# Patient Record
Sex: FEMALE | Race: WHITE | NOT HISPANIC OR LATINO | Employment: FULL TIME | URBAN - NONMETROPOLITAN AREA
[De-identification: names, ages, dates, MRNs, and addresses within clinical notes are randomized per-mention and may not be internally consistent; named-entity substitution may affect disease eponyms.]

---

## 2017-01-01 ENCOUNTER — HOSPITAL ENCOUNTER (OUTPATIENT)
Dept: PHYSICAL THERAPY | Facility: HOSPITAL | Age: 52
Setting detail: THERAPIES SERIES
Discharge: HOME | End: 2017-01-20
Attending: ORTHOPAEDIC SURGERY | Admitting: ORTHOPAEDIC SURGERY

## 2017-01-19 ENCOUNTER — TRANSCRIBE ORDERS (OUTPATIENT)
Dept: PHYSICAL THERAPY | Facility: HOSPITAL | Age: 52
End: 2017-01-19

## 2017-01-19 DIAGNOSIS — Z98.890 STATUS POST ARTHROSCOPY OF SHOULDER: Primary | ICD-10-CM

## 2017-01-23 ENCOUNTER — HOSPITAL ENCOUNTER (OUTPATIENT)
Dept: PHYSICAL THERAPY | Facility: HOSPITAL | Age: 52
Discharge: HOME | End: 2017-01-23
Admitting: PHYSICAL THERAPIST

## 2017-01-23 DIAGNOSIS — M25.512 LEFT SHOULDER PAIN, UNSPECIFIED CHRONICITY: Primary | ICD-10-CM

## 2017-01-23 PROCEDURE — 97110 THERAPEUTIC EXERCISES: CPT | Performed by: PHYSICAL THERAPIST

## 2017-01-23 PROCEDURE — 97164 PT RE-EVAL EST PLAN CARE: CPT | Performed by: PHYSICAL THERAPIST

## 2017-01-23 NOTE — PROGRESS NOTES
Outpatient Physical Therapy Ortho Re-Evaluation  [facility]     Patient Name: Priti Orr  : 1965  MRN: [id number]  Today's Date: 2017      Visit Date: 2017  Attendance: 15  Subjective Improvement: 80-85%  Patient Active Problem List   Diagnosis   • Osteoporosis   • Chronic left shoulder pain   • Presence of left artificial shoulder joint        Past Medical History   Diagnosis Date   • Allergic rhinitis    • Asthma    • Benign neoplasm of meninges      Post op       • Cobalamin deficiency    • Corneal abrasion    • Encounter for gynecological examination (general) (routine) without abnormal findings    • Essential hypertension    • Fatigue    • Female stress incontinence    • History of delayed wound healing      Delayed healing of surgical wound      • Hyperlipidemia    • Low back pain    • Nosebleed, symptom      Nosebleed/epistaxis symptom      • Osteoporosis         Past Surgical History   Procedure Laterality Date   • Hemorrhoidectomy     • Hysterectomy     • Breast lumpectomy     • Injection of medication  2013     Celestone (betamethasone) (1)      •  section  1986      Section (1)     • Endoscopy  2008     Colon endoscopy 02975 (1)     • Injection of medication  2013     Depo Medrol (Methylprednisone) (1)      • Injection of medication  2013     Dexamethasone (2)      • Excision breast lesion w/ preop needle loc  1981     Excision, breast lesion (1)     • Hemorrhoidectomy  2008     Hemorrhoidectomy (2)      • Injection of medication  2014     Kenalog (6)   • Knee arthroscopy  2007     Knee arthroscopy, surgery (1)     • Pap smear  2007     PAP SMEAR (1)      • Arm skin lesion biopsy / excision     • Arm lesion/cyst excision  2014     Remove arm bone lesion (1)   • Arm wound repair / closure  2014     Repair Superficial Wound TR-EXT 2.5 < CM 13976 (1)      • Total abdominal  hysterectomy with salpingo oophorectomy  1993     JOSE/BSO (1)      • Laparoscopic tubal ligation  04/16/1991     Tubal ligation (1)          Visit Dx:     ICD-10-CM ICD-9-CM   1. Left shoulder pain, unspecified chronicity M25.512 719.41             Patient History       01/23/17 1700          History    Chief Complaint Pain;Difficulty with daily activities  -      Type of Pain Shoulder pain  -      Date Current Problem(s) Began 08/23/16  -      Brief Description of Current Complaint Please see Initial Evaluation  -        User Key  (r) = Recorded By, (t) = Taken By, (c) = Cosigned By    Initials Name Provider Type     Nathaniel Sharp, PT Physical Therapist                PT Ortho       01/23/17 1700    Subjective Comments    Subjective Comments Quite a bit of soreness after last therapy session. Isometric abduction was somewhat painful. Trouble with activities that require elevation of the left shoulder. Able to reach L side of head if laterally flexes cervical spine. Unable to reach R side of head. Able to carry briefcase and groceries. No strength in L UE.  -    Precautions and Contraindications    Precautions/Limitations no known precautions/limitations  -    Subjective Pain    Able to rate subjective pain? yes  -    Pre-Treatment Pain Level 3  -SS    Post-Treatment Pain Level 4  -    Pain Assessment    Pain Assessment 0-10  -SS    Pain Score 3  -SS    Post Pain Score 4  -    Pain Location Shoulder  -SS    Pain Orientation Left  -SS    Posture/Observations    Alignment Options Scapular elevation;Rounded shoulders  -SS    ROM (Range of Motion)    General ROM upper extremity range of motion deficits identified  -    Left Shoulder    Flexion AROM other (see comments)   80 seated; 90 standing  -SS    External Rotation AROM other (see comments)   50 in supine 90/90  -SS    MMT (Manual Muscle Testing)    General MMT Assessment upper extremity strength deficits identified  -SS    Left  Shoulder    Flexion Gross Movement (3/5) fair  -SS    Extension Gross Movement (3+/5) fair plus  -SS    Int Rotation Gross Movement (3/5) fair  -SS    Ext Rotation Gross Movement (3/5) fair  -SS      User Key  (r) = Recorded By, (t) = Taken By, (c) = Cosigned By    Initials Name Provider Type    RUBÉN Sharp, PT Physical Therapist                            Therapy Education       01/23/17 1700    Therapy Education    Given HEP  -SS    Program Reinforced  -SS    How Provided Verbal;Demonstration  -SS    Provided to Patient  -SS    Level of Understanding Demonstrated  -SS      User Key  (r) = Recorded By, (t) = Taken By, (c) = Cosigned By    Initials Name Provider Type     Nathaniel Sharp, PT Physical Therapist                PT OP Goals       01/23/17 1700          PT Short Term Goals    STG Date to Achieve 02/13/17  -SS      STG 1 Active elevation to be >= 110 degrees  -SS      STG 1 Progress Not Met  -SS      STG 2 Active ER in supine 90/90 position to be >= 65 degrees  -SS      STG 2 Progress Not Met  -SS      Long Term Goals    LTG Date to Achieve 03/06/17  -SS      LTG 1 Independent w/ HEP  -SS      LTG 1 Progress Not Met  -SS      LTG 2 Active flexion to be >= 120 degrees  -SS      LTG 2 Progress Not Met  -SS      LTG 3 Independent use of L UE for dression  -SS      LTG 3 Progress Not Met  -SS      LTG 4 Fix hair w/o assistance  -SS      LTG 4 Progress Not Met  -SS      Time Calculation    PT Goal Re-Cert Due Date 02/13/17  -        User Key  (r) = Recorded By, (t) = Taken By, (c) = Cosigned By    Initials Name Provider Type     Nathaniel Sharp, PT Physical Therapist                PT Assessment/Plan       01/23/17 1700          PT Assessment    Functional Limitations Limitations in community activities;Performance in leisure activities;Performance in self-care ADL  -SS      Impairments Range of motion;Muscle strength  -      Assessment Comments Continuing ROM and strength  deficits s/p total shoulder arthroplasty  -SS      Please refer to paper survey for additional self-reported information Yes   QuickDASH  -SS      Rehab Potential Fair   Prior enchondroma and proximal humerus replacement   -SS      Patient/caregiver participated in establishment of treatment plan and goals Yes  -SS      Patient would benefit from skilled therapy intervention Yes  -SS      PT Plan    PT Frequency 1x/week  -SS      Predicted Duration of Therapy Intervention (days/wks) 6-8 weeks  -SS      Planned CPT's? PT EVAL: 09177;PT RE-EVAL: 27862;PT THER PROC EA 15 MIN: 76158;PT HOT OR COLD PACK TREAT MCARE  -SS      PT Plan Comments Focus on strength  -SS        User Key  (r) = Recorded By, (t) = Taken By, (c) = Cosigned By    Initials Name Provider Type     Nathaniel Sharp, PT Physical Therapist                  Exercises       01/23/17 1700          Subjective Comments    Subjective Comments Quite a bit of soreness after last therapy session. Isometric abduction was somewhat painful. Trouble with activities that require elevation of the left shoulder. Able to reach L side of head if laterally flexes cervical spine. Unable to reach R side of head. Able to carry briefcase and groceries. No strength in L UE.  -SS      Subjective Pain    Able to rate subjective pain? yes  -SS      Pre-Treatment Pain Level 3  -SS      Post-Treatment Pain Level 4  -SS      Aquatics    Aquatics performed? No  -SS      Exercise 1    Exercise Name 1 Isometric shoulder abduction ball squeeze  -SS      Cueing 1 Demo;Verbal  -SS      Equipment 1 Other   ball  -SS      Sets 1 1  -SS      Reps 1 10  -SS      Time (Seconds) 1 5  -SS      Exercise 2    Exercise Name 2 T-band Row  -SS      Cueing 2 Verbal  -SS      Equipment 2 Theraband  -SS      Resistance 2 Green  -SS      Sets 2 2  -SS      Reps 2 10  -SS      Exercise 3    Exercise Name 3 T-band Shoulder Extension  -SS      Cueing 3 Verbal  -SS      Equipment 3 Theraband  -SS       Resistance 3 Red  -SS      Sets 3 2  -SS      Reps 3 10  -SS      Exercise 4    Exercise Name 4 T-band Shoulder Adduction  -SS      Cueing 4 Verbal  -SS      Equipment 4 Theraband  -SS      Resistance 4 Red  -SS      Sets 4 2  -SS      Reps 4 10  -SS      Exercise 5    Exercise Name 5 T-band Shoulder IR  -SS      Cueing 5 Verbal  -SS      Equipment 5 Theraband  -SS      Resistance 5 Red  -SS      Sets 5 2  -SS      Reps 5 10  -SS      Exercise 6    Exercise Name 6 T-band Single Arm Row -- Left  -SS      Cueing 6 Verbal  -SS      Equipment 6 Theraband  -SS      Resistance 6 Red  -SS      Sets 6 2  -SS      Reps 6 10  -SS      Exercise 7    Exercise Name 7 Shoulder Flexion w/ Isometric Adduction Ball Squeeze  -SS      Cueing 7 Verbal  -SS      Resistance 7 --   isometric  -SS      Sets 7 1  -SS      Reps 7 5  -SS      Exercise 8    Exercise Name 8 Countertop Pushups  -SS      Cueing 8 Demo  -SS      Sets 8 2  -SS      Reps 8 5  -SS      Exercise 9    Exercise Name 9 Shoulder Depression Into Ball  -SS      Cueing 9 Verbal  -SS      Equipment 9 --   therapy ball  -SS      Sets 9 1  -SS      Reps 9 10  -SS      Time (Seconds) 9 5  -SS      Exercise 10    Exercise Name 10 Shoulder Abduction w/ Elbows Flexed  -SS      Cueing 10 Demo  -SS      Sets 10 2  -SS      Reps 10 10  -SS      Exercise 11    Exercise Name 11 Sternal Lift  -SS      Cueing 11 Tactile  -SS      Sets 11 2  -SS      Reps 11 15  -SS        User Key  (r) = Recorded By, (t) = Taken By, (c) = Cosigned By    Initials Name Provider Type     Nathaniel Sharp PT Physical Therapist                              Outcome Measures       01/23/17 1700          Quick DASH    Open a tight or new jar. 3  -SS      Do heavy household chores (e.g., wash walls, wash floors) 1  -SS      Carry a shopping bag or briefcase 1  -SS      Wash your back 4  -SS      Use a knife to cut food 2  -SS      Recreational activities in which you take some force or impact through your  arm, should or hand (e.g. golf, hammering, tennis, etc.) 4  -SS      During the past week, to what extent has your arm, shoulder, or hand problem interfered with your normal social activites with family, friends, neighbors or groups? 1  -SS      During the past week, were you limited in your work or other regular daily activities as a result of your arm, shoulder or hand problem? 1  -SS      Arm, Shoulder, or hand pain 3  -SS      Tingling (pins and needles) in your arm, shoulder, or hand 1  -SS      During the past week, how much difficulty have you had sleeping because of the pain in your arm, shoulder or hand? 2  -SS      Number of Questions Answered 11  -SS      Quick DASH Score 27.27  -SS      Functional Assessment    Outcome Measure Options Quick DASH  -SS        User Key  (r) = Recorded By, (t) = Taken By, (c) = Cosigned By    Initials Name Provider Type    SS Nathaniel Sharp, PT Physical Therapist            Time Calculation:   Start Time: 1615  Stop Time: 1657  Time Calculation (min): 42 min     Therapy Charges for Today     Code Description Service Date Service Provider Modifiers Qty    13804712201 HC PT RE-EVAL ESTABLISHED PLAN 1 1/23/2017 Nathaniel Sharp, PT GP 1    18335209558 HC PT THER PROC EA 15 MIN 1/23/2017 Nathaneil Sharp, PT GP 2          PT G-Codes  Outcome Measure Options: Quick DASH         Nathaniel Sharp, PT  1/23/2017

## 2017-01-30 ENCOUNTER — HOSPITAL ENCOUNTER (OUTPATIENT)
Dept: PHYSICAL THERAPY | Facility: HOSPITAL | Age: 52
Setting detail: THERAPIES SERIES
Discharge: HOME | End: 2017-01-30

## 2017-01-30 DIAGNOSIS — Z96.612 PRESENCE OF LEFT ARTIFICIAL SHOULDER JOINT: Primary | ICD-10-CM

## 2017-01-30 PROCEDURE — 97110 THERAPEUTIC EXERCISES: CPT | Performed by: PHYSICAL THERAPIST

## 2017-01-30 NOTE — MR AVS SNAPSHOT
Priti Andrews Kirby   1/30/2017  4:15 PM   Therapy Treatment    Dept Phone:  [phone]   Encounter #:  [id number]    Provider:  Nathaniel Sharp PT   Department:  [facility]                 Your Full Care Plan              Your Updated Medication List      ASK your doctor about these medications     ALBUTEROL IN        MG capsule       lisinopril-hydrochlorothiazide 20-12.5 MG per tablet   Commonly known as:  PRINZIDE,ZESTORETIC       PROLIA 60 MG/ML solution syringe   Generic drug:  denosumab       Vitamin D 1000 UNITS tablet               You Were Diagnosed With        Codes Comments    Presence of left artificial shoulder joint    -  Primary ICD-10-CM: Z96.612  ICD-9-CM: V43.61       Instructions     None    Patient Instructions History      Upcoming Appointments     Visit Type Date Time Department    TREATMENT 1/30/2017  4:15 PM [facility]     FOLLOW UP 2/2/2017  8:00 AM [facility]    TREATMENT 2/6/2017  4:15 PM [facility]       MyChart Signup     Our records indicate that you have declined [facility] sign[facility]. If you would like to sign up for [employer], please email [email or web address] or call [phone] to obtain an activation code.             Other Info from Your Visit           Your Appointments     Feb 02, 2017  8:00 AM CST   Follow Up with Brooks Junior MD   [facility] (--)    [address]  [facility] [address] [phone]           Arrive 15 minutes prior to appointment.            Feb 06, 2017  4:15 PM CST   Therapy Treatment with Nathaniel Sharp, PT   [facility]  (--)    [address] [address] [phone]              Allergies     Pseudoephedrine  Shortness Of Breath, Swelling    LIPS SWELL AND TONGUE BECOMES THICK    Morphine  Other (See Comments)    States felt like itching from  within, hallucinations, agitation    Allegra [Fexofenadine]  Swelling    Zyrtec [Cetirizine]  Swelling      Reason for Visit     Left Arm - Pain L reverse total shoulder 8/23/16      Vital Signs     Smoking Status                   Never Smoker           Problems and Diagnoses Noted     Presence of left artificial shoulder joint

## 2017-01-30 NOTE — PROGRESS NOTES
Outpatient Physical Therapy Ortho Treatment Note  [facility]     Patient Name: Priti Orr  : 1965  MRN: [id number]  Today's Date: 2017      Visit Date: 2017  Attendance:   Subjective Improvement: 85%  Next MD Appt: 17  Recert Date: 17      Visit Dx:    ICD-10-CM ICD-9-CM   1. Presence of left artificial shoulder joint Z96.612 V43.61   Left reverse total shoulder arthroplasty 16    Patient Active Problem List   Diagnosis   • Osteoporosis   • Chronic left shoulder pain   • Presence of left artificial shoulder joint        Past Medical History   Diagnosis Date   • Allergic rhinitis    • Asthma    • Benign neoplasm of meninges      Post op       • Cobalamin deficiency    • Corneal abrasion    • Encounter for gynecological examination (general) (routine) without abnormal findings    • Essential hypertension    • Fatigue    • Female stress incontinence    • History of delayed wound healing      Delayed healing of surgical wound      • Hyperlipidemia    • Low back pain    • Nosebleed, symptom      Nosebleed/epistaxis symptom      • Osteoporosis         Past Surgical History   Procedure Laterality Date   • Hemorrhoidectomy     • Hysterectomy     • Breast lumpectomy     • Injection of medication  2013     Celestone (betamethasone) (1)      •  section  1986      Section (1)     • Endoscopy  2008     Colon endoscopy 66536 (1)     • Injection of medication  2013     Depo Medrol (Methylprednisone) (1)      • Injection of medication  2013     Dexamethasone (2)      • Excision breast lesion w/ preop needle loc  1981     Excision, breast lesion (1)     • Hemorrhoidectomy  2008     Hemorrhoidectomy (2)      • Injection of medication  2014     Kenalog (6)   • Knee arthroscopy  2007     Knee arthroscopy, surgery (1)     • Pap smear  2007     PAP SMEAR (1)      • Arm skin lesion biopsy / excision      • Arm lesion/cyst excision  12/11/2014     Remove arm bone lesion (1)   • Arm wound repair / closure  07/02/2014     Repair Superficial Wound TR-EXT 2.5 < CM 21776 (1)      • Total abdominal hysterectomy with salpingo oophorectomy  1993     JOSE/BSO (1)      • Laparoscopic tubal ligation  04/16/1991     Tubal ligation (1)                PT Ortho       01/30/17 1600    Left Shoulder    Flexion AROM other (see comments)   90 Standing  -SS    Extension AROM other (see comments)   67  -SS    ABduction AROM other (see comments)   85  -SS    External Rotation AROM other (see comments)   15 (neutral)  -      User Key  (r) = Recorded By, (t) = Taken By, (c) = Cosigned By    Initials Name Provider Type    SS Nathaniel Sharp, PT Physical Therapist                            PT Assessment/Plan       01/30/17 1600          PT Assessment    Functional Limitations Limitations in community activities;Performance in leisure activities;Performance in self-care ADL  -      Impairments Range of motion;Muscle strength  -      Assessment Comments Muscle fatigue post-treatment with diffuse shoulder pain.  -      Rehab Potential Fair   prior enchondroma and proximal humerus replacement  -      Patient/caregiver participated in establishment of treatment plan and goals Yes  -SS      Patient would benefit from skilled therapy intervention Yes  -SS      PT Plan    PT Frequency 1x/week  -      Predicted Duration of Therapy Intervention (days/wks) 6-8 weeks  -      PT Plan Comments Continue to work on shoulder and scapular strengthening. Advance [place] exercises as zena.  -        User Key  (r) = Recorded By, (t) = Taken By, (c) = Cosigned By    Initials Name Provider Type    SS Nathaniel Sharp, PT Physical Therapist                    Exercises       01/30/17 1600          Subjective Comments    Subjective Comments Very sore yesterday. Worked on HEP with wall walks, can lifts, theraband exercises, and supine  elevation over the weekend. Has made her sore. 85% improved.  -SS      Subjective Pain    Able to rate subjective pain? yes  -SS      Pre-Treatment Pain Level 0  -SS      Post-Treatment Pain Level 4  -SS      Subjective Pain Comment fatigued with therex  -SS      Aquatics    Aquatics performed? No  -SS      Exercise 1    Exercise Name 1 AA shouler ER w/ arm neutral  -SS      Cueing 1 Tactile  -SS      Sets 1 3  -SS      Reps 1 20  -SS      Exercise 2    Exercise Name 2 Manually resisted scap retraction  -SS      Sets 2 3  -SS      Reps 2 10  -SS      Time (Seconds) 2 5  -SS      Exercise 3    Exercise Name 3 Shoulder abduction with elbows flexed  -SS      Sets 3 3  -SS      Reps 3 10  -SS      Exercise 4    Exercise Name 4 AA Shoulder flexion with elbow flexed   -SS      Cueing 4 Verbal;Demo  -SS      Sets 4 2  -SS      Reps 4 20  -SS      Exercise 5    Exercise Name 5 Isometric Shoulder Depression  -SS      Cueing 5 Tactile  -SS      Sets 5 3  -SS      Reps 5 10  -SS      Time (Seconds) 5 5  -SS      Exercise 6    Exercise Name 6 [place] Isometric Low Row  -SS      Cueing 6 Demo  -SS      Sets 6 1  -SS      Reps 6 15  -SS      Exercise 7    Exercise Name 7 [place] T-band Low Row  -SS      Cueing 7 Demo  -SS      Equipment 7 Theraband  -SS      Resistance 7 Red  -SS      Sets 7 3  -SS      Reps 7 10  -SS      Exercise 8    Exercise Name 8 Katie T-band Long Pull  -SS      Cueing 8 Demo  -SS      Equipment 8 Theraband  -SS      Resistance 8 Red  -SS      Sets 8 3  -SS      Reps 8 10  -SS        User Key  (r) = Recorded By, (t) = Taken By, (c) = Cosigned By    Initials Name Provider Type    SS Nathaniel Sharp, PT Physical Therapist                               PT OP Goals       01/30/17 1712 01/30/17 1600 01/23/17 1700    PT Short Term Goals    STG Date to Achieve  02/13/17  -SS 02/13/17  -SS    STG 1  Active elevation to be >= 110 degrees  -SS Active elevation to be >= 110 degrees  -SS    STG 1 Progress  Not Met   -SS Not Met  -SS    STG 2  Active ER in supine 90/90 position to be >= 65 degrees  -SS Active ER in supine 90/90 position to be >= 65 degrees  -SS    STG 2 Progress  Not Met  -SS Not Met  -SS    Long Term Goals    LTG Date to Achieve  03/06/17  -SS 03/06/17  -SS    LTG 1  Independent w/ HEP  -SS Independent w/ HEP  -SS    LTG 1 Progress  Not Met  -SS Not Met  -SS    LTG 2  Active flexion to be >= 120 degrees  -SS Active flexion to be >= 120 degrees  -SS    LTG 2 Progress  Not Met  -SS Not Met  -SS    LTG 3  Independent use of L UE for dression  -SS Independent use of L UE for dression  -SS    LTG 3 Progress  Not Met  -SS Not Met  -SS    LTG 4  Fix hair w/o assistance  -SS Fix hair w/o assistance  -SS    LTG 4 Progress  Not Met  -SS Not Met  -SS    Time Calculation    PT Goal Re-Cert Due Date 02/13/17  -SS  02/13/17  -SS      User Key  (r) = Recorded By, (t) = Taken By, (c) = Cosigned By    Initials Name Provider Type    SS Nathaniel Sharp, PT Physical Therapist                    Time Calculation:   Start Time: 1614  Stop Time: 1700  Time Calculation (min): 46 min  Total Timed Code Minutes- PT: 46 minute(s)    Therapy Charges for Today     Code Description Service Date Service Provider Modifiers Qty    21454907250 HC PT THER PROC EA 15 MIN 1/30/2017 Nathaniel Sharp, PT GP 3                    Nathaniel Sharp, PT  1/30/2017

## 2017-02-02 ENCOUNTER — OFFICE VISIT (OUTPATIENT)
Dept: ORTHOPEDIC SURGERY | Facility: CLINIC | Age: 52
End: 2017-02-02

## 2017-02-02 VITALS — WEIGHT: 201 LBS | BODY MASS INDEX: 40.52 KG/M2 | HEIGHT: 59 IN

## 2017-02-02 DIAGNOSIS — Z96.612 PRESENCE OF LEFT ARTIFICIAL SHOULDER JOINT: ICD-10-CM

## 2017-02-02 DIAGNOSIS — M25.512 CHRONIC LEFT SHOULDER PAIN: Primary | ICD-10-CM

## 2017-02-02 DIAGNOSIS — G89.29 CHRONIC LEFT SHOULDER PAIN: Primary | ICD-10-CM

## 2017-02-02 PROCEDURE — 99213 OFFICE O/P EST LOW 20 MIN: CPT | Performed by: ORTHOPAEDIC SURGERY

## 2017-02-02 RX ORDER — MELOXICAM 15 MG/1
15 TABLET ORAL DAILY
Refills: 5 | COMMUNITY
Start: 2016-12-01 | End: 2020-05-29 | Stop reason: ALTCHOICE

## 2017-02-02 NOTE — PROGRESS NOTES
Priti Orr is a 51 y.o. female returns for     Chief Complaint   Patient presents with   • Left Shoulder - Follow-up   • Results     repeat xrays done today in office       HISTORY OF PRESENT ILLNESS: Patient follows up for left shoulder. Achy but not having any real pain.  She states she is so much better than prior to last surgery.  She has continued doing PT and HEP but has not noticed much improvement lately.  No fevers or chills.       CONCURRENT MEDICAL HISTORY:    Past Medical History   Diagnosis Date   • Allergic rhinitis    • Asthma    • Benign neoplasm of meninges      Post op 2011      • Cobalamin deficiency    • Corneal abrasion    • Encounter for gynecological examination (general) (routine) without abnormal findings    • Essential hypertension    • Fatigue    • Female stress incontinence    • History of delayed wound healing      Delayed healing of surgical wound      • Hyperlipidemia    • Low back pain    • Nosebleed, symptom      Nosebleed/epistaxis symptom      • Osteoporosis        Allergies   Allergen Reactions   • Pseudoephedrine Shortness Of Breath and Swelling     LIPS SWELL AND TONGUE BECOMES THICK   • Morphine Other (See Comments)     States felt like itching from within, hallucinations, agitation   • Allegra [Fexofenadine] Swelling   • Zyrtec [Cetirizine] Swelling         Current Outpatient Prescriptions:   •  ALBUTEROL IN, Inhale 1 puff., Disp: , Rfl:   •  Cholecalciferol (VITAMIN D) 1000 UNITS tablet, Take 1,000 Units by mouth., Disp: , Rfl:   •  denosumab (PROLIA) 60 MG/ML solution syringe, Inject  under the skin 1 (One) Time., Disp: , Rfl:   •  Docusate Sodium (DSS) 100 MG capsule, Take 100 mg by mouth., Disp: , Rfl:   •  lisinopril-hydrochlorothiazide (PRINZIDE,ZESTORETIC) 20-12.5 MG per tablet, Take 1 tablet by mouth., Disp: , Rfl:   •  meloxicam (MOBIC) 15 MG tablet, , Disp: , Rfl: 5    Past Surgical History   Procedure Laterality Date   • Hemorrhoidectomy     • Hysterectomy   "   • Breast lumpectomy     • Injection of medication  2013     Celestone (betamethasone) (1)      •  section  1986      Section (1)     • Endoscopy  2008     Colon endoscopy 97476 (1)     • Injection of medication  2013     Depo Medrol (Methylprednisone) (1)      • Injection of medication  2013     Dexamethasone (2)      • Excision breast lesion w/ preop needle loc  1981     Excision, breast lesion (1)     • Hemorrhoidectomy  2008     Hemorrhoidectomy (2)      • Injection of medication  2014     Kenalog (6)   • Knee arthroscopy  2007     Knee arthroscopy, surgery (1)     • Pap smear  2007     PAP SMEAR (1)      • Arm skin lesion biopsy / excision     • Arm lesion/cyst excision  2014     Remove arm bone lesion (1)   • Arm wound repair / closure  2014     Repair Superficial Wound TR-EXT 2.5 < CM 37060 (1)      • Total abdominal hysterectomy with salpingo oophorectomy       JOSE/BSO (1)      • Laparoscopic tubal ligation  1991     Tubal ligation (1)          ROS  No fevers or chills.  No chest pain or shortness of air.  No GI or  disturbances.    PHYSICAL EXAMINATION:       Visit Vitals   • Ht 59\" (149.9 cm)   • Wt 201 lb (91.2 kg)   • BMI 40.6 kg/m2       Physical Exam   Constitutional: She is oriented to person, place, and time. She appears well-developed and well-nourished.   Neurological: She is alert and oriented to person, place, and time.   Psychiatric: She has a normal mood and affect. Her behavior is normal. Judgment and thought content normal.         Left Shoulder Exam     Tenderness   The patient is experiencing no tenderness.         Range of Motion   Active Abduction: 90   Forward Flexion: 30     Muscle Strength   Abduction: 3/5   Supraspinatus: 3/5     Other   Sensation: normal  Pulse: present     Comments:  Stable exam              Xr Shoulder 2+ View Left    Result Date: 2017  Narrative: 4 views of " the left shoulder show a proximal humerus replacement device as an extended reverse total shoulder.  No sign of loosening or failure is noted.  No acute bony abnormality is noted. Comparison views 3/22/16 which were prior to her most recent surgery. 02/02/17 at 5:08 PM by Brooks Junior MD             ASSESSMENT:    Diagnoses and all orders for this visit:    Chronic left shoulder pain  -     XR Shoulder 2+ View Left    Presence of left artificial shoulder joint  -     XR Shoulder 2+ View Left    Other orders  -     meloxicam (MOBIC) 15 MG tablet;           PLAN    Activity as tolerated.  Continue strengthening.  Continue stretching and trying to gain rotation.  Recheck in 6 months with repeat xrays or sooner if needed.  Patient is happy.      Brooks Junior MD     Cc:  Dr Glenn Sy

## 2017-02-06 ENCOUNTER — HOSPITAL ENCOUNTER (OUTPATIENT)
Dept: PHYSICAL THERAPY | Facility: HOSPITAL | Age: 52
Setting detail: THERAPIES SERIES
Discharge: HOME | End: 2017-02-06

## 2017-02-06 DIAGNOSIS — M25.512 LEFT SHOULDER PAIN, UNSPECIFIED CHRONICITY: ICD-10-CM

## 2017-02-06 DIAGNOSIS — Z96.612 PRESENCE OF LEFT ARTIFICIAL SHOULDER JOINT: Primary | ICD-10-CM

## 2017-02-06 PROCEDURE — 97110 THERAPEUTIC EXERCISES: CPT | Performed by: PHYSICAL THERAPIST

## 2017-02-06 NOTE — PROGRESS NOTES
Outpatient Physical Therapy Ortho Treatment Note  [facility]     Patient Name: Priti Orr  : 1965  MRN: [id number]  Today's Date: 2017      Visit Date: 2017  Attendance:   (30v/yr approved; 6 used in 2017)  Subjective Improvement: 85%  Next MD Appt: 6 months ([place])  Recert Date: 17    Therapy Diagnosis: L reverse total shoulder 16    Visit Dx:    ICD-10-CM ICD-9-CM   1. Presence of left artificial shoulder joint Z96.612 V43.61   2. Left shoulder pain, unspecified chronicity M25.512 719.41       Patient Active Problem List   Diagnosis   • Osteoporosis   • Chronic left shoulder pain   • Presence of left artificial shoulder joint        Past Medical History   Diagnosis Date   • Allergic rhinitis    • Asthma    • Benign neoplasm of meninges      Post op       • Cobalamin deficiency    • Corneal abrasion    • Encounter for gynecological examination (general) (routine) without abnormal findings    • Essential hypertension    • Fatigue    • Female stress incontinence    • History of delayed wound healing      Delayed healing of surgical wound      • Hyperlipidemia    • Low back pain    • Nosebleed, symptom      Nosebleed/epistaxis symptom      • Osteoporosis         Past Surgical History   Procedure Laterality Date   • Hemorrhoidectomy     • Hysterectomy     • Breast lumpectomy     • Injection of medication  2013     Celestone (betamethasone) (1)      •  section  1986      Section (1)     • Endoscopy  2008     Colon endoscopy 67065 (1)     • Injection of medication  2013     Depo Medrol (Methylprednisone) (1)      • Injection of medication  2013     Dexamethasone (2)      • Excision breast lesion w/ preop needle loc  1981     Excision, breast lesion (1)     • Hemorrhoidectomy  2008     Hemorrhoidectomy (2)      • Injection of medication  2014     Kenalog (6)   • Knee arthroscopy  2007     Knee  "arthroscopy, surgery (1)     • Pap smear  01/18/2007     PAP SMEAR (1)      • Arm skin lesion biopsy / excision     • Arm lesion/cyst excision  12/11/2014     Remove arm bone lesion (1)   • Arm wound repair / closure  07/02/2014     Repair Superficial Wound TR-EXT 2.5 < CM 97904 (1)      • Total abdominal hysterectomy with salpingo oophorectomy  1993     JOSE/BSO (1)      • Laparoscopic tubal ligation  04/16/1991     Tubal ligation (1)                PT Ortho       02/06/17 1600    Left Shoulder    Flexion AROM other (see comments)   90 scaption  -SS    Extension AROM WNL (0-60 degrees)   65  -SS    ABduction AROM other (see comments)   90  -SS    External Rotation AROM other (see comments)   20  -SS      User Key  (r) = Recorded By, (t) = Taken By, (c) = Cosigned By    Initials Name Provider Type     Nathaniel Sharp, PT Physical Therapist                            PT Assessment/Plan       02/06/17 1700          PT Assessment    Functional Limitations Limitations in community activities;Performance in leisure activities  -      Impairments Range of motion;Muscle strength  -      Assessment Comments Muscle fatigue w/ therex. Good effort.   -      Rehab Potential Fair   prior enchondroma with proximal humerus replacement  -      Patient/caregiver participated in establishment of treatment plan and goals Yes  -SS      Patient would benefit from skilled therapy intervention Yes  -SS      PT Plan    PT Frequency 1x/week  -      Predicted Duration of Therapy Intervention (days/wks) 6 weeks  -      PT Plan Comments Re-eval scheduled for 2/14/17. Continue strengthening.  -        User Key  (r) = Recorded By, (t) = Taken By, (c) = Cosigned By    Initials Name Provider Type     Nathaniel Sharp, PT Physical Therapist                    Exercises       02/06/17 1600          Subjective Comments    Subjective Comments \"I do wake up in the night\" with arm twisted and painful. Notices that happens " almost every night. Overall, pt is very pleased.  -SS      Subjective Pain    Able to rate subjective pain? yes  -SS      Pre-Treatment Pain Level 0  -SS      Post-Treatment Pain Level 0  -SS      Subjective Pain Comment sore and muscle fatigue with therex  -SS      Aquatics    Aquatics performed? No  -SS      Exercise 1    Exercise Name 1 AA shoulder ER  -SS      Cueing 1 Tactile  -SS      Sets 1 1  -SS      Reps 1 Other   60  -SS      Exercise 2    Exercise Name 2 Manually resisted isometric shoulder ER  -SS      Cueing 2 Tactile  -SS      Sets 2 2  -SS      Reps 2 10  -SS      Time (Seconds) 2 3  -SS      Exercise 3    Exercise Name 3 Manually resisted scapular retraction  -SS      Cueing 3 Tactile  -SS      Sets 3 3  -SS      Reps 3 10  -SS      Time (Seconds) 3 5  -SS      Exercise 4    Exercise Name 4 Isometric shoulder depression  -SS      Cueing 4 Tactile  -SS      Sets 4 2  -SS      Reps 4 10  -SS      Time (Seconds) 4 5  -SS      Exercise 5    Exercise Name 5 CW/CCW circles with pressdown  -SS      Cueing 5 Tactile  -SS      Equipment 5 Other   physioball  -SS      Sets 5 1  -SS      Reps 5 20  -SS      Exercise 6    Exercise Name 6 [place] T-band Low Row  -SS      Cueing 6 Verbal  -SS      Equipment 6 Theraband  -SS      Resistance 6 Red  -SS      Sets 6 2  -SS      Reps 6 15  -SS      Exercise 7    Exercise Name 7 [place] T-band Long Pull  -SS      Cueing 7 Verbal  -SS      Equipment 7 Theraband  -SS      Resistance 7 Red  -SS      Sets 7 2  -SS      Reps 7 15  -SS      Exercise 8    Exercise Name 8 Tband IR  -SS      Cueing 8 Verbal  -SS      Equipment 8 Theraband  -SS      Resistance 8 Red  -SS      Sets 8 2  -SS      Reps 8 10  -SS      Exercise 9    Exercise Name 9 Place and hold shoulder scaption  -SS      Cueing 9 Tactile  -SS      Sets 9 1  -SS      Reps 9 15  -SS      Time (Seconds) 9 3  -SS        User Key  (r) = Recorded By, (t) = Taken By, (c) = Cosigned By    Initials Name Provider Type    SS  Nathaniel Sharp, PT Physical Therapist                               PT OP Goals       02/06/17 1722 02/06/17 1600 01/30/17 1712    PT Short Term Goals    STG Date to Achieve  02/13/17  -SS     STG 1  Active elevation to be >= 110 degrees  -SS     STG 1 Progress  Not Met  -SS     STG 2  Active ER in supine 90/90 position to be >= 65 degrees  -SS     STG 2 Progress  Not Met  -SS     Long Term Goals    LTG Date to Achieve  03/06/17  -SS     LTG 1  Independent w/ HEP  -SS     LTG 1 Progress  Not Met  -SS     LTG 2  Active flexion to be >= 120 degrees  -SS     LTG 2 Progress  Not Met  -SS     LTG 3  Independent use of L UE for dression  -SS     LTG 3 Progress  Not Met  -SS     LTG 4  Fix hair w/o assistance  -SS     LTG 4 Progress  Not Met  -SS     Time Calculation    PT Goal Re-Cert Due Date 02/13/17  -SS  02/13/17  -SS      01/30/17 1600          PT Short Term Goals    STG Date to Achieve 02/13/17  -SS      STG 1 Active elevation to be >= 110 degrees  -SS      STG 1 Progress Not Met  -SS      STG 2 Active ER in supine 90/90 position to be >= 65 degrees  -SS      STG 2 Progress Not Met  -SS      Long Term Goals    LTG Date to Achieve 03/06/17  -SS      LTG 1 Independent w/ HEP  -SS      LTG 1 Progress Not Met  -SS      LTG 2 Active flexion to be >= 120 degrees  -SS      LTG 2 Progress Not Met  -SS      LTG 3 Independent use of L UE for dression  -SS      LTG 3 Progress Not Met  -SS      LTG 4 Fix hair w/o assistance  -SS      LTG 4 Progress Not Met  -SS        User Key  (r) = Recorded By, (t) = Taken By, (c) = Cosigned By    Initials Name Provider Type    SS Nathaniel Sharp, PT Physical Therapist                    Time Calculation:   Start Time: 1612  Stop Time: 1702  Time Calculation (min): 50 min  Total Timed Code Minutes- PT: 50 minute(s)    Therapy Charges for Today     Code Description Service Date Service Provider Modifiers Qty    27037846085 HC PT THER PROC EA 15 MIN 2/6/2017 Nathaniel Sharp,  PT GP 3                    Nathaniel Sharp, PT  2/6/2017

## 2017-02-14 ENCOUNTER — HOSPITAL ENCOUNTER (OUTPATIENT)
Dept: PHYSICAL THERAPY | Facility: HOSPITAL | Age: 52
Setting detail: THERAPIES SERIES
Discharge: HOME | End: 2017-02-14

## 2017-02-14 DIAGNOSIS — Z96.612 PRESENCE OF LEFT ARTIFICIAL SHOULDER JOINT: Primary | ICD-10-CM

## 2017-02-14 DIAGNOSIS — M25.512 LEFT SHOULDER PAIN, UNSPECIFIED CHRONICITY: ICD-10-CM

## 2017-02-14 PROCEDURE — 97164 PT RE-EVAL EST PLAN CARE: CPT | Performed by: PHYSICAL THERAPIST

## 2017-02-14 PROCEDURE — 97110 THERAPEUTIC EXERCISES: CPT | Performed by: PHYSICAL THERAPIST

## 2017-02-14 NOTE — THERAPY DISCHARGE NOTE
Outpatient Physical Therapy Ortho Re-Evaluation/Discharge  [facility]     Patient Name: Priti Orr  : 1965  MRN: [id number]  Today's Date: 2017      Visit Date: 2017  Attendance:   Subjective Improvement: 85%  Next MD Appt: August (Dr. Junoir)  Recert Date: N/A    Therapy Diagnosis: L reverse total shoulder 16    Patient Active Problem List   Diagnosis   • Osteoporosis   • Chronic left shoulder pain   • Presence of left artificial shoulder joint        Past Medical History   Diagnosis Date   • Allergic rhinitis    • Asthma    • Benign neoplasm of meninges      Post op       • Cobalamin deficiency    • Corneal abrasion    • Encounter for gynecological examination (general) (routine) without abnormal findings    • Essential hypertension    • Fatigue    • Female stress incontinence    • History of delayed wound healing      Delayed healing of surgical wound      • Hyperlipidemia    • Low back pain    • Nosebleed, symptom      Nosebleed/epistaxis symptom      • Osteoporosis         Past Surgical History   Procedure Laterality Date   • Hemorrhoidectomy     • Hysterectomy     • Breast lumpectomy     • Injection of medication  2013     Celestone (betamethasone) (1)      •  section  1986      Section (1)     • Endoscopy  2008     Colon endoscopy 07232 (1)     • Injection of medication  2013     Depo Medrol (Methylprednisone) (1)      • Injection of medication  2013     Dexamethasone (2)      • Excision breast lesion w/ preop needle loc  1981     Excision, breast lesion (1)     • Hemorrhoidectomy  2008     Hemorrhoidectomy (2)      • Injection of medication  2014     Kenalog (6)   • Knee arthroscopy  2007     Knee arthroscopy, surgery (1)     • Pap smear  2007     PAP SMEAR (1)      • Arm skin lesion biopsy / excision     • Arm lesion/cyst excision  2014     Remove arm bone lesion (1)   • Arm  wound repair / closure  07/02/2014     Repair Superficial Wound TR-EXT 2.5 < CM 23960 (1)      • Total abdominal hysterectomy with salpingo oophorectomy  1993     JOSE/BSO (1)      • Laparoscopic tubal ligation  04/16/1991     Tubal ligation (1)        Changes in Medications: none noted  Changes in MD Orders: none noted  Number of Work Days Lost: approximately 2 weeks      Visit Dx:     ICD-10-CM ICD-9-CM   1. Presence of left artificial shoulder joint Z96.612 V43.61   2. Left shoulder pain, unspecified chronicity M25.512 719.41                 PT Ortho       02/14/17 1600    Subjective Comments    Subjective Comments Not feeling too good today. Generalized ache in whole body. Shoulder isn't feeling any worse. Getting better at curling her hair using compensations. Sees Dr. Junior again in August for shoulder. 85% subjective improvement.  -SS    Precautions and Contraindications    Precautions/Limitations no known precautions/limitations  -SS    Subjective Pain    Able to rate subjective pain? yes  -SS    Pre-Treatment Pain Level 0  -SS    Pain Assessment    Pain Location Shoulder  -SS    Pain Orientation Left  -SS    Posture/Observations    Alignment Options Scapular elevation  -SS    Scapular Elevation Moderate  -SS    Sensation    Sensation WNL? WNL  -SS    Left Shoulder    Flexion AROM other (see comments)   90 (scaption) standing; 143 supine flexion  -SS    Extension AROM WNL (0-60 degrees)   62  -SS    ABduction AROM other (see comments)   85  -SS    External Rotation AROM other (see comments)   15 neutral; 55 in supine 90/90  -SS    Left Shoulder    Flexion Gross Movement (3/5) fair   pain  -SS    Extension Gross Movement (3+/5) fair plus;(4-/5) good minus  -SS    ABduction Gross Movement (3/5) fair   pain  -SS    Int Rotation Gross Movement (3/5) fair  -SS    Ext Rotation Gross Movement (3/5) fair   pain  -SS      User Key  (r) = Recorded By, (t) = Taken By, (c) = Cosigned By    Initials Name Provider Type     SS Nathaniel Sharp, PT Physical Therapist                             Therapy Education       02/14/17 1600    Therapy Education    Given HEP  -SS    Program Modified   added supine chest press; discussed HEP and Fitness routine  -SS    How Provided Verbal;Demonstration  -SS    Provided to Patient  -SS    Level of Understanding Demonstrated;Verbalized  -SS      User Key  (r) = Recorded By, (t) = Taken By, (c) = Cosigned By    Initials Name Provider Type    SS Nathaniel Sharp, PT Physical Therapist                PT OP Goals       02/14/17 1600 02/06/17 1722 02/06/17 1600    PT Short Term Goals    STG Date to Achieve 02/13/17  -SS  02/13/17  -SS    STG 1 Active elevation to be >= 110 degrees  -SS  Active elevation to be >= 110 degrees  -SS    STG 1 Progress Not Met  -SS  Not Met  -SS    STG 2 Active ER in supine 90/90 position to be >= 65 degrees  -SS  Active ER in supine 90/90 position to be >= 65 degrees  -SS    STG 2 Progress Not Met  -SS  Not Met  -SS    Long Term Goals    LTG Date to Achieve 03/06/17  -SS  03/06/17  -SS    LTG 1 Independent w/ HEP  -SS  Independent w/ HEP  -SS    LTG 1 Progress Met  -SS  Not Met  -SS    LTG 2 Active flexion to be >= 120 degrees  -SS  Active flexion to be >= 120 degrees  -SS    LTG 2 Progress Not Met  -SS  Not Met  -SS    LTG 3 Independent use of L UE for dression  -SS  Independent use of L UE for dression  -SS    LTG 3 Progress Met  -SS  Not Met  -SS    LTG 4 Fix hair w/o assistance  -SS  Fix hair w/o assistance  -SS    LTG 4 Progress Met  -SS  Not Met  -SS    Time Calculation    PT Goal Re-Cert Due Date  02/13/17  -SS       User Key  (r) = Recorded By, (t) = Taken By, (c) = Cosigned By    Initials Name Provider Type    SS Nathaniel Sharp, PT Physical Therapist                PT Assessment/Plan       02/14/17 1600          PT Assessment    Functional Limitations Limitations in community activities;Performance in leisure activities  -SS      Impairments Range of  motion;Muscle strength  -      Assessment Comments Essentiallly no change in AROM over past 6 weeks. Improving functional use of the L UE but continues to be limited by motion and strength. We discussed HEP and [employer] routine.  -SS      Rehab Potential Fair   prior enchondroma and proximal humerus replacement  -SS      Patient/caregiver participated in establishment of treatment plan and goals Yes  -SS      Patient would benefit from skilled therapy intervention Yes  -SS      PT Plan    PT Frequency Other (comment)   D/C P.T. this date  -SS      PT Plan Comments D/C P.T. this date  -        User Key  (r) = Recorded By, (t) = Taken By, (c) = Cosigned By    Initials Name Provider Type     Nathaniel Sharp, PT Physical Therapist                Exercises       02/14/17 1600          Subjective Comments    Subjective Comments Not feeling too good today. Generalized ache in whole body. Shoulder isn't feeling any worse. Getting better at curling her hair using compensations. Sees Dr. Junior again in August for shoulder. 85% subjective improvement.  -      Subjective Pain    Able to rate subjective pain? yes  -SS      Pre-Treatment Pain Level 0  -SS      Post-Treatment Pain Level 3  -SS      Exercise 1    Exercise Name 1 Cybex Row  -SS      Cueing 1 Verbal;Demo  -SS      Weights/Plates 1 Other Weight   30#  -SS      Sets 1 2  -SS      Reps 1 10  -SS      Exercise 2    Exercise Name 2 Cybex Incline Pull  -SS      Cueing 2 Verbal;Demo  -SS      Weights/Plates 2 10   pounds  -SS      Reps 2 5  -SS      Exercise 3    Exercise Name 3 Cybex Chest Press  -SS      Cueing 3 Verbal;Demo  -SS      Weights/Plates 3 10   pounds  -SS      Reps 3 3  -SS      Exercise 4    Exercise Name 4 Supine Chest Press  -SS      Cueing 4 Verbal;Tactile  -SS      Weights/Plates 4 --   body weight  -SS      Sets 4 1  -SS      Reps 4 10  -SS      Exercise 5    Exercise Name 5 DB row  -SS      Cueing 5 Verbal  -SS      Equipment 5  [place]  -SS      Weights/Plates 5 1   pound  -SS      Reps 5 20  -SS        User Key  (r) = Recorded By, (t) = Taken By, (c) = Cosigned By    Initials Name Provider Type     Nathaniel Sharp PT Physical Therapist                             Outcome Measures       02/14/17 1600          Quick DASH    Open a tight or new jar. 2  -SS      Do heavy household chores (e.g., wash walls, wash floors) 1  -SS      Carry a shopping bag or briefcase 1  -SS      Wash your back 4  -SS      Use a knife to cut food 1  -SS      Recreational activities in which you take some force or impact through your arm, should or hand (e.g. golf, hammering, tennis, etc.) 4  -SS      During the past week, to what extent has your arm, shoulder, or hand problem interfered with your normal social activites with family, friends, neighbors or groups? 1  -SS      During the past week, were you limited in your work or other regular daily activities as a result of your arm, shoulder or hand problem? 1  -SS      Arm, Shoulder, or hand pain 2  -SS      During the past week, how much difficulty have you had sleeping because of the pain in your arm, shoulder or hand? 1  -SS      Number of Questions Answered 10  -SS      Quick DASH Score 20  -SS      Functional Assessment    Outcome Measure Options Quick DASH  -SS        User Key  (r) = Recorded By, (t) = Taken By, (c) = Cosigned By    Initials Name Provider Type     Nathaniel Sharp PT Physical Therapist            Time Calculation:   Start Time: 1604  Stop Time: 1648  Time Calculation (min): 44 min  Total Timed Code Minutes- PT: 14 minute(s)    Therapy Charges for Today     Code Description Service Date Service Provider Modifiers Qty    63346894029 HC PT RE-EVAL ESTABLISHED PLAN 2 2/14/2017 Nathaniel Sharp, PT GP 1    81036645150 HC PT THER SUPP EA 15 MIN 2/14/2017 Nathaniel Sharp, PT GP 1    11819975953 HC PT THER PROC EA 15 MIN 2/14/2017 Nathaniel Sharp, PT GP 1           PT G-Codes  Outcome Measure Options: Quick DASH     OP PT Discharge Summary  Date of Discharge: 02/14/17  Reason for Discharge: Lack of progress (AROM unchanged x 6-8 weeks)  Outcomes Achieved: Patient able to partially acheive established goals  Discharge Destination: Home with home program  Discharge Instructions: Pt to contact the P.T. clinic if questions or problems arise. She was provided with 3-month [employer] membership extension.        Nathaniel Sharp, PT  2/14/2017

## 2017-05-09 DIAGNOSIS — M81.0 OSTEOPOROSIS, UNSPECIFIED: Primary | ICD-10-CM

## 2017-05-10 ENCOUNTER — TELEPHONE (OUTPATIENT)
Dept: ORTHOPEDIC SURGERY | Facility: CLINIC | Age: 52
End: 2017-05-10

## 2017-08-02 DIAGNOSIS — M25.512 CHRONIC LEFT SHOULDER PAIN: Primary | ICD-10-CM

## 2017-08-02 DIAGNOSIS — Z96.612 PRESENCE OF LEFT ARTIFICIAL SHOULDER JOINT: ICD-10-CM

## 2017-08-02 DIAGNOSIS — G89.29 CHRONIC LEFT SHOULDER PAIN: Primary | ICD-10-CM

## 2017-08-03 ENCOUNTER — OFFICE VISIT (OUTPATIENT)
Dept: ORTHOPEDIC SURGERY | Facility: CLINIC | Age: 52
End: 2017-08-03

## 2017-08-03 VITALS — HEIGHT: 59 IN

## 2017-08-03 DIAGNOSIS — M85.80 OSTEOPENIA, SENILE: ICD-10-CM

## 2017-08-03 DIAGNOSIS — M25.512 CHRONIC LEFT SHOULDER PAIN: Primary | ICD-10-CM

## 2017-08-03 DIAGNOSIS — Z96.612 PRESENCE OF LEFT ARTIFICIAL SHOULDER JOINT: ICD-10-CM

## 2017-08-03 DIAGNOSIS — G89.29 CHRONIC LEFT SHOULDER PAIN: Primary | ICD-10-CM

## 2017-08-03 PROCEDURE — 99213 OFFICE O/P EST LOW 20 MIN: CPT | Performed by: ORTHOPAEDIC SURGERY

## 2017-08-03 NOTE — PROGRESS NOTES
Priti Orr is a 51 y.o. female returns for     Chief Complaint   Patient presents with   • Left Shoulder - Follow-up       HISTORY OF PRESENT ILLNESS:   xrays done today, patient d/c from therapy on 2/14/2017.   She has some regular aching.  She reports that she is doing fairly well and is able to do most activities.  She is able to get her hand to the side of her head but has to lean her head over an order to get on top of her head.  No fevers or chills.       CONCURRENT MEDICAL HISTORY:    Past Medical History:   Diagnosis Date   • Allergic rhinitis    • Asthma    • Benign neoplasm of meninges     Post op 2011      • Cobalamin deficiency    • Corneal abrasion    • Encounter for gynecological examination (general) (routine) without abnormal findings    • Essential hypertension    • Fatigue    • Female stress incontinence    • History of delayed wound healing     Delayed healing of surgical wound      • Hyperlipidemia    • Low back pain    • Nosebleed, symptom     Nosebleed/epistaxis symptom      • Osteoporosis        Allergies   Allergen Reactions   • Pseudoephedrine Shortness Of Breath and Swelling     LIPS SWELL AND TONGUE BECOMES THICK   • Morphine Other (See Comments)     States felt like itching from within, hallucinations, agitation   • Allegra [Fexofenadine] Swelling   • Zyrtec [Cetirizine] Swelling         Current Outpatient Prescriptions:   •  ALBUTEROL IN, Inhale 1 puff., Disp: , Rfl:   •  Cholecalciferol (VITAMIN D) 1000 UNITS tablet, Take 1,000 Units by mouth., Disp: , Rfl:   •  denosumab (PROLIA) 60 MG/ML solution syringe, Inject  under the skin 1 (One) Time., Disp: , Rfl:   •  Docusate Sodium (DSS) 100 MG capsule, Take 100 mg by mouth., Disp: , Rfl:   •  lisinopril-hydrochlorothiazide (PRINZIDE,ZESTORETIC) 20-12.5 MG per tablet, Take 1 tablet by mouth., Disp: , Rfl:   •  meloxicam (MOBIC) 15 MG tablet, , Disp: , Rfl: 5    Past Surgical History:   Procedure Laterality Date   • ARM LESION/CYST  "EXCISION  2014    Remove arm bone lesion (1)   • ARM SKIN LESION BIOPSY / EXCISION     • ARM WOUND REPAIR / CLOSURE  2014    Repair Superficial Wound TR-EXT 2.5 < CM 02972 (1)      • BREAST LUMPECTOMY     •  SECTION  1986     Section (1)     • ENDOSCOPY  2008    Colon endoscopy 93743 (1)     • EXCISION BREAST LESION W/ PREOP NEEDLE LOC  1981    Excision, breast lesion (1)     • HEMORRHOIDECTOMY     • HEMORRHOIDECTOMY  2008    Hemorrhoidectomy (2)      • HYSTERECTOMY     • INJECTION OF MEDICATION  2013    Celestone (betamethasone) (1)      • INJECTION OF MEDICATION  2013    Depo Medrol (Methylprednisone) (1)      • INJECTION OF MEDICATION  2013    Dexamethasone (2)      • INJECTION OF MEDICATION  2014    Kenalog (6)   • KNEE ARTHROSCOPY  2007    Knee arthroscopy, surgery (1)     • LAPAROSCOPIC TUBAL LIGATION  1991    Tubal ligation (1)      • PAP SMEAR  2007    PAP SMEAR (1)      • TOTAL ABDOMINAL HYSTERECTOMY WITH SALPINGO OOPHORECTOMY      JOSE/BSO (1)          ROS  No fevers or chills.  No chest pain or shortness of air.  No GI or  disturbances.    PHYSICAL EXAMINATION:       Ht 59\" (149.9 cm)    Physical Exam   Constitutional: She is oriented to person, place, and time. She appears well-developed and well-nourished.   Neurological: She is alert and oriented to person, place, and time.   Psychiatric: She has a normal mood and affect. Her behavior is normal. Judgment and thought content normal.       GAIT:     [x]  Normal  []  Antalgic    Assistive device: [x]  None  []  Walker     []  Crutches  []  Cane     []  Wheelchair  []  Stretcher    Left Shoulder Exam     Tenderness   The patient is experiencing no tenderness.         Range of Motion   Active Abduction: 90   Forward Flexion: 90   Left shoulder external rotation: 45.     Other   Erythema: absent  Scars: present  Sensation: normal  Pulse: present     Comments:  " Generally she has 3+ to 4- strength in the left shoulder.              Xr Shoulder 2+ View Left    Result Date: 8/3/2017  Narrative: 3 views of the left shoulder show acceptable position and alignment of a reverse total shoulder proximal humeral replacement.  No sign of implant loosening or failure is noted.  No other acute bony abnormality is noted either. 08/03/17 at 12:18 PM by Brooks Junior MD             ASSESSMENT:    Diagnoses and all orders for this visit:    Chronic left shoulder pain    Presence of left artificial shoulder joint    Osteopenia, senile          PLAN    Her personality does not allow for her to be significantly limited by this condition.  She is much happier and is in less pain than she was prior to revision surgery.  Her function is also better than it was prior to the revision surgery.  She will continue with range of motion and strengthening as tolerated.  She'll continue with mobility as tolerated.  She does need repeat Prolene and continued use of bone density testing once a year.  Otherwise there are no restrictions and she can do activities as tolerated.    Return for prolia injection.    Brooks Junior MD

## 2017-08-07 ENCOUNTER — TELEPHONE (OUTPATIENT)
Dept: ORTHOPEDIC SURGERY | Facility: CLINIC | Age: 52
End: 2017-08-07

## 2017-08-07 DIAGNOSIS — M81.0 OSTEOPOROSIS, UNSPECIFIED: Primary | ICD-10-CM

## 2017-08-07 NOTE — TELEPHONE ENCOUNTER
----- Message from Brooks Junior MD sent at 8/6/2017  6:54 AM CDT -----  prolia injection please  Also, send a copy of note to Dr Glenn Sy in [place].  Thanks,  CONSTANTINO

## 2017-08-07 NOTE — TELEPHONE ENCOUNTER
rx for prolia we no longer get them from our pharmacy. Patient will need to get the rx from the pharmacy and call back to setup appt.

## 2017-08-29 ENCOUNTER — CLINICAL SUPPORT (OUTPATIENT)
Dept: ORTHOPEDIC SURGERY | Facility: CLINIC | Age: 52
End: 2017-08-29

## 2017-08-29 VITALS — HEIGHT: 59 IN | BODY MASS INDEX: 42.29 KG/M2 | WEIGHT: 209.8 LBS

## 2017-08-29 DIAGNOSIS — M81.0 OSTEOPOROSIS: Primary | ICD-10-CM

## 2017-08-29 PROCEDURE — 96372 THER/PROPH/DIAG INJ SC/IM: CPT | Performed by: ORTHOPAEDIC SURGERY

## 2017-08-29 RX ORDER — MONTELUKAST SODIUM 10 MG/1
10 TABLET ORAL DAILY PRN
COMMUNITY
Start: 2017-08-09 | End: 2020-05-29

## 2017-11-29 ENCOUNTER — OFFICE VISIT (OUTPATIENT)
Dept: PODIATRY | Facility: CLINIC | Age: 52
End: 2017-11-29

## 2017-11-29 VITALS
DIASTOLIC BLOOD PRESSURE: 74 MMHG | HEART RATE: 73 BPM | HEIGHT: 59 IN | OXYGEN SATURATION: 95 % | SYSTOLIC BLOOD PRESSURE: 107 MMHG | BODY MASS INDEX: 41.33 KG/M2 | WEIGHT: 205 LBS

## 2017-11-29 DIAGNOSIS — M79.671 RIGHT FOOT PAIN: Primary | ICD-10-CM

## 2017-11-29 DIAGNOSIS — M72.2 PLANTAR FASCIITIS: ICD-10-CM

## 2017-11-29 PROCEDURE — 99203 OFFICE O/P NEW LOW 30 MIN: CPT | Performed by: PODIATRIST

## 2017-11-29 PROCEDURE — 20550 NJX 1 TENDON SHEATH/LIGAMENT: CPT | Performed by: PODIATRIST

## 2017-11-29 RX ORDER — FLUTICASONE PROPIONATE 50 MCG
1 SPRAY, SUSPENSION (ML) NASAL DAILY
COMMUNITY
Start: 2017-10-17 | End: 2020-12-04

## 2017-11-29 RX ORDER — BUPIVACAINE HYDROCHLORIDE 5 MG/ML
5 INJECTION, SOLUTION EPIDURAL; INTRACAUDAL ONCE
Status: COMPLETED | OUTPATIENT
Start: 2017-11-29 | End: 2017-11-29

## 2017-11-29 RX ORDER — DEXAMETHASONE SODIUM PHOSPHATE 4 MG/ML
2 INJECTION, SOLUTION INTRA-ARTICULAR; INTRALESIONAL; INTRAMUSCULAR; INTRAVENOUS; SOFT TISSUE ONCE
Status: COMPLETED | OUTPATIENT
Start: 2017-11-29 | End: 2017-11-29

## 2017-11-29 RX ORDER — TRIAMCINOLONE ACETONIDE 40 MG/ML
10 INJECTION, SUSPENSION INTRA-ARTICULAR; INTRAMUSCULAR ONCE
Status: COMPLETED | OUTPATIENT
Start: 2017-11-29 | End: 2017-11-29

## 2017-11-29 RX ADMIN — TRIAMCINOLONE ACETONIDE 10 MG: 40 INJECTION, SUSPENSION INTRA-ARTICULAR; INTRAMUSCULAR at 10:09

## 2017-11-29 RX ADMIN — BUPIVACAINE HYDROCHLORIDE 5 MG: 5 INJECTION, SOLUTION EPIDURAL; INTRACAUDAL at 10:09

## 2017-11-29 RX ADMIN — DEXAMETHASONE SODIUM PHOSPHATE 2 MG: 4 INJECTION, SOLUTION INTRA-ARTICULAR; INTRALESIONAL; INTRAMUSCULAR; INTRAVENOUS; SOFT TISSUE at 10:09

## 2017-11-29 NOTE — PROGRESS NOTES
Priti Orr  1965  52 y.o. female   Patient presents today for right foot pain.     2017  Chief Complaint   Patient presents with   • Right Foot - Pain           History of Present Illness    Priti Orr is a 52 y.o.female who presents to clinic today with chief complaint of right foot pain.  Pain is located to the bottom of her heel.  It has been present for 1 month.  She rates as a 5 out of 10.  She describes a sharp and states that it is worse with the first step in the morning.  She has tried nothing to treat it.  She denies any injury or trauma.  She has no other pedal complaints.          Past Medical History:   Diagnosis Date   • Allergic rhinitis    • Asthma    • Benign neoplasm of meninges     Post op       • Cobalamin deficiency    • Corneal abrasion    • Encounter for gynecological examination (general) (routine) without abnormal findings    • Essential hypertension    • Fatigue    • Female stress incontinence    • History of delayed wound healing     Delayed healing of surgical wound      • Hyperlipidemia    • Low back pain    • Nosebleed, symptom     Nosebleed/epistaxis symptom      • Osteoporosis          Past Surgical History:   Procedure Laterality Date   • ARM LESION/CYST EXCISION  2014    Remove arm bone lesion (1)   • ARM SKIN LESION BIOPSY / EXCISION     • ARM WOUND REPAIR / CLOSURE  2014    Repair Superficial Wound TR-EXT 2.5 < CM 95393 (1)      • BREAST LUMPECTOMY     •  SECTION  1986     Section (1)     • ENDOSCOPY  2008    Colon endoscopy 81283 (1)     • EXCISION BREAST LESION W/ PREOP NEEDLE LOC  1981    Excision, breast lesion (1)     • HEMORRHOIDECTOMY     • HEMORRHOIDECTOMY  2008    Hemorrhoidectomy (2)      • HYSTERECTOMY     • INJECTION OF MEDICATION  2013    Celestone (betamethasone) (1)      • INJECTION OF MEDICATION  2013    Depo Medrol (Methylprednisone) (1)      • INJECTION OF MEDICATION   05/21/2013    Dexamethasone (2)      • INJECTION OF MEDICATION  01/29/2014    Kenalog (6)   • KNEE ARTHROSCOPY  03/05/2007    Knee arthroscopy, surgery (1)     • LAPAROSCOPIC TUBAL LIGATION  04/16/1991    Tubal ligation (1)      • PAP SMEAR  01/18/2007    PAP SMEAR (1)      • SHOULDER SURGERY     • TOTAL ABDOMINAL HYSTERECTOMY WITH SALPINGO OOPHORECTOMY  1993    JOSE/BSO (1)            Family History   Problem Relation Age of Onset   • Alzheimer's disease Other    • Breast cancer Other      other   • Cancer Other      other - GYN   • Colon cancer Other      Colorectal Cancer   • Depression Other    • Diabetes Other    • Hyperlipidemia Other    • Hypertension Other    • Stroke Other    • Ovarian cancer Other    • Colon cancer Other    • Hypertension Mother    • Hypertension Father        Allergies   Allergen Reactions   • Pseudoephedrine Shortness Of Breath and Swelling     LIPS SWELL AND TONGUE BECOMES THICK   • Morphine Other (See Comments)     States felt like itching from within, hallucinations, agitation   • Allegra [Fexofenadine] Swelling   • Zyrtec [Cetirizine] Swelling       Social History     Social History   • Marital status:      Spouse name: N/A   • Number of children: N/A   • Years of education: N/A     Occupational History   • Not on file.     Social History Main Topics   • Smoking status: Never Smoker   • Smokeless tobacco: Never Used   • Alcohol use No   • Drug use: No   • Sexual activity: Defer     Other Topics Concern   • Not on file     Social History Narrative         Current Outpatient Prescriptions   Medication Sig Dispense Refill   • ALBUTEROL IN Inhale 1 puff.     • Cholecalciferol (VITAMIN D) 1000 UNITS tablet Take 1,000 Units by mouth.     • denosumab (PROLIA) 60 MG/ML solution syringe Inject  under the skin 1 (One) Time.     • Docusate Sodium (DSS) 100 MG capsule Take 100 mg by mouth.     • fluticasone (FLONASE) 50 MCG/ACT nasal spray 1 spray into each nostril.     •  "lisinopril-hydrochlorothiazide (PRINZIDE,ZESTORETIC) 20-12.5 MG per tablet Take 1 tablet by mouth.     • meloxicam (MOBIC) 15 MG tablet   5   • montelukast (SINGULAIR) 10 MG tablet        No current facility-administered medications for this visit.          OBJECTIVE    /74  Pulse 73  Ht 59\" (149.9 cm)  Wt 205 lb (93 kg)  SpO2 95%  BMI 41.4 kg/m2      Review of Systems   Constitutional: Negative.    HENT: Negative.    Eyes: Negative.    Respiratory: Negative.    Cardiovascular: Negative.    Gastrointestinal: Negative.    Endocrine: Negative.    Genitourinary: Negative.    Musculoskeletal: Positive for joint swelling.        Foot pain         Constitutional: well developed, well nourished    HEENT: Normocephalic and atraumatic, normal hearing    Respiratory: Non labored respirations noted    Cardiovascular:    DP/PT pulses palpable    CFT brisk  to all digitst.   No erythema  noted     Musculoskeletal:  Muscle strength is 5/5 for all muscle groups tested   ROM of the 1st MTP is full without pain or crepitus  ROM of the MTJ is full without pain or crepitus    ROM of the STJ is full without pain or crepitus    ROM of the ankle joint is full without pain or crepitus    Pain on palpation to the medial tubercle of the right calcaneus.  Negative lateral squeeze test.    Dermatological:   Skin is warm, dry and intact    Webspaces 1-4 bilateral are clean, dry and intact.   No subcutaneous nodules or masses noted      Neurological:   Sensation intact to light touch    DTR intact    Psychiatric: A&O x 3 with normal mood and affect. NAD.     Radiographs: 3 views the right foot were obtained today.  No acute osseous abnormalities noted.        Procedures    Plantar Fasciits Injection  Date/Time: 11/29/17  Performed by: YOLANDE SANCHEZ  Authorized by: YOLANDE SANCHEZ   Consent: Verbal consent obtained. Written consent obtained.  Risks and benefits: risks, benefits and alternatives were discussed  Consent given by: " patient  Patient identity confirmed: verbally with patient  Indications: pain relief  Nerve block body site: left heel.  Sedation:  Patient sedated: no    Patient position: sitting  Needle size: 25 G  Local anesthetic: 0.5% Marcaine plain, Kenalog 40 mg/ml , Decadron 4 mg/mL.   Outcome: pain improved  Patient tolerance: Patient tolerated the procedure well with no immediate complications              ASSESSMENT AND PLAN    Priti was seen today for pain.    Diagnoses and all orders for this visit:    Right foot pain  -     XR Foot 3+ View Right    Plantar fasciitis    - Comprehensive foot and ankle exam performed  - X-rays taken and reviewed  - Steroid injection for plantar fasciitis given  - Patient advised to perform stretching exercises, icing and to make appropriate shoe gear changes to include wearing athletic type shoes with supportive insoles.  No bare foot walking.  Patient also given written instructions on how to correctly perform the stretching of the Achilles tendon/calf stretches, and the heel spur/plantar fasciitis regimen.  - Recommended over-the-counter insole such as power steps to properly support the arch in order to alleviate the tension in stress on the plantar fascia associated with normal daily walking. Patient was educated on the break-in period for new orthotics.  - Patient is in agreement with the current treatment plan.  All  questions were answered to their satisfaction.  - RTC in 4 weeks          This document has been electronically signed by Genaro Lowery DPM on November 29, 2017 2:01 PM     11/29/2017  2:01 PM

## 2018-04-06 DIAGNOSIS — M81.0 OSTEOPOROSIS, UNSPECIFIED OSTEOPOROSIS TYPE, UNSPECIFIED PATHOLOGICAL FRACTURE PRESENCE: Primary | ICD-10-CM

## 2018-04-16 ENCOUNTER — TELEPHONE (OUTPATIENT)
Dept: ORTHOPEDIC SURGERY | Facility: CLINIC | Age: 53
End: 2018-04-16

## 2018-04-16 NOTE — TELEPHONE ENCOUNTER
PT CALLED AND WAS UNABLE TO GET BONE DENSITY DUE TO INSURANCE. LAST SCAN WAS 5-24-17. DOES SHE STILL NEED TO KEEP APPOINTMENT FOR PROLIA INJ ON 4-26-18?

## 2018-04-26 ENCOUNTER — OFFICE VISIT (OUTPATIENT)
Dept: ORTHOPEDIC SURGERY | Facility: CLINIC | Age: 53
End: 2018-04-26

## 2018-04-26 VITALS — HEIGHT: 59 IN | BODY MASS INDEX: 41.53 KG/M2 | WEIGHT: 206 LBS

## 2018-04-26 DIAGNOSIS — M81.0 OSTEOPOROSIS, UNSPECIFIED OSTEOPOROSIS TYPE, UNSPECIFIED PATHOLOGICAL FRACTURE PRESENCE: ICD-10-CM

## 2018-04-26 PROCEDURE — 96372 THER/PROPH/DIAG INJ SC/IM: CPT | Performed by: ORTHOPAEDIC SURGERY

## 2018-04-26 NOTE — PROGRESS NOTES
"Chief Complaint   Patient presents with   • Osteoporosis     Prolia injection     Bon Estrada MD    HPI:  Patient returns for a prolia injection.  No new complaints      Current Outpatient Prescriptions:   •  ALBUTEROL IN, Inhale 1 puff., Disp: , Rfl:   •  Cholecalciferol (VITAMIN D) 1000 UNITS tablet, Take 1,000 Units by mouth., Disp: , Rfl:   •  fluticasone (FLONASE) 50 MCG/ACT nasal spray, 1 spray into each nostril., Disp: , Rfl:   •  lisinopril-hydrochlorothiazide (PRINZIDE,ZESTORETIC) 20-12.5 MG per tablet, Take 1 tablet by mouth., Disp: , Rfl:   •  meloxicam (MOBIC) 15 MG tablet, , Disp: , Rfl: 5  •  montelukast (SINGULAIR) 10 MG tablet, , Disp: , Rfl:     Allergies   Allergen Reactions   • Pseudoephedrine Shortness Of Breath and Swelling     LIPS SWELL AND TONGUE BECOMES THICK   • Morphine Other (See Comments)     States felt like itching from within, hallucinations, agitation   • Allegra [Fexofenadine] Swelling   • Zyrtec [Cetirizine] Swelling         Vitals:    04/26/18 0806   Weight: 93.4 kg (206 lb)   Height: 149.9 cm (59\")         ASSESSMENT:    Diagnoses and all orders for this visit:    Osteoporosis, unspecified osteoporosis type, unspecified pathological fracture presence        Plan:    The medicine was supplied by the patient  The patient was injected, under aseptic conditions, into the:    [x]  Right arm    []  Left arm  []  Right thigh    []  Left thigh  []  Right side of abdomen  []  Left side of abdomen    The patient tolerated the procedure well.  Plan Followup in 6 months for next injection.  Bone density test scheduled    NDC #: 37777-264-23      [id number]  "

## 2018-06-25 DIAGNOSIS — M25.562 LEFT KNEE PAIN, UNSPECIFIED CHRONICITY: Primary | ICD-10-CM

## 2018-06-26 ENCOUNTER — OFFICE VISIT (OUTPATIENT)
Dept: ORTHOPEDIC SURGERY | Facility: CLINIC | Age: 53
End: 2018-06-26

## 2018-06-26 VITALS — HEIGHT: 59 IN | BODY MASS INDEX: 42.13 KG/M2 | WEIGHT: 209 LBS

## 2018-06-26 DIAGNOSIS — E66.01 MORBID OBESITY WITH BMI OF 40.0-44.9, ADULT (HCC): ICD-10-CM

## 2018-06-26 DIAGNOSIS — M81.0 OSTEOPOROSIS WITHOUT CURRENT PATHOLOGICAL FRACTURE, UNSPECIFIED OSTEOPOROSIS TYPE: ICD-10-CM

## 2018-06-26 DIAGNOSIS — M25.562 LEFT KNEE PAIN, UNSPECIFIED CHRONICITY: ICD-10-CM

## 2018-06-26 DIAGNOSIS — M17.12 PRIMARY OSTEOARTHRITIS OF LEFT KNEE: Primary | ICD-10-CM

## 2018-06-26 PROCEDURE — 99214 OFFICE O/P EST MOD 30 MIN: CPT | Performed by: NURSE PRACTITIONER

## 2018-06-26 PROCEDURE — 20610 DRAIN/INJ JOINT/BURSA W/O US: CPT | Performed by: NURSE PRACTITIONER

## 2018-06-26 RX ORDER — ERGOCALCIFEROL 1.25 MG/1
50000 CAPSULE ORAL WEEKLY
COMMUNITY
End: 2019-02-21 | Stop reason: SDUPTHER

## 2018-06-26 RX ORDER — TRIAMCINOLONE ACETONIDE 40 MG/ML
80 INJECTION, SUSPENSION INTRA-ARTICULAR; INTRAMUSCULAR
Status: COMPLETED | OUTPATIENT
Start: 2018-06-26 | End: 2018-06-26

## 2018-06-26 RX ORDER — LIDOCAINE HYDROCHLORIDE 10 MG/ML
2 INJECTION, SOLUTION EPIDURAL; INFILTRATION; INTRACAUDAL; PERINEURAL
Status: COMPLETED | OUTPATIENT
Start: 2018-06-26 | End: 2018-06-26

## 2018-06-26 RX ADMIN — TRIAMCINOLONE ACETONIDE 80 MG: 40 INJECTION, SUSPENSION INTRA-ARTICULAR; INTRAMUSCULAR at 09:50

## 2018-06-26 RX ADMIN — LIDOCAINE HYDROCHLORIDE 2 ML: 10 INJECTION, SOLUTION EPIDURAL; INFILTRATION; INTRACAUDAL; PERINEURAL at 09:50

## 2018-06-26 NOTE — PROGRESS NOTES
Priti Orr is a 52 y.o. female   Primary provider:  Bon Estrada MD       Chief Complaint   Patient presents with   • Left Knee - Knee Pain       HISTORY OF PRESENT ILLNESS: left knee pain started about 2 weeks, patient has plantar fasciitis  in the right foot and has been leaning more the left side. xrays done today.     Knee Pain    The incident occurred more than 1 week ago. The incident occurred at home. There was no injury mechanism. The pain is present in the left knee. Quality: grinding.  The pain is severe. The pain has been intermittent since onset. Associated symptoms comments: Swelling, clicking. . She reports no foreign bodies present. The symptoms are aggravated by weight bearing (standing, walking. ). She has tried rest for the symptoms.        CONCURRENT MEDICAL HISTORY:    Past Medical History:   Diagnosis Date   • Allergic rhinitis    • Asthma    • Benign neoplasm of meninges     Post op 2011      • Cobalamin deficiency    • Corneal abrasion    • Encounter for gynecological examination (general) (routine) without abnormal findings    • Essential hypertension    • Fatigue    • Female stress incontinence    • History of delayed wound healing     Delayed healing of surgical wound      • Hyperlipidemia    • Low back pain    • Nosebleed, symptom     Nosebleed/epistaxis symptom      • Osteoporosis        Allergies   Allergen Reactions   • Pseudoephedrine Shortness Of Breath and Swelling     LIPS SWELL AND TONGUE BECOMES THICK   • Morphine Other (See Comments)     States felt like itching from within, hallucinations, agitation   • Allegra [Fexofenadine] Swelling   • Zyrtec [Cetirizine] Swelling         Current Outpatient Prescriptions:   •  ALBUTEROL IN, Inhale 1 puff., Disp: , Rfl:   •  BuPROPion HCl (WELLBUTRIN PO), Take  by mouth., Disp: , Rfl:   •  fluticasone (FLONASE) 50 MCG/ACT nasal spray, 1 spray into each nostril., Disp: , Rfl:   •  lisinopril-hydrochlorothiazide  (PRINZIDE,ZESTORETIC) 20-12.5 MG per tablet, Take 1 tablet by mouth., Disp: , Rfl:   •  meloxicam (MOBIC) 15 MG tablet, , Disp: , Rfl: 5  •  montelukast (SINGULAIR) 10 MG tablet, , Disp: , Rfl:   •  vitamin D (ERGOCALCIFEROL) 45978 units capsule capsule, Take 50,000 Units by mouth 1 (One) Time Per Week., Disp: , Rfl:     Past Surgical History:   Procedure Laterality Date   • ARM LESION/CYST EXCISION  2014    Remove arm bone lesion (1)   • ARM SKIN LESION BIOPSY / EXCISION     • ARM WOUND REPAIR / CLOSURE  2014    Repair Superficial Wound TR-EXT 2.5 < CM 69387 (1)      • BREAST LUMPECTOMY     •  SECTION  1986     Section (1)     • ENDOSCOPY  2008    Colon endoscopy 40387 (1)     • EXCISION BREAST LESION W/ PREOP NEEDLE LOC  1981    Excision, breast lesion (1)     • HEMORRHOIDECTOMY     • HEMORRHOIDECTOMY  2008    Hemorrhoidectomy (2)      • HYSTERECTOMY     • INJECTION OF MEDICATION  2013    Celestone (betamethasone) (1)      • INJECTION OF MEDICATION  2013    Depo Medrol (Methylprednisone) (1)      • INJECTION OF MEDICATION  2013    Dexamethasone (2)      • INJECTION OF MEDICATION  2014    Kenalog (6)   • KNEE ARTHROSCOPY  2007    Knee arthroscopy, surgery (1)     • LAPAROSCOPIC TUBAL LIGATION  1991    Tubal ligation (1)      • PAP SMEAR  2007    PAP SMEAR (1)      • SHOULDER SURGERY     • TOTAL ABDOMINAL HYSTERECTOMY WITH SALPINGO OOPHORECTOMY      JOSE/BSO (1)          Family History   Problem Relation Age of Onset   • Alzheimer's disease Other    • Breast cancer Other         other   • Cancer Other         other - GYN   • Colon cancer Other         Colorectal Cancer   • Depression Other    • Diabetes Other    • Hyperlipidemia Other    • Hypertension Other    • Stroke Other    • Ovarian cancer Other    • Colon cancer Other    • Hypertension Mother    • Hypertension Father        Social History     Social History   •  "Marital status:      Spouse name: N/A   • Number of children: N/A   • Years of education: N/A     Occupational History   • Not on file.     Social History Main Topics   • Smoking status: Never Smoker   • Smokeless tobacco: Never Used   • Alcohol use No   • Drug use: No   • Sexual activity: Defer     Other Topics Concern   • Not on file     Social History Narrative   • No narrative on file        Review of Systems   Constitutional: Positive for unexpected weight change.   Endocrine: Positive for heat intolerance.   All other systems reviewed and are negative.      PHYSICAL EXAMINATION:       Ht 149.9 cm (59\")   Wt 94.8 kg (209 lb)   BMI 42.21 kg/m²     Physical Exam   Constitutional: She is oriented to person, place, and time. Vital signs are normal. She appears well-developed and well-nourished. She is cooperative.   HENT:   Head: Normocephalic and atraumatic.   Neck: Trachea normal and phonation normal.   Pulmonary/Chest: Effort normal. No respiratory distress.   Abdominal: Soft. Normal appearance. She exhibits no distension.   Musculoskeletal:        Left knee: She exhibits no effusion.   Neurological: She is alert and oriented to person, place, and time. GCS eye subscore is 4. GCS verbal subscore is 5. GCS motor subscore is 6.   Skin: Skin is warm, dry and intact.   Psychiatric: She has a normal mood and affect. Her speech is normal and behavior is normal. Judgment and thought content normal. Cognition and memory are normal.   Vitals reviewed.      GAIT:     []  Normal  [x]  Antalgic    Assistive device: []  None  []  Walker     []  Crutches  []  Cane     []  Wheelchair  []  Stretcher    Right Knee Exam   Right knee exam is normal.    Muscle Strength     The patient has normal right knee strength.      Left Knee Exam     Tenderness   The patient is experiencing tenderness in the medial joint line and lateral joint line.    Range of Motion   Left knee flexion: 90.     Other   Erythema: absent  Scars: " absent  Sensation: normal  Pulse: present  Swelling: mild  Effusion: no effusion present              PROCEDURE: A bone densitometry (DEXA) study of the lumbar spine  and both hips was performed.     HISTORY: osteoporosis, M81.0 Age-related osteoporosis without  current pathological fracture     BMD: Bone mineral density.     COMPARISON: 5/23/2017     T-score: Represents the number of standard deviations (SD) that  an individual is above or below the reference value for young  normals.     Z-score: Represents the amount of bone an individual has compared  with other people in the same age group and gender.     AP SPINE  BMD (g/cm2): 0.863  T-score (SD vs young adult): -1.7  Z-score (SD vs age-matched): -0.9  Change versus previous: +3 %     Left femoral neck  BMD (g/cm2): 0.699  T-score (SD vs young adult): -1.3  Z-score (SD vs age-matched): -0.6  Change versus previous: +2.2%     Right femoral neck  BMD (g/cm2): 0.829  T-score (SD vs young adult): -0.2  Z-score (SD vs age-matched): 0.5  Change versus previous: +2.2%     Comment: World Health Organization (WHO) classifications:  Normal: T-score at or above -1 SD  Osteopenia: T-score between -1 and -2.5 SD  Osteoporosis: T-score at or below -2.5 SD     IMPRESSION:  CONCLUSION: Osteopenia of the lumbar spine and left femoral neck  according to World Health Organization criteria.     Electronically signed by:  Bruce Matthews MD  5/24/2018 2:11 PM CDT  Workstation: [id number]    Xr Knee 1 Or 2 View Left    Result Date: 6/26/2018  Narrative: Standing AP of both knees with lateral views of the left knee only reveals severe tricompartmental degenerative changes of the left knee without any acute radiological abnormality noted.  There are no recent comparison views on file. 06/26/18 at 9:29 AM by MELISSA Marcano    Xr Knee Bilateral Ap Standing    Result Date: 6/26/2018  Narrative: Standing AP of both knees with lateral views of the left knee only reveals severe  tricompartmental degenerative changes of the left knee without any acute radiological abnormality noted.  There are no recent comparison views on file. 06/26/18 at 9:29 AM by MELISSA Marcano           ASSESSMENT:    Diagnoses and all orders for this visit:    Primary osteoarthritis of left knee    Left knee pain, unspecified chronicity  -     Large Joint Arthrocentesis    Morbid obesity with BMI of 40.0-44.9, adult    Osteoporosis without current pathological fracture, unspecified osteoporosis type  -     Amb[facility] Referral to [facility]    Other orders  -     BuPROPion HCl (WELLBUTRIN PO); Take  by mouth.  -     vitamin D (ERGOCALCIFEROL) 41603 units capsule capsule; Take 50,000 Units by mouth 1 (One) Time Per Week.    Large Joint Arthrocentesis  Date/Time: 6/26/2018 9:50 AM  Consent given by: patient  Site marked: site marked  Timeout: Immediately prior to procedure a time out was called to verify the correct patient, procedure, equipment, support staff and site/side marked as required   Supporting Documentation  Indications: pain   Procedure Details  Location: knee - L knee  Preparation: Patient was prepped and draped in the usual sterile fashion  Needle size: 22 G  Approach: anteromedial  Medications administered: 2 mL lidocaine PF 1% 1 %; 80 mg triamcinolone acetonide 40 MG/ML                PLAN  End-stage osteoarthritis noted left knee.  Discussed treatment options in detail with the patient and injected the leg was right today.  We discussed aggressive weight reduction modified weightbearing and follow-up in 4-6 weeks for recheck.  She was given an informational Visco supplementation as well before leaving.   We also discussed her osteoporosis treatment and I encouraged her to continue vitamin D supplementation, weightbearing exercises and follow-up with the bone health clinic in 1 year for repeat bone density.   No Follow-up on file.    MELISSA Marcano

## 2018-07-16 PROBLEM — I10 HYPERTENSION: Status: ACTIVE | Noted: 2018-07-16

## 2018-07-16 PROBLEM — E66.01 MORBID OBESITY (HCC): Status: ACTIVE | Noted: 2018-07-16

## 2018-07-17 ENCOUNTER — TELEPHONE (OUTPATIENT)
Dept: ORTHOPEDIC SURGERY | Facility: CLINIC | Age: 53
End: 2018-07-17

## 2018-07-19 ENCOUNTER — OFFICE VISIT (OUTPATIENT)
Dept: ORTHOPEDIC SURGERY | Facility: CLINIC | Age: 53
End: 2018-07-19

## 2018-07-19 VITALS — HEIGHT: 60 IN | BODY MASS INDEX: 41.01 KG/M2 | WEIGHT: 208.9 LBS

## 2018-07-19 DIAGNOSIS — M17.12 PRIMARY OSTEOARTHRITIS OF LEFT KNEE: ICD-10-CM

## 2018-07-19 DIAGNOSIS — I10 ESSENTIAL HYPERTENSION: ICD-10-CM

## 2018-07-19 DIAGNOSIS — E66.01 MORBID OBESITY WITH BMI OF 40.0-44.9, ADULT (HCC): ICD-10-CM

## 2018-07-19 DIAGNOSIS — M25.562 LEFT KNEE PAIN, UNSPECIFIED CHRONICITY: Primary | ICD-10-CM

## 2018-07-19 PROCEDURE — 99213 OFFICE O/P EST LOW 20 MIN: CPT | Performed by: ORTHOPAEDIC SURGERY

## 2018-07-19 NOTE — PROGRESS NOTES
Priti Orr is a 52 y.o. female returns for     Chief Complaint   Patient presents with   • Left Knee - Follow-up       HISTORY OF PRESENT ILLNESS: Patient being seen for left knee follow up. Patient states she is interested in surgery if necessary, however would like to try other options first. Patient states pain increases with activity.   Pain with adl's, pain with activity  Pain is all the time  Mostly a dull ache but sharp pains with activity  Working on weight loss.       CONCURRENT MEDICAL HISTORY:    Past Medical History:   Diagnosis Date   • Allergic rhinitis    • Asthma    • Benign neoplasm of meninges (CMS/HCC)     Post op 2011      • Cobalamin deficiency    • Corneal abrasion    • Encounter for gynecological examination (general) (routine) without abnormal findings    • Essential hypertension    • Fatigue    • Female stress incontinence    • History of delayed wound healing     Delayed healing of surgical wound      • Hyperlipidemia    • Low back pain    • Nosebleed, symptom     Nosebleed/epistaxis symptom      • Osteoporosis        Allergies   Allergen Reactions   • Pseudoephedrine Shortness Of Breath and Swelling     LIPS SWELL AND TONGUE BECOMES THICK   • Morphine Other (See Comments)     States felt like itching from within, hallucinations, agitation   • Allegra [Fexofenadine] Swelling   • Zyrtec [Cetirizine] Swelling         Current Outpatient Prescriptions:   •  ALBUTEROL IN, Inhale 1 puff., Disp: , Rfl:   •  fluticasone (FLONASE) 50 MCG/ACT nasal spray, 1 spray into each nostril., Disp: , Rfl:   •  lisinopril-hydrochlorothiazide (PRINZIDE,ZESTORETIC) 20-12.5 MG per tablet, Take 1 tablet by mouth., Disp: , Rfl:   •  meloxicam (MOBIC) 15 MG tablet, , Disp: , Rfl: 5  •  montelukast (SINGULAIR) 10 MG tablet, , Disp: , Rfl:   •  vitamin D (ERGOCALCIFEROL) 33592 units capsule capsule, Take 50,000 Units by mouth 1 (One) Time Per Week., Disp: , Rfl:     Past Surgical History:   Procedure  "Laterality Date   • ARM LESION/CYST EXCISION  2014    Remove arm bone lesion (1)   • ARM SKIN LESION BIOPSY / EXCISION     • ARM WOUND REPAIR / CLOSURE  2014    Repair Superficial Wound TR-EXT 2.5 < CM 72117 (1)      • BREAST LUMPECTOMY     •  SECTION  1986     Section (1)     • ENDOSCOPY  2008    Colon endoscopy 78824 (1)     • EXCISION BREAST LESION W/ PREOP NEEDLE LOC  1981    Excision, breast lesion (1)     • HEMORRHOIDECTOMY     • HEMORRHOIDECTOMY  2008    Hemorrhoidectomy (2)      • HYSTERECTOMY     • INJECTION OF MEDICATION  2013    Celestone (betamethasone) (1)      • INJECTION OF MEDICATION  2013    Depo Medrol (Methylprednisone) (1)      • INJECTION OF MEDICATION  2013    Dexamethasone (2)      • INJECTION OF MEDICATION  2014    Kenalog (6)   • KNEE ARTHROSCOPY  2007    Knee arthroscopy, surgery (1)     • LAPAROSCOPIC TUBAL LIGATION  1991    Tubal ligation (1)      • PAP SMEAR  2007    PAP SMEAR (1)      • SHOULDER SURGERY     • TOTAL ABDOMINAL HYSTERECTOMY WITH SALPINGO OOPHORECTOMY      JOSE/BSO (1)          ROS  No fevers or chills.  No chest pain or shortness of air.  No GI or  disturbances.    PHYSICAL EXAMINATION:       Ht 152.4 cm (60\")   Wt 94.8 kg (208 lb 14.4 oz)   BMI 40.80 kg/m²     Physical Exam   Constitutional: She is oriented to person, place, and time. She appears well-developed and well-nourished.   Neurological: She is alert and oriented to person, place, and time.   Psychiatric: She has a normal mood and affect. Her behavior is normal. Judgment and thought content normal.       GAIT:     []  Normal  [x]  Antalgic    Assistive device: [x]  None  []  Walker     []  Crutches  []  Cane     []  Wheelchair  []  Stretcher    Left Knee Exam     Tenderness   Left knee tenderness location: diffuse.    Range of Motion   Extension: -5   Flexion: 120     Muscle Strength     The patient has normal " left knee strength.    Tests   Drawer:       Anterior - negative     Posterior - negative  Varus: negative  Valgus: negative    Other   Sensation: normal  Pulse: present  Swelling: none    Comments:  Crepitation with motion  Mild to moderate pain through arc of motion              Xr Knee 1 Or 2 View Left    Result Date: 7/16/2018  Narrative: EXAM:  Radiograph(s), Osseous       REGION:   Knee  SIDE:     Left   VIEWS:   2         INDICATION:    injury today, M25.561 Pain in right knee M25.562 Pain in left knee   COMPARISON:    6/26/18          FINDINGS:       No evidence of a fracture or bony malalignment.   There is moderate to severe medial compartment degenerative change, unchanged from the recent prior study. Intra-articular osseous foreign body, unchanged. .        Impression: CONCLUSION:       1. Degenerative changes, grossly unchanged. Suggest correlation with MRI.                   Electronically signed by:  APURVA Nguyen MD  7/16/2018 7:24 PM CDT Workstation: [id number]    Xr Knee 1 Or 2 View Left    Result Date: 6/26/2018  Narrative: Standing AP of both knees with lateral views of the left knee only reveals severe tricompartmental degenerative changes of the left knee without any acute radiological abnormality noted.  There are no recent comparison views on file. 06/26/18 at 9:29 AM by MELISSA Marcano    Xr Knee Bilateral Ap Standing    Result Date: 6/26/2018  Narrative: Standing AP of both knees with lateral views of the left knee only reveals severe tricompartmental degenerative changes of the left knee without any acute radiological abnormality noted.  There are no recent comparison views on file. 06/26/18 at 9:29 AM by MELISSA Marcano             ASSESSMENT:    Diagnoses and all orders for this visit:    Left knee pain, unspecified chronicity    Primary osteoarthritis of left knee    Essential hypertension    Morbid obesity with BMI of 40.0-44.9, adult (CMS/[facility])          PLAN    Began  discussion for TKA left knee.  Continue strength and conditioning  Does not want injection today  Strongly recommended use of a cane with prolonged walking  Encouraged to continue with weight loss and healthy lifestyle options.  She is thinking about November as a possible surgery time unless symptoms improve - she will discuss medical issues with primary MD and then discuss further with this office.    Patient's Body mass index is 40.8 kg/m². BMI is above normal parameters. Recommendations include: exercise counseling and nutrition counseling.      Return in about 3 months (around 10/19/2018) for recheck.    Brooks Junior MD

## 2018-08-21 ENCOUNTER — OFFICE VISIT (OUTPATIENT)
Dept: ORTHOPEDIC SURGERY | Facility: CLINIC | Age: 53
End: 2018-08-21

## 2018-08-21 VITALS — HEIGHT: 60 IN | WEIGHT: 208 LBS | BODY MASS INDEX: 40.84 KG/M2

## 2018-08-21 DIAGNOSIS — M25.562 LEFT KNEE PAIN, UNSPECIFIED CHRONICITY: ICD-10-CM

## 2018-08-21 DIAGNOSIS — G47.33 OSA (OBSTRUCTIVE SLEEP APNEA): ICD-10-CM

## 2018-08-21 DIAGNOSIS — M17.12 PRIMARY OSTEOARTHRITIS OF LEFT KNEE: Primary | ICD-10-CM

## 2018-08-21 DIAGNOSIS — E66.01 MORBID OBESITY WITH BMI OF 40.0-44.9, ADULT (HCC): ICD-10-CM

## 2018-08-21 PROCEDURE — 99213 OFFICE O/P EST LOW 20 MIN: CPT | Performed by: ORTHOPAEDIC SURGERY

## 2018-08-21 PROCEDURE — 20610 DRAIN/INJ JOINT/BURSA W/O US: CPT | Performed by: ORTHOPAEDIC SURGERY

## 2018-08-21 RX ADMIN — TRIAMCINOLONE ACETONIDE 40 MG: 40 INJECTION, SUSPENSION INTRA-ARTICULAR; INTRAMUSCULAR at 08:08

## 2018-08-21 RX ADMIN — LIDOCAINE HYDROCHLORIDE 2 ML: 20 INJECTION, SOLUTION INFILTRATION; PERINEURAL at 08:08

## 2018-08-21 NOTE — PROGRESS NOTES
"Priti Orr is a 52 y.o. female returns for     Chief Complaint   Patient presents with   • Left Knee - Follow-up, Pain       HISTORY OF PRESENT ILLNESS: f/u left knee, patient requesting evaluation for steroid injection.   Pain with daily activity  Difficulty with prolonged standing or walking       CONCURRENT MEDICAL HISTORY:    The following portions of the patient's history were reviewed and updated as appropriate: allergies, current medications, past family history, past medical history, past social history, past surgical history and problem list.     ROS  No fevers or chills.  No chest pain or shortness of air.  No GI or  disturbances.    PHYSICAL EXAMINATION:       Ht 152.4 cm (60\")   Wt 94.3 kg (208 lb)   BMI 40.62 kg/m²     Physical Exam   Constitutional: She is oriented to person, place, and time. She appears well-developed and well-nourished.   Neurological: She is alert and oriented to person, place, and time.   Psychiatric: She has a normal mood and affect. Her behavior is normal. Judgment and thought content normal.       GAIT:     []  Normal  [x]  Antalgic    Assistive device: [x]  None  []  Walker     []  Crutches  []  Cane     []  Wheelchair  []  Stretcher    Left Knee Exam     Tenderness   Left knee tenderness location: diffuse.    Range of Motion   Extension: -5   Flexion: 120     Muscle Strength     The patient has normal left knee strength.    Tests   Drawer:       Anterior - negative     Posterior - negative  Varus: negative  Valgus: negative    Other   Sensation: normal  Pulse: present  Swelling: none    Comments:  Crepitation with motion  Mild to moderate pain through arc of motion                  Large Joint Arthrocentesis  Date/Time: 8/21/2018 8:08 AM  Consent given by: patient  Site marked: site marked  Timeout: Immediately prior to procedure a time out was called to verify the correct patient, procedure, equipment, support staff and site/side marked as required   Supporting " Documentation  Indications: pain   Procedure Details  Location: knee - L knee  Preparation: Patient was prepped and draped in the usual sterile fashion  Needle size: 22 G  Approach: anteromedial  Medications administered: 40 mg triamcinolone acetonide 40 MG/ML; 2 mL lidocaine 2%  Patient tolerance: patient tolerated the procedure well with no immediate complications            ASSESSMENT:    Diagnoses and all orders for this visit:    Primary osteoarthritis of left knee  -     Large Joint Arthrocentesis    Left knee pain, unspecified chronicity  -     Large Joint Arthrocentesis    NICK (obstructive sleep apnea)    Morbid obesity with BMI of 40.0-44.9, adult (CMS/[facility])          PLAN    Continue strength and conditioning  Continue with activity as tolerated  Discussed progressing conditioning as pain allows.  Also discussed healthy lifestyle options  Possible TKA in November.    Patient's Body mass index is 40.62 kg/m². BMI is above normal parameters. Recommendations include: exercise counseling and nutrition counseling.    Return in about 6 weeks (around 10/2/2018).    Brooks Junior MD

## 2018-08-24 RX ORDER — LIDOCAINE HYDROCHLORIDE 20 MG/ML
2 INJECTION, SOLUTION INFILTRATION; PERINEURAL
Status: COMPLETED | OUTPATIENT
Start: 2018-08-21 | End: 2018-08-21

## 2018-08-24 RX ORDER — TRIAMCINOLONE ACETONIDE 40 MG/ML
40 INJECTION, SUSPENSION INTRA-ARTICULAR; INTRAMUSCULAR
Status: COMPLETED | OUTPATIENT
Start: 2018-08-21 | End: 2018-08-21

## 2018-09-29 ENCOUNTER — PREP FOR SURGERY (OUTPATIENT)
Dept: OTHER | Facility: HOSPITAL | Age: 53
End: 2018-09-29

## 2018-09-29 DIAGNOSIS — Z98.890 HISTORY OF BRAIN SURGERY: ICD-10-CM

## 2018-09-29 DIAGNOSIS — R56.9 SEIZURE-LIKE ACTIVITY (HCC): ICD-10-CM

## 2018-09-29 DIAGNOSIS — M17.12 PRIMARY OSTEOARTHRITIS OF LEFT KNEE: Primary | ICD-10-CM

## 2018-09-29 DIAGNOSIS — G47.33 OSA (OBSTRUCTIVE SLEEP APNEA): ICD-10-CM

## 2018-09-29 DIAGNOSIS — Z96.612 PRESENCE OF LEFT ARTIFICIAL SHOULDER JOINT: ICD-10-CM

## 2018-09-29 DIAGNOSIS — E66.01 MORBID OBESITY WITH BMI OF 40.0-44.9, ADULT (HCC): ICD-10-CM

## 2018-09-29 RX ORDER — PREGABALIN 75 MG/1
75 CAPSULE ORAL ONCE
Status: CANCELLED | OUTPATIENT
Start: 2018-11-19 | End: 2018-09-29

## 2018-09-29 RX ORDER — ACETAMINOPHEN 500 MG
1000 TABLET ORAL ONCE
Status: CANCELLED | OUTPATIENT
Start: 2018-11-19 | End: 2018-09-29

## 2018-09-29 RX ORDER — OXYCODONE HCL 10 MG/1
10 TABLET, FILM COATED, EXTENDED RELEASE ORAL ONCE
Status: CANCELLED | OUTPATIENT
Start: 2018-11-19 | End: 2018-09-29

## 2018-09-29 RX ORDER — MELOXICAM 15 MG/1
15 TABLET ORAL ONCE
Status: CANCELLED | OUTPATIENT
Start: 2018-11-19 | End: 2018-09-29

## 2018-09-29 RX ORDER — BUPIVACAINE HCL/0.9 % NACL/PF 0.1 %
2 PLASTIC BAG, INJECTION (ML) EPIDURAL ONCE
Status: CANCELLED | OUTPATIENT
Start: 2018-11-19 | End: 2018-09-29

## 2018-11-13 ENCOUNTER — OFFICE VISIT (OUTPATIENT)
Dept: ORTHOPEDIC SURGERY | Facility: CLINIC | Age: 53
End: 2018-11-13

## 2018-11-13 ENCOUNTER — APPOINTMENT (OUTPATIENT)
Dept: PREADMISSION TESTING | Facility: HOSPITAL | Age: 53
End: 2018-11-13

## 2018-11-13 VITALS
HEIGHT: 60 IN | SYSTOLIC BLOOD PRESSURE: 145 MMHG | WEIGHT: 212 LBS | HEART RATE: 68 BPM | OXYGEN SATURATION: 97 % | RESPIRATION RATE: 12 BRPM | BODY MASS INDEX: 41.62 KG/M2 | DIASTOLIC BLOOD PRESSURE: 88 MMHG

## 2018-11-13 VITALS — BODY MASS INDEX: 41.62 KG/M2 | WEIGHT: 212 LBS | HEIGHT: 60 IN

## 2018-11-13 DIAGNOSIS — E66.01 MORBID OBESITY WITH BMI OF 40.0-44.9, ADULT (HCC): ICD-10-CM

## 2018-11-13 DIAGNOSIS — Z96.612 PRESENCE OF LEFT ARTIFICIAL SHOULDER JOINT: ICD-10-CM

## 2018-11-13 DIAGNOSIS — I10 ESSENTIAL HYPERTENSION: ICD-10-CM

## 2018-11-13 DIAGNOSIS — M17.12 PRIMARY OSTEOARTHRITIS OF LEFT KNEE: Primary | ICD-10-CM

## 2018-11-13 DIAGNOSIS — M25.562 LEFT KNEE PAIN, UNSPECIFIED CHRONICITY: ICD-10-CM

## 2018-11-13 DIAGNOSIS — G47.33 OSA (OBSTRUCTIVE SLEEP APNEA): ICD-10-CM

## 2018-11-13 DIAGNOSIS — Z98.890 HISTORY OF BRAIN SURGERY: ICD-10-CM

## 2018-11-13 DIAGNOSIS — R56.9 SEIZURE-LIKE ACTIVITY (HCC): ICD-10-CM

## 2018-11-13 DIAGNOSIS — M17.12 PRIMARY OSTEOARTHRITIS OF LEFT KNEE: ICD-10-CM

## 2018-11-13 LAB
ABO GROUP BLD: NORMAL
ANION GAP SERPL CALCULATED.3IONS-SCNC: 8 MMOL/L (ref 5–15)
BLD GP AB SCN SERPL QL: NEGATIVE
BUN BLD-MCNC: 22 MG/DL (ref 7–21)
BUN/CREAT SERPL: 29.7 (ref 7–25)
CALCIUM SPEC-SCNC: 10 MG/DL (ref 8.4–10.2)
CHLORIDE SERPL-SCNC: 101 MMOL/L (ref 95–110)
CO2 SERPL-SCNC: 27 MMOL/L (ref 22–31)
CREAT BLD-MCNC: 0.74 MG/DL (ref 0.5–1)
DEPRECATED RDW RBC AUTO: 44.5 FL (ref 36.4–46.3)
ERYTHROCYTE [DISTWIDTH] IN BLOOD BY AUTOMATED COUNT: 13.8 % (ref 11.5–14.5)
GFR SERPL CREATININE-BSD FRML MDRD: 83 ML/MIN/1.73 (ref 51–120)
GLUCOSE BLD-MCNC: 85 MG/DL (ref 60–100)
HCT VFR BLD AUTO: 40 % (ref 35–45)
HGB BLD-MCNC: 13.4 G/DL (ref 12–15.5)
Lab: NORMAL
MCH RBC QN AUTO: 29.5 PG (ref 26.5–34)
MCHC RBC AUTO-ENTMCNC: 33.5 G/DL (ref 31.4–36)
MCV RBC AUTO: 87.9 FL (ref 80–98)
MRSA DNA SPEC QL NAA+PROBE: NEGATIVE
PLATELET # BLD AUTO: 336 10*3/MM3 (ref 150–450)
PMV BLD AUTO: 9.9 FL (ref 8–12)
POTASSIUM BLD-SCNC: 4 MMOL/L (ref 3.5–5.1)
RBC # BLD AUTO: 4.55 10*6/MM3 (ref 3.77–5.16)
RH BLD: POSITIVE
SODIUM BLD-SCNC: 136 MMOL/L (ref 137–145)
T&S EXPIRATION DATE: NORMAL
WBC NRBC COR # BLD: 6.78 10*3/MM3 (ref 3.2–9.8)

## 2018-11-13 PROCEDURE — 85027 COMPLETE CBC AUTOMATED: CPT | Performed by: ANESTHESIOLOGY

## 2018-11-13 PROCEDURE — 87641 MR-STAPH DNA AMP PROBE: CPT | Performed by: ORTHOPAEDIC SURGERY

## 2018-11-13 PROCEDURE — 86850 RBC ANTIBODY SCREEN: CPT | Performed by: ORTHOPAEDIC SURGERY

## 2018-11-13 PROCEDURE — 86900 BLOOD TYPING SEROLOGIC ABO: CPT | Performed by: ORTHOPAEDIC SURGERY

## 2018-11-13 PROCEDURE — 36415 COLL VENOUS BLD VENIPUNCTURE: CPT

## 2018-11-13 PROCEDURE — 93005 ELECTROCARDIOGRAM TRACING: CPT

## 2018-11-13 PROCEDURE — 86901 BLOOD TYPING SEROLOGIC RH(D): CPT | Performed by: ORTHOPAEDIC SURGERY

## 2018-11-13 PROCEDURE — 99214 OFFICE O/P EST MOD 30 MIN: CPT | Performed by: ORTHOPAEDIC SURGERY

## 2018-11-13 PROCEDURE — 93010 ELECTROCARDIOGRAM REPORT: CPT | Performed by: INTERNAL MEDICINE

## 2018-11-13 PROCEDURE — 80048 BASIC METABOLIC PNL TOTAL CA: CPT | Performed by: ANESTHESIOLOGY

## 2018-11-13 RX ORDER — SODIUM CHLORIDE, SODIUM GLUCONATE, SODIUM ACETATE, POTASSIUM CHLORIDE, AND MAGNESIUM CHLORIDE 526; 502; 368; 37; 30 MG/100ML; MG/100ML; MG/100ML; MG/100ML; MG/100ML
1000 INJECTION, SOLUTION INTRAVENOUS CONTINUOUS
Status: CANCELLED | OUTPATIENT
Start: 2018-11-19

## 2018-11-13 ASSESSMENT — KOOS JR
KOOS JR SCORE: 20
KOOS JR SCORE: 36.931

## 2018-11-13 NOTE — DISCHARGE INSTRUCTIONS
[facility]  Pre-op Information and Guidelines    You will be called after 2 p.m. the day before your surgery (Friday for Monday surgery) and notified of your time for arrival and approximate surgery time.  If you have not received a call by 4P.M., please contact Same Day Surgery at [phone] of if outside [facility] call [phone].    Please Follow these Important Safety Guidelines:    • The morning of your procedure, take only the medications listed below with   A sip of water:_____________________________________________       ____FLONASE________________________________    • DO NOT eat or drink anything after 12:00 midnight the night before surgery  Specific instructions concerning drinking clear liquids will be discussed during  the pre-surgery instruction call the day before your surgery.    • If you take a blood thinner (ex. Plavix, Coumadin, aspirin), ask your doctor when to stop it before surgery  STOP DATE: _________________    • Only 2 visitors are allowed in patient rooms at a time  Your visitors will be asked to wait in the lobby until the admission process is complete with the exception of a parent with a child and patients in need of special assistance.    • YOU CANNOT DRIVE YOURSELF HOME  You must be accompanied by someone who will be responsible for driving you home after surgery and for your care at home.    • DO NOT chew gum, use breath mints, hard candy, or smoke the day of surgery  • DO NOT drink alcohol for at least 24 hours before your surgery  • DO NOT wear any jewelry and remove all body piercing before coming to the hospital  • DO NOT wear make-up to the hospital  • If you are having surgery on an extremity (arm/leg/foot) remove nail polish/artificial nails on the surgical side  • Clothing, glasses, contacts, dentures, and hairpieces must be removed before surgery  • Bathe the night before or the morning of your surgery and do not use powders/lotions on  skin.

## 2018-11-13 NOTE — PROGRESS NOTES
Priti Orr is a 53 y.o. female   Primary Care Provider Radha Gregorio MD     Chief Complaint   Patient presents with   • Left Knee - Pre-op Exam       HISTORY OF PRESENT ILLNESS:  Priti Orr is here for followup of left knee pain.  The pain has not improved despite long term treatment with multiple conservative management trials.      Prior Arthritis treatments: [x]  PT    [x]  HEP      [x]  Attempt weight loss  [x]  NSAIDS      [x]  Cane   [x]  Intra-articular steroid inj      [x]  Viscosupplementation    Pain is chronic dull ache that is severe at times and worse with activity.  Difficulty with ADL's.  Patient is not happy with current quality of life and wants to discuss proceeding with surgical intervention.     CONCURRENT MEDICAL HISTORY:    Past Medical History:   Diagnosis Date   • Allergic rhinitis    • Arthritis    • Asthma    • Benign neoplasm of meninges (CMS/HCC)     Post op 2011      • Cobalamin deficiency    • Corneal abrasion    • Encounter for gynecological examination (general) (routine) without abnormal findings    • Essential hypertension    • Fatigue    • Female stress incontinence    • Head injury 2011   • History of delayed wound healing     Delayed healing of surgical wound      • Hyperlipidemia    • Low back pain    • Nosebleed, symptom     Nosebleed/epistaxis symptom      • Osteoporosis    • PONV (postoperative nausea and vomiting)    • Sleep apnea     not wearing c-pap       Allergies   Allergen Reactions   • Pseudoephedrine Shortness Of Breath and Swelling     LIPS SWELL AND TONGUE BECOMES THICK   • Morphine Other (See Comments)     States felt like itching from within, hallucinations, agitation   • Allegra [Fexofenadine] Swelling   • Zyrtec [Cetirizine] Swelling         Current Outpatient Medications:   •  fluticasone (FLONASE) 50 MCG/ACT nasal spray, 1 spray into the nostril(s) as directed by provider Daily., Disp: , Rfl:   •  lisinopril-hydrochlorothiazide  (PRINZIDE,ZESTORETIC) 20-12.5 MG per tablet, Take 1 tablet by mouth Daily., Disp: , Rfl:   •  meloxicam (MOBIC) 15 MG tablet, Take 15 mg by mouth Daily., Disp: , Rfl: 5  •  montelukast (SINGULAIR) 10 MG tablet, Take 10 mg by mouth Daily As Needed., Disp: , Rfl:   •  vitamin D (ERGOCALCIFEROL) 49542 units capsule capsule, Take 50,000 Units by mouth 1 (One) Time Per Week., Disp: , Rfl:   •  Glucos-Chondroit-Hyaluron-MSM (GLUCOSAMINE CHONDROITIN JOINT PO), Take 1 tablet by mouth Daily., Disp: , Rfl:     Past Surgical History:   Procedure Laterality Date   • ARM LESION/CYST EXCISION  2014    Remove arm bone lesion (1)   • ARM SKIN LESION BIOPSY / EXCISION     • ARM WOUND REPAIR / CLOSURE  2014    Repair Superficial Wound TR-EXT 2.5 < CM 71112 (1)      • BRAIN SURGERY     • BREAST LUMPECTOMY     •  SECTION  1986     Section (1)     • ENDOSCOPY  2008    Colon endoscopy 37878 (1)     • EXCISION BREAST LESION W/ PREOP NEEDLE LOC  1981    Excision, breast lesion (1)     • HEMORRHOIDECTOMY     • HEMORRHOIDECTOMY  2008    Hemorrhoidectomy (2)      • HYSTERECTOMY     • INJECTION OF MEDICATION  2013    Celestone (betamethasone) (1)      • INJECTION OF MEDICATION  2013    Depo Medrol (Methylprednisone) (1)      • INJECTION OF MEDICATION  2013    Dexamethasone (2)      • INJECTION OF MEDICATION  2014    Kenalog (6)   • KNEE ARTHROSCOPY  2007    Knee arthroscopy, surgery (1)     • LAPAROSCOPIC TUBAL LIGATION  1991    Tubal ligation (1)      • PAP SMEAR  2007    PAP SMEAR (1)      • SHOULDER SURGERY Left     x 4   • TOTAL ABDOMINAL HYSTERECTOMY WITH SALPINGO OOPHORECTOMY      JOSE/BSO (1)          Family History   Problem Relation Age of Onset   • Alzheimer's disease Other    • Breast cancer Other         other   • Cancer Other         other - GYN   • Colon cancer Other         Colorectal Cancer   • Depression Other    • Diabetes  "Other    • Hyperlipidemia Other    • Hypertension Other    • Stroke Other    • Ovarian cancer Other    • Colon cancer Other    • Hypertension Mother    • Hypertension Father        Social History     Socioeconomic History   • Marital status:      Spouse name: Not on file   • Number of children: Not on file   • Years of education: Not on file   • Highest education level: Not on file   Social Needs   • Financial resource strain: Not on file   • Food insecurity - worry: Not on file   • Food insecurity - inability: Not on file   • Transportation needs - medical: Not on file   • Transportation needs - non-medical: Not on file   Occupational History   • Not on file   Tobacco Use   • Smoking status: Never Smoker   • Smokeless tobacco: Never Used   Substance and Sexual Activity   • Alcohol use: Yes     Comment: occasional   • Drug use: No   • Sexual activity: Defer   Other Topics Concern   • Not on file   Social History Narrative   • Not on file        Review of Systems   Constitutional: Negative for chills and fever.   Respiratory: Negative.    Cardiovascular: Negative.    Gastrointestinal: Negative.    Genitourinary: Negative.    Musculoskeletal:        Left knee pain   All other systems reviewed and are negative.      PHYSICAL EXAMINATION:       Ht 152.4 cm (60\")   Wt 96.2 kg (212 lb)   BMI 41.40 kg/m²     Physical Exam   Constitutional: She is oriented to person, place, and time. She appears well-developed and well-nourished. No distress.   Cardiovascular: Normal rate, regular rhythm and normal heart sounds.   Pulmonary/Chest: Effort normal and breath sounds normal.   Abdominal: Soft. Bowel sounds are normal.   Musculoskeletal:        Right knee: She exhibits no effusion.        Left knee: She exhibits no effusion.   Neurological: She is alert and oriented to person, place, and time.   Psychiatric: She has a normal mood and affect. Her behavior is normal. Judgment and thought content normal.   Vitals " reviewed.      GAIT:     []  Normal  [x]  Antalgic    Assistive device: [x]  None  []  Walker     []  Crutches  []  Cane     []  Wheelchair  []  Stretcher    Right Knee Exam     Muscle Strength   The patient has normal right knee strength.    Tenderness   The patient is experiencing no tenderness.     Range of Motion   The patient has normal right knee ROM.    Tests   Varus: negative Valgus: negative  Drawer:  Anterior - negative        Other   Erythema: absent  Sensation: normal  Pulse: present  Swelling: none  Effusion: no effusion present      Left Knee Exam     Muscle Strength   The patient has normal left knee strength.    Tenderness   Left knee tenderness location: diffuse.    Range of Motion   Extension: -5   Flexion: 120     Tests   Varus: negative Valgus: negative  Drawer:  Anterior - negative     Posterior - negative    Other   Erythema: absent  Sensation: normal  Pulse: present  Swelling: none  Effusion: no effusion present    Comments:  Crepitation with motion  Mild to moderate pain through arc of motion              EXAM:  Radiograph(s), Osseous         REGION:   Knee    SIDE:     Left     VIEWS:   2             INDICATION:    injury today, M25.561 Pain in right knee M25.562  Pain in left knee     COMPARISON:    6/26/18              FINDINGS:           No evidence of a fracture or bony malalignment.     There is moderate to severe medial compartment degenerative  change, unchanged from the recent prior study.  Intra-articular osseous foreign body, unchanged.  .      IMPRESSION:  CONCLUSION:           1. Degenerative changes, grossly unchanged.     Suggest correlation with MRI.            NOTE:  TXA  tranexemic acid summary: THIS PATIENT IS A CANDIDATE FOR IV TXA DURING SURGERY.    ASSESSMENT:    Diagnoses and all orders for this visit:    Primary osteoarthritis of left knee    Left knee pain, unspecified chronicity    NICK (obstructive sleep apnea)    Morbid obesity with BMI of 40.0-44.9, adult  (CMS/[facility])    Essential hypertension          PLAN    The patient voiced understanding of the risks, benefits, and alternative forms of treatment that were discussed and the patient consents to proceed with surgery.  All risks, benefits and alternatives were discussed.  Risks including to but not exclusive to anesthetic complications, including death, MI, CVA, infection, bleeding DVT, fracture, residual pain and need for future surgery.    Plan total knee arthroplasty in the left knee.    All questions were answered.    Her BMI is 41.4.  We discussed that it was not below the target of 40 but that she had made progress and she had progressed well.  We discussed the slight increased risk and agreed to proceed.      Return for Post-operative eval.    Brooks Junior MD

## 2018-11-13 NOTE — H&P (VIEW-ONLY)
Priti Orr is a 53 y.o. female   Primary Care Provider Radha Gregorio MD     Chief Complaint   Patient presents with   • Left Knee - Pre-op Exam       HISTORY OF PRESENT ILLNESS:  Priti Orr is here for followup of left knee pain.  The pain has not improved despite long term treatment with multiple conservative management trials.      Prior Arthritis treatments: [x]  PT    [x]  HEP      [x]  Attempt weight loss  [x]  NSAIDS      [x]  Cane   [x]  Intra-articular steroid inj      [x]  Viscosupplementation    Pain is chronic dull ache that is severe at times and worse with activity.  Difficulty with ADL's.  Patient is not happy with current quality of life and wants to discuss proceeding with surgical intervention.     CONCURRENT MEDICAL HISTORY:    Past Medical History:   Diagnosis Date   • Allergic rhinitis    • Arthritis    • Asthma    • Benign neoplasm of meninges (CMS/HCC)     Post op 2011      • Cobalamin deficiency    • Corneal abrasion    • Encounter for gynecological examination (general) (routine) without abnormal findings    • Essential hypertension    • Fatigue    • Female stress incontinence    • Head injury 2011   • History of delayed wound healing     Delayed healing of surgical wound      • Hyperlipidemia    • Low back pain    • Nosebleed, symptom     Nosebleed/epistaxis symptom      • Osteoporosis    • PONV (postoperative nausea and vomiting)    • Sleep apnea     not wearing c-pap       Allergies   Allergen Reactions   • Pseudoephedrine Shortness Of Breath and Swelling     LIPS SWELL AND TONGUE BECOMES THICK   • Morphine Other (See Comments)     States felt like itching from within, hallucinations, agitation   • Allegra [Fexofenadine] Swelling   • Zyrtec [Cetirizine] Swelling         Current Outpatient Medications:   •  fluticasone (FLONASE) 50 MCG/ACT nasal spray, 1 spray into the nostril(s) as directed by provider Daily., Disp: , Rfl:   •  lisinopril-hydrochlorothiazide  (PRINZIDE,ZESTORETIC) 20-12.5 MG per tablet, Take 1 tablet by mouth Daily., Disp: , Rfl:   •  meloxicam (MOBIC) 15 MG tablet, Take 15 mg by mouth Daily., Disp: , Rfl: 5  •  montelukast (SINGULAIR) 10 MG tablet, Take 10 mg by mouth Daily As Needed., Disp: , Rfl:   •  vitamin D (ERGOCALCIFEROL) 71115 units capsule capsule, Take 50,000 Units by mouth 1 (One) Time Per Week., Disp: , Rfl:   •  Glucos-Chondroit-Hyaluron-MSM (GLUCOSAMINE CHONDROITIN JOINT PO), Take 1 tablet by mouth Daily., Disp: , Rfl:     Past Surgical History:   Procedure Laterality Date   • ARM LESION/CYST EXCISION  2014    Remove arm bone lesion (1)   • ARM SKIN LESION BIOPSY / EXCISION     • ARM WOUND REPAIR / CLOSURE  2014    Repair Superficial Wound TR-EXT 2.5 < CM 02757 (1)      • BRAIN SURGERY     • BREAST LUMPECTOMY     •  SECTION  1986     Section (1)     • ENDOSCOPY  2008    Colon endoscopy 52835 (1)     • EXCISION BREAST LESION W/ PREOP NEEDLE LOC  1981    Excision, breast lesion (1)     • HEMORRHOIDECTOMY     • HEMORRHOIDECTOMY  2008    Hemorrhoidectomy (2)      • HYSTERECTOMY     • INJECTION OF MEDICATION  2013    Celestone (betamethasone) (1)      • INJECTION OF MEDICATION  2013    Depo Medrol (Methylprednisone) (1)      • INJECTION OF MEDICATION  2013    Dexamethasone (2)      • INJECTION OF MEDICATION  2014    Kenalog (6)   • KNEE ARTHROSCOPY  2007    Knee arthroscopy, surgery (1)     • LAPAROSCOPIC TUBAL LIGATION  1991    Tubal ligation (1)      • PAP SMEAR  2007    PAP SMEAR (1)      • SHOULDER SURGERY Left     x 4   • TOTAL ABDOMINAL HYSTERECTOMY WITH SALPINGO OOPHORECTOMY      JOSE/BSO (1)          Family History   Problem Relation Age of Onset   • Alzheimer's disease Other    • Breast cancer Other         other   • Cancer Other         other - GYN   • Colon cancer Other         Colorectal Cancer   • Depression Other    • Diabetes  "Other    • Hyperlipidemia Other    • Hypertension Other    • Stroke Other    • Ovarian cancer Other    • Colon cancer Other    • Hypertension Mother    • Hypertension Father        Social History     Socioeconomic History   • Marital status:      Spouse name: Not on file   • Number of children: Not on file   • Years of education: Not on file   • Highest education level: Not on file   Social Needs   • Financial resource strain: Not on file   • Food insecurity - worry: Not on file   • Food insecurity - inability: Not on file   • Transportation needs - medical: Not on file   • Transportation needs - non-medical: Not on file   Occupational History   • Not on file   Tobacco Use   • Smoking status: Never Smoker   • Smokeless tobacco: Never Used   Substance and Sexual Activity   • Alcohol use: Yes     Comment: occasional   • Drug use: No   • Sexual activity: Defer   Other Topics Concern   • Not on file   Social History Narrative   • Not on file        Review of Systems   Constitutional: Negative for chills and fever.   Respiratory: Negative.    Cardiovascular: Negative.    Gastrointestinal: Negative.    Genitourinary: Negative.    Musculoskeletal:        Left knee pain   All other systems reviewed and are negative.      PHYSICAL EXAMINATION:       Ht 152.4 cm (60\")   Wt 96.2 kg (212 lb)   BMI 41.40 kg/m²     Physical Exam   Constitutional: She is oriented to person, place, and time. She appears well-developed and well-nourished. No distress.   Cardiovascular: Normal rate, regular rhythm and normal heart sounds.   Pulmonary/Chest: Effort normal and breath sounds normal.   Abdominal: Soft. Bowel sounds are normal.   Musculoskeletal:        Right knee: She exhibits no effusion.        Left knee: She exhibits no effusion.   Neurological: She is alert and oriented to person, place, and time.   Psychiatric: She has a normal mood and affect. Her behavior is normal. Judgment and thought content normal.   Vitals " reviewed.      GAIT:     []  Normal  [x]  Antalgic    Assistive device: [x]  None  []  Walker     []  Crutches  []  Cane     []  Wheelchair  []  Stretcher    Right Knee Exam     Muscle Strength   The patient has normal right knee strength.    Tenderness   The patient is experiencing no tenderness.     Range of Motion   The patient has normal right knee ROM.    Tests   Varus: negative Valgus: negative  Drawer:  Anterior - negative        Other   Erythema: absent  Sensation: normal  Pulse: present  Swelling: none  Effusion: no effusion present      Left Knee Exam     Muscle Strength   The patient has normal left knee strength.    Tenderness   Left knee tenderness location: diffuse.    Range of Motion   Extension: -5   Flexion: 120     Tests   Varus: negative Valgus: negative  Drawer:  Anterior - negative     Posterior - negative    Other   Erythema: absent  Sensation: normal  Pulse: present  Swelling: none  Effusion: no effusion present    Comments:  Crepitation with motion  Mild to moderate pain through arc of motion              EXAM:  Radiograph(s), Osseous         REGION:   Knee    SIDE:     Left     VIEWS:   2             INDICATION:    injury today, M25.561 Pain in right knee M25.562  Pain in left knee     COMPARISON:    6/26/18              FINDINGS:           No evidence of a fracture or bony malalignment.     There is moderate to severe medial compartment degenerative  change, unchanged from the recent prior study.  Intra-articular osseous foreign body, unchanged.  .      IMPRESSION:  CONCLUSION:           1. Degenerative changes, grossly unchanged.     Suggest correlation with MRI.            NOTE:  TXA  tranexemic acid summary: THIS PATIENT IS A CANDIDATE FOR IV TXA DURING SURGERY.    ASSESSMENT:    Diagnoses and all orders for this visit:    Primary osteoarthritis of left knee    Left knee pain, unspecified chronicity    NICK (obstructive sleep apnea)    Morbid obesity with BMI of 40.0-44.9, adult  (CMS/[facility])    Essential hypertension          PLAN    The patient voiced understanding of the risks, benefits, and alternative forms of treatment that were discussed and the patient consents to proceed with surgery.  All risks, benefits and alternatives were discussed.  Risks including to but not exclusive to anesthetic complications, including death, MI, CVA, infection, bleeding DVT, fracture, residual pain and need for future surgery.    Plan total knee arthroplasty in the left knee.    All questions were answered.    Her BMI is 41.4.  We discussed that it was not below the target of 40 but that she had made progress and she had progressed well.  We discussed the slight increased risk and agreed to proceed.      Return for Post-operative eval.    Brooks Junior MD

## 2018-11-18 ENCOUNTER — ANESTHESIA EVENT (OUTPATIENT)
Dept: PERIOP | Facility: HOSPITAL | Age: 53
End: 2018-11-18

## 2018-11-19 ENCOUNTER — HOSPITAL ENCOUNTER (INPATIENT)
Facility: HOSPITAL | Age: 53
LOS: 2 days | Discharge: HOME HEALTH | End: 2018-11-21
Attending: ORTHOPAEDIC SURGERY | Admitting: ORTHOPAEDIC SURGERY

## 2018-11-19 ENCOUNTER — APPOINTMENT (OUTPATIENT)
Dept: GENERAL RADIOLOGY | Facility: HOSPITAL | Age: 53
End: 2018-11-19

## 2018-11-19 ENCOUNTER — ANESTHESIA (OUTPATIENT)
Dept: PERIOP | Facility: HOSPITAL | Age: 53
End: 2018-11-19

## 2018-11-19 DIAGNOSIS — G47.33 OSA (OBSTRUCTIVE SLEEP APNEA): ICD-10-CM

## 2018-11-19 DIAGNOSIS — E66.01 MORBID OBESITY WITH BMI OF 40.0-44.9, ADULT (HCC): ICD-10-CM

## 2018-11-19 DIAGNOSIS — Z74.09 IMPAIRED FUNCTIONAL MOBILITY, BALANCE, GAIT, AND ENDURANCE: ICD-10-CM

## 2018-11-19 DIAGNOSIS — Z78.9 IMPAIRED MOBILITY AND ADLS: ICD-10-CM

## 2018-11-19 DIAGNOSIS — I10 ESSENTIAL HYPERTENSION: ICD-10-CM

## 2018-11-19 DIAGNOSIS — Z98.890 HISTORY OF BRAIN SURGERY: ICD-10-CM

## 2018-11-19 DIAGNOSIS — M17.12 PRIMARY OSTEOARTHRITIS OF LEFT KNEE: Primary | ICD-10-CM

## 2018-11-19 DIAGNOSIS — Z74.09 IMPAIRED MOBILITY AND ADLS: ICD-10-CM

## 2018-11-19 DIAGNOSIS — Z96.612 PRESENCE OF LEFT ARTIFICIAL SHOULDER JOINT: ICD-10-CM

## 2018-11-19 DIAGNOSIS — R56.9 SEIZURE-LIKE ACTIVITY (HCC): ICD-10-CM

## 2018-11-19 LAB
ABO GROUP BLD: NORMAL
BLD GP AB SCN SERPL QL: NEGATIVE
HCT VFR BLD AUTO: 36.3 % (ref 35–45)
HGB BLD-MCNC: 12.1 G/DL (ref 12–15.5)
Lab: NORMAL
RH BLD: POSITIVE
T&S EXPIRATION DATE: NORMAL

## 2018-11-19 PROCEDURE — 25010000002 ONDANSETRON PER 1 MG: Performed by: NURSE ANESTHETIST, CERTIFIED REGISTERED

## 2018-11-19 PROCEDURE — 25010000002 DEXAMETHASONE PER 1 MG: Performed by: NURSE ANESTHETIST, CERTIFIED REGISTERED

## 2018-11-19 PROCEDURE — 25010000002 CEFAZOLIN PER 500 MG: Performed by: ORTHOPAEDIC SURGERY

## 2018-11-19 PROCEDURE — 97166 OT EVAL MOD COMPLEX 45 MIN: CPT

## 2018-11-19 PROCEDURE — 88305 TISSUE EXAM BY PATHOLOGIST: CPT | Performed by: ORTHOPAEDIC SURGERY

## 2018-11-19 PROCEDURE — G8979 MOBILITY GOAL STATUS: HCPCS

## 2018-11-19 PROCEDURE — 88305 TISSUE EXAM BY PATHOLOGIST: CPT | Performed by: PATHOLOGY

## 2018-11-19 PROCEDURE — 27447 TOTAL KNEE ARTHROPLASTY: CPT | Performed by: ORTHOPAEDIC SURGERY

## 2018-11-19 PROCEDURE — 85018 HEMOGLOBIN: CPT | Performed by: ORTHOPAEDIC SURGERY

## 2018-11-19 PROCEDURE — 73560 X-RAY EXAM OF KNEE 1 OR 2: CPT

## 2018-11-19 PROCEDURE — G8988 SELF CARE GOAL STATUS: HCPCS

## 2018-11-19 PROCEDURE — C1713 ANCHOR/SCREW BN/BN,TIS/BN: HCPCS | Performed by: ORTHOPAEDIC SURGERY

## 2018-11-19 PROCEDURE — 85014 HEMATOCRIT: CPT | Performed by: ORTHOPAEDIC SURGERY

## 2018-11-19 PROCEDURE — 25010000002 PROMETHAZINE PER 50 MG: Performed by: ORTHOPAEDIC SURGERY

## 2018-11-19 PROCEDURE — 25010000002 MIDAZOLAM PER 1 MG: Performed by: NURSE ANESTHETIST, CERTIFIED REGISTERED

## 2018-11-19 PROCEDURE — 25010000002 PROPOFOL 1000 MG/ML EMULSION: Performed by: NURSE ANESTHETIST, CERTIFIED REGISTERED

## 2018-11-19 PROCEDURE — 86900 BLOOD TYPING SEROLOGIC ABO: CPT | Performed by: ORTHOPAEDIC SURGERY

## 2018-11-19 PROCEDURE — 86901 BLOOD TYPING SEROLOGIC RH(D): CPT | Performed by: ORTHOPAEDIC SURGERY

## 2018-11-19 PROCEDURE — 25010000002 ONDANSETRON PER 1 MG: Performed by: ORTHOPAEDIC SURGERY

## 2018-11-19 PROCEDURE — G8978 MOBILITY CURRENT STATUS: HCPCS

## 2018-11-19 PROCEDURE — 94799 UNLISTED PULMONARY SVC/PX: CPT

## 2018-11-19 PROCEDURE — 25010000002 FENTANYL CITRATE (PF) 100 MCG/2ML SOLUTION: Performed by: NURSE ANESTHETIST, CERTIFIED REGISTERED

## 2018-11-19 PROCEDURE — 25010000002 HYDROMORPHONE PER 4 MG: Performed by: ORTHOPAEDIC SURGERY

## 2018-11-19 PROCEDURE — 25010000002 ROPIVACAINE PER 1 MG: Performed by: ANESTHESIOLOGY

## 2018-11-19 PROCEDURE — 0SRD0J9 REPLACEMENT OF LEFT KNEE JOINT WITH SYNTHETIC SUBSTITUTE, CEMENTED, OPEN APPROACH: ICD-10-PCS | Performed by: ORTHOPAEDIC SURGERY

## 2018-11-19 PROCEDURE — 86850 RBC ANTIBODY SCREEN: CPT | Performed by: ORTHOPAEDIC SURGERY

## 2018-11-19 PROCEDURE — G8987 SELF CARE CURRENT STATUS: HCPCS

## 2018-11-19 PROCEDURE — C1776 JOINT DEVICE (IMPLANTABLE): HCPCS | Performed by: ORTHOPAEDIC SURGERY

## 2018-11-19 PROCEDURE — 97161 PT EVAL LOW COMPLEX 20 MIN: CPT

## 2018-11-19 DEVICE — SMARTSET HV HIGH VISCOSITY BONE CEMENT 40G
Type: IMPLANTABLE DEVICE | Site: KNEE | Status: FUNCTIONAL
Brand: SMARTSET

## 2018-11-19 DEVICE — ATTUNE KNEE SYSTEM FEMORAL POSTERIOR STABILIZED NARROW SIZE 5N LEFT CEMENTED
Type: IMPLANTABLE DEVICE | Site: KNEE | Status: FUNCTIONAL
Brand: ATTUNE

## 2018-11-19 DEVICE — ATTUNE KNEE SYSTEM TIBIAL INSERT ROTATING PLATFORM POSTERIOR STABILIZED 5 12MM AOX
Type: IMPLANTABLE DEVICE | Site: KNEE | Status: FUNCTIONAL
Brand: ATTUNE

## 2018-11-19 DEVICE — TOTL KN ATTUNE DEPUY 9527038: Type: IMPLANTABLE DEVICE | Status: FUNCTIONAL

## 2018-11-19 DEVICE — ATTUNE KNEE SYSTEM TIBIAL BASE ROTATING PLATFORM SIZE 4 CEMENTED
Type: IMPLANTABLE DEVICE | Site: KNEE | Status: FUNCTIONAL
Brand: ATTUNE

## 2018-11-19 DEVICE — ATTUNE PATELLA MEDIALIZED DOME 35MM CEMENTED AOX
Type: IMPLANTABLE DEVICE | Site: KNEE | Status: FUNCTIONAL
Brand: ATTUNE

## 2018-11-19 RX ORDER — DIPHENHYDRAMINE HYDROCHLORIDE 50 MG/ML
12.5 INJECTION INTRAMUSCULAR; INTRAVENOUS
Status: DISCONTINUED | OUTPATIENT
Start: 2018-11-19 | End: 2018-11-19 | Stop reason: HOSPADM

## 2018-11-19 RX ORDER — TRANEXAMIC ACID 100 MG/ML
INJECTION, SOLUTION INTRAVENOUS AS NEEDED
Status: DISCONTINUED | OUTPATIENT
Start: 2018-11-19 | End: 2018-11-19 | Stop reason: SURG

## 2018-11-19 RX ORDER — NALOXONE HCL 0.4 MG/ML
0.2 VIAL (ML) INJECTION AS NEEDED
Status: DISCONTINUED | OUTPATIENT
Start: 2018-11-19 | End: 2018-11-19 | Stop reason: HOSPADM

## 2018-11-19 RX ORDER — ONDANSETRON 2 MG/ML
4 INJECTION INTRAMUSCULAR; INTRAVENOUS ONCE AS NEEDED
Status: COMPLETED | OUTPATIENT
Start: 2018-11-19 | End: 2018-11-19

## 2018-11-19 RX ORDER — 0.9 % SODIUM CHLORIDE 0.9 %
VIAL (ML) INJECTION AS NEEDED
Status: DISCONTINUED | OUTPATIENT
Start: 2018-11-19 | End: 2018-11-21 | Stop reason: HOSPADM

## 2018-11-19 RX ORDER — ROPIVACAINE HYDROCHLORIDE 5 MG/ML
INJECTION, SOLUTION EPIDURAL; INFILTRATION; PERINEURAL
Status: COMPLETED | OUTPATIENT
Start: 2018-11-19 | End: 2018-11-19

## 2018-11-19 RX ORDER — OXYCODONE HYDROCHLORIDE 5 MG/1
5 TABLET ORAL EVERY 4 HOURS PRN
Status: DISCONTINUED | OUTPATIENT
Start: 2018-11-19 | End: 2018-11-21 | Stop reason: HOSPADM

## 2018-11-19 RX ORDER — PROMETHAZINE HYDROCHLORIDE 25 MG/ML
25 INJECTION, SOLUTION INTRAMUSCULAR; INTRAVENOUS EVERY 6 HOURS PRN
Status: DISCONTINUED | OUTPATIENT
Start: 2018-11-19 | End: 2018-11-21 | Stop reason: HOSPADM

## 2018-11-19 RX ORDER — MELOXICAM 15 MG/1
15 TABLET ORAL ONCE
Status: COMPLETED | OUTPATIENT
Start: 2018-11-19 | End: 2018-11-19

## 2018-11-19 RX ORDER — MIDAZOLAM HYDROCHLORIDE 1 MG/ML
INJECTION INTRAMUSCULAR; INTRAVENOUS AS NEEDED
Status: DISCONTINUED | OUTPATIENT
Start: 2018-11-19 | End: 2018-11-19 | Stop reason: SURG

## 2018-11-19 RX ORDER — OXYCODONE HCL 10 MG/1
10 TABLET, FILM COATED, EXTENDED RELEASE ORAL EVERY 12 HOURS SCHEDULED
Status: DISCONTINUED | OUTPATIENT
Start: 2018-11-19 | End: 2018-11-21 | Stop reason: HOSPADM

## 2018-11-19 RX ORDER — SODIUM CHLORIDE 9 MG/ML
100 INJECTION, SOLUTION INTRAVENOUS CONTINUOUS
Status: DISCONTINUED | OUTPATIENT
Start: 2018-11-19 | End: 2018-11-21 | Stop reason: HOSPADM

## 2018-11-19 RX ORDER — FERROUS SULFATE TAB EC 324 MG (65 MG FE EQUIVALENT) 324 (65 FE) MG
324 TABLET DELAYED RESPONSE ORAL
Status: DISCONTINUED | OUTPATIENT
Start: 2018-11-20 | End: 2018-11-21 | Stop reason: HOSPADM

## 2018-11-19 RX ORDER — MEPERIDINE HYDROCHLORIDE 50 MG/ML
12.5 INJECTION INTRAMUSCULAR; INTRAVENOUS; SUBCUTANEOUS
Status: DISCONTINUED | OUTPATIENT
Start: 2018-11-19 | End: 2018-11-19 | Stop reason: HOSPADM

## 2018-11-19 RX ORDER — SODIUM CHLORIDE 0.9 % (FLUSH) 0.9 %
3-10 SYRINGE (ML) INJECTION AS NEEDED
Status: DISCONTINUED | OUTPATIENT
Start: 2018-11-19 | End: 2018-11-21 | Stop reason: HOSPADM

## 2018-11-19 RX ORDER — BACITRACIN 50000 [IU]/1
INJECTION, POWDER, FOR SOLUTION INTRAMUSCULAR AS NEEDED
Status: DISCONTINUED | OUTPATIENT
Start: 2018-11-19 | End: 2018-11-21 | Stop reason: HOSPADM

## 2018-11-19 RX ORDER — BUPIVACAINE HYDROCHLORIDE 7.5 MG/ML
INJECTION, SOLUTION EPIDURAL; RETROBULBAR AS NEEDED
Status: DISCONTINUED | OUTPATIENT
Start: 2018-11-19 | End: 2018-11-19 | Stop reason: SURG

## 2018-11-19 RX ORDER — SODIUM CHLORIDE, SODIUM GLUCONATE, SODIUM ACETATE, POTASSIUM CHLORIDE, AND MAGNESIUM CHLORIDE 526; 502; 368; 37; 30 MG/100ML; MG/100ML; MG/100ML; MG/100ML; MG/100ML
1000 INJECTION, SOLUTION INTRAVENOUS CONTINUOUS
Status: ACTIVE | OUTPATIENT
Start: 2018-11-19 | End: 2018-11-21

## 2018-11-19 RX ORDER — ONDANSETRON 2 MG/ML
4 INJECTION INTRAMUSCULAR; INTRAVENOUS EVERY 6 HOURS PRN
Status: DISCONTINUED | OUTPATIENT
Start: 2018-11-19 | End: 2018-11-21 | Stop reason: HOSPADM

## 2018-11-19 RX ORDER — LABETALOL HYDROCHLORIDE 5 MG/ML
5 INJECTION, SOLUTION INTRAVENOUS
Status: DISCONTINUED | OUTPATIENT
Start: 2018-11-19 | End: 2018-11-19 | Stop reason: HOSPADM

## 2018-11-19 RX ORDER — ONDANSETRON 2 MG/ML
INJECTION INTRAMUSCULAR; INTRAVENOUS AS NEEDED
Status: DISCONTINUED | OUTPATIENT
Start: 2018-11-19 | End: 2018-11-19 | Stop reason: SURG

## 2018-11-19 RX ORDER — LIDOCAINE HYDROCHLORIDE 10 MG/ML
INJECTION, SOLUTION INFILTRATION; PERINEURAL AS NEEDED
Status: DISCONTINUED | OUTPATIENT
Start: 2018-11-19 | End: 2018-11-19 | Stop reason: SURG

## 2018-11-19 RX ORDER — DEXAMETHASONE SODIUM PHOSPHATE 4 MG/ML
INJECTION, SOLUTION INTRA-ARTICULAR; INTRALESIONAL; INTRAMUSCULAR; INTRAVENOUS; SOFT TISSUE AS NEEDED
Status: DISCONTINUED | OUTPATIENT
Start: 2018-11-19 | End: 2018-11-19 | Stop reason: SURG

## 2018-11-19 RX ORDER — ACETAMINOPHEN 325 MG/1
650 TABLET ORAL ONCE AS NEEDED
Status: DISCONTINUED | OUTPATIENT
Start: 2018-11-19 | End: 2018-11-19 | Stop reason: HOSPADM

## 2018-11-19 RX ORDER — PREGABALIN 75 MG/1
75 CAPSULE ORAL ONCE
Status: COMPLETED | OUTPATIENT
Start: 2018-11-19 | End: 2018-11-19

## 2018-11-19 RX ORDER — ACETAMINOPHEN 650 MG/1
650 SUPPOSITORY RECTAL ONCE AS NEEDED
Status: DISCONTINUED | OUTPATIENT
Start: 2018-11-19 | End: 2018-11-19 | Stop reason: HOSPADM

## 2018-11-19 RX ORDER — ONDANSETRON 4 MG/1
4 TABLET, FILM COATED ORAL EVERY 6 HOURS PRN
Status: DISCONTINUED | OUTPATIENT
Start: 2018-11-19 | End: 2018-11-21 | Stop reason: HOSPADM

## 2018-11-19 RX ORDER — EPHEDRINE SULFATE 50 MG/ML
5 INJECTION, SOLUTION INTRAVENOUS ONCE AS NEEDED
Status: DISCONTINUED | OUTPATIENT
Start: 2018-11-19 | End: 2018-11-19 | Stop reason: HOSPADM

## 2018-11-19 RX ORDER — WARFARIN SODIUM 5 MG/1
5 TABLET ORAL
Status: DISCONTINUED | OUTPATIENT
Start: 2018-11-19 | End: 2018-11-21 | Stop reason: HOSPADM

## 2018-11-19 RX ORDER — NALOXONE HCL 0.4 MG/ML
0.1 VIAL (ML) INJECTION
Status: DISCONTINUED | OUTPATIENT
Start: 2018-11-19 | End: 2018-11-21 | Stop reason: HOSPADM

## 2018-11-19 RX ORDER — DOCUSATE SODIUM 100 MG/1
100 CAPSULE, LIQUID FILLED ORAL 2 TIMES DAILY PRN
Status: DISCONTINUED | OUTPATIENT
Start: 2018-11-19 | End: 2018-11-21 | Stop reason: HOSPADM

## 2018-11-19 RX ORDER — BUPIVACAINE HCL/0.9 % NACL/PF 0.1 %
2 PLASTIC BAG, INJECTION (ML) EPIDURAL ONCE
Status: COMPLETED | OUTPATIENT
Start: 2018-11-19 | End: 2018-11-19

## 2018-11-19 RX ORDER — SODIUM CHLORIDE 0.9 % (FLUSH) 0.9 %
3 SYRINGE (ML) INJECTION EVERY 12 HOURS SCHEDULED
Status: DISCONTINUED | OUTPATIENT
Start: 2018-11-19 | End: 2018-11-21 | Stop reason: HOSPADM

## 2018-11-19 RX ORDER — HYDROMORPHONE HCL 110MG/55ML
0.5 PATIENT CONTROLLED ANALGESIA SYRINGE INTRAVENOUS
Status: DISCONTINUED | OUTPATIENT
Start: 2018-11-19 | End: 2018-11-21 | Stop reason: HOSPADM

## 2018-11-19 RX ORDER — FLUTICASONE PROPIONATE 50 MCG
1 SPRAY, SUSPENSION (ML) NASAL DAILY
Status: DISCONTINUED | OUTPATIENT
Start: 2018-11-19 | End: 2018-11-21 | Stop reason: HOSPADM

## 2018-11-19 RX ORDER — FAMOTIDINE 40 MG/1
40 TABLET, FILM COATED ORAL DAILY
Status: DISCONTINUED | OUTPATIENT
Start: 2018-11-19 | End: 2018-11-21 | Stop reason: HOSPADM

## 2018-11-19 RX ORDER — BUPIVACAINE HCL/0.9 % NACL/PF 0.1 %
2 PLASTIC BAG, INJECTION (ML) EPIDURAL EVERY 8 HOURS
Status: COMPLETED | OUTPATIENT
Start: 2018-11-19 | End: 2018-11-20

## 2018-11-19 RX ORDER — ACETAMINOPHEN 500 MG
1000 TABLET ORAL ONCE
Status: COMPLETED | OUTPATIENT
Start: 2018-11-19 | End: 2018-11-19

## 2018-11-19 RX ORDER — OXYCODONE HCL 10 MG/1
10 TABLET, FILM COATED, EXTENDED RELEASE ORAL ONCE
Status: COMPLETED | OUTPATIENT
Start: 2018-11-19 | End: 2018-11-19

## 2018-11-19 RX ORDER — ONDANSETRON 4 MG/1
4 TABLET, ORALLY DISINTEGRATING ORAL EVERY 6 HOURS PRN
Status: DISCONTINUED | OUTPATIENT
Start: 2018-11-19 | End: 2018-11-21 | Stop reason: HOSPADM

## 2018-11-19 RX ORDER — FENTANYL CITRATE 50 UG/ML
INJECTION, SOLUTION INTRAMUSCULAR; INTRAVENOUS AS NEEDED
Status: DISCONTINUED | OUTPATIENT
Start: 2018-11-19 | End: 2018-11-19 | Stop reason: SURG

## 2018-11-19 RX ORDER — SCOLOPAMINE TRANSDERMAL SYSTEM 1 MG/1
1 PATCH, EXTENDED RELEASE TRANSDERMAL ONCE
Status: DISCONTINUED | OUTPATIENT
Start: 2018-11-19 | End: 2018-11-20

## 2018-11-19 RX ORDER — BISACODYL 5 MG/1
10 TABLET, DELAYED RELEASE ORAL DAILY PRN
Status: DISCONTINUED | OUTPATIENT
Start: 2018-11-20 | End: 2018-11-21 | Stop reason: HOSPADM

## 2018-11-19 RX ORDER — FLUMAZENIL 0.1 MG/ML
0.2 INJECTION INTRAVENOUS AS NEEDED
Status: DISCONTINUED | OUTPATIENT
Start: 2018-11-19 | End: 2018-11-19 | Stop reason: HOSPADM

## 2018-11-19 RX ORDER — ACETAMINOPHEN 500 MG
1000 TABLET ORAL 4 TIMES DAILY
Status: DISCONTINUED | OUTPATIENT
Start: 2018-11-19 | End: 2018-11-21 | Stop reason: HOSPADM

## 2018-11-19 RX ADMIN — TRANEXAMIC ACID 1000 MG: 100 INJECTION, SOLUTION INTRAVENOUS at 12:13

## 2018-11-19 RX ADMIN — LIDOCAINE HYDROCHLORIDE 3 MG: 10 INJECTION, SOLUTION INFILTRATION; PERINEURAL at 10:33

## 2018-11-19 RX ADMIN — SODIUM CHLORIDE, SODIUM GLUCONATE, SODIUM ACETATE, POTASSIUM CHLORIDE, AND MAGNESIUM CHLORIDE 1000 ML: 526; 502; 368; 37; 30 INJECTION, SOLUTION INTRAVENOUS at 08:48

## 2018-11-19 RX ADMIN — WARFARIN SODIUM 5 MG: 5 TABLET ORAL at 17:20

## 2018-11-19 RX ADMIN — MIDAZOLAM HYDROCHLORIDE 2 MG: 2 INJECTION, SOLUTION INTRAMUSCULAR; INTRAVENOUS at 10:08

## 2018-11-19 RX ADMIN — DOCUSATE SODIUM 100 MG: 100 CAPSULE, LIQUID FILLED ORAL at 17:20

## 2018-11-19 RX ADMIN — PREGABALIN 75 MG: 75 CAPSULE ORAL at 08:56

## 2018-11-19 RX ADMIN — ONDANSETRON 4 MG: 2 INJECTION INTRAMUSCULAR; INTRAVENOUS at 17:59

## 2018-11-19 RX ADMIN — PROPOFOL 70 MCG/KG/MIN: 10 INJECTION, EMULSION INTRAVENOUS at 10:38

## 2018-11-19 RX ADMIN — OXYCODONE HYDROCHLORIDE 5 MG: 5 TABLET ORAL at 14:39

## 2018-11-19 RX ADMIN — ACETAMINOPHEN 1000 MG: 500 TABLET ORAL at 17:20

## 2018-11-19 RX ADMIN — ONDANSETRON 4 MG: 2 INJECTION INTRAMUSCULAR; INTRAVENOUS at 12:25

## 2018-11-19 RX ADMIN — MELOXICAM 15 MG: 15 TABLET ORAL at 08:57

## 2018-11-19 RX ADMIN — SODIUM CHLORIDE 100 ML/HR: 9 INJECTION, SOLUTION INTRAVENOUS at 14:39

## 2018-11-19 RX ADMIN — FAMOTIDINE 40 MG: 40 TABLET ORAL at 14:39

## 2018-11-19 RX ADMIN — TRANEXAMIC ACID 1000 MG: 100 INJECTION, SOLUTION INTRAVENOUS at 08:56

## 2018-11-19 RX ADMIN — HYDROMORPHONE HYDROCHLORIDE 0.5 MG: 2 INJECTION INTRAMUSCULAR; INTRAVENOUS; SUBCUTANEOUS at 15:37

## 2018-11-19 RX ADMIN — Medication 2 G: at 10:39

## 2018-11-19 RX ADMIN — SODIUM CHLORIDE 2 G: 9 INJECTION, SOLUTION INTRAVENOUS at 17:20

## 2018-11-19 RX ADMIN — ACETAMINOPHEN 1000 MG: 500 TABLET ORAL at 08:56

## 2018-11-19 RX ADMIN — FLUTICASONE PROPIONATE 1 SPRAY: 50 SPRAY, METERED NASAL at 14:39

## 2018-11-19 RX ADMIN — SCOPALAMINE 1 PATCH: 1 PATCH, EXTENDED RELEASE TRANSDERMAL at 09:23

## 2018-11-19 RX ADMIN — OXYCODONE HYDROCHLORIDE 10 MG: 10 TABLET, FILM COATED, EXTENDED RELEASE ORAL at 21:01

## 2018-11-19 RX ADMIN — FENTANYL CITRATE 50 MCG: 50 INJECTION, SOLUTION INTRAMUSCULAR; INTRAVENOUS at 12:03

## 2018-11-19 RX ADMIN — ROPIVACAINE HYDROCHLORIDE 30 ML: 5 INJECTION, SOLUTION EPIDURAL; INFILTRATION; PERINEURAL at 10:40

## 2018-11-19 RX ADMIN — SODIUM CHLORIDE, PRESERVATIVE FREE 3 ML: 5 INJECTION INTRAVENOUS at 21:02

## 2018-11-19 RX ADMIN — PROMETHAZINE HYDROCHLORIDE 25 MG: 25 INJECTION INTRAMUSCULAR; INTRAVENOUS at 18:54

## 2018-11-19 RX ADMIN — ONDANSETRON 4 MG: 2 INJECTION INTRAMUSCULAR; INTRAVENOUS at 13:30

## 2018-11-19 RX ADMIN — ACETAMINOPHEN 1000 MG: 500 TABLET ORAL at 21:01

## 2018-11-19 RX ADMIN — BUPIVACAINE HYDROCHLORIDE 3 ML: 7.5 INJECTION, SOLUTION EPIDURAL; RETROBULBAR at 10:33

## 2018-11-19 RX ADMIN — OXYCODONE HYDROCHLORIDE 10 MG: 10 TABLET, FILM COATED, EXTENDED RELEASE ORAL at 08:56

## 2018-11-19 RX ADMIN — DEXAMETHASONE SODIUM PHOSPHATE 4 MG: 4 INJECTION, SOLUTION INTRAMUSCULAR; INTRAVENOUS at 12:25

## 2018-11-19 NOTE — ANESTHESIA POSTPROCEDURE EVALUATION
Patient: Priti Orr    Procedure Summary     Date:  11/19/18 Room / Location:  [facility] OR [address] [place] [address] OR    Anesthesia Start:  1011 Anesthesia Stop:  1245    Procedure:  TOTAL KNEE ARTHROPLASTY ATTUNE with adductor canal block - left (Left ) Diagnosis:       NICK (obstructive sleep apnea)      Seizure-like activity (CMS/HCC)      History of brain surgery      Presence of left artificial shoulder joint      Primary osteoarthritis of left knee      Morbid obesity with BMI of 40.0-44.9, adult (CMS/HCC)      (NICK (obstructive sleep apnea) Seizure-like activity (CMS/HCC) History of brain surgery ] Presence of left artificial shoulder joint  Primary osteoarthritis of left knee  Morbid obesity with BMI of 40.0-44.9, adult (CMS/[facility]))    Surgeon:  Brooks Junior MD Provider:  Orestes Woody MD    Anesthesia Type:  MAC, regional, spinal ASA Status:  3          Anesthesia Type: MAC, regional, spinal  Last vitals  BP   129/71 (11/19/18 0842)   Temp   98.1 °F (36.7 °C) (11/19/18 0842)   Pulse   81 (11/19/18 0842)   Resp   18 (11/19/18 0842)     SpO2   97 % (11/19/18 0842)     Post Anesthesia Care and Evaluation    Patient location during evaluation: bedside  Patient participation: complete - patient participated  Level of consciousness: awake and alert  Pain management: adequate  Airway patency: patent  Anesthetic complications: No anesthetic complications    Cardiovascular status: acceptable  Respiratory status: acceptable, unassisted, spontaneous ventilation and room air  Hydration status: acceptable

## 2018-11-19 NOTE — PLAN OF CARE
Problem: Patient Care Overview  Goal: Plan of Care Review  Outcome: Ongoing (interventions implemented as appropriate)   11/19/18 1430   Coping/Psychosocial   Plan of Care Reviewed With patient;daughter   OTHER   Outcome Summary OT eval completed this date co-eval with PT. Pt was min assist to CGA for bed mobility, pt struggled with pain in LUE and LLE. Pt was min assist with sit to stand t/f off bed and toilet and for mobility with cues for safety and arm/hand placement. Pt required min-mod assist to don pants. Pt and pt's dtr was educated on need for bath bench and rolling walker. Pt could benfit from skilled OT to increase strength, endurance, safety, and independence with ADL. Recommend to d/c home with 24/7 assist and home health verse outpaitent therapy depending if OT goals are met.

## 2018-11-19 NOTE — THERAPY EVALUATION
Acute Care - Physical Therapy Initial Evaluation  [facility]     Patient Name: Priti Orr  : 1965  MRN: [id number]  Today's Date: 2018   Onset of Illness/Injury or Date of Surgery: 18  Date of Referral to PT: 18  Referring Physician: Dr. Junior      Admit Date: 2018    Visit Dx:     ICD-10-CM ICD-9-CM   1. Impaired functional mobility, balance, gait, and endurance Z74.09 V49.89   2. Primary osteoarthritis of left knee M17.12 715.16   3. NICK (obstructive sleep apnea) G47.33 327.23   4. Seizure-like activity (CMS/HCC) R56.9 780.39   5. Presence of left artificial shoulder joint Z96.612 V43.61   6. History of brain surgery Z98.890 V15.29   7. Morbid obesity with BMI of 40.0-44.9, adult (CMS/[facility]) E66.01 278.01    Z68.41 V85.41   8. Impaired mobility and ADLs Z74.09 799.89     Patient Active Problem List   Diagnosis   • Osteoporosis   • Chronic left shoulder pain   • Presence of left artificial shoulder joint   • Left knee pain   • Anxiety   • Arthritis   • Asthma   • Depression   • Enchondroma of humerus   • Headache, chronic migraine without aura, intractable   • History of brain surgery   • Humeral fracture   • Hypercholesteremia   • Hyperlipidemia   • Essential hypertension   • Meningioma (CMS/[facility])   • Morbid obesity (CMS/[facility])   • NICK (obstructive sleep apnea)   • Vitamin D deficiency   • Seizure-like activity (CMS/[facility])   • S/p reverse total shoulder arthroplasty   • Primary osteoarthritis of left knee   • Morbid obesity with BMI of 40.0-44.9, adult (CMS/[facility])     Past Medical History:   Diagnosis Date   • Allergic rhinitis    • Arthritis    • Asthma    • Benign neoplasm of meninges (CMS/[facility])     Post op       • Cobalamin deficiency    • Corneal abrasion    • Encounter for gynecological examination (general) (routine) without abnormal findings    • Essential hypertension    • Fatigue    • Female stress incontinence    • Head injury    • History of delayed wound  healing     Delayed healing of surgical wound      • Hyperlipidemia    • Low back pain    • Nosebleed, symptom     Nosebleed/epistaxis symptom      • Osteoporosis    • PONV (postoperative nausea and vomiting)    • Sleep apnea     not wearing c-pap     Past Surgical History:   Procedure Laterality Date   • ARM LESION/CYST EXCISION  2014    Remove arm bone lesion (1)   • ARM SKIN LESION BIOPSY / EXCISION     • ARM WOUND REPAIR / CLOSURE  2014    Repair Superficial Wound TR-EXT 2.5 < CM 11324 (1)      • BRAIN SURGERY     • BREAST LUMPECTOMY     •  SECTION  1986     Section (1)     • ENDOSCOPY  2008    Colon endoscopy 23821 (1)     • EXCISION BREAST LESION W/ PREOP NEEDLE LOC  1981    Excision, breast lesion (1)     • HEMORRHOIDECTOMY     • HEMORRHOIDECTOMY  2008    Hemorrhoidectomy (2)      • HYSTERECTOMY     • INJECTION OF MEDICATION  2013    Celestone (betamethasone) (1)      • INJECTION OF MEDICATION  2013    Depo Medrol (Methylprednisone) (1)      • INJECTION OF MEDICATION  2013    Dexamethasone (2)      • INJECTION OF MEDICATION  2014    Kenalog (6)   • KNEE ARTHROSCOPY  2007    Knee arthroscopy, surgery (1)     • LAPAROSCOPIC TUBAL LIGATION  1991    Tubal ligation (1)      • PAP SMEAR  2007    PAP SMEAR (1)      • SHOULDER SURGERY Left     x 4   • TOTAL ABDOMINAL HYSTERECTOMY WITH SALPINGO OOPHORECTOMY      JOSE/BSO (1)           PT ASSESSMENT (last 12 hours)      Physical Therapy Evaluation     Row Name 18 1431          PT Evaluation Time/Intention    Subjective Information  complains of  -CW     Document Type  evaluation  -CW     Mode of Treatment  co-treatment;physical therapy;occupational therapy  -CW     Patient Effort  good  -CW     Symptoms Noted During/After Treatment  significant change in vital signs;fatigue;increased pain  -CW     Comment  RN notified of BP drop, fatigue, and pt not feeling well   -CW     Row Name 11/19/18 1431          General Information    Patient Profile Reviewed?  yes  -CW     Onset of Illness/Injury or Date of Surgery  11/19/18  -CW     Referring Physician  Dr. Junior  -CW     Patient Observations  alert;cooperative;agree to therapy  -CW     Patient/Family Observations  Daughter present  -CW     General Observations of Patient  Pt supine in HOB up on room air with IV, bed alarm, wound drain  -CW     Prior Level of Function  independent:;all household mobility;community mobility;gait;transfer;cooking;cleaning;driving;shopping;using stairs;work;home management  -CW     Equipment Currently Used at Home  none;grab bar;raised toilet Has quad cane,  -CW     Pertinent History of Current Functional Problem  Pt s/p L TKA on 11/19/18  -CW     Existing Precautions/Restrictions  fall  -CW     Limitations/Impairments  safety/cognitive  -CW     Equipment Ordered for Patient  gait belt  -CW     Risks Reviewed  patient and family:;LOB;nausea/vomiting;dizziness;increased discomfort;change in vital signs;increased drainage;lines disloged  -CW     Benefits Reviewed  patient and family:;improve function;increase independence;increase strength;increase balance;decrease pain;decrease risk of DVT;improve skin integrity;increase knowledge  -CW     Row Name 11/19/18 1431          Relationship/Environment    Lives With  alone  -CW     Concerns About Impact on Relationships  pt voiced her mom is staying with her for recovery  -CW     Row Name 11/19/18 1431          Resource/Environmental Concerns    Current Living Arrangements  home/apartment/condo  -CW     Row Name 11/19/18 1431          Home Main Entrance    Number of Stairs, Main Entrance  two  -CW     Stair Railings, Main Entrance  railing on left side (ascending)  -CW     Row Name 11/19/18 1431          Cognitive Assessment/Interventions    Additional Documentation  Cognitive Assessment/Intervention (Group)  -CW     Row Name 11/19/18 1431          Cognitive  Assessment/Intervention- PT/OT    Affect/Mental Status (Cognitive)  WFL  -CW     Orientation Status (Cognition)  oriented x 4  -CW     Follows Commands (Cognition)  over 90% accuracy;verbal cues/prompting required  -CW     Safety Deficit (Cognitive)  mild deficit  -CW     Personal Safety Interventions  fall prevention program maintained;gait belt;nonskid shoes/slippers when out of bed;supervised activity  -CW     Row Name 11/19/18 143          Safety Issues, Functional Mobility    Safety Issues Affecting Function (Mobility)  ability to follow commands;awareness of need for assistance;insight into deficits/self awareness;positioning of assistive device;safety precautions follow-through/compliance;steps too close to assistive device  -CW     Impairments Affecting Function (Mobility)  balance;coordination;endurance/activity tolerance;strength;shortness of breath;range of motion (ROM);pain  -     Row Name 11/19/18 [facility]          Mobility Assessment/Treatment    Extremity Weight-bearing Status  left upper extremity  -CW     Left Upper Extremity (Weight-bearing Status)  weight-bearing as tolerated (WBAT)  -CW     Left Lower Extremity (Weight-bearing Status)  weight-bearing as tolerated (WBAT)  -     Row Name 11/19/18 [facility]          Bed Mobility Assessment/Treatment    Bed Mobility Assessment/Treatment  supine-sit;sit-supine  -CW     Supine-Sit Major (Bed Mobility)  minimum assist (75% patient effort)  -CW     Sit-Supine Major (Bed Mobility)  contact guard;minimum assist (75% patient effort)  -CW     Bed Mobility, Safety Issues  decreased use of arms for pushing/pulling;decreased use of legs for bridging/pushing  -     Assistive Device (Bed Mobility)  bed rails;head of bed elevated  -     Comment (Bed Mobility)  cues for safety with drain and positioning  -     Row Name 11/19/18 [facility]          Transfer Assessment/Treatment    Transfer Assessment/Treatment  sit-stand transfer;stand-sit transfer  -      Comment (Transfers)  Pt required cues for hand placement during transfers  -CW     Sit-Stand [place] (Transfers)  minimum assist (75% patient effort)  -CW     Stand-Sit [place] (Transfers)  minimum assist (75% patient effort)  -CW     [place] Level (Toilet Transfer)  minimum assist (75% patient effort)  -CW     Row Name 11/19/18 1431          Sit-Stand Transfer    Assistive Device (Sit-Stand Transfers)  walker, front-wheeled  -CW     Row Name 11/19/18 1431          Stand-Sit Transfer    Assistive Device (Stand-Sit Transfers)  walker, front-wheeled  -CW     Row Name 11/19/18 1431          Toilet Transfer    Type (Toilet Transfer)  stand-sit;sit-stand  -CW     Row Name 11/19/18 1431          Gait/Stairs Assessment/Training    Gait/Stairs Assessment/Training  gait/ambulation assistive device;gait/ambulation independence;distance ambulated;gait pattern;gait deviations  -CW     [place] Level (Gait)  minimum assist (75% patient effort)  -CW     Assistive Device (Gait)  walker, front-wheeled  -CW     Distance in Feet (Gait)  2x15 ft  -CW     Pattern (Gait)  step-to;step-through  -CW     Deviations/Abnormal Patterns (Gait)  antalgic  -CW     Bilateral Gait Deviations  knee buckling, left side;heel strike decreased;Trendelenburg sign  -CW     Left Sided Gait Deviations  knee buckling, left side  -     Row Name 11/19/18 1431          General ROM    LT Lower Ext  Lt Knee Extension/Flexion  -     GENERAL ROM COMMENTS  R LE AROM WFL  -     Row Name 11/19/18 1431          Left Lower Ext    Lt Knee Extension/Flexion AROM  5-70'   -     Lt Knee Extension/Flexion PROM  2-75'  -     Row Name 11/19/18 1431          MMT (Manual Muscle Testing)    General MMT Comments  R LE grossly 4/5, L ankle df/pf 4/5, L knee flex/extension 3/5, limited by pain  -     Row Name 11/19/18 1431          Sensory Assessment/Intervention    Sensory General Assessment  light touch sensation deficits identified  -[facility]  Name 11/19/18 1431          Light Touch Sensation Assessment    Left Lower Extremity: Light Touch Sensation Assessment  mild impairment, 75% or more correct responses  -CW     Comment, Left Lower Extremity: Light Touch Sensation Assessment  pt reports feeling lighter touch on L foot,  -CW     Additional Details: Light Touch Sensation Assessment  Pt reports her butttocks feeling numb  -CW     Row Name 11/19/18 1431          Pain Assessment    Additional Documentation  Pain Scale: Numbers Pre/Post-Treatment (Group)  -CW     Row Name 11/19/18 1431          Pain Scale: Numbers Pre/Post-Treatment    Pain Scale: Numbers, Pretreatment  6/10  -CW     Pain Scale: Numbers, Post-Treatment  7/10  -CW     Pain Location  -- left knee and shoulder  -CW     Pain Intervention(s)  Medication (See MAR);Repositioned;Ambulation/increased activity;Distraction  -CW     Row Name             Wound 11/19/18 1106 Left midline knee incision;surgical    Wound - Properties Group Date first assessed: 11/19/18  -KM Time first assessed: 1106  -KM Present On Admission : no  -KM Side: Left  -KM Orientation: midline  -KM Location: knee  -KM Type: incision;surgical  -KM    Row Name 11/19/18 1431          Physical Therapy Clinical Impression    Date of Referral to PT  11/19/18  -CW     PT Diagnosis (PT Clinical Impression)  Impaired funcitonal mobility, balance, gait, endurance  -CW     Prognosis (PT Clinical Impression)  good  -CW     Functional Level at Time of Evaluation (PT Clinical Impression)  min A for bed mobility, transfers, and ambulation c FWW  -CW     Criteria for Skilled Interventions Met (PT Clinical Impression)  yes  -CW     Pathology/Pathophysiology Noted (Describe Specifically for Each System)  musculoskeletal  -CW     Impairments Found (describe specific impairments)  aerobic capacity/endurance;gait, locomotion, and balance;integumentary integrity;muscle performance;ROM;joint integrity and mobility;ventilation and respiration/gas  exchange  -CW     Functional Limitations in Following Categories (Describe Specific Limitations)  self-care;home management;work;community/leisure  -CW     Disability: Inability to Perform Actions/Activities of Required Roles (describe specific disability)  work;community/leisure;school  -CW     Rehab Potential (PT Clinical Summary)  good, to achieve stated therapy goals  -CW     Predicted Duration of Therapy (PT)  until d/c from acute care or all goals met  -CW     Care Plan Review (PT)  evaluation/treatment results reviewed;patient/other agree to care plan  -CW     Care Plan Review, Other Participant (PT Clinical Impression)  daughter  -CW     Row Name 11/19/18 1431          Vital Signs    Pre Systolic BP Rehab  154  -CW     Pre Treatment Diastolic BP  87  -CW     Post Systolic BP Rehab  80 102/61 when retaken  -CW     Post Treatment Diastolic BP  64 notified RN  -CW     Pretreatment Heart Rate (beats/min)  57  -CW     Posttreatment Heart Rate (beats/min)  69  -CW     Pre SpO2 (%)  98  -CW     O2 Delivery Pre Treatment  room air  -CW     Post SpO2 (%)  95  -CW     O2 Delivery Post Treatment  room air  -CW     Pre Patient Position  Supine  -CW     Post Patient Position  Supine  -CW     Row Name 11/19/18 1431          Physical Therapy Goals    Bed Mobility Goal Selection (PT)  bed mobility, PT goal 1  -CW     Transfer Goal Selection (PT)  transfer, PT goal 1  -CW     Gait Training Goal Selection (PT)  gait training, PT goal 1  -CW     ROM Goal Selection (PT)  ROM, PT goal 1  -CW     Stairs Goal Selection (PT)  stairs, PT goal 1  -CW     Additional Documentation  Stairs Goal Selection (PT) (Row);ROM Goal Selection (PT) (Row)  -CW     Row Name 11/19/18 1431          Bed Mobility Goal 1 (PT)    Activity/Assistive Device (Bed Mobility Goal 1, PT)  sit to supine;supine to sit;scooting;rolling to left;rolling to right;bridging  -CW     [place] Level/Cues Needed (Bed Mobility Goal 1, PT)  conditional independence  -CW      Time Frame (Bed Mobility Goal 1, PT)  2 days  -CW     Progress/Outcomes (Bed Mobility Goal 1, PT)  goal not met  -CW     Row Name 11/19/18 1431          Transfer Goal 1 (PT)    Activity/Assistive Device (Transfer Goal 1, PT)  transfers, all  -CW     [place] Level/Cues Needed (Transfer Goal 1, PT)  conditional independence  -CW     Time Frame (Transfer Goal 1, PT)  2 - 3 days  -CW     Progress/Outcome (Transfer Goal 1, PT)  goal not met  -CW     Row Name 11/19/18 1431          Gait Training Goal 1 (PT)    Activity/Assistive Device (Gait Training Goal 1, PT)  gait (walking locomotion);assistive device use;backward stepping;decrease asymmetrical patterns;decrease fall risk;diminish gait deviation;forward stepping;improve balance and speed;increase endurance/gait distance;increase energy conservation;normalize weight shifts  -CW     [place] Level (Gait Training Goal 1, PT)  conditional independence  -CW     Distance (Gait Goal 1, PT)  1x500 ft or more  -CW     Time Frame (Gait Training Goal 1, PT)  by discharge  -CW     Progress/Outcome (Gait Training Goal 1, PT)  goal not met  -CW     Row Name 11/19/18 1431          ROM Goal 1 (PT)    ROM Goal 1 (PT)  0-100' knee flexion  -CW     Time Frame (ROM Goal 1, PT)  1 week  -CW     Progress/Outcome (ROM Goal 1, PT)  goal not met  -CW     Row Name 11/19/18 1431          Stairs Goal 1 (PT)    Activity/Assistive Device (Stairs Goal 1, PT)  using handrail, left;stairs, all skills  -CW     [place] Level/Cues Needed (Stairs Goal 1, PT)  supervision required  -CW     Number of Stairs (Stairs Goal 1, PT)  2  -CW     Time Frame (Stairs Goal 1, PT)  by discharge  -CW     Progress/Outcome (Stairs Goal 1, PT)  goal not met  -CW     Row Name 11/19/18 1431          Positioning and Restraints    Pre-Treatment Position  in bed  -CW     Post Treatment Position  bed  -CW     In Bed  notified nsg;supine;call light within reach;encouraged to call for assist;exit alarm on;with  family/caregiver  -CW     Row Name 11/19/18 1431          Living Environment    Home Accessibility  stairs to enter home walk in shower, 1 step, grab bars  -CW       User Key  (r) = Recorded By, (t) = Taken By, (c) = Cosigned By    Initials Name Provider Type    Madeline Desai, RN Registered Nurse    Chey Rodriguez, PT Physical Therapist        Physical Therapy Education     Title: PT OT SLP Therapies (Active)     Topic: Physical Therapy (Done)     Point: Mobility training (Done)     Learning Progress Summary           Patient Acceptance, E, VU,NR by CW at 11/19/2018  4:54 PM    Comment:  Patient education on role of PT in acute care, use of gait belt, AD, hand placement during transfers, plan of care, and encouraged to call for assist to reduce risk of falls.   Family Acceptance, E, VU,NR by CW at 11/19/2018  4:54 PM    Comment:  Patient education on role of PT in acute care, use of gait belt, AD, hand placement during transfers, plan of care, and encouraged to call for assist to reduce risk of falls.                   Point: Home exercise program (Done)     Learning Progress Summary           Patient Acceptance, E, VU,NR by CW at 11/19/2018  4:55 PM    Comment:  Patient education on importance of not keeping pillow under knee, moving knee often, practicing quadriceps sets 10x every hour for neuromuscular control and holding each for 5 seconds.   Family Acceptance, E, VU,NR by CW at 11/19/2018  4:55 PM    Comment:  Patient education on importance of not keeping pillow under knee, moving knee often, practicing quadriceps sets 10x every hour for neuromuscular control and holding each for 5 seconds.                   Point: Body mechanics (Done)     Learning Progress Summary           Patient Acceptance, E, VU,NR by CW at 11/19/2018  4:54 PM    Comment:  Patient education on role of PT in acute care, use of gait belt, AD, hand placement during transfers, plan of care, and encouraged to call for assist to  reduce risk of falls.   Family Acceptance, RAHAT [place],NR by LEONA at 11/19/2018  4:54 PM    Comment:  Patient education on role of PT in acute care, use of gait belt, AD, hand placement during transfers, plan of care, and encouraged to call for assist to reduce risk of falls.                   Point: Precautions (Done)     Learning Progress Summary           Patient Acceptance, RAHAT [place],NR by LEONA at 11/19/2018  4:54 PM    Comment:  Patient education on role of PT in acute care, use of gait belt, AD, hand placement during transfers, plan of care, and encouraged to call for assist to reduce risk of falls.   Family Acceptance, RAHAT [place],NR by LEONA at 11/19/2018  4:54 PM    Comment:  Patient education on role of PT in acute care, use of gait belt, AD, hand placement during transfers, plan of care, and encouraged to call for assist to reduce risk of falls.                               User Key     Initials Effective Dates Name Provider Type Discipline     10/04/18 -  Chey Lam, PT Physical Therapist PT              PT Recommendation and Plan  Anticipated Discharge Disposition (PT): home with assist, home with OP services  Planned Therapy Interventions (PT Eval): balance training, bed mobility training, gait training, home exercise program, patient/family education, ROM (range of motion), stair training, strengthening, transfer training  Therapy Frequency (PT Clinical Impression): 2 times/day  Outcome Summary/Treatment Plan (PT)  Anticipated Equipment Needs at Discharge (PT): front wheeled walker  Anticipated Discharge Disposition (PT): home with assist, home with OP services  Plan of Care Reviewed With: patient  Progress: no change  Outcome Summary: PT eval completed with OT co-eval. Pt is 54 y/o female s/p L TKA on 11/19/18. Patient has history of multiple L shoulder surgeries which limits function in L UE. Patient demonstrated supine<>sit with min A, sit to stand with FWW and min A and verbal cues for hand placement during  transfers, and ambulated 2x15 ft with FWW and min A. Patient's AROM was 5'-70' knee flexion, and passively 2'-75'. Pt limited by pain in both shoulder and knee,a nd also by BP drop (RN notified). Pt would benefit from continued skilled physical therapy to improve functional mobility, range of motion, strength, endurance, use of assistive device, balance, and reduce risk of falls. D/C recommendation includes home with assist and HH PT or OP PT depending on progress made in acute care.  Outcome Measures     Row Name 11/19/18 1431 11/19/18 1430          How much help from another person do you currently need...    Turning from your back to your side while in flat bed without using bedrails?  3  -CW  --     Moving from lying on back to sitting on the side of a flat bed without bedrails?  3  -CW  --     Moving to and from a bed to a chair (including a wheelchair)?  2  -CW  --     Standing up from a chair using your arms (e.g., wheelchair, bedside chair)?  3  -CW  --     Climbing 3-5 steps with a railing?  1  -CW  --     To walk in hospital room?  2  -CW  --     AM-PAC 6 Clicks Score  14  -CW  --        How much help from another is currently needed...    Putting on and taking off regular lower body clothing?  --  2  -BH     Bathing (including washing, rinsing, and drying)  --  2  -BH     Toileting (which includes using toilet bed pan or urinal)  --  3  -BH     Putting on and taking off regular upper body clothing  --  3  -BH     Taking care of personal grooming (such as brushing teeth)  --  3  -BH     Eating meals  --  4  -     Score  --  17  -        Functional Assessment    Outcome Measure Options  AM-PAC 6 Clicks Basic Mobility (PT)  -CW  AM-PAC 6 Clicks Daily Activity (OT)  -       User Key  (r) = Recorded By, (t) = Taken By, (c) = Cosigned By    Initials Name Provider Type    Ninaf Daley, OTR/L Occupational Therapist    CW Chey Lam, PT Physical Therapist         Time Calculation:   PT Charges      Row Name 11/19/18 1431             Time Calculation    Start Time  1431  -CW      Stop Time  1509  -CW      Time Calculation (min)  38 min  -CW      PT Received On  11/19/18  -CW      PT Goal Re-Cert Due Date  12/02/18  -CW        User Key  (r) = Recorded By, (t) = Taken By, (c) = Cosigned By    Initials Name Provider Type    CW Chey Lam, PT Physical Therapist        Therapy Suggested Charges     Code   Minutes Charges    None           Therapy Charges for Today     Code Description Service Date Service Provider Modifiers Qty    70137431644 HC PT MOBILITY CURRENT 11/19/2018 Chey Lam, PT GP, CK 1    89351062889 HC PT MOBILITY PROJECTED 11/19/2018 Chey Lam, PT GP, CI 1    19062258011 HC PT EVAL LOW COMPLEXITY 3 11/19/2018 Chey Lam, PT GP 1          PT G-Codes  PT Professional Judgement Used?: Yes  Outcome Measure Options: AM-PAC 6 Clicks Basic Mobility (PT)  AM-PAC 6 Clicks Score: 14  Score: 17  Functional Limitation: Mobility: Walking and moving around  Mobility: Walking and Moving Around Current Status (): At least 40 percent but less than 60 percent impaired, limited or restricted  Mobility: Walking and Moving Around Goal Status (): At least 1 percent but less than 20 percent impaired, limited or restricted      Chey Lam PT  11/19/2018

## 2018-11-19 NOTE — OP NOTE
TOTAL KNEE ARTHROPLASTY ATTUNE  Procedure Note    Name:    Priti Orr  YOB: 1965  Date of surgery:   11/19/2018    Pre-op Diagnosis:   NICK (obstructive sleep apnea) Seizure-like activity (CMS/Formerly [facility]) History of brain surgery ] Presence of left artificial shoulder joint  Primary osteoarthritis of left knee  Morbid obesity with BMI of 40.0-44.9, adult (CMS/Formerly [facility])    Post-op Diagnosis:    Post-Op Diagnosis Codes:     * NICK (obstructive sleep apnea) [G47.33]     * Seizure-like activity (CMS/HCC) [R56.9]     * History of brain surgery [Z98.890]     * Presence of left artificial shoulder joint [Z96.612]     * Primary osteoarthritis of left knee [M17.12]     * Morbid obesity with BMI of 40.0-44.9, adult (CMS/[facility]) [E66.01, Z68.41]    Procedure:  Procedure(s):  TOTAL KNEE ARTHROPLASTY ATTUNE with adductor canal block - left    Surgeon(s):  Brooks Junior MD    SURGEON: Brooks Junior MD    ASSISTANT:  DYLON Tamez    Anesthesia: Spinal    Staff:   Circulator: Madeline Miller RN  Scrub Person: Brittany Ma; Katerina Gaxiola  Assistant: Laureen Marina CSA    Estimated Blood Loss: 150 mL    Specimens:                ID Type Source Tests Collected by Time   A :  Bone Knee, Left TISSUE PATHOLOGY EXAM Brooks Junior MD 11/19/2018 1210         Drains:   Closed/Suction Drain 1 Left Knee Accordion 10 Fr. (Active)   Dressing Status Clean;Dry;Intact 11/19/2018 12:06 PM       Findings:  End-stage osteoarthritis Left knee    Complications: None    IMPLANTS:   Implant Name Type Inv. Item Serial No.  Lot No. LRB No. Used   CMT BONE SMARTSET HV 40GM - [id number] Implant CMT BONE SMARTSET HV 40GM  DEPUY [id number] Left 1   COMP PAT ATTUNE CMT MEDL/DOME 35MM - [id number] Implant COMP PAT ATTUNE CMT MEDL/DOME 35MM  DEPUY [id number] Left 1   COMP FEM ATTUNE CMT PS NRW SZ5 LT - [id number] Implant COMP FEM ATTUNE CMT PS NRW SZ5 LT  DEPUY [id number] Left 1   BASE TIB ATTUNE CMT RP  S PLS SZ4 - [id number] Implant BASE TIB ATTUNE CMT RP S PLS SZ4  DEPUY [id number] Left 1   INSRT TIB ATTUNE PS RP SZ5 12MM - [id number] Implant INSRT TIB ATTUNE PS RP SZ5 12MM  DEPUY [id number] Left 1         PROCEDURE:    The patient was taken to the operating room and placed in the supine position. Preoperative antibiotics were administered. Surgical time out was performed. After adequate induction of anesthesia, the leg was prepped and draped in the usual sterile fashion, exsanguinated with an Ace bandage and the tourniquet inflated to 300 mmHg. A midline incision was performed followed by a medial parapatellar arthrotomy.    Attention was then placed to the patella. The patella was noted to track centrally through range of motion. The patella was then sized with the trials. The thickness of the patella was then measured and the cut was made in a free-hand technique. The patella protector was then placed and the surrounding osteophytes were removed.   The patella was subluxed laterally in a non-everted position.  A portion of the fat pad, ACL, and anterior horns of the meniscus were excised.     The drill hole was placed in the distal femur and the canal was the irrigated and suctioned. The IM shima was placed and a 5 degree distal valgus cut was performed on the femur. The femur was then sized with a sizing guide. The femoral cutting block was placed and all femoral cuts were performed. The proximal tibia was exposed. We used the extramedullary tibial cutting guide set for removal of an appropriate amount of proximal tibia. The tibial cut was performed. The posterior horns of the menisci were excised. The posterior osteophytes were removed. Flexion extension blocks were then used to balance the knee. The tibial cut surface was then sized with the sizing templates and the tibial and femoral trial were then placed. The knee was placed in full extension and then the patella was re-addressed. The patella was resurfaced and  the preoperative thickness was reproduced. The patella tracked centrally through range of motion.     At this point all trial components were removed, the knee was copiously irrigated with pulsed lavage.. The cut surfaces were then dried with clean lap sponges, and the components were cemented tibia, followed by femur, then patella. The knee was held in full extension and all excess cement was removed. The knee was held still until the cement had completely hardened. We then placed the trial polyethylene spacer which resulted in full extension and excellent flexion-extension balance. We placed the final polyethylene spacer.    The knee was then copiously irrigated. The tourniquet was then released. There was excellent hemostasis. We placed a 10 Nepali Hemovac drain. We closed the knee in multiple layers in standard fashion. Sterile dressing were applied. At the end of the case, the sponge and needle counts were reported as being correct. There were no known complications. The patient was then transported to the recovery room in satisfactory condition..      Brooks Junior MD     Date: 11/19/2018  Time: 12:51 PM

## 2018-11-19 NOTE — THERAPY EVALUATION
Acute Care - Physical Therapy Initial Evaluation  [facility]     Patient Name: Priti Orr  : 1965  MRN: [id number]  Today's Date: 2018   Onset of Illness/Injury or Date of Surgery: 18     Referring Physician: Dr. Junior      Admit Date: 2018    Visit Dx:     ICD-10-CM ICD-9-CM   1. Impaired functional mobility, balance, gait, and endurance Z74.09 V49.89   2. Primary osteoarthritis of left knee M17.12 715.16   3. NICK (obstructive sleep apnea) G47.33 327.23   4. Seizure-like activity (CMS/HCC) R56.9 780.39   5. Presence of left artificial shoulder joint Z96.612 V43.61   6. History of brain surgery Z98.890 V15.29   7. Morbid obesity with BMI of 40.0-44.9, adult (CMS/Formerly [facility]) E66.01 278.01    Z68.41 V85.41   8. Impaired mobility and ADLs Z74.09 799.89     Patient Active Problem List   Diagnosis   • Osteoporosis   • Chronic left shoulder pain   • Presence of left artificial shoulder joint   • Left knee pain   • Anxiety   • Arthritis   • Asthma   • Depression   • Enchondroma of humerus   • Headache, chronic migraine without aura, intractable   • History of brain surgery   • Humeral fracture   • Hypercholesteremia   • Hyperlipidemia   • Essential hypertension   • Meningioma (CMS/Formerly [facility])   • Morbid obesity (CMS/Formerly [facility])   • NICK (obstructive sleep apnea)   • Vitamin D deficiency   • Seizure-like activity (CMS/Formerly [facility])   • S/p reverse total shoulder arthroplasty   • Primary osteoarthritis of left knee   • Morbid obesity with BMI of 40.0-44.9, adult (CMS/Formerly [facility])     Past Medical History:   Diagnosis Date   • Allergic rhinitis    • Arthritis    • Asthma    • Benign neoplasm of meninges (CMS/Formerly [facility])     Post op       • Cobalamin deficiency    • Corneal abrasion    • Encounter for gynecological examination (general) (routine) without abnormal findings    • Essential hypertension    • Fatigue    • Female stress incontinence    • Head injury    • History of delayed wound healing     Delayed healing of  surgical wound      • Hyperlipidemia    • Low back pain    • Nosebleed, symptom     Nosebleed/epistaxis symptom      • Osteoporosis    • PONV (postoperative nausea and vomiting)    • Sleep apnea     not wearing c-pap     Past Surgical History:   Procedure Laterality Date   • ARM LESION/CYST EXCISION  2014    Remove arm bone lesion (1)   • ARM SKIN LESION BIOPSY / EXCISION     • ARM WOUND REPAIR / CLOSURE  2014    Repair Superficial Wound TR-EXT 2.5 < CM 84759 (1)      • BRAIN SURGERY     • BREAST LUMPECTOMY     •  SECTION  1986     Section (1)     • ENDOSCOPY  2008    Colon endoscopy 89529 (1)     • EXCISION BREAST LESION W/ PREOP NEEDLE LOC  1981    Excision, breast lesion (1)     • HEMORRHOIDECTOMY     • HEMORRHOIDECTOMY  2008    Hemorrhoidectomy (2)      • HYSTERECTOMY     • INJECTION OF MEDICATION  2013    Celestone (betamethasone) (1)      • INJECTION OF MEDICATION  2013    Depo Medrol (Methylprednisone) (1)      • INJECTION OF MEDICATION  2013    Dexamethasone (2)      • INJECTION OF MEDICATION  2014    Kenalog (6)   • KNEE ARTHROSCOPY  2007    Knee arthroscopy, surgery (1)     • LAPAROSCOPIC TUBAL LIGATION  1991    Tubal ligation (1)      • PAP SMEAR  2007    PAP SMEAR (1)      • SHOULDER SURGERY Left     x 4   • TOTAL ABDOMINAL HYSTERECTOMY WITH SALPINGO OOPHORECTOMY      JOSE/BSO (1)           PT ASSESSMENT (last 12 hours)      Physical Therapy Evaluation     Row Name 18 1437          PT Evaluation Time/Intention    Subjective Information  complains of  -CW     Document Type  evaluation  -CW     Mode of Treatment  co-treatment;physical therapy;occupational therapy  -CW     Patient Effort  good  -CW     Symptoms Noted During/After Treatment  significant change in vital signs;fatigue;increased pain  -CW     Comment  RN notified of BP drop, fatigue, and pt not feeling well  -CW     Row Name 18 7135           General Information    Patient Profile Reviewed?  yes  -CW     Onset of Illness/Injury or Date of Surgery  11/19/18  -CW     Referring Physician  Dr. Junior  -CW     Patient Observations  alert;cooperative;agree to therapy  -CW     Patient/Family Observations  Daughter present  -CW     General Observations of Patient  Pt supine in HOB up on room air with IV, bed alarm, wound drain  -CW     Prior Level of Function  independent:;all household mobility;community mobility;gait;transfer;cooking;cleaning;driving;shopping;using stairs;work;home management  -CW     Equipment Currently Used at Home  none;grab bar;raised toilet Has quad cane,  -CW     Pertinent History of Current Functional Problem  Pt s/p L TKA on 11/19/18  -CW     Existing Precautions/Restrictions  fall  -CW     Limitations/Impairments  safety/cognitive  -CW     Equipment Ordered for Patient  gait belt  -CW     Risks Reviewed  patient and family:;LOB;nausea/vomiting;dizziness;increased discomfort;change in vital signs;increased drainage;lines disloged  -CW     Benefits Reviewed  patient and family:;improve function;increase independence;increase strength;increase balance;decrease pain;decrease risk of DVT;improve skin integrity;increase knowledge  -CW     Row Name 11/19/18 1431          Relationship/Environment    Lives With  alone  -CW     Concerns About Impact on Relationships  pt voiced her mom is staying with her for recovery  -CW     Row Name 11/19/18 1431          Resource/Environmental Concerns    Current Living Arrangements  home/apartment/condo  -CW     Row Name 11/19/18 1431          Home Main Entrance    Number of Stairs, Main Entrance  two  -CW     Stair Railings, Main Entrance  railing on left side (ascending)  -CW     Row Name 11/19/18 1431          Cognitive Assessment/Interventions    Additional Documentation  Cognitive Assessment/Intervention (Group)  -CW     Row Name 11/19/18 1431          Cognitive Assessment/Intervention- PT/OT     Affect/Mental Status (Cognitive)  WFL  -CW     Orientation Status (Cognition)  oriented x 4  -CW     Follows Commands (Cognition)  over 90% accuracy;verbal cues/prompting required  -CW     Safety Deficit (Cognitive)  mild deficit  -CW     Personal Safety Interventions  fall prevention program maintained;gait belt;nonskid shoes/slippers when out of bed;supervised activity  -CW     Row Name 11/19/18 1431          Safety Issues, Functional Mobility    Safety Issues Affecting Function (Mobility)  ability to follow commands;awareness of need for assistance;insight into deficits/self awareness;positioning of assistive device;safety precautions follow-through/compliance;steps too close to assistive device  -CW     Impairments Affecting Function (Mobility)  balance;coordination;endurance/activity tolerance;strength;shortness of breath;range of motion (ROM);pain  -CW     Row Name 11/19/18 1431          Mobility Assessment/Treatment    Extremity Weight-bearing Status  left upper extremity  -CW     Left Upper Extremity (Weight-bearing Status)  weight-bearing as tolerated (WBAT)  -CW     Left Lower Extremity (Weight-bearing Status)  weight-bearing as tolerated (WBAT)  -     Row Name 11/19/18 1431          Bed Mobility Assessment/Treatment    Bed Mobility Assessment/Treatment  supine-sit;sit-supine  -CW     Supine-Sit [place] (Bed Mobility)  minimum assist (75% patient effort)  -CW     Sit-Supine [place] (Bed Mobility)  contact guard;minimum assist (75% patient effort)  -CW     Bed Mobility, Safety Issues  decreased use of arms for pushing/pulling;decreased use of legs for bridging/pushing  -CW     Assistive Device (Bed Mobility)  bed rails;head of bed elevated  -     Comment (Bed Mobility)  cues for safety with drain and positioning  -CW     Row Name 11/19/18 1431          Transfer Assessment/Treatment    Transfer Assessment/Treatment  sit-stand transfer;stand-sit transfer  -CW     Comment (Transfers)  Pt  required cues for hand placement during transfers  -CW     Sit-Stand [place] (Transfers)  minimum assist (75% patient effort)  -CW     Stand-Sit [place] (Transfers)  minimum assist (75% patient effort)  -CW     [place] Level (Toilet Transfer)  minimum assist (75% patient effort)  -CW     Row Name 11/19/18 1431          Sit-Stand Transfer    Assistive Device (Sit-Stand Transfers)  walker, front-wheeled  -CW     Row Name 11/19/18 1431          Stand-Sit Transfer    Assistive Device (Stand-Sit Transfers)  walker, front-wheeled  -CW     Row Name 11/19/18 1431          Toilet Transfer    Type (Toilet Transfer)  stand-sit;sit-stand  -CW     Row Name 11/19/18 1431          Gait/Stairs Assessment/Training    Gait/Stairs Assessment/Training  gait/ambulation assistive device;gait/ambulation independence;distance ambulated;gait pattern;gait deviations  -CW     [place] Level (Gait)  minimum assist (75% patient effort)  -CW     Assistive Device (Gait)  walker, front-wheeled  -CW     Distance in Feet (Gait)  2x15 ft  -CW     Pattern (Gait)  step-to;step-through  -CW     Deviations/Abnormal Patterns (Gait)  antalgic  -CW     Bilateral Gait Deviations  knee buckling, left side;heel strike decreased;Trendelenburg sign  -CW     Left Sided Gait Deviations  knee buckling, left side  -     Row Name 11/19/18 1431          General ROM    LT Lower Ext  Lt Knee Extension/Flexion  -CW     GENERAL ROM COMMENTS  R LE AROM WFL  -     Row Name 11/19/18 1431          Left Lower Ext    Lt Knee Extension/Flexion AROM  5-70'   -     Lt Knee Extension/Flexion PROM  2-75'  -     Row Name 11/19/18 1431          MMT (Manual Muscle Testing)    General MMT Comments  R LE grossly 4/5, L ankle df/pf 4/5, L knee flex/extension 3/5, limited by pain  -     Row Name 11/19/18 1431          Sensory Assessment/Intervention    Sensory General Assessment  light touch sensation deficits identified  -     Row Name 11/19/18 1431           Light Touch Sensation Assessment    Left Lower Extremity: Light Touch Sensation Assessment  mild impairment, 75% or more correct responses  -CW     Comment, Left Lower Extremity: Light Touch Sensation Assessment  pt reports feeling lighter touch on L foot,  -CW     Additional Details: Light Touch Sensation Assessment  Pt reports her butttocks feeling numb  -CW     Row Name 11/19/18 1431          Pain Assessment    Additional Documentation  Pain Scale: Numbers Pre/Post-Treatment (Group)  -CW     Row Name 11/19/18 1431          Pain Scale: Numbers Pre/Post-Treatment    Pain Scale: Numbers, Pretreatment  6/10  -CW     Pain Scale: Numbers, Post-Treatment  7/10  -CW     Pain Location  — left knee and shoulder  -CW     Pain Intervention(s)  Medication (See MAR);Repositioned;Ambulation/increased activity;Distraction  -CW     Row Name             Wound 11/19/18 1106 Left midline knee incision;surgical    Wound - Properties Group Date first assessed: 11/19/18  -KM Time first assessed: 1106  -KM Present On Admission : no  -KM Side: Left  -KM Orientation: midline  -KM Location: knee  -KM Type: incision;surgical  -KM    Row Name 11/19/18 1431          Vital Signs    Pre Systolic BP Rehab  154  -CW     Pre Treatment Diastolic BP  87  -CW     Post Systolic BP Rehab  80 102/61 when retaken  -CW     Post Treatment Diastolic BP  64 notified RN  -CW     Pretreatment Heart Rate (beats/min)  57  -CW     Posttreatment Heart Rate (beats/min)  69  -CW     Pre SpO2 (%)  98  -CW     O2 Delivery Pre Treatment  room air  -CW     Post SpO2 (%)  95  -CW     O2 Delivery Post Treatment  room air  -CW     Pre Patient Position  Supine  -CW     Post Patient Position  Supine  -CW     Row Name 11/19/18 1431          Physical Therapy Goals    Bed Mobility Goal Selection (PT)  bed mobility, PT goal 1  -CW     Transfer Goal Selection (PT)  transfer, PT goal 1  -CW     Gait Training Goal Selection (PT)  gait training, PT goal 1  -CW     ROM Goal Selection  (PT)  ROM, PT goal 1  -CW     Stairs Goal Selection (PT)  stairs, PT goal 1  -CW     Additional Documentation  Stairs Goal Selection (PT) (Row);ROM Goal Selection (PT) (Row)  -CW     Row Name 11/19/18 1431          Bed Mobility Goal 1 (PT)    Activity/Assistive Device (Bed Mobility Goal 1, PT)  sit to supine;supine to sit;scooting;rolling to left;rolling to right;bridging  -CW     [place] Level/Cues Needed (Bed Mobility Goal 1, PT)  conditional independence  -CW     Time Frame (Bed Mobility Goal 1, PT)  2 days  -CW     Progress/Outcomes (Bed Mobility Goal 1, PT)  goal not met  -CW     Row Name 11/19/18 1431          Transfer Goal 1 (PT)    Activity/Assistive Device (Transfer Goal 1, PT)  transfers, all  -CW     [place] Level/Cues Needed (Transfer Goal 1, PT)  conditional independence  -CW     Time Frame (Transfer Goal 1, PT)  2 - 3 days  -CW     Progress/Outcome (Transfer Goal 1, PT)  goal not met  -CW     Row Name 11/19/18 [facility]          Gait Training Goal 1 (PT)    Activity/Assistive Device (Gait Training Goal 1, PT)  gait (walking locomotion);assistive device use;backward stepping;decrease asymmetrical patterns;decrease fall risk;diminish gait deviation;forward stepping;improve balance and speed;increase endurance/gait distance;increase energy conservation;normalize weight shifts  -CW     [place] Level (Gait Training Goal 1, PT)  conditional independence  -CW     Distance (Gait Goal 1, PT)  1x500 ft or more  -CW     Time Frame (Gait Training Goal 1, PT)  by discharge  -CW     Progress/Outcome (Gait Training Goal 1, PT)  goal not met  -CW     Row Name 11/19/18 [facility]          ROM Goal 1 (PT)    ROM Goal 1 (PT)  0-100' knee flexion  -CW     Time Frame (ROM Goal 1, PT)  1 week  -CW     Progress/Outcome (ROM Goal 1, PT)  goal not met  -CW     Row Name 11/19/18 1431          Stairs Goal 1 (PT)    Activity/Assistive Device (Stairs Goal 1, PT)  using handrail, left;stairs, all skills  -CW     [place]  Level/Cues Needed (Stairs Goal 1, PT)  supervision required  -CW     Number of Stairs (Stairs Goal 1, PT)  2  -CW     Time Frame (Stairs Goal 1, PT)  by discharge  -CW     Progress/Outcome (Stairs Goal 1, PT)  goal not met  -CW     Row Name 11/19/18 1431          Living Environment    Home Accessibility  stairs to enter home walk in shower, 1 step, grab bars  -CW       User Key  (r) = Recorded By, (t) = Taken By, (c) = Cosigned By    Initials Name Provider Type    Madeline Desai, RN Registered Nurse    Chey Rodriguez, MARICRUZ Physical Therapist        Physical Therapy Education     Title: PT OT SLP Therapies (Active)     Topic: Physical Therapy (Done)     Point: Mobility training (Done)     Learning Progress Summary           Patient Acceptance, E, VU,NR by CW at 11/19/2018  4:54 PM    Comment:  Patient education on role of PT in acute care, use of gait belt, AD, hand placement during transfers, plan of care, and encouraged to call for assist to reduce risk of falls.   Family Acceptance, E, VU,NR by CW at 11/19/2018  4:54 PM    Comment:  Patient education on role of PT in acute care, use of gait belt, AD, hand placement during transfers, plan of care, and encouraged to call for assist to reduce risk of falls.                   Point: Home exercise program (Done)     Learning Progress Summary           Patient Acceptance, E, VU,NR by CW at 11/19/2018  4:55 PM    Comment:  Patient education on importance of not keeping pillow under knee, moving knee often, practicing quadriceps sets 10x every hour for neuromuscular control and holding each for 5 seconds.   Family Acceptance, E, VU,NR by CW at 11/19/2018  4:55 PM    Comment:  Patient education on importance of not keeping pillow under knee, moving knee often, practicing quadriceps sets 10x every hour for neuromuscular control and holding each for 5 seconds.                   Point: Body mechanics (Done)     Learning Progress Summary           Patient Acceptance, E,  RAHAT,NR by CW at 11/19/2018  4:54 PM    Comment:  Patient education on role of PT in acute care, use of gait belt, AD, hand placement during transfers, plan of care, and encouraged to call for assist to reduce risk of falls.   Family Acceptance, RAHAT [place],ILIR by CW at 11/19/2018  4:54 PM    Comment:  Patient education on role of PT in acute care, use of gait belt, AD, hand placement during transfers, plan of care, and encouraged to call for assist to reduce risk of falls.                   Point: Precautions (Done)     Learning Progress Summary           Patient Acceptance, E, RAHAT,NR by CW at 11/19/2018  4:54 PM    Comment:  Patient education on role of PT in acute care, use of gait belt, AD, hand placement during transfers, plan of care, and encouraged to call for assist to reduce risk of falls.   Family Acceptance, RAHAT [place],NR by CW at 11/19/2018  4:54 PM    Comment:  Patient education on role of PT in acute care, use of gait belt, AD, hand placement during transfers, plan of care, and encouraged to call for assist to reduce risk of falls.                               User Key     Initials Effective Dates Name Provider Type Discipline     10/04/18 -  Chey Lam, PT Physical Therapist PT              PT Recommendation and Plan     Plan of Care Reviewed With: patient  Progress: no change  Outcome Summary: PT eval completed with OT co-edgar. Pt is 52 y/o female s/p L TKA on 11/19/18. Patient has history of multiple L shoulder surgeries which limits function in L UE. Patient demonstrated supine<>sit with min A, sit to stand with FWW and min A and verbal cues for hand placement during transfers, and ambulated 2x15 ft with FWW and min A. Patient's AROM was 5'-70' knee flexion, and passively 2'-75'. Pt limited by pain in both shoulder and knee,a nd also by BP drop (RN notified). Pt would benefit from continued skilled physical therapy to improve functional mobility, range of motion, strength, endurance, use of assistive  device, balance, and reduce risk of falls. D/C recommendation includes home with assist and HH PT or OP PT depending on progress made in acute care.  Outcome Measures     Row Name 11/19/18 1431 11/19/18 1430          How much help from another person do you currently need...    Turning from your back to your side while in flat bed without using bedrails?  3  -CW  —     Moving from lying on back to sitting on the side of a flat bed without bedrails?  3  -CW  —     Moving to and from a bed to a chair (including a wheelchair)?  2  -CW  —     Standing up from a chair using your arms (e.g., wheelchair, bedside chair)?  3  -CW  —     Climbing 3-5 steps with a railing?  1  -CW  —     To walk in hospital room?  2  -CW  —     AM-PAC 6 Clicks Score  14  -CW  —        How much help from another is currently needed...    Putting on and taking off regular lower body clothing?  —  2  -BH     Bathing (including washing, rinsing, and drying)  —  2  -BH     Toileting (which includes using toilet bed pan or urinal)  —  3  -BH     Putting on and taking off regular upper body clothing  —  3  -BH     Taking care of personal grooming (such as brushing teeth)  —  3  -BH     Eating meals  —  4  -BH     Score  —  17  -BH        Functional Assessment    Outcome Measure Options  AM-PAC 6 Clicks Basic Mobility (PT)  -CW  AM-PAC 6 Clicks Daily Activity (OT)  -       User Key  (r) = Recorded By, (t) = Taken By, (c) = Cosigned By    Initials Name Provider Type     Ninfa Waddell, OTR/L Occupational Therapist    Chey Rodriguez, PT Physical Therapist         Time Calculation:   PT Charges     Row Name 11/19/18 1431             Time Calculation    Start Time  1431  -CW      Stop Time  1509  -CW      Time Calculation (min)  38 min  -CW      PT Received On  11/19/18  -CW      PT Goal Re-Cert Due Date  12/02/18  -CW        User Key  (r) = Recorded By, (t) = Taken By, (c) = Cosigned By    Initials Name Provider Type    Chey Rodriguez  PT Physical Therapist        Therapy Suggested Charges     Code   Minutes Charges    None           Therapy Charges for Today     Code Description Service Date Service Provider Modifiers Qty    33763385060 HC PT MOBILITY CURRENT 11/19/2018 Chey Lam, PT GP, CK 1    32060234670 HC PT MOBILITY PROJECTED 11/19/2018 Chey Lam, PT GP, CI 1    40974242781 HC PT EVAL LOW COMPLEXITY 3 11/19/2018 Chey Lam, PT GP 1          PT G-Codes  PT Professional Judgement Used?: Yes  Outcome Measure Options: AM-PAC 6 Clicks Basic Mobility (PT)  AM-PAC 6 Clicks Score: 14  Score: 17  Functional Limitation: Mobility: Walking and moving around  Mobility: Walking and Moving Around Current Status (): At least 40 percent but less than 60 percent impaired, limited or restricted  Mobility: Walking and Moving Around Goal Status (): At least 1 percent but less than 20 percent impaired, limited or restricted      Chey Lam, PT  11/19/2018

## 2018-11-19 NOTE — PLAN OF CARE
Problem: Patient Care Overview  Goal: Plan of Care Review  Outcome: Ongoing (interventions implemented as appropriate)   11/19/18 1431   Coping/Psychosocial   Plan of Care Reviewed With patient   Plan of Care Review   Progress no change   OTHER   Outcome Summary PT eval completed with OT co-eval. Pt is 54 y/o female s/p L TKA on 11/19/18. Patient has history of multiple L shoulder surgeries which limits function in L UE. Patient demonstrated supine<>sit with min A, sit to stand with FWW and min A and verbal cues for hand placement during transfers, and ambulated 2x15 ft with FWW and min A. Patient's AROM was 5'-70' knee flexion, and passively 2'-75'. Pt limited by pain in both shoulder and knee,a nd also by BP drop (RN notified). Pt would benefit from continued skilled physical therapy to improve functional mobility, range of motion, strength, endurance, use of assistive device, balance, and reduce risk of falls. D/C recommendation includes home with assist and HH PT or OP PT depending on progress made in acute care.

## 2018-11-19 NOTE — PLAN OF CARE
Problem: Patient Care Overview  Goal: Plan of Care Review  Outcome: Ongoing (interventions implemented as appropriate)   11/19/18 8906   Coping/Psychosocial   Plan of Care Reviewed With patient   Plan of Care Review   Progress no change   OTHER   Outcome Summary new admit. vss. pain well controlled at this point, but block is still wearing off.        Problem: Fall Risk (Adult)  Goal: Identify Related Risk Factors and Signs and Symptoms  Outcome: Outcome(s) achieved Date Met: 11/19/18    Goal: Absence of Fall  Outcome: Ongoing (interventions implemented as appropriate)      Problem: Knee Arthroplasty (Total, Partial) (Adult)  Goal: Signs and Symptoms of Listed Potential Problems Will be Absent, Minimized or Managed (Knee Arthroplasty)  Outcome: Ongoing (interventions implemented as appropriate)    Goal: Anesthesia/Sedation Recovery  Outcome: Outcome(s) achieved Date Met: 11/19/18      Problem: Pain, Acute (Adult)  Goal: Identify Related Risk Factors and Signs and Symptoms  Outcome: Outcome(s) achieved Date Met: 11/19/18    Goal: Acceptable Pain Control/Comfort Level  Outcome: Ongoing (interventions implemented as appropriate)

## 2018-11-19 NOTE — ANESTHESIA PREPROCEDURE EVALUATION
Anesthesia Evaluation     history of anesthetic complications: PONV  NPO Solid Status: > 8 hours  NPO Liquid Status: > 2 hours           Airway   Mallampati: II  TM distance: >3 FB  Neck ROM: full  No difficulty expected  Dental - normal exam         Pulmonary - normal exam    breath sounds clear to auscultation  (+) asthma, sleep apnea,   (-) not a smoker  Cardiovascular - normal exam  Exercise tolerance: poor (<4 METS)    ECG reviewed  Rhythm: regular  Rate: normal    (+) hypertension well controlled 2 medications or greater,   (-) valvular problems/murmurs, past MI, dysrhythmias, angina, PVD, hyperlipidemia    ROS comment: Normal sinus rhythm  Normal ECG  No previous ECGs available  Confirmed by [facility]    Neuro/Psych  (+) seizures well controlled,     (-) headaches, psychiatric history  GI/Hepatic/Renal/Endo    (+) morbid obesity,    (-) GERD, hepatitis, liver disease, no renal disease, diabetes, hypothyroidism    Musculoskeletal     (+) arthralgias,   Abdominal   (+) obese,    Substance History   (+) alcohol use (occ),   (-) drug use     OB/GYN negative ob/gyn ROS   (-)  Pregnant        Other   (+) arthritis (shoulders and knees)     (-) history of cancer                  Anesthesia Plan    ASA 3     MAC, regional and spinal   (Adductor canal block discussed and patient agrees to proceed.  Will place scop patch in case have to go general)  intravenous induction   Anesthetic plan, all risks, benefits, and alternatives have been provided, discussed and informed consent has been obtained with: patient and spouse/significant other.

## 2018-11-19 NOTE — ANESTHESIA PROCEDURE NOTES
Spinal Block      Patient location during procedure: OR  Indication:at surgeon's request  Performed By  CRNA: Pauly Tejeda CRNA  Preanesthetic Checklist  Completed: patient identified, surgical consent, pre-op evaluation, timeout performed, IV checked, risks and benefits discussed and monitors and equipment checked  Spinal Block Prep:  Patient Position:sitting  Sterile Tech:cap, gloves, gown, mask and sterile barriers  Prep:Chloraprep  Patient Monitoring:blood pressure monitoring, continuous pulse oximetry and EKG  Spinal Block Procedure  Approach:midline  Guidance:landmark technique  Location:L3-L4  Needle Type:Pencan  Needle Gauge:22 G  Placement of Spinal needle event:cerebrospinal fluid aspirated    Fluid Appearance:clear     Post Assessment  Patient Tolerance:patient tolerated the procedure well with no apparent complications  Complications no

## 2018-11-19 NOTE — THERAPY EVALUATION
Acute Care - Occupational Therapy Initial Evaluation  [facility]     Patient Name: Priti Orr  : 1965  MRN: [id number]  Today's Date: 2018  Onset of Illness/Injury or Date of Surgery: 18  Date of Referral to OT: 18  Referring Physician: Dr. Junior    Admit Date: 2018       ICD-10-CM ICD-9-CM   1. Primary osteoarthritis of left knee M17.12 715.16   2. NICK (obstructive sleep apnea) G47.33 327.23   3. Seizure-like activity (CMS/HCC) R56.9 780.39   4. Presence of left artificial shoulder joint Z96.612 V43.61   5. History of brain surgery Z98.890 V15.29   6. Morbid obesity with BMI of 40.0-44.9, adult (CMS/[facility]) E66.01 278.01    Z68.41 V85.41   7. Impaired mobility and ADLs Z74.09 799.89     Patient Active Problem List   Diagnosis   • Osteoporosis   • Chronic left shoulder pain   • Presence of left artificial shoulder joint   • Left knee pain   • Anxiety   • Arthritis   • Asthma   • Depression   • Enchondroma of humerus   • Headache, chronic migraine without aura, intractable   • History of brain surgery   • Humeral fracture   • Hypercholesteremia   • Hyperlipidemia   • Essential hypertension   • Meningioma (CMS/HCC)   • Morbid obesity (CMS/HCC)   • NICK (obstructive sleep apnea)   • Vitamin D deficiency   • Seizure-like activity (CMS/[facility])   • S/p reverse total shoulder arthroplasty   • Primary osteoarthritis of left knee   • Morbid obesity with BMI of 40.0-44.9, adult (CMS/[facility])     Past Medical History:   Diagnosis Date   • Allergic rhinitis    • Arthritis    • Asthma    • Benign neoplasm of meninges (CMS/HCC)     Post op       • Cobalamin deficiency    • Corneal abrasion    • Encounter for gynecological examination (general) (routine) without abnormal findings    • Essential hypertension    • Fatigue    • Female stress incontinence    • Head injury    • History of delayed wound healing     Delayed healing of surgical wound      • Hyperlipidemia    • Low back pain    •  Nosebleed, symptom     Nosebleed/epistaxis symptom      • Osteoporosis    • PONV (postoperative nausea and vomiting)    • Sleep apnea     not wearing c-pap     Past Surgical History:   Procedure Laterality Date   • ARM LESION/CYST EXCISION  2014    Remove arm bone lesion (1)   • ARM SKIN LESION BIOPSY / EXCISION     • ARM WOUND REPAIR / CLOSURE  2014    Repair Superficial Wound TR-EXT 2.5 < CM 65933 (1)      • BRAIN SURGERY     • BREAST LUMPECTOMY     •  SECTION  1986     Section (1)     • ENDOSCOPY  2008    Colon endoscopy 91116 (1)     • EXCISION BREAST LESION W/ PREOP NEEDLE LOC  1981    Excision, breast lesion (1)     • HEMORRHOIDECTOMY     • HEMORRHOIDECTOMY  2008    Hemorrhoidectomy (2)      • HYSTERECTOMY     • INJECTION OF MEDICATION  2013    Celestone (betamethasone) (1)      • INJECTION OF MEDICATION  2013    Depo Medrol (Methylprednisone) (1)      • INJECTION OF MEDICATION  2013    Dexamethasone (2)      • INJECTION OF MEDICATION  2014    Kenalog (6)   • KNEE ARTHROSCOPY  2007    Knee arthroscopy, surgery (1)     • LAPAROSCOPIC TUBAL LIGATION  1991    Tubal ligation (1)      • PAP SMEAR  2007    PAP SMEAR (1)      • SHOULDER SURGERY Left     x 4   • TOTAL ABDOMINAL HYSTERECTOMY WITH SALPINGO OOPHORECTOMY      JOSE/BSO (1)             OT ASSESSMENT FLOWSHEET (last 72 hours)      Occupational Therapy Evaluation     Row Name 18 0667                   OT Evaluation Time/Intention    Subjective Information  complains of;pain  -BH        Document Type  evaluation  -        Mode of Treatment  co-treatment;occupational therapy;physical therapy  -        Total Evaluation Minutes, Occupational Therapy  39  -BH        Patient Effort  good  -BH        Symptoms Noted During/After Treatment  significant change in vital signs;fatigue;increased pain  -        Comment  RN notified of BP drop, fatigue and not  feeling well at the end of session  -           General Information    Patient Profile Reviewed?  yes  -        Onset of Illness/Injury or Date of Surgery  11/19/18  -        Referring Physician  Dr. Junior  -        Patient Observations  alert;cooperative;agree to therapy  -        Patient/Family Observations  dtr present   -        General Observations of Patient  pt supine HOB up on room air, IV, bed alarm, wound drain  -        Prior Level of Function  independent:;all household mobility;community mobility;transfer;ADL's;home management;cooking;cleaning;driving;shopping;work;using stairs  -        Equipment Currently Used at Home  none;grab bar;raised toilet has quad cane, HH shower head, reacher  -        Pertinent History of Current Functional Problem  pt s/p total knee surgery on left on 11/19/18  -        Existing Precautions/Restrictions  fall  -        Limitations/Impairments  safety/cognitive  -        Equipment Ordered for Patient  gait belt  -        Risks Reviewed  patient and family:;LOB;dizziness;increased discomfort;change in vital signs  -        Benefits Reviewed  patient and family:;increase independence;improve function;increase strength;increase balance;increase knowledge  -           Relationship/Environment    Lives With  alone  -        Concerns About Impact on Relationships  pt voiced her mom is staying with her for recovery  -           Resource/Environmental Concerns    Current Living Arrangements  home/apartment/condo  -           Home Main Entrance    Number of Stairs, Main Entrance  two  -        Stair Railings, Main Entrance  railing on left side (ascending)  -           Cognitive Assessment/Interventions    Additional Documentation  Cognitive Assessment/Intervention (Group)  -           Cognitive Assessment/Intervention- PT/OT    Affect/Mental Status (Cognitive)  WFL  -        Orientation Status (Cognition)  oriented x 4  -        Follows  Commands (Cognition)  over 90% accuracy;verbal cues/prompting required  -        Safety Deficit (Cognitive)  mild deficit  -        Personal Safety Interventions  fall prevention program maintained;gait belt;nonskid shoes/slippers when out of bed;supervised activity  -           Safety Issues, Functional Mobility    Safety Issues Affecting Function (Mobility)  ability to follow commands;awareness of need for assistance;insight into deficits/self awareness;judgment;positioning of assistive device;problem solving;safety precaution awareness;safety precautions follow-through/compliance;sequencing abilities  -        Impairments Affecting Function (Mobility)  balance;coordination;endurance/activity tolerance;motor control;motor planning;pain;postural/trunk control;strength  -           Mobility Assessment/Treatment    Extremity Weight-bearing Status  left lower extremity  -        Left Lower Extremity (Weight-bearing Status)  weight-bearing as tolerated (WBAT)  -           Bed Mobility Assessment/Treatment    Bed Mobility Assessment/Treatment  supine-sit;sit-supine  -        Supine-Sit [place] (Bed Mobility)  minimum assist (75% patient effort)  -        Sit-Supine [place] (Bed Mobility)  contact guard;minimum assist (75% patient effort)  -        Bed Mobility, Safety Issues  decreased use of arms for pushing/pulling;decreased use of legs for bridging/pushing  -        Assistive Device (Bed Mobility)  bed rails;head of bed elevated  -        Comment (Bed Mobility)  cues for safety with drain and positioning, pt increased time, pt has decreased ROM and arm strength on LUE due to prior injuries/surgery that limit ability  -           Functional Mobility    Functional Mobility- Ind. Level  minimum assist (75% patient effort)  -        Functional Mobility- Device  rolling walker  -        Functional Mobility- Comment  vc for side stepping and position of self to walker ; pt struggles  with walker and mobility due to limited ROM and strength in LUE. Pt wants to use a cane and PT and OT educated why a walker currently needed.   -           Transfer Assessment/Treatment    Transfer Assessment/Treatment  sit-stand transfer;stand-sit transfer;toilet transfer  -        Comment (Transfers)  pt required cues and redirection to use arms off sitting surfance and not on walker. pt struggled wtih LUE from previous injury   -           Sit-Stand Transfer    Sit-Stand [place] (Transfers)  minimum assist (75% patient effort);2 person assist  -        Assistive Device (Sit-Stand Transfers)  walker, front-wheeled  -           Stand-Sit Transfer    Stand-Sit [place] (Transfers)  minimum assist (75% patient effort);verbal cues  -        Assistive Device (Stand-Sit Transfers)  walker, front-wheeled  -           Toilet Transfer    Type (Toilet Transfer)  stand-sit;sit-stand  -        [place] Level (Toilet Transfer)  minimum assist (75% patient effort)  -        Assistive Device (Toilet Transfer)  -- BSC over toilet  -           ADL Assessment/Intervention    BADL Assessment/Intervention  lower body dressing;bathing;toileting  -           Bathing Assessment/Intervention    Comment (Bathing)  Educated pt about need for a bath bench and not to get into the shower alone. Educated on safety with this in regards to knee.   -           Lower Body Dressing Assessment/Training    Lower Body Dressing [place] Level  don;pants/bottoms;minimum assist (75% patient effort);moderate assist (50% patient effort);verbal cues  -        Lower Body Dressing Position  edge of bed sitting;supported standing  -        Comment (Lower Body Dressing)  min assist to CGA for standing balance, min assist to get LLE threaded first then min assist to get pants/underwear pulled up around drain with education on safety.   -           Toileting Assessment/Training    [place] Level (Toileting)   toileting skills;minimum assist (75% patient effort);contact guard assist;verbal cues  -        Assistive Devices (Toileting)  -- BSC over toilet and RW  -           BADL Safety/Performance    Impairments, BADL Safety/Performance  balance;endurance/activity tolerance;coordination;motor control;pain;strength;trunk/postural control;motor planning  -           General ROM    GENERAL ROM COMMENTS  LUE WFL; RUE shoulder grossly 40 degrees in supine, rest WFL but pain with movement   -           MMT (Manual Muscle Testing)    General MMT Comments  BUE  grossly 4-/5; RUE shoulder/elbow grossly 4- to4 /5; LUE elbow grossly 3+/5 but pain with very little resistance, shoulder grossly 2+ to 3-/5  -           Sensory Assessment/Intervention    Additional Documentation  Vision Assessment/Intervention (Group)  -           Vision Assessment/Intervention    Visual Impairment/Limitations  corrective lenses full time usually contacts   -           Positioning and Restraints    Pre-Treatment Position  in bed  -        Post Treatment Position  bed  -        In Bed  notified nsg;supine;call light within reach;encouraged to call for assist;exit alarm on;with family/caregiver;side rails up x2 HOB up pt encouraged to eat  -           Pain Assessment    Additional Documentation  Pain Scale: Numbers Pre/Post-Treatment (Group)  -           Pain Scale: Numbers Pre/Post-Treatment    Pain Scale: Numbers, Pretreatment  6/10  -        Pain Scale: Numbers, Post-Treatment  7/10  -        Pain Location  -- left knee and shoulder  -        Pain Intervention(s)  Medication (See MAR);Ambulation/increased activity;Rest  -           Wound 11/19/18 1106 Left midline knee incision;surgical    Wound - Properties Group Date first assessed: 11/19/18  -KM Time first assessed: 1106  -KM Present On Admission : no  -KM Side: Left  -KM Orientation: midline  -KM Location: knee  -KM Type: incision;surgical  -       Plan of Care  Review    Plan of Care Reviewed With  patient;daughter  -           Clinical Impression (OT)    Date of Referral to OT  11/19/18  -        OT Diagnosis  Impaired mobility and ADL  -        Prognosis (OT Eval)  fair  -        Functional Level at Time of Evaluation (OT Eval)  pt decreased endurance, strength, safety and independence with ADL.   -        Patient/Family Goals Statement (OT Eval)  indicated going home with family to assist.   -        Criteria for Skilled Therapeutic Interventions Met (OT Eval)  yes;treatment indicated  -        Rehab Potential (OT Eval)  fair, will monitor progress closely  -        Therapy Frequency (OT Eval)  daily  -        Predicted Duration of Therapy Intervention (Therapy Eval)  until d/c  -        Care Plan Review (OT)  evaluation/treatment results reviewed;care plan/treatment goals reviewed;risks/benefits reviewed;patient/other agree to care plan;current/potential barriers reviewed  -        Care Plan Review, Other Participant (OT Eval)  daughter  -        Anticipated Equipment Needs at Discharge (OT)  front wheeled walker;tub bench;dressing equipment;bathing equipment  -        Anticipated Discharge Disposition (OT)  home with /7 care;home with OP services;home with home health  -           Vital Signs    Pre Systolic BP Rehab  154  -BH        Pre Treatment Diastolic BP  87  -BH        Post Systolic BP Rehab  80 102/61  -BH        Post Treatment Diastolic BP  64 RN notified  -        Pretreatment Heart Rate (beats/min)  57  -BH        Posttreatment Heart Rate (beats/min)  69  -BH        Pre SpO2 (%)  98  -BH        O2 Delivery Pre Treatment  room air  -BH        Post SpO2 (%)  95  -BH        O2 Delivery Post Treatment  room air  -BH        Pre Patient Position  Supine  -BH        Post Patient Position  Supine  -           Planned OT Interventions    Planned Therapy Interventions (OT Eval)  activity tolerance training;adaptive equipment  training;BADL retraining;functional balance retraining;IADL retraining;occupation/activity based interventions;patient/caregiver education/training;ROM/therapeutic exercise;strengthening exercise;transfer/mobility retraining  -           OT Goals    Transfer Goal Selection (OT)  transfer, OT goal 1  -        Bathing Goal Selection (OT)  bathing, OT goal 1  -        Dressing Goal Selection (OT)  dressing, OT goal 1  -        Toileting Goal Selection (OT)  toileting, OT goal 1  -           Transfer Goal 1 (OT)    Activity/Assistive Device (Transfer Goal 1, OT)  toilet  -        [place] Level/Cues Needed (Transfer Goal 1, OT)  supervision required  -        Time Frame (Transfer Goal 1, OT)  long term goal (LTG);by discharge  -        Progress/Outcome (Transfer Goal 1, OT)  goal not met  -           Bathing Goal 1 (OT)    Activity/Assistive Device (Bathing Goal 1, OT)  bathing skills, all  -        [place] Level/Cues Needed (Bathing Goal 1, OT)  supervision required;set-up required  -        Time Frame (Bathing Goal 1, OT)  long term goal (LTG);by discharge  -        Progress/Outcomes (Bathing Goal 1, OT)  goal not met  -           Dressing Goal 1 (OT)    Activity/Assistive Device (Dressing Goal 1, OT)  dressing skills, all  -        [place]/Cues Needed (Dressing Goal 1, OT)  set-up required;supervision required  -        Time Frame (Dressing Goal 1, OT)  long term goal (LTG);by discharge  -        Progress/Outcome (Dressing Goal 1, OT)  goal not met  -           Toileting Goal 1 (OT)    Activity/Device (Toileting Goal 1, OT)  toileting skills, all  -        [place] Level/Cues Needed (Toileting Goal 1, OT)  supervision required  -        Time Frame (Toileting Goal 1, OT)  long term goal (LTG);by discharge  -        Progress/Outcome (Toileting Goal 1, OT)  goal not met  -           Patient Education Goal (OT)    Activity (Patient Education Goal, OT)  Pt will  communicate at least 5 home safety education recommendations.   -        [place]/Cues/Accuracy (Memory Goal 2, OT)  verbalizes understanding  -        Time Frame (Patient Education Goal, OT)  long term goal (LTG);by discharge  -        Progress/Outcome (Patient Education Goal, OT)  goal not met  -           Living Environment    Home Accessibility  stairs to enter home;other (see comments) walk in shower 1 step grab bars   -          User Key  (r) = Recorded By, (t) = Taken By, (c) = Cosigned By    Initials Name Effective Dates     Ninfa Waddell, OTR/L 06/08/18 -     Madeline Desai, RN 05/21/18 -          Occupational Therapy Education     Title: PT OT SLP Therapies (Active)     Topic: Occupational Therapy (Active)     Point: ADL training (Done)     Description: Instruct learner(s) on proper safety adaptation and remediation techniques during self care or transfers.   Instruct in proper use of assistive devices.    Learning Progress Summary           Patient Acceptance, E, VU,NR by  at 11/19/2018  3:58 PM    Comment:  Educated about OT and POC. Educated to call for assist. Educated pt on need for RW and bath bench. Educated on safety throughout.   Family Acceptance, E, VU,NR by  at 11/19/2018  3:58 PM    Comment:  Educated about OT and POC. Educated to call for assist. Educated pt on need for RW and bath bench. Educated on safety throughout.                   Point: Precautions (Done)     Description: Instruct learner(s) on prescribed precautions during self-care and functional transfers.    Learning Progress Summary           Patient Acceptance, E, VU,NR by  at 11/19/2018  3:58 PM    Comment:  Educated about OT and POC. Educated to call for assist. Educated pt on need for RW and bath bench. Educated on safety throughout.   Family Acceptance, E, VU,NR by  at 11/19/2018  3:58 PM    Comment:  Educated about OT and POC. Educated to call for assist. Educated pt on need for RW and bath  bench. Educated on safety throughout.                               User Key     Initials Effective Dates Name Provider Type Discipline     06/08/18 -  Ninfa Waddell, OTR/L Occupational Therapist OT                  OT Recommendation and Plan  Outcome Summary/Treatment Plan (OT)  Anticipated Equipment Needs at Discharge (OT): front wheeled walker, tub bench, dressing equipment, bathing equipment  Anticipated Discharge Disposition (OT): home with 24/7 care, home with OP services, home with home health  Planned Therapy Interventions (OT Eval): activity tolerance training, adaptive equipment training, BADL retraining, functional balance retraining, IADL retraining, occupation/activity based interventions, patient/caregiver education/training, ROM/therapeutic exercise, strengthening exercise, transfer/mobility retraining  Therapy Frequency (OT Eval): daily  Plan of Care Review  Plan of Care Reviewed With: patient, daughter  Plan of Care Reviewed With: patient, daughter  Outcome Summary: OT eval completed this date co-eval with PT. Pt was min assist to CGA for bed mobility, pt struggled with pain in LUE and LLE. Pt was min assist with sit to stand t/f off bed and toilet and for mobility with cues for safety and arm/hand placement. Pt required min-mod assist to don pants. Pt and pt's dtr was educated on need for bath bench and rolling walker. Pt could benfit from skilled OT to increase strength, endurance, safety, and independence with ADL. Recommend to d/c home with 24/7 assist and home health verse outpaitent therapy depending if OT goals are met.     Outcome Measures     Row Name 11/19/18 1004             How much help from another is currently needed...    Putting on and taking off regular lower body clothing?  2  -BH      Bathing (including washing, rinsing, and drying)  2  -BH      Toileting (which includes using toilet bed pan or urinal)  3  -BH      Putting on and taking off regular upper body clothing  3  -BH       Taking care of personal grooming (such as brushing teeth)  3  -      Eating meals  4  -      Score  17  -         Functional Assessment    Outcome Measure Options  AM-PAC 6 Clicks Daily Activity (OT)  -        User Key  (r) = Recorded By, (t) = Taken By, (c) = Cosigned By    Initials Name Provider Type     Ninfa Waddell OTR/L Occupational Therapist          Time Calculation:   Time Calculation- OT     Row Name 11/19/18 1604             Time Calculation-     OT Start Time  1430  -      OT Stop Time  1509  -      OT Time Calculation (min)  39 min  -      OT Received On  11/19/18  -      OT Goal Re-Cert Due Date  12/02/18  -        User Key  (r) = Recorded By, (t) = Taken By, (c) = Cosigned By    Initials Name Provider Type    Ninfa Daley OTR/L Occupational Therapist        Therapy Suggested Charges     Code   Minutes Charges    None           Therapy Charges for Today     Code Description Service Date Service Provider Modifiers Qty    16646741232  OT SELFCARE CURRENT 11/19/2018 Ninfa Waddell OTR/L GO, CK 1    [id number]  OT SELFCARE PROJECTED 11/19/2018 Ninfa Waddell OTR/L GO, CJ 1    [id number]  OT EVAL MOD COMPLEXITY 3 11/19/2018 Ninfa Waddell OTR/L GO 1          OT G-codes  OT Professional Judgement Used?: Yes  OT Functional Scales Options: AM-PAC 6 Clicks Daily Activity (OT)  Score: 17  Functional Limitation: Self care  Self Care Current Status (): At least 40 percent but less than 60 percent impaired, limited or restricted  Self Care Goal Status (): At least 20 percent but less than 40 percent impaired, limited or restricted    Ninfa Waddell OTR/ALANIS  11/19/2018

## 2018-11-19 NOTE — ANESTHESIA PROCEDURE NOTES
Peripheral Block      Patient location during procedure: OR  Start time: 11/19/2018 10:35 AM  Stop time: 11/19/2018 10:38 AM  Reason for block: at surgeon's request and post-op pain management  Performed by  CRNA: Pauly Tejeda CRNA  Preanesthetic Checklist  Completed: patient identified, surgical consent, pre-op evaluation, timeout performed, IV checked, risks and benefits discussed and monitors and equipment checked  Prep:  Pt Position: supine  Sterile barriers:cap, gloves, gown, mask and sterile barriers  Prep: ChloraPrep  Patient monitoring: blood pressure monitoring, continuous pulse oximetry and EKG  Procedure  Sedation:yes  Performed under: MAC  Laterality:left  Block Type:adductor canal block  Injection Technique:single-shot  Medications Used: ropivacaine (NAROPIN) 0.5 % injection, 30 mL  Medications  Preservative Free Saline:30ml  Comment:4mg decadron added    Post Assessment  Injection Assessment: negative aspiration for heme, no paresthesia on injection and incremental injection  Patient Tolerance:comfortable throughout block  Complications:no

## 2018-11-20 LAB
HOLD SPECIMEN: NORMAL
INR PPP: 1.06 (ref 0.8–1.2)
LAB AP CASE REPORT: NORMAL
PATH REPORT.FINAL DX SPEC: NORMAL
PATH REPORT.GROSS SPEC: NORMAL
PROTHROMBIN TIME: 13.6 SECONDS (ref 11.1–15.3)

## 2018-11-20 PROCEDURE — 85610 PROTHROMBIN TIME: CPT | Performed by: ORTHOPAEDIC SURGERY

## 2018-11-20 PROCEDURE — 97530 THERAPEUTIC ACTIVITIES: CPT

## 2018-11-20 PROCEDURE — 99024 POSTOP FOLLOW-UP VISIT: CPT | Performed by: ORTHOPAEDIC SURGERY

## 2018-11-20 PROCEDURE — 25010000002 CEFAZOLIN PER 500 MG: Performed by: ORTHOPAEDIC SURGERY

## 2018-11-20 PROCEDURE — 97110 THERAPEUTIC EXERCISES: CPT

## 2018-11-20 PROCEDURE — 97116 GAIT TRAINING THERAPY: CPT

## 2018-11-20 PROCEDURE — 97535 SELF CARE MNGMENT TRAINING: CPT

## 2018-11-20 RX ADMIN — ACETAMINOPHEN 1000 MG: 500 TABLET ORAL at 08:58

## 2018-11-20 RX ADMIN — FLUTICASONE PROPIONATE 1 SPRAY: 50 SPRAY, METERED NASAL at 08:59

## 2018-11-20 RX ADMIN — ACETAMINOPHEN 1000 MG: 500 TABLET ORAL at 12:39

## 2018-11-20 RX ADMIN — SODIUM CHLORIDE 2 G: 9 INJECTION, SOLUTION INTRAVENOUS at 01:38

## 2018-11-20 RX ADMIN — SODIUM CHLORIDE, PRESERVATIVE FREE 3 ML: 5 INJECTION INTRAVENOUS at 20:50

## 2018-11-20 RX ADMIN — SODIUM CHLORIDE 100 ML/HR: 9 INJECTION, SOLUTION INTRAVENOUS at 01:38

## 2018-11-20 RX ADMIN — FERROUS SULFATE TAB EC 324 MG (65 MG FE EQUIVALENT) 324 MG: 324 (65 FE) TABLET DELAYED RESPONSE at 08:58

## 2018-11-20 RX ADMIN — ACETAMINOPHEN 1000 MG: 500 TABLET ORAL at 17:25

## 2018-11-20 RX ADMIN — OXYCODONE HYDROCHLORIDE 5 MG: 5 TABLET ORAL at 12:39

## 2018-11-20 RX ADMIN — OXYCODONE HYDROCHLORIDE 5 MG: 5 TABLET ORAL at 22:19

## 2018-11-20 RX ADMIN — OXYCODONE HYDROCHLORIDE 5 MG: 5 TABLET ORAL at 07:30

## 2018-11-20 RX ADMIN — FAMOTIDINE 40 MG: 40 TABLET ORAL at 08:58

## 2018-11-20 RX ADMIN — OXYCODONE HYDROCHLORIDE 10 MG: 10 TABLET, FILM COATED, EXTENDED RELEASE ORAL at 08:58

## 2018-11-20 RX ADMIN — OXYCODONE HYDROCHLORIDE 5 MG: 5 TABLET ORAL at 01:38

## 2018-11-20 RX ADMIN — WARFARIN SODIUM 5 MG: 5 TABLET ORAL at 17:25

## 2018-11-20 RX ADMIN — OXYCODONE HYDROCHLORIDE 5 MG: 5 TABLET ORAL at 17:25

## 2018-11-20 NOTE — DISCHARGE PLACEMENT REQUEST
"At discharge Louis Bergman will require home health for physical therapy and likely nursing for PT/INR's. She has a total knee replacement. I do not have an order at this time. Will you please let me know if you are able to accept patient's insurance? Thank you!    [facility]  Office: [phone]    Louis Bergman (53 y.o. Female)     Date of Birth Social Security Number Address Home Phone MRN    1965  [address] [place] [id number] [phone] [id number]    Muslim Marital Status          Religion        Admission Date Admission Type Admitting Provider Attending Provider Department, Room/Bed    11/19/18 Elective Brooks Junior MD Dodds, James Carpenter, MD [facility] 3 EAST, 361/1    Discharge Date Discharge Disposition Discharge Destination                       Attending Provider:  Brooks Junior MD    Allergies:  Pseudoephedrine, Morphine, Allegra [Fexofenadine], Zyrtec [Cetirizine]    Isolation:  None   Infection:  None   Code Status:  CPR    Ht:  149.9 cm (59\")   Wt:  98.8 kg (217 lb 13 oz)    Admission Cmt:  None   Principal Problem:  Primary osteoarthritis of left knee [M17.12]                 Active Insurance as of 11/19/2018     Primary Coverage     Payor Plan Insurance Group Employer/Plan Group    [facility] [id number]     Payor Plan Address Payor Plan Phone Number Payor Plan Fax Number Effective Dates    [address]   6/1/2018 - None Entered    [facility] [phone]       Subscriber Name Subscriber Birth Date Member ID       LOUIS BERGAMN 1965 [id number]                 Emergency Contacts      (Rel.) Home Phone Work Phone Mobile Phone    Pauly Larson (Daughter) -- -- [phone]    Verónica Bergman (Mother) -- -- [phone]               History & Physical      Brooks Junior MD at 11/19/2018 10:02 AM        H&P reviewed. The patient was examined and there are " no changes to the H&P.    Electronically signed by Brooks Junior MD at 11/19/2018 10:02 AM   Source Note             Priti Orr is a 53 y.o. female   Primary Care Provider Radha Gregorio MD     Chief Complaint   Patient presents with   • Left Knee - Pre-op Exam       HISTORY OF PRESENT ILLNESS:  Priti Orr is here for followup of left knee pain.  The pain has not improved despite long term treatment with multiple conservative management trials.      Prior Arthritis treatments: [x]  PT    [x]  HEP      [x]  Attempt weight loss  [x]  NSAIDS      [x]  Cane   [x]  Intra-articular steroid inj      [x]  Viscosupplementation    Pain is chronic dull ache that is severe at times and worse with activity.  Difficulty with ADL's.  Patient is not happy with current quality of life and wants to discuss proceeding with surgical intervention.     CONCURRENT MEDICAL HISTORY:    Past Medical History:   Diagnosis Date   • Allergic rhinitis    • Arthritis    • Asthma    • Benign neoplasm of meninges (CMS/HCC)     Post op 2011      • Cobalamin deficiency    • Corneal abrasion    • Encounter for gynecological examination (general) (routine) without abnormal findings    • Essential hypertension    • Fatigue    • Female stress incontinence    • Head injury 2011   • History of delayed wound healing     Delayed healing of surgical wound      • Hyperlipidemia    • Low back pain    • Nosebleed, symptom     Nosebleed/epistaxis symptom      • Osteoporosis    • PONV (postoperative nausea and vomiting)    • Sleep apnea     not wearing c-pap       Allergies   Allergen Reactions   • Pseudoephedrine Shortness Of Breath and Swelling     LIPS SWELL AND TONGUE BECOMES THICK   • Morphine Other (See Comments)     States felt like itching from within, hallucinations, agitation   • Allegra [Fexofenadine] Swelling   • Zyrtec [Cetirizine] Swelling         Current Outpatient Medications:   •  fluticasone (FLONASE) 50 MCG/ACT  nasal spray, 1 spray into the nostril(s) as directed by provider Daily., Disp: , Rfl:   •  lisinopril-hydrochlorothiazide (PRINZIDE,ZESTORETIC) 20-12.5 MG per tablet, Take 1 tablet by mouth Daily., Disp: , Rfl:   •  meloxicam (MOBIC) 15 MG tablet, Take 15 mg by mouth Daily., Disp: , Rfl: 5  •  montelukast (SINGULAIR) 10 MG tablet, Take 10 mg by mouth Daily As Needed., Disp: , Rfl:   •  vitamin D (ERGOCALCIFEROL) 64352 units capsule capsule, Take 50,000 Units by mouth 1 (One) Time Per Week., Disp: , Rfl:   •  Glucos-Chondroit-Hyaluron-MSM (GLUCOSAMINE CHONDROITIN JOINT PO), Take 1 tablet by mouth Daily., Disp: , Rfl:     Past Surgical History:   Procedure Laterality Date   • ARM LESION/CYST EXCISION  2014    Remove arm bone lesion (1)   • ARM SKIN LESION BIOPSY / EXCISION     • ARM WOUND REPAIR / CLOSURE  2014    Repair Superficial Wound TR-EXT 2.5 < CM 37201 (1)      • BRAIN SURGERY     • BREAST LUMPECTOMY     •  SECTION  1986     Section (1)     • ENDOSCOPY  2008    Colon endoscopy 31537 (1)     • EXCISION BREAST LESION W/ PREOP NEEDLE LOC  1981    Excision, breast lesion (1)     • HEMORRHOIDECTOMY     • HEMORRHOIDECTOMY  2008    Hemorrhoidectomy (2)      • HYSTERECTOMY     • INJECTION OF MEDICATION  2013    Celestone (betamethasone) (1)      • INJECTION OF MEDICATION  2013    Depo Medrol (Methylprednisone) (1)      • INJECTION OF MEDICATION  2013    Dexamethasone (2)      • INJECTION OF MEDICATION  2014    Kenalog (6)   • KNEE ARTHROSCOPY  2007    Knee arthroscopy, surgery (1)     • LAPAROSCOPIC TUBAL LIGATION  1991    Tubal ligation (1)      • PAP SMEAR  2007    PAP SMEAR (1)      • SHOULDER SURGERY Left     x 4   • TOTAL ABDOMINAL HYSTERECTOMY WITH SALPINGO OOPHORECTOMY      JOSE/BSO (1)          Family History   Problem Relation Age of Onset   • Alzheimer's disease Other    • Breast cancer Other         other  "  • Cancer Other         other - GYN   • Colon cancer Other         Colorectal Cancer   • Depression Other    • Diabetes Other    • Hyperlipidemia Other    • Hypertension Other    • Stroke Other    • Ovarian cancer Other    • Colon cancer Other    • Hypertension Mother    • Hypertension Father        Social History     Socioeconomic History   • Marital status:      Spouse name: Not on file   • Number of children: Not on file   • Years of education: Not on file   • Highest education level: Not on file   Social Needs   • Financial resource strain: Not on file   • Food insecurity - worry: Not on file   • Food insecurity - inability: Not on file   • Transportation needs - medical: Not on file   • Transportation needs - non-medical: Not on file   Occupational History   • Not on file   Tobacco Use   • Smoking status: Never Smoker   • Smokeless tobacco: Never Used   Substance and Sexual Activity   • Alcohol use: Yes     Comment: occasional   • Drug use: No   • Sexual activity: Defer   Other Topics Concern   • Not on file   Social History Narrative   • Not on file        Review of Systems   Constitutional: Negative for chills and fever.   Respiratory: Negative.    Cardiovascular: Negative.    Gastrointestinal: Negative.    Genitourinary: Negative.    Musculoskeletal:        Left knee pain   All other systems reviewed and are negative.      PHYSICAL EXAMINATION:       Ht 152.4 cm (60\")   Wt 96.2 kg (212 lb)   BMI 41.40 kg/m²      Physical Exam   Constitutional: She is oriented to person, place, and time. She appears well-developed and well-nourished. No distress.   Cardiovascular: Normal rate, regular rhythm and normal heart sounds.   Pulmonary/Chest: Effort normal and breath sounds normal.   Abdominal: Soft. Bowel sounds are normal.   Musculoskeletal:        Right knee: She exhibits no effusion.        Left knee: She exhibits no effusion.   Neurological: She is alert and oriented to person, place, and time. "   Psychiatric: She has a normal mood and affect. Her behavior is normal. Judgment and thought content normal.   Vitals reviewed.      GAIT:     []  Normal  [x]  Antalgic    Assistive device: [x]  None  []  Walker     []  Crutches  []  Cane     []  Wheelchair  []  Stretcher    Right Knee Exam     Muscle Strength   The patient has normal right knee strength.    Tenderness   The patient is experiencing no tenderness.     Range of Motion   The patient has normal right knee ROM.    Tests   Varus: negative Valgus: negative  Drawer:  Anterior - negative        Other   Erythema: absent  Sensation: normal  Pulse: present  Swelling: none  Effusion: no effusion present      Left Knee Exam     Muscle Strength   The patient has normal left knee strength.    Tenderness   Left knee tenderness location: diffuse.    Range of Motion   Extension: -5   Flexion: 120     Tests   Varus: negative Valgus: negative  Drawer:  Anterior - negative     Posterior - negative    Other   Erythema: absent  Sensation: normal  Pulse: present  Swelling: none  Effusion: no effusion present    Comments:  Crepitation with motion  Mild to moderate pain through arc of motion              EXAM:  Radiograph(s), Osseous         REGION:   Knee    SIDE:     Left     VIEWS:   2             INDICATION:    injury today, M25.561 Pain in right knee M25.562  Pain in left knee     COMPARISON:    6/26/18              FINDINGS:           No evidence of a fracture or bony malalignment.     There is moderate to severe medial compartment degenerative  change, unchanged from the recent prior study.  Intra-articular osseous foreign body, unchanged.  .      IMPRESSION:  CONCLUSION:           1. Degenerative changes, grossly unchanged.     Suggest correlation with MRI.            NOTE:  TXA  tranexemic acid summary: THIS PATIENT IS A CANDIDATE FOR IV TXA DURING SURGERY.    ASSESSMENT:    Diagnoses and all orders for this visit:    Primary osteoarthritis of left knee    Left knee  pain, unspecified chronicity    NICK (obstructive sleep apnea)    Morbid obesity with BMI of 40.0-44.9, adult (CMS/[facility])    Essential hypertension          PLAN    The patient voiced understanding of the risks, benefits, and alternative forms of treatment that were discussed and the patient consents to proceed with surgery.  All risks, benefits and alternatives were discussed.  Risks including to but not exclusive to anesthetic complications, including death, MI, CVA, infection, bleeding DVT, fracture, residual pain and need for future surgery.    Plan total knee arthroplasty in the left knee.    All questions were answered.    Her BMI is 41.4.  We discussed that it was not below the target of 40 but that she had made progress and she had progressed well.  We discussed the slight increased risk and agreed to proceed.      Return for Post-operative eval.    Brooks Junior MD               Electronically signed by Brooks Junior MD at 11/15/2018  5:34 PM             Brooks Junior MD at 11/13/2018  8:20 AM          Priti Orr is a 53 y.o. female   Primary Care Provider Radha Gregorio MD     Chief Complaint   Patient presents with   • Left Knee - Pre-op Exam       HISTORY OF PRESENT ILLNESS:  Priti Orr is here for followup of left knee pain.  The pain has not improved despite long term treatment with multiple conservative management trials.      Prior Arthritis treatments: [x]  PT    [x]  HEP      [x]  Attempt weight loss  [x]  NSAIDS      [x]  Cane   [x]  Intra-articular steroid inj      [x]  Viscosupplementation    Pain is chronic dull ache that is severe at times and worse with activity.  Difficulty with ADL's.  Patient is not happy with current quality of life and wants to discuss proceeding with surgical intervention.     CONCURRENT MEDICAL HISTORY:    Past Medical History:   Diagnosis Date   • Allergic rhinitis    • Arthritis    • Asthma    • Benign neoplasm of  meninges (CMS/HCC)     Post op       • Cobalamin deficiency    • Corneal abrasion    • Encounter for gynecological examination (general) (routine) without abnormal findings    • Essential hypertension    • Fatigue    • Female stress incontinence    • Head injury    • History of delayed wound healing     Delayed healing of surgical wound      • Hyperlipidemia    • Low back pain    • Nosebleed, symptom     Nosebleed/epistaxis symptom      • Osteoporosis    • PONV (postoperative nausea and vomiting)    • Sleep apnea     not wearing c-pap       Allergies   Allergen Reactions   • Pseudoephedrine Shortness Of Breath and Swelling     LIPS SWELL AND TONGUE BECOMES THICK   • Morphine Other (See Comments)     States felt like itching from within, hallucinations, agitation   • Allegra [Fexofenadine] Swelling   • Zyrtec [Cetirizine] Swelling         Current Outpatient Medications:   •  fluticasone (FLONASE) 50 MCG/ACT nasal spray, 1 spray into the nostril(s) as directed by provider Daily., Disp: , Rfl:   •  lisinopril-hydrochlorothiazide (PRINZIDE,ZESTORETIC) 20-12.5 MG per tablet, Take 1 tablet by mouth Daily., Disp: , Rfl:   •  meloxicam (MOBIC) 15 MG tablet, Take 15 mg by mouth Daily., Disp: , Rfl: 5  •  montelukast (SINGULAIR) 10 MG tablet, Take 10 mg by mouth Daily As Needed., Disp: , Rfl:   •  vitamin D (ERGOCALCIFEROL) 61265 units capsule capsule, Take 50,000 Units by mouth 1 (One) Time Per Week., Disp: , Rfl:   •  Glucos-Chondroit-Hyaluron-MSM (GLUCOSAMINE CHONDROITIN JOINT PO), Take 1 tablet by mouth Daily., Disp: , Rfl:     Past Surgical History:   Procedure Laterality Date   • ARM LESION/CYST EXCISION  2014    Remove arm bone lesion (1)   • ARM SKIN LESION BIOPSY / EXCISION     • ARM WOUND REPAIR / CLOSURE  2014    Repair Superficial Wound TR-EXT 2.5 < CM 65376 (1)      • BRAIN SURGERY     • BREAST LUMPECTOMY     •  SECTION  1986     Section (1)     • ENDOSCOPY   02/19/2008    Colon endoscopy 52785 (1)     • EXCISION BREAST LESION W/ PREOP NEEDLE LOC  07/01/1981    Excision, breast lesion (1)     • HEMORRHOIDECTOMY     • HEMORRHOIDECTOMY  02/19/2008    Hemorrhoidectomy (2)      • HYSTERECTOMY     • INJECTION OF MEDICATION  06/06/2013    Celestone (betamethasone) (1)      • INJECTION OF MEDICATION  12/04/2013    Depo Medrol (Methylprednisone) (1)      • INJECTION OF MEDICATION  05/21/2013    Dexamethasone (2)      • INJECTION OF MEDICATION  01/29/2014    Kenalog (6)   • KNEE ARTHROSCOPY  03/05/2007    Knee arthroscopy, surgery (1)     • LAPAROSCOPIC TUBAL LIGATION  04/16/1991    Tubal ligation (1)      • PAP SMEAR  01/18/2007    PAP SMEAR (1)      • SHOULDER SURGERY Left     x 4   • TOTAL ABDOMINAL HYSTERECTOMY WITH SALPINGO OOPHORECTOMY  1993    JOSE/BSO (1)          Family History   Problem Relation Age of Onset   • Alzheimer's disease Other    • Breast cancer Other         other   • Cancer Other         other - GYN   • Colon cancer Other         Colorectal Cancer   • Depression Other    • Diabetes Other    • Hyperlipidemia Other    • Hypertension Other    • Stroke Other    • Ovarian cancer Other    • Colon cancer Other    • Hypertension Mother    • Hypertension Father        Social History     Socioeconomic History   • Marital status:      Spouse name: Not on file   • Number of children: Not on file   • Years of education: Not on file   • Highest education level: Not on file   Social Needs   • Financial resource strain: Not on file   • Food insecurity - worry: Not on file   • Food insecurity - inability: Not on file   • Transportation needs - medical: Not on file   • Transportation needs - non-medical: Not on file   Occupational History   • Not on file   Tobacco Use   • Smoking status: Never Smoker   • Smokeless tobacco: Never Used   Substance and Sexual Activity   • Alcohol use: Yes     Comment: occasional   • Drug use: No   • Sexual activity: Defer   Other Topics  "Concern   • Not on file   Social History Narrative   • Not on file        Review of Systems   Constitutional: Negative for chills and fever.   Respiratory: Negative.    Cardiovascular: Negative.    Gastrointestinal: Negative.    Genitourinary: Negative.    Musculoskeletal:        Left knee pain   All other systems reviewed and are negative.      PHYSICAL EXAMINATION:       Ht 152.4 cm (60\")   Wt 96.2 kg (212 lb)   BMI 41.40 kg/m²      Physical Exam   Constitutional: She is oriented to person, place, and time. She appears well-developed and well-nourished. No distress.   Cardiovascular: Normal rate, regular rhythm and normal heart sounds.   Pulmonary/Chest: Effort normal and breath sounds normal.   Abdominal: Soft. Bowel sounds are normal.   Musculoskeletal:        Right knee: She exhibits no effusion.        Left knee: She exhibits no effusion.   Neurological: She is alert and oriented to person, place, and time.   Psychiatric: She has a normal mood and affect. Her behavior is normal. Judgment and thought content normal.   Vitals reviewed.      GAIT:     []  Normal  [x]  Antalgic    Assistive device: [x]  None  []  Walker     []  Crutches  []  Cane     []  Wheelchair  []  Stretcher    Right Knee Exam     Muscle Strength   The patient has normal right knee strength.    Tenderness   The patient is experiencing no tenderness.     Range of Motion   The patient has normal right knee ROM.    Tests   Varus: negative Valgus: negative  Drawer:  Anterior - negative        Other   Erythema: absent  Sensation: normal  Pulse: present  Swelling: none  Effusion: no effusion present      Left Knee Exam     Muscle Strength   The patient has normal left knee strength.    Tenderness   Left knee tenderness location: diffuse.    Range of Motion   Extension: -5   Flexion: 120     Tests   Varus: negative Valgus: negative  Drawer:  Anterior - negative     Posterior - negative    Other   Erythema: absent  Sensation: normal  Pulse: " present  Swelling: none  Effusion: no effusion present    Comments:  Crepitation with motion  Mild to moderate pain through arc of motion              EXAM:  Radiograph(s), Osseous         REGION:   Knee    SIDE:     Left     VIEWS:   2             INDICATION:    injury today, M25.561 Pain in right knee M25.562  Pain in left knee     COMPARISON:    6/26/18              FINDINGS:           No evidence of a fracture or bony malalignment.     There is moderate to severe medial compartment degenerative  change, unchanged from the recent prior study.  Intra-articular osseous foreign body, unchanged.  .      IMPRESSION:  CONCLUSION:           1. Degenerative changes, grossly unchanged.     Suggest correlation with MRI.            NOTE:  TXA  tranexemic acid summary: THIS PATIENT IS A CANDIDATE FOR IV TXA DURING SURGERY.    ASSESSMENT:    Diagnoses and all orders for this visit:    Primary osteoarthritis of left knee    Left knee pain, unspecified chronicity    NICK (obstructive sleep apnea)    Morbid obesity with BMI of 40.0-44.9, adult (CMS/[facility])    Essential hypertension          PLAN    The patient voiced understanding of the risks, benefits, and alternative forms of treatment that were discussed and the patient consents to proceed with surgery.  All risks, benefits and alternatives were discussed.  Risks including to but not exclusive to anesthetic complications, including death, MI, CVA, infection, bleeding DVT, fracture, residual pain and need for future surgery.    Plan total knee arthroplasty in the left knee.    All questions were answered.    Her BMI is 41.4.  We discussed that it was not below the target of 40 but that she had made progress and she had progressed well.  We discussed the slight increased risk and agreed to proceed.      Return for Post-operative eval.    Brooks Junior MD              Electronically signed by Brooks Junior MD at 11/15/2018  5:34 PM       Prior to Admission  Medications     Prescriptions Last Dose Informant Patient Reported? Taking?    fluticasone (FLONASE) 50 MCG/ACT nasal spray Past Week  Yes Yes    1 spray into the nostril(s) as directed by provider Daily.    Glucos-Chondroit-Hyaluron-MSM (GLUCOSAMINE CHONDROITIN JOINT PO) Past Week  Yes Yes    Take 1 tablet by mouth Daily.    lisinopril-hydrochlorothiazide (PRINZIDE,ZESTORETIC) 20-12.5 MG per tablet 11/18/2018  Yes Yes    Take 1 tablet by mouth Daily.    meloxicam (MOBIC) 15 MG tablet 11/18/2018  Yes Yes    Take 15 mg by mouth Daily.    vitamin D (ERGOCALCIFEROL) 87464 units capsule capsule Past Week  Yes Yes    Take 50,000 Units by mouth 1 (One) Time Per Week.    montelukast (SINGULAIR) 10 MG tablet More than a month  Yes No    Take 10 mg by mouth Daily As Needed.          Hospital Medications (active)       Dose Frequency Start End    acetaminophen (TYLENOL) tablet 1,000 mg 1,000 mg 4 Times Daily 11/19/2018     Sig - Route: Take 2 tablets by mouth 4 (Four) Times a Day. - Oral    bacitracin injection  As Needed 11/19/2018     Sig: As Needed.    bisacodyl (DULCOLAX) EC tablet 10 mg 10 mg Daily PRN 11/20/2018     Sig - Route: Take 2 tablets by mouth Daily As Needed for Constipation. - Oral    ceFAZolin in 0.9% normal saline (ANCEF) IVPB solution 2 g 2 g Every 8 Hours 11/19/2018 11/20/2018    Sig - Route: Infuse 100 mL into a venous catheter Every 8 (Eight) Hours. - Intravenous    docusate sodium (COLACE) capsule 100 mg 100 mg 2 Times Daily PRN 11/19/2018     Sig - Route: Take 1 capsule by mouth 2 (Two) Times a Day As Needed for Constipation. - Oral    electrolyte-148 (PLASMALYTE) solution 1,000 mL 1,000 mL Continuous 11/19/2018 11/21/2018    Sig - Route: Infuse 1,000 mL into a venous catheter Continuous. - Intravenous    famotidine (PEPCID) tablet 40 mg 40 mg Daily 11/19/2018     Sig - Route: Take 1 tablet by mouth Daily. - Oral    ferrous sulfate EC tablet 324 mg 324 mg Daily With Breakfast 11/20/2018     Sig -  "Route: Take 1 tablet by mouth Daily With Breakfast. - Oral    fluticasone (FLONASE) 50 MCG/ACT nasal spray 1 spray 1 spray Daily 11/19/2018     Sig - Route: 1 spray into the nostril(s) as directed by provider Daily. - Nasal    HYDROmorphone (DILAUDID) injection 0.5 mg 0.5 mg Every 2 Hours PRN 11/19/2018 11/29/2018    Sig - Route: Infuse 0.25 mL into a venous catheter Every 2 (Two) Hours As Needed for Severe Pain . - Intravenous    Linked Group 1:  \"And\" Linked Group Details        Influenza Vac Subunit Quad (FLUCELVAX) injection 0.5 mL 0.5 mL During Hospitalization 11/19/2018     Sig - Route: Inject 0.5 mL into the appropriate muscle as directed by prescriber During Hospitalization for Immunization. - Intramuscular    naloxone (NARCAN) injection 0.1 mg 0.1 mg Every 5 Minutes PRN 11/19/2018     Sig - Route: Infuse 0.25 mL into a venous catheter Every 5 (Five) Minutes As Needed for Respiratory Depression. - Intravenous    Linked Group 1:  \"And\" Linked Group Details        ondansetron (ZOFRAN) injection 4 mg 4 mg Once As Needed 11/19/2018 11/19/2018    Sig - Route: Infuse 2 mL into a venous catheter 1 (One) Time As Needed for Nausea or Vomiting. - Intravenous    ondansetron (ZOFRAN) injection 4 mg 4 mg Every 6 Hours PRN 11/19/2018     Sig - Route: Infuse 2 mL into a venous catheter Every 6 (Six) Hours As Needed for Nausea or Vomiting. - Intravenous    Linked Group 2:  \"Or\" Linked Group Details        ondansetron (ZOFRAN) tablet 4 mg 4 mg Every 6 Hours PRN 11/19/2018     Sig - Route: Take 1 tablet by mouth Every 6 (Six) Hours As Needed for Nausea or Vomiting. - Oral    Linked Group 2:  \"Or\" Linked Group Details        ondansetron ODT (ZOFRAN-ODT) disintegrating tablet 4 mg 4 mg Every 6 Hours PRN 11/19/2018     Sig - Route: Take 1 tablet by mouth Every 6 (Six) Hours As Needed for Nausea or Vomiting. - Oral    Linked Group 2:  \"Or\" Linked Group Details        oxyCODONE (oxyCONTIN) 12 hr tablet 10 mg 10 mg Every 12 Hours " "Scheduled 11/19/2018 11/22/2018    Sig - Route: Take 1 tablet by mouth Every 12 (Twelve) Hours. - Oral    oxyCODONE (ROXICODONE) immediate release tablet 5 mg 5 mg Every 4 Hours PRN 11/19/2018 11/29/2018    Sig - Route: Take 1 tablet by mouth Every 4 (Four) Hours As Needed for Moderate Pain . - Oral    Pharmacy to dose warfarin  Continuous PRN 11/19/2018     Sig - Route: Continuous As Needed for Consult. - Does not apply    promethazine (PHENERGAN) injection 25 mg 25 mg Every 6 Hours PRN 11/19/2018     Sig - Route: Infuse 1 mL into a venous catheter Every 6 (Six) Hours As Needed for Nausea or Vomiting. - Intravenous    sodium chloride 0.9 % flush 3 mL 3 mL Every 12 Hours Scheduled 11/19/2018     Sig - Route: Infuse 3 mL into a venous catheter Every 12 (Twelve) Hours. - Intravenous    sodium chloride 0.9 % flush 3-10 mL 3-10 mL As Needed 11/19/2018     Sig - Route: Infuse 3-10 mL into a venous catheter As Needed for Line Care. - Intravenous    sodium chloride 0.9 % flush  As Needed 11/19/2018     Sig: As Needed.    sodium chloride 0.9 % infusion 100 mL/hr Continuous 11/19/2018     Sig - Route: Infuse 100 mL/hr into a venous catheter Continuous. - Intravenous    warfarin (COUMADIN) tablet 5 mg 5 mg Daily Warfarin 11/19/2018     Sig - Route: Take 1 tablet by mouth Daily. - Oral    acetaminophen (TYLENOL) suppository 650 mg (Discontinued) 650 mg Once As Needed 11/19/2018 11/19/2018    Sig - Route: Insert 1 suppository into the rectum 1 (One) Time As Needed for Mild Pain . - Rectal    Reason for Discontinue: Patient Transfer    Linked Group 3:  \"Or\" Linked Group Details        acetaminophen (TYLENOL) tablet 650 mg (Discontinued) 650 mg Once As Needed 11/19/2018 11/19/2018    Sig - Route: Take 2 tablets by mouth 1 (One) Time As Needed for Mild Pain . - Oral    Reason for Discontinue: Patient Transfer    Linked Group 3:  \"Or\" Linked Group Details        diphenhydrAMINE (BENADRYL) injection 12.5 mg (Discontinued) 12.5 mg " Every 15 Minutes PRN 11/19/2018 11/19/2018    Sig - Route: Infuse 0.25 mL into a venous catheter Every 15 (Fifteen) Minutes As Needed for Itching (May repeat x 1). - Intravenous    Reason for Discontinue: Patient Transfer    ePHEDrine injection 5 mg (Discontinued) 5 mg Once As Needed 11/19/2018 11/19/2018    Sig - Route: Infuse 0.1 mL into a venous catheter 1 (One) Time As Needed (symptomatic hypotension - Notify attending anesthesiologist). - Intravenous    Reason for Discontinue: Patient Transfer    flumazenil (ROMAZICON) injection 0.2 mg (Discontinued) 0.2 mg As Needed 11/19/2018 11/19/2018    Sig - Route: Infuse 2 mL into a venous catheter As Needed (unresponsiveness). - Intravenous    Reason for Discontinue: Patient Transfer    HYDROmorphone (DILAUDID) injection 0.5 mg (Discontinued) 0.5 mg Every 5 Minutes PRN 11/19/2018 11/19/2018    Sig - Route: Infuse 0.5 mL into a venous catheter Every 5 (Five) Minutes As Needed for Moderate Pain  or Severe Pain . - Intravenous    Reason for Discontinue: Patient Transfer    labetalol (NORMODYNE,TRANDATE) injection 5 mg (Discontinued) 5 mg Every 5 Minutes PRN 11/19/2018 11/19/2018    Sig - Route: Infuse 1 mL into a venous catheter Every 5 (Five) Minutes As Needed for High Blood Pressure (for systolic blood pressure greater than 180 mmHg or diastolic blood pressure greater than 105 mmHg). - Intravenous    Reason for Discontinue: Patient Transfer    meperidine (DEMEROL) injection 12.5 mg (Discontinued) 12.5 mg Every 5 Minutes PRN 11/19/2018 11/19/2018    Sig - Route: Infuse 0.25 mL into a venous catheter Every 5 (Five) Minutes As Needed for Shivering (May repeat x 8). - Intravenous    Reason for Discontinue: Patient Transfer    naloxone (NARCAN) injection 0.2 mg (Discontinued) 0.2 mg As Needed 11/19/2018 11/19/2018    Sig - Route: Infuse 0.5 mL into a venous catheter As Needed for Opioid Reversal or Respiratory Depression (unresponsiveness, decrease oxygen saturation). -  Intravenous    Reason for Discontinue: Patient Transfer    Scopolamine (TRANSDERM-SCOP) 1.5 MG/3DAYS patch 1 patch (Discontinued) 1 patch Once 11/19/2018 11/20/2018    Sig - Route: Place 1 patch on the skin as directed by provider 1 (One) Time. - Transdermal             Operative/Procedure Notes (all)      Brooks Junior MD at 11/19/2018  9:54 AM  Version 1 of 1         TOTAL KNEE ARTHROPLASTY ATTUNE  Procedure Note    Name:    Priti Orr  YOB: 1965  Date of surgery:   11/19/2018    Pre-op Diagnosis:   NICK (obstructive sleep apnea) Seizure-like activity (CMS/HCC) History of brain surgery ] Presence of left artificial shoulder joint  Primary osteoarthritis of left knee  Morbid obesity with BMI of 40.0-44.9, adult (CMS/HCC)    Post-op Diagnosis:    Post-Op Diagnosis Codes:     * NICK (obstructive sleep apnea) [G47.33]     * Seizure-like activity (CMS/HCC) [R56.9]     * History of brain surgery [Z98.890]     * Presence of left artificial shoulder joint [Z96.612]     * Primary osteoarthritis of left knee [M17.12]     * Morbid obesity with BMI of 40.0-44.9, adult (CMS/HCC) [E66.01, Z68.41]    Procedure:  Procedure(s):  TOTAL KNEE ARTHROPLASTY ATTUNE with adductor canal block - left    Surgeon(s):  Brooks Junior MD    SURGEON: Brooks Junior MD    ASSISTANT:  DYLON Tamez    Anesthesia: Spinal    Staff:   Circulator: Madeline Miller RN  Scrub Person: Koby Ma Crystal C  Assistant: Laureen Marina CSA    Estimated Blood Loss: 150 mL    Specimens:                ID Type Source Tests Collected by Time   A :  Bone Knee, Left TISSUE PATHOLOGY EXAM Brooks Junior MD 11/19/2018 1210         Drains:   Closed/Suction Drain 1 Left Knee Accordion 10 Fr. (Active)   Dressing Status Clean;Dry;Intact 11/19/2018 12:06 PM       Findings:  End-stage osteoarthritis Left knee    Complications: None    IMPLANTS:   Implant Name Type Inv. Item Serial No.   Lot No. LRB No. Used   CMT BONE SMARTSET HV 40GM - [id number] Implant CMT BONE SMARTSET HV 40GM  DEPUY [id number] Left 1   COMP PAT ATTUNE CMT MEDL/DOME 35MM - [id number] Implant COMP PAT ATTUNE CMT MEDL/DOME 35MM  DEPUY [id number] Left 1   COMP FEM ATTUNE CMT PS NRW SZ5 LT - [id number] Implant COMP FEM ATTUNE CMT PS NRW SZ5 LT  DEPUY [id number] Left 1   BASE TIB ATTUNE CMT RP S PLS SZ4 - [id number] Implant BASE TIB ATTUNE CMT RP S PLS SZ4  DEPUY [id number] Left 1   INSRT TIB ATTUNE PS RP SZ5 12MM - [id number] Implant INSRT TIB ATTUNE PS RP SZ5 12MM  DEPUY [id number] Left 1         PROCEDURE:    The patient was taken to the operating room and placed in the supine position. Preoperative antibiotics were administered. Surgical time out was performed. After adequate induction of anesthesia, the leg was prepped and draped in the usual sterile fashion, exsanguinated with an Ace bandage and the tourniquet inflated to 300 mmHg. A midline incision was performed followed by a medial parapatellar arthrotomy.    Attention was then placed to the patella. The patella was noted to track centrally through range of motion. The patella was then sized with the trials. The thickness of the patella was then measured and the cut was made in a free-hand technique. The patella protector was then placed and the surrounding osteophytes were removed.   The patella was subluxed laterally in a non-everted position.  A portion of the fat pad, ACL, and anterior horns of the meniscus were excised.     The drill hole was placed in the distal femur and the canal was the irrigated and suctioned. The IM shima was placed and a 5 degree distal valgus cut was performed on the femur. The femur was then sized with a sizing guide. The femoral cutting block was placed and all femoral cuts were performed. The proximal tibia was exposed. We used the extramedullary tibial cutting guide set for removal of an appropriate amount of proximal tibia. The tibial cut was  performed. The posterior horns of the menisci were excised. The posterior osteophytes were removed. Flexion extension blocks were then used to balance the knee. The tibial cut surface was then sized with the sizing templates and the tibial and femoral trial were then placed. The knee was placed in full extension and then the patella was re-addressed. The patella was resurfaced and the preoperative thickness was reproduced. The patella tracked centrally through range of motion.     At this point all trial components were removed, the knee was copiously irrigated with pulsed lavage.. The cut surfaces were then dried with clean lap sponges, and the components were cemented tibia, followed by femur, then patella. The knee was held in full extension and all excess cement was removed. The knee was held still until the cement had completely hardened. We then placed the trial polyethylene spacer which resulted in full extension and excellent flexion-extension balance. We placed the final polyethylene spacer.    The knee was then copiously irrigated. The tourniquet was then released. There was excellent hemostasis. We placed a 10 Czech Hemovac drain. We closed the knee in multiple layers in standard fashion. Sterile dressing were applied. At the end of the case, the sponge and needle counts were reported as being correct. There were no known complications. The patient was then transported to the recovery room in satisfactory condition..      Brooks Junior MD     Date: 11/19/2018  Time: 12:51 PM    Electronically signed by Brooks Junior MD at 11/19/2018 12:51 PM

## 2018-11-20 NOTE — PLAN OF CARE
Problem: Patient Care Overview  Goal: Plan of Care Review  Outcome: Ongoing (interventions implemented as appropriate)   11/20/18 1015   Coping/Psychosocial   Plan of Care Reviewed With patient   Plan of Care Review   Progress improving   OTHER   Outcome Summary Pt. able to transfer with CGA this a.m. Pt. able to amb. 130ft. with RW, but c/o's discomfort at L UE & fatigue at L UE during performance. Pt. with no increase in pain after treatment. Pt. ROM 0-79degrees AAROM. Gt with platform at L and shorter RW in p.m. if able to provide this p.m.      Goal: Discharge Needs Assessment  Outcome: Ongoing (interventions implemented as appropriate)   11/19/18 0849 11/19/18 1414   Disability   Equipment Currently Used at Home cane, quad;grab bar --    Discharge Needs Assessment   Patient/Family Anticipates Transition to --  home   Patient/Family Anticipated Services at Transition --  none   Transportation Concerns --  car, none   Transportation Anticipated --  car, drives self

## 2018-11-20 NOTE — PLAN OF CARE
Problem: Patient Care Overview  Goal: Plan of Care Review  Outcome: Ongoing (interventions implemented as appropriate)  Making Lifestyle Choices for a Healthier Weight used to provide ed regarding Wt Loss Diet and diet copy given.   11/20/18 7949   Coping/Psychosocial   Plan of Care Reviewed With patient   Plan of Care Review   Progress no change   OTHER   Outcome Summary initial visit

## 2018-11-20 NOTE — CONSULTS
Adult Nutrition  Assessment    Patient Name:  Priti Orr  YOB: 1965  MRN: [id number]  Admit Date:  11/19/2018    Assessment Date:  11/20/2018    Comments:  BMI 43 with pt at 223% IBW.  Making Lifestyle Choices for a Healthier Weight used to provide ed regarding Wt Loss Diet and diet copy given.    Reason for Assessment     Row Name 11/20/18 1505          Reason for Assessment    Reason For Assessment  per organizational policy WT Loss Diet ed     Diagnosis  other (see comments) dx Morbid Obesity     Identified At Risk by Screening Criteria  BMI;need for education                 Estimated/Assessed Needs     Row Name 11/20/18 1506          Calculation Measurements    Weight Used For Calculations  57.7 kg (127 lb 3.3 oz)        Estimated/Assessed Needs    Additional Documentation  Fluid Requirements (Group);[place]-St. Jeor Equation (Group);Protein Requirements (Group);Calorie Requirements (Group)        Calorie Requirements    Estimated Calorie Requirement (kcal/day)  1426     Estimated Calorie Need Method  [place]-St Jeor        KCAL/KG    14 Kcal/Kg (kcal)  807.8     15 Kcal/Kg (kcal)  865.5     18 Kcal/Kg (kcal)  1038.6     20 Kcal/Kg (kcal)  1154     25 Kcal/Kg (kcal)  1442.5     30 Kcal/Kg (kcal)  1731     35 Kcal/Kg (kcal)  2019.5     40 Kcal/Kg (kcal)  2308     45 Kcal/Kg (kcal)  2596.5     50 Kcal/Kg (kcal)  2885        [place]-St. Jeor Equation    RMR ([place]-St. Jeor Equation)  1097.63        Protein Requirements    Est Protein Requirement Amount (gms/kg)  1.2 gm protein     Estimated Protein Requirements (gms/day)  69.24        Fluid Requirements    Estimated Fluid Requirements (mL/day)  1731     RDA Method (mL)  1731     [place]-Segar Method (over 20 kg)  2654         Nutrition Prescription Ordered     Row Name 11/20/18 1508          Nutrition Prescription PO    Current PO Diet  Regular         Evaluation of Received Nutrient/Fluid Intake     Row Name 11/20/18 1508 11/20/18  1506       Calculation Measurements    Weight Used For Calculations  --  57.7 kg (127 lb 3.3 oz)       PO Evaluation    Number of Meals  2  --    % PO Intake  100% - 1x, 75% - 1x  --        Evaluation of Prescribed Nutrient/Fluid Intake     Row Name 11/20/18 1506          Calculation Measurements    Weight Used For Calculations  57.7 kg (127 lb 3.3 oz)             Electronically signed by:  Shelby Ellis RD  11/20/18 3:10 PM

## 2018-11-20 NOTE — PLAN OF CARE
Problem: Patient Care Overview  Goal: Plan of Care Review  Outcome: Ongoing (interventions implemented as appropriate)   11/20/18 1410   Coping/Psychosocial   Plan of Care Reviewed With patient   Plan of Care Review   Progress improving   OTHER   Outcome Summary Pt. able to amb. 250ft with PRW CGA with decrease pain at L shoulder. Pt. ROM 0-81degrees. Cont. gt, transfers, ROM     Goal: Discharge Needs Assessment  Outcome: Ongoing (interventions implemented as appropriate)   11/19/18 0849 11/19/18 1414   Disability   Equipment Currently Used at Home cane, quad;grab bar --    Discharge Needs Assessment   Patient/Family Anticipates Transition to --  home   Patient/Family Anticipated Services at Transition --  none   Transportation Concerns --  car, none   Transportation Anticipated --  car, drives self

## 2018-11-20 NOTE — THERAPY TREATMENT NOTE
Acute Care - Physical Therapy Treatment Note  [facility]     Patient Name: Priti Orr  : 1965  MRN: [id number]  Today's Date: 2018  Onset of Illness/Injury or Date of Surgery: 18  Date of Referral to PT: 18  Referring Physician: Dr. Junior    Admit Date: 2018    Visit Dx:    ICD-10-CM ICD-9-CM   1. Impaired functional mobility, balance, gait, and endurance Z74.09 V49.89   2. Primary osteoarthritis of left knee M17.12 715.16   3. NICK (obstructive sleep apnea) G47.33 327.23   4. Seizure-like activity (CMS/HCC) R56.9 780.39   5. Presence of left artificial shoulder joint Z96.612 V43.61   6. History of brain surgery Z98.890 V15.29   7. Morbid obesity with BMI of 40.0-44.9, adult (CMS/[facility]) E66.01 278.01    Z68.41 V85.41   8. Impaired mobility and ADLs Z74.09 799.89     Patient Active Problem List   Diagnosis   • Osteoporosis   • Chronic left shoulder pain   • Presence of left artificial shoulder joint   • Left knee pain   • Anxiety   • Arthritis   • Asthma   • Depression   • Enchondroma of humerus   • Headache, chronic migraine without aura, intractable   • History of brain surgery   • Humeral fracture   • Hypercholesteremia   • Hyperlipidemia   • Essential hypertension   • Meningioma (CMS/[facility])   • Morbid obesity (CMS/[facility])   • NICK (obstructive sleep apnea)   • Vitamin D deficiency   • Seizure-like activity (CMS/[facility])   • S/p reverse total shoulder arthroplasty   • Primary osteoarthritis of left knee   • Morbid obesity with BMI of 40.0-44.9, adult (CMS/[facility])       Therapy Treatment    Rehabilitation Treatment Summary     Row Name 18 1015 18 0858          Treatment Time/Intention    Discipline  physical therapy assistant  -JA  occupational therapy assistant  -CS     Document Type  therapy note (daily note)  -EMERY  therapy note (daily note)  -CS     Subjective Information  complains of;pain  -EMERY  complains of;pain  -CS     Mode of Treatment  physical therapy  -EMERY   occupational therapy  -CS     Therapy Frequency (PT Clinical Impression)  2 times/day  -JA  --     Therapy Frequency (OT Eval)  --  daily  -CS     Patient Effort  good  -JA  good  -CS2     Existing Precautions/Restrictions  fall  -JA  fall  -CS2     Recorded by [JA] Al Hobson, PTA 11/20/18 1125 [CS] Sera Eddy WANG/L 11/20/18 0912  [CS2] Sera Eddy WANG/L 11/20/18 0942     Row Name 11/20/18 1015 11/20/18 0858          Vital Signs    Pre Systolic BP Rehab  --  104  -CS     Pre Treatment Diastolic BP  --  62  -CS     Post Systolic BP Rehab  106  -JA  108  -CS2     Post Treatment Diastolic BP  63  -JA  58  -CS2     Pretreatment Heart Rate (beats/min)  --  76  -CS     Posttreatment Heart Rate (beats/min)  54  -JA  73  -CS2     Pre SpO2 (%)  --  95  -CS     O2 Delivery Pre Treatment  --  room air  -CS     Post SpO2 (%)  97  -JA  94  -CS2     O2 Delivery Post Treatment  room air  -JA  room air  -CS2     Pre Patient Position  --  Supine  -CS     Intra Patient Position  --  Sitting  -CS2     Post Patient Position  --  Supine  -CS2     Recorded by [JA] Al Hobson, PTA 11/20/18 1125 [CS] Sera Eddy WANG/L 11/20/18 0912  [CS2] Sera Eddy AWNG/L 11/20/18 0942     Row Name 11/20/18 1015 11/20/18 0858          Cognitive Assessment/Intervention- PT/OT    Affect/Mental Status (Cognitive)  WFL  -JA  WFL  -CS     Orientation Status (Cognition)  oriented x 4  -JA  oriented x 4  -CS     Follows Commands (Cognition)  over 90% accuracy;verbal cues/prompting required  -JA  WFL  -CS     Recorded by [JA] Al Hobson, PTA 11/20/18 1125 [CS] Sera Eddy WANG/L 11/20/18 0912     Row Name 11/20/18 1015             Safety Issues, Functional Mobility    Impairments Affecting Function (Mobility)  endurance/activity tolerance;strength;range of motion (ROM);pain  -JA      Recorded by [JA] Al Hobson, PTA 11/20/18 1125      Row Name 11/20/18 1015             Mobility  Assessment/Intervention    Extremity Weight-bearing Status  left upper extremity  -JA      Left Upper Extremity (Weight-bearing Status)  weight-bearing as tolerated (WBAT)  -JA      Left Lower Extremity (Weight-bearing Status)  weight-bearing as tolerated (WBAT)  -JA      Recorded by [JA] Al Hobson, PTA 11/20/18 1125      Row Name 11/20/18 1015             Bed Mobility Assessment/Treatment    Bed Mobility Assessment/Treatment  supine-sit  -JA      Supine-Sit [place] (Bed Mobility)  supervision  -JA      Sit-Supine [place] (Bed Mobility)  --  -JA      Bed Mobility, Safety Issues  decreased use of arms for pushing/pulling;decreased use of legs for bridging/pushing  -JA      Assistive Device (Bed Mobility)  bed rails;head of bed elevated  -JA      Recorded by [JA] Al Hobson, PTA 11/20/18 1125      Row Name 11/20/18 1015 11/20/18 0858          Transfer Assessment/Treatment    Transfer Assessment/Treatment  sit-stand transfer;stand-sit transfer  -JA  sit-stand transfer;stand-sit transfer  -CS     Recorded by [JA] Al Hobson, PTA 11/20/18 1125 [CS] Sera Eddy WANG/L 11/20/18 0920     Row Name 11/20/18 1015 11/20/18 0858          Sit-Stand Transfer    Sit-Stand [place] (Transfers)  contact guard  -JA  contact guard  -CS     Assistive Device (Sit-Stand Transfers)  walker, front-wheeled  -EMERY  walker, front-wheeled  -CS     Recorded by [JA] Al Hobson, PTA 11/20/18 1125 [CS] Sera Eddy WANG/L 11/20/18 0920     Row Name 11/20/18 1015 11/20/18 0858          Stand-Sit Transfer    Stand-Sit [place] (Transfers)  contact guard  -JA  contact guard  -CS     Assistive Device (Stand-Sit Transfers)  walker, front-wheeled  -JA  walker, front-wheeled  -CS     Recorded by [JA] Al Hobson, PTA 11/20/18 1125 [CS] Sera Eddy WANG/L 11/20/18 0920     Row Name 11/20/18 1015 11/20/18 0858          Toilet Transfer    Type (Toilet Transfer)  --  -EMERY   sit-stand;stand-sit  -CS     [place] Level (Toilet Transfer)  --  -JA  contact guard  -CS     Recorded by [JA] Al Hobson, PTA 11/20/18 1125 [CS] Sera Eddy COTA/L 11/20/18 0942     Row Name 11/20/18 1015             Gait/Stairs Assessment/Training    Gait/Stairs Assessment/Training  gait/ambulation assistive device;gait/ambulation independence;distance ambulated;gait pattern;gait deviations  -JA      [place] Level (Gait)  contact guard  -JA      Assistive Device (Gait)  walker, front-wheeled  -JA      Distance in Feet (Gait)  130  -JA      Pattern (Gait)  step-to;step-through  -JA      Deviations/Abnormal Patterns (Gait)  antalgic  -JA      Bilateral Gait Deviations  heel strike decreased;leans left leans to left due to L shoulder pain  -JA      Left Sided Gait Deviations  --  -JA      Comment (Gait/Stairs)  Pt. requesting possible use of platform to rest L UE on during gt. to decrease discomfort with gt. for p.m. treatment  -JA      Recorded by [JA] Al Hobson, PTA 11/20/18 1125      Row Name 11/20/18 0858             ADL Assessment/Intervention    BADL Assessment/Intervention  bathing;upper body dressing;lower body dressing;grooming;toileting  -CS      Recorded by [CS] Sera Eddy COTA/L 11/20/18 0942      Row Name 11/20/18 0858             Bathing Assessment/Intervention    Bathing [place] Level  bathing skills;upper body;set up;lower body;supervision  -CS      Assistive Devices (Bathing)  bath mitt;long-handled sponge  -CS      Recorded by [CS] Sera Eddy COTA/L 11/20/18 0929      Row Name 11/20/18 0858             Upper Body Dressing Assessment/Training    Upper Body Dressing [place] Level  upper body dressing skills;doff;don;pull-over garment;independent  -CS      Upper Body Dressing Position  edge of bed sitting  -CS      Recorded by [CS] Sera Eddy COTA/L 11/20/18 0929      Row Name 11/20/18 0858             Lower Body Dressing  Assessment/Training    Lower Body Dressing [place] Level  doff;don;pants/bottoms;socks;undergarment;supervision  -CS      Assistive Devices (Lower Body Dressing)  sock-aid  -CS      Lower Body Dressing Position  edge of bed sitting;supported standing  -CS      Recorded by [CS] SurjitSera WANG/L 11/20/18 0929      Row Name 11/20/18 0858             Grooming Assessment/Training    [place] Level (Grooming)  grooming skills;wash face, hands apply deodorant/lotion  -CS      Grooming Position  edge of bed sitting  -CS2      Recorded by [CS] Sera Eddy WANG/L 11/20/18 0920  [CS2] Sera Eddy WANG/L 11/20/18 0942      Row Name 11/20/18 0858             Toileting Assessment/Training    [place] Level (Toileting)  toileting skills;adjust/manage clothing;perform perineal hygiene;supervision  -CS      Assistive Devices (Toileting)  raised toilet seat;grab bar/safety frame  -CS      Toileting Position  unsupported sitting;supported standing  -CS      Recorded by [CS] Sera Eddy WANG/L 11/20/18 0942      Row Name 11/20/18 1015             Left Lower Ext    Lt Knee Extension/Flexion AROM  0-79AAROM  -JA      Recorded by [JA] Al Hobson, PTA 11/20/18 1125      Row Name 11/20/18 1015             Lower Extremity Seated Therapeutic Exercise    Performed, Seated Lower Extremity (Therapeutic Exercise)  -- seated [place]   -EMERY      Exercise Type, Seated Lower Extremity (Therapeutic Exercise)  AROM (active range of motion);AAROM (active assistive range of motion)  -JA      Sets/Reps Detail, Seated Lower Extremity (Therapeutic Exercise)  ~5mins total A & AAROM  -JA      Recorded by [JA] Al Hobson, PTA 11/20/18 1125      Row Name 11/20/18 1015             Lower Extremity Supine Therapeutic Exercise    Performed, Supine Lower Extremity (Therapeutic Exercise)  ankle dorsiflexion/plantarflexion;SLR (straight leg raise);quadriceps sets;gluteal sets  -EMERY      Exercise Type,  Supine Lower Extremity (Therapeutic Exercise)  AAROM (active assistive range of motion);AROM (active range of motion);isometric contraction, static  -JA      Sets/Reps Detail, Supine Lower Extremity (Therapeutic Exercise)  x20 AP, x20 Qsets, x20 Gsets, AA SLR L x10  -JA      Recorded by [JA] Al Hobson, PTA 11/20/18 1125      Row Name 11/20/18 1015 11/20/18 0858          Positioning and Restraints    Pre-Treatment Position  in bed  -JA  in bed  -CS     Post Treatment Position  chair  -JA  bed  -CS2     In Bed  --  supine;call light within reach;encouraged to call for assist;exit alarm on  -CS2     Recorded by [JA] Al Hobson, PTA 11/20/18 1125 [CS] Sera Eddy WANG/L 11/20/18 0912  [CS2] Sera Eddy, WANG/L 11/20/18 0942     Row Name 11/20/18 1015 11/20/18 0858          Pain Scale: Numbers Pre/Post-Treatment    Pain Scale: Numbers, Pretreatment  6/10  -JA  6/10  -CS     Pain Scale: Numbers, Post-Treatment  6/10  -JA  6/10  -CS2     Pain Location  -- left knee and shoulder  -JA  knee L  -CS2     Pain Intervention(s)  Medication (See MAR);Repositioned;Ambulation/increased activity;Cold applied  -JA  Medication (See MAR)  -CS2     Recorded by [JA] Al Hobson, PTA 11/20/18 1125 [CS] Sera Eddy WANG/L 11/20/18 0912  [CS2] Sera Eddy, WANG/L 11/20/18 0942     Row Name 11/20/18 1015             Sensory Assessment/Intervention    Sensory General Assessment  --  -JA      Recorded by [JA] Al Hobson, PTA 11/20/18 1125      Row Name 11/20/18 1015             Light Touch Sensation Assessment    Left Lower Extremity: Light Touch Sensation Assessment  --  -JA      Recorded by [JA] Al Hobson, PTA 11/20/18 1125      Row Name                Wound 11/19/18 1106 Left midline knee incision;surgical    Wound - Properties Group Date first assessed: 11/19/18 [KM] Time first assessed: 1106 [KM] Present On Admission : no [KM] Side: Left [KM] Orientation: midline [KM]  Location: knee [KM] Type: incision;surgical [KM] Recorded by:  [KM] Madeline Miller, RN 11/19/18 1107    Row Name 11/20/18 0858             Outcome Summary/Treatment Plan (OT)    Daily Summary of Progress (OT)  progress toward functional goals as expected  -CS      Plan for Continued Treatment (OT)  cont OT POC  -CS      Anticipated Discharge Disposition (OT)  home with 24/7 care;home with OP services;home with home health  -CS      Recorded by [CS] Sera Eddy COTA/L 11/20/18 0942      Row Name 11/20/18 1015             Outcome Summary/Treatment Plan (PT)    Daily Summary of Progress (PT)  progress toward functional goals as expected  -JA      Anticipated Equipment Needs at Discharge (PT)  front wheeled walker possible platform at L UE  -JA      Anticipated Discharge Disposition (PT)  home with assist;home with OP services  -JA      Recorded by [JA] Al Hobson, PTA 11/20/18 1125        User Key  (r) = Recorded By, (t) = Taken By, (c) = Cosigned By    Initials Name Effective Dates Discipline    JA Al Hobson, PTA 03/07/18 -  PT    CS Sera Eddy WANG/L 03/07/18 -  OT    KM Madeline Miller, RN 05/21/18 -  Nurse          Wound 11/19/18 1106 Left midline knee incision;surgical (Active)   Dressing Appearance dry;intact 11/20/2018  8:53 AM   Closure MALLY 11/20/2018  8:53 AM   Base dressing in place, unable to visualize 11/20/2018  8:53 AM   Care, Wound cleansed with;sterile normal saline 11/19/2018 12:00 PM   Dressing Care, Wound dressing applied;other (see comments) 11/19/2018 12:00 PM       PT Rehab Goals     Row Name 11/20/18 1015             Bed Mobility Goal 1 (PT)    Activity/Assistive Device (Bed Mobility Goal 1, PT)  sit to supine;supine to sit;scooting;rolling to left;rolling to right;bridging  -JA      [place] Level/Cues Needed (Bed Mobility Goal 1, PT)  conditional independence  -JA      Time Frame (Bed Mobility Goal 1, PT)  2 days  -JA      Progress/Outcomes (Bed Mobility  Goal 1, PT)  goal not met  -JA         Transfer Goal 1 (PT)    Activity/Assistive Device (Transfer Goal 1, PT)  transfers, all  -JA      [place] Level/Cues Needed (Transfer Goal 1, PT)  conditional independence  -JA      Time Frame (Transfer Goal 1, PT)  2 - 3 days  -JA      Progress/Outcome (Transfer Goal 1, PT)  goal not met  -JA         Gait Training Goal 1 (PT)    Activity/Assistive Device (Gait Training Goal 1, PT)  gait (walking locomotion);assistive device use;backward stepping;decrease asymmetrical patterns;decrease fall risk;diminish gait deviation;forward stepping;improve balance and speed;increase endurance/gait distance;increase energy conservation;normalize weight shifts  -JA      [place] Level (Gait Training Goal 1, PT)  conditional independence  -JA      Distance (Gait Goal 1, PT)  1x500 ft or more  -JA      Time Frame (Gait Training Goal 1, PT)  by discharge  -JA      Progress/Outcome (Gait Training Goal 1, PT)  goal not met  -JA         ROM Goal 1 (PT)    ROM Goal 1 (PT)  0-100' knee flexion  -JA      Time Frame (ROM Goal 1, PT)  1 week  -JA      Progress/Outcome (ROM Goal 1, PT)  goal not met  -JA         Stairs Goal 1 (PT)    Activity/Assistive Device (Stairs Goal 1, PT)  using handrail, left;stairs, all skills  -JA      [place] Level/Cues Needed (Stairs Goal 1, PT)  supervision required  -JA      Number of Stairs (Stairs Goal 1, PT)  2  -JA      Time Frame (Stairs Goal 1, PT)  by discharge  -JA      Progress/Outcome (Stairs Goal 1, PT)  goal not met  -JA        User Key  (r) = Recorded By, (t) = Taken By, (c) = Cosigned By    Initials Name Provider Type Discipline    Al Ye PTA Physical Therapy Assistant PT          Physical Therapy Education     Title: PT OT SLP Therapies (Active)     Topic: Physical Therapy (Done)     Point: Mobility training (Done)     Learning Progress Summary           Patient Acceptance, RAHAT [place],NR by LEONA at 11/19/2018  4:54 PM    Comment:   Patient education on role of PT in acute care, use of gait belt, AD, hand placement during transfers, plan of care, and encouraged to call for assist to reduce risk of falls.   Family Acceptance, E, RAHAT,NR by CW at 11/19/2018  4:54 PM    Comment:  Patient education on role of PT in acute care, use of gait belt, AD, hand placement during transfers, plan of care, and encouraged to call for assist to reduce risk of falls.                   Point: Home exercise program (Done)     Learning Progress Summary           Patient Acceptance, E, RAHAT,NR by CW at 11/19/2018  4:55 PM    Comment:  Patient education on importance of not keeping pillow under knee, moving knee often, practicing quadriceps sets 10x every hour for neuromuscular control and holding each for 5 seconds.   Family Acceptance, E, RAHAT,NR by CW at 11/19/2018  4:55 PM    Comment:  Patient education on importance of not keeping pillow under knee, moving knee often, practicing quadriceps sets 10x every hour for neuromuscular control and holding each for 5 seconds.                   Point: Body mechanics (Done)     Learning Progress Summary           Patient Acceptance, E, RAHAT,NR by CW at 11/19/2018  4:54 PM    Comment:  Patient education on role of PT in acute care, use of gait belt, AD, hand placement during transfers, plan of care, and encouraged to call for assist to reduce risk of falls.   Family Acceptance, E, RAHAT,NR by CW at 11/19/2018  4:54 PM    Comment:  Patient education on role of PT in acute care, use of gait belt, AD, hand placement during transfers, plan of care, and encouraged to call for assist to reduce risk of falls.                   Point: Precautions (Done)     Learning Progress Summary           Patient Acceptance, E, RAHAT,NR by CW at 11/19/2018  4:54 PM    Comment:  Patient education on role of PT in acute care, use of gait belt, AD, hand placement during transfers, plan of care, and encouraged to call for assist to reduce risk of falls.   Family  Ayden, RAHAT MONTAGUE,NR by  at 11/19/2018  4:54 PM    Comment:  Patient education on role of PT in acute care, use of gait belt, AD, hand placement during transfers, plan of care, and encouraged to call for assist to reduce risk of falls.                               User Key     Initials Effective Dates Name Provider Type Discipline     10/04/18 -  Chey Lam, PT Physical Therapist PT                PT Recommendation and Plan  Anticipated Discharge Disposition (PT): home with assist, home with OP services  Therapy Frequency (PT Clinical Impression): 2 times/day  Outcome Summary/Treatment Plan (PT)  Daily Summary of Progress (PT): progress toward functional goals as expected  Anticipated Equipment Needs at Discharge (PT): front wheeled walker(possible platform at L UE)  Anticipated Discharge Disposition (PT): home with assist, home with OP services  Plan of Care Reviewed With: patient  Progress: improving  Outcome Summary: Pt. able to transfer with CGA this a.m. Pt. able to amb. 130ft. with RW, but c/o's discomfort at L UE & fatigue at L UE during performance. Pt. with no increase in pain after treatment. Pt. ROM 0-79degrees AAROM. Gt with platform at L and shorter RW in p.m. if able to provide this p.m.   Outcome Measures     Row Name 11/20/18 1015 11/20/18 0900 11/19/18 1431       How much help from another person do you currently need...    Turning from your back to your side while in flat bed without using bedrails?  3  -JA  --  3  -CW    Moving from lying on back to sitting on the side of a flat bed without bedrails?  3  -JA  --  3  -CW    Moving to and from a bed to a chair (including a wheelchair)?  3  -JA  --  2  -CW    Standing up from a chair using your arms (e.g., wheelchair, bedside chair)?  3  -JA  --  3  -CW    Climbing 3-5 steps with a railing?  2  -JA  --  1  -CW    To walk in hospital room?  3  -JA  --  2  -CW    AM-PAC 6 Clicks Score  17  -JA  --  14  -CW       How much help from another  is currently needed...    Putting on and taking off regular lower body clothing?  --  2  -CS  --    Bathing (including washing, rinsing, and drying)  --  3  -CS  --    Toileting (which includes using toilet bed pan or urinal)  --  3  -CS  --    Putting on and taking off regular upper body clothing  --  4  -CS  --    Taking care of personal grooming (such as brushing teeth)  --  4  -CS  --    Eating meals  --  4  -CS  --    Score  --  20  -CS  --       Functional Assessment    Outcome Measure Options  AM-PAC 6 Clicks Basic Mobility (PT)  -  AM-PAC 6 Clicks Daily Activity (OT)  -[facility] 6 Clicks Basic Mobility (PT)  -    Row Name 11/19/18 1430             How much help from another is currently needed...    Putting on and taking off regular lower body clothing?  2  -      Bathing (including washing, rinsing, and drying)  2  -      Toileting (which includes using toilet bed pan or urinal)  3  -      Putting on and taking off regular upper body clothing  3  -      Taking care of personal grooming (such as brushing teeth)  3  -      Eating meals  4  -      Score  17  -         Functional Assessment    Outcome Measure Options  [facility] 6 Clicks Daily Activity (OT)  -        User Key  (r) = Recorded By, (t) = Taken By, (c) = Cosigned By    Initials Name Provider Type     Ninfa Waddell, OTR/L Occupational Therapist    Al Ye, PTA Physical Therapy Assistant    CS Sera Eddy, WANG/L Occupational Therapy Assistant    Chey Rodriguez, PT Physical Therapist         Time Calculation:   PT Charges     Row Name 11/20/18 1128             Time Calculation    Start Time  1015  -      Stop Time  1100  -JA      Time Calculation (min)  45 min  -JA         Time Calculation- PT    Total Timed Code Minutes- PT  45 minute(s)  -JA         Timed Charges    51094 - PT Therapeutic Exercise Minutes  30  -      04510 - Gait Training Minutes   15  -        User Key  (r) = Recorded By, (t) =  Taken By, (c) = Cosigned By    Initials Name Provider Type    Al Ye, PTA Physical Therapy Assistant        Therapy Suggested Charges     Code   Minutes Charges    10247 (CPT®) Hc Pt Neuromusc Re Education Ea 15 Min      60616 (CPT®) Hc Pt Ther Proc Ea 15 Min 30 2    83015 (CPT®) Hc Gait Training Ea 15 Min 15 1    33751 (CPT®) Hc Pt Therapeutic Act Ea 15 Min      31851 (CPT®) Hc Pt Manual Therapy Ea 15 Min      68312 (CPT®) Hc Pt Iontophoresis Ea 15 Min      28288 (CPT®) Hc Pt Elec Stim Ea-Per 15 Min      91481 (CPT®) Hc Pt Ultrasound Ea 15 Min      78008 (CPT®) Hc Pt Self Care/Mgmt/Train Ea 15 Min      95457 (CPT®) Hc Pt Prosthetic (S) Train Initial Encounter, Each 15 Min      45984 (CPT®) Hc Pt Orthotic(S)/Prosthetic(S) Encounter, Each 15 Min      59609 (CPT®) Hc Orthotic(S) Mgmt/Train Initial Encounter, Each 15min      Total  45 3        Therapy Charges for Today     Code Description Service Date Service Provider Modifiers Qty    27259183280 HC PT THER PROC EA 15 MIN 11/20/2018 Al Hobson, PTA GP 2    45962414793 HC GAIT TRAINING EA 15 MIN 11/20/2018 Al Hobson, PTA GP 1          PT G-Codes  PT Professional Judgement Used?: Yes  Outcome Measure Options: AM-PAC 6 Clicks Basic Mobility (PT)  AM-PAC 6 Clicks Score: 17  Score: 20  Functional Limitation: Mobility: Walking and moving around  Mobility: Walking and Moving Around Current Status (): At least 40 percent but less than 60 percent impaired, limited or restricted  Mobility: Walking and Moving Around Goal Status (): At least 1 percent but less than 20 percent impaired, limited or restricted    Al Hobson PTA  11/20/2018

## 2018-11-20 NOTE — PLAN OF CARE
Problem: Patient Care Overview  Goal: Plan of Care Review  Outcome: Ongoing (interventions implemented as appropriate)   11/20/18 0118   Coping/Psychosocial   Plan of Care Reviewed With patient   Plan of Care Review   Progress no change   OTHER   Outcome Summary pt vss. Working on pain management     Goal: Individualization and Mutuality  Outcome: Ongoing (interventions implemented as appropriate)    Goal: Discharge Needs Assessment  Outcome: Ongoing (interventions implemented as appropriate)      Problem: Fall Risk (Adult)  Goal: Absence of Fall  Outcome: Ongoing (interventions implemented as appropriate)      Problem: Knee Arthroplasty (Total, Partial) (Adult)  Goal: Signs and Symptoms of Listed Potential Problems Will be Absent, Minimized or Managed (Knee Arthroplasty)  Outcome: Ongoing (interventions implemented as appropriate)      Problem: Pain, Acute (Adult)  Goal: Acceptable Pain Control/Comfort Level  Outcome: Ongoing (interventions implemented as appropriate)

## 2018-11-20 NOTE — PLAN OF CARE
Problem: Patient Care Overview  Goal: Plan of Care Review  Outcome: Ongoing (interventions implemented as appropriate)   11/20/18 0323   Coping/Psychosocial   Plan of Care Reviewed With patient   Plan of Care Review   Progress no change   OTHER   Outcome Summary VSS, pt complains of pain but continues to rest well throughout shift, cont to monitor       Problem: Fall Risk (Adult)  Goal: Absence of Fall  Outcome: Ongoing (interventions implemented as appropriate)      Problem: Knee Arthroplasty (Total, Partial) (Adult)  Goal: Signs and Symptoms of Listed Potential Problems Will be Absent, Minimized or Managed (Knee Arthroplasty)  Outcome: Ongoing (interventions implemented as appropriate)      Problem: Pain, Acute (Adult)  Goal: Acceptable Pain Control/Comfort Level  Outcome: Ongoing (interventions implemented as appropriate)

## 2018-11-20 NOTE — THERAPY TREATMENT NOTE
Acute Care - Physical Therapy Treatment Note  [facility]     Patient Name: Priti Orr  : 1965  MRN: [id number]  Today's Date: 2018  Onset of Illness/Injury or Date of Surgery: 18  Date of Referral to PT: 18  Referring Physician: Dr. Junior    Admit Date: 2018    Visit Dx:    ICD-10-CM ICD-9-CM   1. Impaired functional mobility, balance, gait, and endurance Z74.09 V49.89   2. Primary osteoarthritis of left knee M17.12 715.16   3. NICK (obstructive sleep apnea) G47.33 327.23   4. Seizure-like activity (CMS/HCC) R56.9 780.39   5. Presence of left artificial shoulder joint Z96.612 V43.61   6. History of brain surgery Z98.890 V15.29   7. Morbid obesity with BMI of 40.0-44.9, adult (CMS/[facility]) E66.01 278.01    Z68.41 V85.41   8. Impaired mobility and ADLs Z74.09 799.89     Patient Active Problem List   Diagnosis   • Osteoporosis   • Chronic left shoulder pain   • Presence of left artificial shoulder joint   • Left knee pain   • Anxiety   • Arthritis   • Asthma   • Depression   • Enchondroma of humerus   • Headache, chronic migraine without aura, intractable   • History of brain surgery   • Humeral fracture   • Hypercholesteremia   • Hyperlipidemia   • Essential hypertension   • Meningioma (CMS/[facility])   • Morbid obesity (CMS/[facility])   • NICK (obstructive sleep apnea)   • Vitamin D deficiency   • Seizure-like activity (CMS/[facility])   • S/p reverse total shoulder arthroplasty   • Primary osteoarthritis of left knee   • Morbid obesity with BMI of 40.0-44.9, adult (CMS/[facility])       Therapy Treatment    Rehabilitation Treatment Summary     Row Name 18 1410 18 1015 18 0858       Treatment Time/Intention    Discipline  physical therapy assistant  -EMERY  physical therapy assistant  -EMERY  occupational therapy assistant  -CHELY    Document Type  therapy note (daily note)  -EMERY  therapy note (daily note)  -EMERY  therapy note (daily note)  -CS    Subjective Information  complains of;pain  -EMERY   complains of;pain  -  complains of;pain  -CS    Mode of Treatment  physical therapy  -JA  physical therapy  -JA  occupational therapy  -CS    Patient/Family Observations  Pt. sitting up in recliner no family present   -  --  --    Therapy Frequency (PT Clinical Impression)  2 times/day  -JA  2 times/day  -  --    Therapy Frequency (OT Eval)  --  --  daily  -CS    Patient Effort  good  -  good  -  good  -CS2    Existing Precautions/Restrictions  fall  -  fall  -  fall  -CS2    Treatment Considerations/Comments  PRW provided to pt. for gt. this p.m.   -  --  --    Recorded by [JA] Al Hobson, PTA 11/20/18 1554 [JA] Al Hobson, PTA 11/20/18 1125 [CS] Sera Eddy, WANG/L 11/20/18 0912  [CS2] Sera Eddy, WANG/L 11/20/18 0942    Row Name 11/20/18 1410 11/20/18 1015 11/20/18 0858       Vital Signs    Pre Systolic BP Rehab  144  -  --  104  -CS    Pre Treatment Diastolic BP  75  -  --  62  -CS    Post Systolic BP Rehab  --  106  -  108  -CS2    Post Treatment Diastolic BP  --  63  -JA  58  -CS2    Pretreatment Heart Rate (beats/min)  62  -  --  76  -CS    Posttreatment Heart Rate (beats/min)  --  54  -  73  -CS2    Pre SpO2 (%)  --  --  95  -CS    O2 Delivery Pre Treatment  --  --  room air  -CS    Post SpO2 (%)  --  97  -  94  -CS2    O2 Delivery Post Treatment  --  room air  -  room air  -CS2    Pre Patient Position  Sitting  -  --  Supine  -CS    Intra Patient Position  Standing  -  --  Sitting  -CS2    Post Patient Position  Supine  -  --  Supine  -CS2    Recorded by [JA] Al Hobson, PTA 11/20/18 1554 [JA] Al Hobson, PTA 11/20/18 1125 [CS] Sera Eddy, WANG/L 11/20/18 0912  [CS2] Sera Eddy WANG/L 11/20/18 0942    Row Name 11/20/18 1410 11/20/18 1015 11/20/18 0858       Cognitive Assessment/Intervention- PT/OT    Affect/Mental Status (Cognitive)  WFL  -JA  WFL  -JA  WFL  -CS    Orientation Status (Cognition)  oriented x 4   -JA  oriented x 4  -JA  oriented x 4  -CS    Follows Commands (Cognition)  over 90% accuracy;verbal cues/prompting required  -JA  over 90% accuracy;verbal cues/prompting required  -JA  WFL  -CS    Recorded by [JA] Al Hobson, PTA 11/20/18 1554 [JA] Al Hobson, PTA 11/20/18 1125 [CS] Sera Eddy COTA/L 11/20/18 0912    Row Name 11/20/18 1410 11/20/18 1015          Safety Issues, Functional Mobility    Impairments Affecting Function (Mobility)  endurance/activity tolerance;strength;range of motion (ROM);pain  -JA  endurance/activity tolerance;strength;range of motion (ROM);pain  -JA     Recorded by [JA] Al Hobson, PTA 11/20/18 1554 [JA] Al Hobson, PTA 11/20/18 1125     Row Name 11/20/18 1410 11/20/18 1015          Mobility Assessment/Intervention    Extremity Weight-bearing Status  left upper extremity  -JA  left upper extremity  -JA     Left Upper Extremity (Weight-bearing Status)  weight-bearing as tolerated (WBAT)  -JA  weight-bearing as tolerated (WBAT)  -JA     Left Lower Extremity (Weight-bearing Status)  weight-bearing as tolerated (WBAT)  -JA  weight-bearing as tolerated (WBAT)  -JA     Recorded by [JA] Al Hobson, PTA 11/20/18 1554 [JA] Al Hobson, PTA 11/20/18 1125     Row Name 11/20/18 1410 11/20/18 1015 11/20/18 0858       Bed Mobility Assessment/Treatment    Bed Mobility Assessment/Treatment  sit-supine  -JA  supine-sit  -JA  supine-sit-supine  -CS    Supine-Sit [place] (Bed Mobility)  --  supervision  -JA  supervision  -CS    Sit-Supine [place] (Bed Mobility)  supervision  -  --  -JA  supervision  -CS    Bed Mobility, Safety Issues  decreased use of arms for pushing/pulling;decreased use of legs for bridging/pushing  -JA  decreased use of arms for pushing/pulling;decreased use of legs for bridging/pushing  -JA  decreased use of arms for pushing/pulling;decreased use of legs for bridging/pushing  -CS    Assistive Device (Bed  Mobility)  bed rails;head of bed elevated  -JA  bed rails;head of bed elevated  -JA  bed rails;head of bed elevated  -CS    Recorded by [JA] Al Hobson, PTA 11/20/18 1554 [JA] Al Hobson, PTA 11/20/18 1125 [CS] Sera Eddy WANG/L 11/20/18 1205    Row Name 11/20/18 1410 11/20/18 1015 11/20/18 0858       Transfer Assessment/Treatment    Transfer Assessment/Treatment  sit-stand transfer;stand-sit transfer  -JA  sit-stand transfer;stand-sit transfer  -JA  sit-stand transfer;stand-sit transfer  -CS    Recorded by [JA] Al Hobson, PTA 11/20/18 1554 [JA] Al Hobson, PTA 11/20/18 1125 [CS] Sera Eddy WANG/L 11/20/18 0920    Row Name 11/20/18 1410 11/20/18 1015 11/20/18 0858       Sit-Stand Transfer    Sit-Stand [place] (Transfers)  supervision  -  contact guard  -JA  contact guard  -CS    Assistive Device (Sit-Stand Transfers)  walker, front-wheeled  -JA  walker, front-wheeled  -JA  walker, front-wheeled  -CS    Recorded by [JA] Al Hobson, PTA 11/20/18 1554 [JA] Al Hobson, PTA 11/20/18 1125 [CS] Sera Eddy WANG/L 11/20/18 0920    Row Name 11/20/18 1410 11/20/18 1015 11/20/18 0858       Stand-Sit Transfer    Stand-Sit [place] (Transfers)  supervision  -  contact guard  -JA  contact guard  -CS    Assistive Device (Stand-Sit Transfers)  walker, front-wheeled  -JA  walker, front-wheeled  -JA  walker, front-wheeled  -CS    Recorded by [JA] Al Hobson, PTA 11/20/18 1554 [JA] Al Hobson, PTA 11/20/18 1125 [CS] Sera Eddy AWNG/L 11/20/18 0920    Row Name 11/20/18 1015 11/20/18 0858          Toilet Transfer    Type (Toilet Transfer)  --  -EMERY  sit-stand;stand-sit  -CS     [place] Level (Toilet Transfer)  --  -EMERY  contact guard  -CS     Recorded by [JA] Al Hobson, PTA 11/20/18 1125 [CS] Sera Eddy, WANG/L 11/20/18 0942     Row Name 11/20/18 1410 11/20/18 1015          Gait/Stairs  Assessment/Training    Gait/Stairs Assessment/Training  gait/ambulation assistive device;gait/ambulation independence;distance ambulated;gait pattern;gait deviations  -JA  gait/ambulation assistive device;gait/ambulation independence;distance ambulated;gait pattern;gait deviations  -JA     [place] Level (Gait)  contact guard  -JA  contact guard  -JA     Assistive Device (Gait)  walker, front-wheeled  -JA  walker, front-wheeled  -JA     Distance in Feet (Gait)  250  -JA  130  -JA     Pattern (Gait)  step-to;step-through  -JA  step-to;step-through  -JA     Deviations/Abnormal Patterns (Gait)  antalgic  -JA  antalgic  -JA     Bilateral Gait Deviations  heel strike decreased  -JA  heel strike decreased;leans left leans to left due to L shoulder pain  -JA     Left Sided Gait Deviations  --  --  -JA     Comment (Gait/Stairs)  --  Pt. requesting possible use of platform to rest L UE on during gt. to decrease discomfort with gt. for p.m. treatment  -JA     Recorded by [JA] Al Hobson, PTA 11/20/18 1554 [JA] Al Hobson, PTA 11/20/18 1125     Row Name 11/20/18 0858             ADL Assessment/Intervention    BADL Assessment/Intervention  bathing;upper body dressing;lower body dressing;grooming;toileting  -CS      Recorded by [CS] Sera Eddy COTA/L 11/20/18 0942      Row Name 11/20/18 0858             Bathing Assessment/Intervention    Bathing [place] Level  bathing skills;upper body;set up;lower body;supervision  -CS      Assistive Devices (Bathing)  bath mitt;long-handled sponge  -CS      Recorded by [CS] Sera Eddy COTA/L 11/20/18 0929      Row Name 11/20/18 0858             Upper Body Dressing Assessment/Training    Upper Body Dressing [place] Level  upper body dressing skills;doff;don;pull-over garment;independent  -CS      Upper Body Dressing Position  edge of bed sitting  -CS      Recorded by [CS] Sera Eddy COTA/L 11/20/18 0929      Row Name 11/20/18 0823              Lower Body Dressing Assessment/Training    Lower Body Dressing [place] Level  doff;don;pants/bottoms;socks;undergarment;supervision  -CS      Assistive Devices (Lower Body Dressing)  sock-aid  -CS      Lower Body Dressing Position  edge of bed sitting;supported standing  -CS      Recorded by [CS] SurjitSera WANG/L 11/20/18 0929      Row Name 11/20/18 0858             Grooming Assessment/Training    [place] Level (Grooming)  grooming skills;wash face, hands apply deodorant/lotion  -CS      Grooming Position  edge of bed sitting  -CS2      Recorded by [CS] Sera Eddy WANG/L 11/20/18 0920  [CS2] SurjitSera WANG/L 11/20/18 0942      Row Name 11/20/18 0858             Toileting Assessment/Training    [place] Level (Toileting)  toileting skills;adjust/manage clothing;perform perineal hygiene;supervision  -CS      Assistive Devices (Toileting)  raised toilet seat;grab bar/safety frame  -CS      Toileting Position  unsupported sitting;supported standing  -CS      Recorded by [CS] Sera Eddy WANG/L 11/20/18 0942      Row Name 11/20/18 1410 11/20/18 1015          Left Lower Ext    Lt Knee Extension/Flexion AROM  0-81 AAROM  -JA  0-79AAROM  -JA     Recorded by [JA] Al Hobson, PTA 11/20/18 3824 [JA] Al Hobson, PTA 11/20/18 1125     Row Name 11/20/18 1410 11/20/18 1015          Lower Extremity Seated Therapeutic Exercise    Performed, Seated Lower Extremity (Therapeutic Exercise)  -- Seated heelslides L  -JA  -- seated Heelslides   -JA     Exercise Type, Seated Lower Extremity (Therapeutic Exercise)  AROM (active range of motion)  -JA  AROM (active range of motion);AAROM (active assistive range of motion)  -JA     Sets/Reps Detail, Seated Lower Extremity (Therapeutic Exercise)  ~3-4mins seated heelslides at L   -JA  ~5mins total A & AAROM L  -JA2     Recorded by [JA] Al Hobson, PTA 11/20/18 1554 [JA] Al Hobson, PTA 11/20/18 1125  [JA2]  Al Hobson, PTA 11/20/18 1554     Row Name 11/20/18 1015             Lower Extremity Supine Therapeutic Exercise    Performed, Supine Lower Extremity (Therapeutic Exercise)  ankle dorsiflexion/plantarflexion;SLR (straight leg raise);quadriceps sets;gluteal sets  -      Exercise Type, Supine Lower Extremity (Therapeutic Exercise)  AAROM (active assistive range of motion);AROM (active range of motion);isometric contraction, static  -JA      Sets/Reps Detail, Supine Lower Extremity (Therapeutic Exercise)  x20 AP, x20 Qsets, x20 Gsets, AA SLR L x10  -JA      Recorded by [JA] Al Hobson, PTA 11/20/18 1125      Row Name 11/20/18 1410 11/20/18 1015 11/20/18 0858       Positioning and Restraints    Pre-Treatment Position  sitting in chair/recliner  -JA  in bed  -JA  in bed  -CS    Post Treatment Position  bed  -JA  chair  -JA  bed  -CS2    In Bed  supine;call light within reach;encouraged to call for assist;exit alarm on  -JA  --  supine;call light within reach;encouraged to call for assist;exit alarm on  -CS2    Recorded by [JA] Al Hobson, PTA 11/20/18 1554 [JA] Al Hobson, PTA 11/20/18 1125 [CS] Sera Eddy COTA/L 11/20/18 0912  [CS2] Sera Eddy WANG/L 11/20/18 0942    Row Name 11/20/18 1410 11/20/18 1015 11/20/18 0858       Pain Scale: Numbers Pre/Post-Treatment    Pain Scale: Numbers, Pretreatment  5/10  -JA  6/10  -JA  6/10  -CS    Pain Scale: Numbers, Post-Treatment  5/10  -JA  6/10  -JA  6/10  -CS2    Pain Location  -- left knee  -JA  -- left knee and shoulder  -JA  knee L  -CS2    Pain Intervention(s)  Medication (See MAR);Repositioned;Ambulation/increased activity  -  Medication (See MAR);Repositioned;Ambulation/increased activity;Cold applied  -  Medication (See MAR)  -CS2    Recorded by [JA] Al Hobson, PTA 11/20/18 1554 [JA] Al Hobson, PTA 11/20/18 1125 [CS] Sera Eddy COTA/L 11/20/18 0912  [CS2] Sera Eddy, FELIPE/L  11/20/18 0942    Row Name 11/20/18 1015             Sensory Assessment/Intervention    Sensory General Assessment  --  -JA      Recorded by [JA] Al Hobson, PTA 11/20/18 1125      Row Name 11/20/18 1015             Light Touch Sensation Assessment    Left Lower Extremity: Light Touch Sensation Assessment  --  -JA      Recorded by [JA] Al Hobson, PTA 11/20/18 1125      Row Name                Wound 11/19/18 1106 Left midline knee incision;surgical    Wound - Properties Group Date first assessed: 11/19/18 [KM] Time first assessed: 1106 [KM] Present On Admission : no [KM] Side: Left [KM] Orientation: midline [KM] Location: knee [KM] Type: incision;surgical [KM] Recorded by:  [KM] Madeline Miller RN 11/19/18 1107    Row Name 11/20/18 0858             Outcome Summary/Treatment Plan (OT)    Daily Summary of Progress (OT)  progress toward functional goals as expected  -CS      Plan for Continued Treatment (OT)  cont OT POC  -CS      Anticipated Discharge Disposition (OT)  home with 24/7 care;home with OP services;home with home health  -CS      Recorded by [CS] Sera Eddy, FELIPE/L 11/20/18 0942      Row Name 11/20/18 1410 11/20/18 1015          Outcome Summary/Treatment Plan (PT)    Daily Summary of Progress (PT)  progress toward functional goals as expected  -JA  progress toward functional goals as expected  -JA     Plan for Continued Treatment (PT)  Gt, therex, ROM  -JA  --     Anticipated Equipment Needs at Discharge (PT)  platform walker RW  -JA  front wheeled walker possible platform at L UE  -JA     Anticipated Discharge Disposition (PT)  home with assist;home with home health  -JA  home with assist;home with OP services  -JA     Recorded by [JA] Al Hobson, PTA 11/20/18 1554 [JA] Al Hobson, PTA 11/20/18 1125       User Key  (r) = Recorded By, (t) = Taken By, (c) = Cosigned By    Initials Name Effective Dates Discipline    Al Ye, PTA 03/07/18 -  PT    CS  Sera Eddy, WANG/L 03/07/18 -  OT    Madeline Desai, RN 05/21/18 -  Nurse          Wound 11/19/18 1106 Left midline knee incision;surgical (Active)   Dressing Appearance dry;intact 11/20/2018  8:53 AM   Closure MALLY 11/20/2018  8:53 AM   Base dressing in place, unable to visualize 11/20/2018  8:53 AM       PT Rehab Goals     Row Name 11/20/18 1410 11/20/18 1015          Bed Mobility Goal 1 (PT)    Activity/Assistive Device (Bed Mobility Goal 1, PT)  sit to supine;supine to sit;scooting;rolling to left;rolling to right;bridging  -JA  sit to supine;supine to sit;scooting;rolling to left;rolling to right;bridging  -JA     [place] Level/Cues Needed (Bed Mobility Goal 1, PT)  conditional independence  -JA  conditional independence  -JA     Time Frame (Bed Mobility Goal 1, PT)  2 days  -JA  2 days  -JA     Progress/Outcomes (Bed Mobility Goal 1, PT)  goal not met  -JA  goal not met  -JA        Transfer Goal 1 (PT)    Activity/Assistive Device (Transfer Goal 1, PT)  transfers, all  -JA  transfers, all  -JA     [place] Level/Cues Needed (Transfer Goal 1, PT)  conditional independence  -JA  conditional independence  -JA     Time Frame (Transfer Goal 1, PT)  2 - 3 days  -JA  2 - 3 days  -JA     Progress/Outcome (Transfer Goal 1, PT)  goal not met  -JA  goal not met  -JA        Gait Training Goal 1 (PT)    Activity/Assistive Device (Gait Training Goal 1, PT)  gait (walking locomotion);assistive device use;backward stepping;decrease asymmetrical patterns;decrease fall risk;diminish gait deviation;forward stepping;improve balance and speed;increase endurance/gait distance;increase energy conservation;normalize weight shifts  -JA  gait (walking locomotion);assistive device use;backward stepping;decrease asymmetrical patterns;decrease fall risk;diminish gait deviation;forward stepping;improve balance and speed;increase endurance/gait distance;increase energy conservation;normalize weight shifts  -JA      [place] Level (Gait Training Goal 1, PT)  conditional independence  -JA  conditional independence  -JA     Distance (Gait Goal 1, PT)  1x500 ft or more  -JA  1x500 ft or more  -JA     Time Frame (Gait Training Goal 1, PT)  by discharge  -JA  by discharge  -JA     Progress/Outcome (Gait Training Goal 1, PT)  goal not met  -JA  goal not met  -JA        ROM Goal 1 (PT)    ROM Goal 1 (PT)  0-100' knee flexion  -JA  0-100' knee flexion  -JA     Time Frame (ROM Goal 1, PT)  1 week  -JA  1 week  -JA     Progress/Outcome (ROM Goal 1, PT)  goal not met  -JA  goal not met  -JA        Stairs Goal 1 (PT)    Activity/Assistive Device (Stairs Goal 1, PT)  using handrail, left;stairs, all skills  -JA  using handrail, left;stairs, all skills  -JA     [place] Level/Cues Needed (Stairs Goal 1, PT)  supervision required  -JA  supervision required  -JA     Number of Stairs (Stairs Goal 1, PT)  2  -JA  2  -JA     Time Frame (Stairs Goal 1, PT)  by discharge  -JA  by discharge  -JA     Progress/Outcome (Stairs Goal 1, PT)  goal not met  -JA  goal not met  -JA       User Key  (r) = Recorded By, (t) = Taken By, (c) = Cosigned By    Initials Name Provider Type Discipline    Al Ye, PTA Physical Therapy Assistant PT          Physical Therapy Education     Title: PT OT SLP Therapies (Active)     Topic: Physical Therapy (Done)     Point: Mobility training (Done)     Learning Progress Summary           Patient Acceptance, RAHAT [place]ONTAGUE,NR by LEONA at 11/19/2018  4:54 PM    Comment:  Patient education on role of PT in acute care, use of gait belt, AD, hand placement during transfers, plan of care, and encouraged to call for assist to reduce risk of falls.   Family Acceptance, RAHAT [place]ONT[place]UE,NR by LEONA at 11/19/2018  4:54 PM    Comment:  Patient education on role of PT in acute care, use of gait belt, AD, hand placement during transfers, plan of care, and encouraged to call for assist to reduce risk of falls.                   Point: Home  exercise program (Done)     Learning Progress Summary           Patient Acceptance, E, VU,NR by CW at 11/19/2018  4:55 PM    Comment:  Patient education on importance of not keeping pillow under knee, moving knee often, practicing quadriceps sets 10x every hour for neuromuscular control and holding each for 5 seconds.   Family Acceptance, E, VU,NR by CW at 11/19/2018  4:55 PM    Comment:  Patient education on importance of not keeping pillow under knee, moving knee often, practicing quadriceps sets 10x every hour for neuromuscular control and holding each for 5 seconds.                   Point: Body mechanics (Done)     Learning Progress Summary           Patient Acceptance, E, VU,NR by CW at 11/19/2018  4:54 PM    Comment:  Patient education on role of PT in acute care, use of gait belt, AD, hand placement during transfers, plan of care, and encouraged to call for assist to reduce risk of falls.   Family Acceptance, E, VU,NR by CW at 11/19/2018  4:54 PM    Comment:  Patient education on role of PT in acute care, use of gait belt, AD, hand placement during transfers, plan of care, and encouraged to call for assist to reduce risk of falls.                   Point: Precautions (Done)     Learning Progress Summary           Patient Acceptance, E, VU,NR by CW at 11/19/2018  4:54 PM    Comment:  Patient education on role of PT in acute care, use of gait belt, AD, hand placement during transfers, plan of care, and encouraged to call for assist to reduce risk of falls.   Family Acceptance, E, VU,NR by CW at 11/19/2018  4:54 PM    Comment:  Patient education on role of PT in acute care, use of gait belt, AD, hand placement during transfers, plan of care, and encouraged to call for assist to reduce risk of falls.                               User Key     Initials Effective Dates Name Provider Type Discipline    CW 10/04/18 -  Chey Lam, PT Physical Therapist PT                PT Recommendation and Plan  Anticipated  Discharge Disposition (PT): home with assist, home with home health  Therapy Frequency (PT Clinical Impression): 2 times/day  Outcome Summary/Treatment Plan (PT)  Daily Summary of Progress (PT): progress toward functional goals as expected  Plan for Continued Treatment (PT): Gt, therex, ROM  Anticipated Equipment Needs at Discharge (PT): platform walker(RW)  Anticipated Discharge Disposition (PT): home with assist, home with home health  Plan of Care Reviewed With: patient  Progress: improving  Outcome Summary: Pt. able to amb. 250ft with PRW CGA with decrease pain at L shoulder. Pt. ROM 0-81degrees. Cont. gt, transfers, ROM  Outcome Measures     Row Name 11/20/18 1200 11/20/18 1015 11/20/18 0900       How much help from another person do you currently need...    Turning from your back to your side while in flat bed without using bedrails?  --  3  -JA  --    Moving from lying on back to sitting on the side of a flat bed without bedrails?  --  3  -JA  --    Moving to and from a bed to a chair (including a wheelchair)?  --  3  -JA  --    Standing up from a chair using your arms (e.g., wheelchair, bedside chair)?  --  3  -JA  --    Climbing 3-5 steps with a railing?  --  2  -JA  --    To walk in hospital room?  --  3  -JA  --    AM-PAC 6 Clicks Score  --  17  -JA  --       How much help from another is currently needed...    Putting on and taking off regular lower body clothing?  3  -CS  --  2  -CS    Bathing (including washing, rinsing, and drying)  3  -CS  --  3  -CS    Toileting (which includes using toilet bed pan or urinal)  3  -CS  --  3  -CS    Putting on and taking off regular upper body clothing  4  -CS  --  4  -CS    Taking care of personal grooming (such as brushing teeth)  4  -CS  --  4  -CS    Eating meals  4  -CS  --  4  -CS    Score  21  -CS  --  20  -CS       Functional Assessment    Outcome Measure Options  AM-PAC 6 Clicks Daily Activity (OT)  -CS  AM-PAC 6 Clicks Basic Mobility (PT)  -JA  AM-PAC 6  Clicks Daily Activity (OT)  -    Row Name 11/19/18 1431 11/19/18 1430          How much help from another person do you currently need...    Turning from your back to your side while in flat bed without using bedrails?  3  -CW  --     Moving from lying on back to sitting on the side of a flat bed without bedrails?  3  -CW  --     Moving to and from a bed to a chair (including a wheelchair)?  2  -CW  --     Standing up from a chair using your arms (e.g., wheelchair, bedside chair)?  3  -CW  --     Climbing 3-5 steps with a railing?  1  -CW  --     To walk in hospital room?  2  -CW  --     AM-PAC 6 Clicks Score  14  -CW  --        How much help from another is currently needed...    Putting on and taking off regular lower body clothing?  --  2  -BH     Bathing (including washing, rinsing, and drying)  --  2  -BH     Toileting (which includes using toilet bed pan or urinal)  --  3  -BH     Putting on and taking off regular upper body clothing  --  3  -BH     Taking care of personal grooming (such as brushing teeth)  --  3  -BH     Eating meals  --  4  -BH     Score  --  17  -BH        Functional Assessment    Outcome Measure Options  AM-PAC 6 Clicks Basic Mobility (PT)  -CW  AM-PAC 6 Clicks Daily Activity (OT)  -       User Key  (r) = Recorded By, (t) = Taken By, (c) = Cosigned By    Initials Name Provider Type     Ninfa Waddell, OTR/L Occupational Therapist    Al Ye, PTA Physical Therapy Assistant    Sera Avelar, WANG/L Occupational Therapy Assistant    Chey Rodriguez, PT Physical Therapist         Time Calculation:   PT Charges     Row Name 11/20/18 1556 11/20/18 1128          Time Calculation    Start Time  1410  -JA  1015  -JA     Stop Time  1445  -JA  1100  -JA     Time Calculation (min)  35 min  -JA  45 min  -JA        Time Calculation- PT    Total Timed Code Minutes- PT  35 minute(s)  -JA  45 minute(s)  -EMERY        Timed Charges    41450 - PT Therapeutic Exercise Minutes   --  30  -JA     37920 - Gait Training Minutes   15  -JA  15  -JA     87998 - PT Therapeutic Activity Minutes  20  -JA  --       User Key  (r) = Recorded By, (t) = Taken By, (c) = Cosigned By    Initials Name Provider Type    Al Ye, PTA Physical Therapy Assistant        Therapy Suggested Charges     Code   Minutes Charges    80328 (CPT®) Hc Pt Neuromusc Re Education Ea 15 Min      92793 (CPT®) Hc Pt Ther Proc Ea 15 Min      76280 (CPT®) Hc Gait Training Ea 15 Min 15 1    68156 (CPT®) Hc Pt Therapeutic Act Ea 15 Min 20 1    99692 (CPT®) Hc Pt Manual Therapy Ea 15 Min      77959 (CPT®) Hc Pt Iontophoresis Ea 15 Min      92791 (CPT®) Hc Pt Elec Stim Ea-Per 15 Min      15664 (CPT®) Hc Pt Ultrasound Ea 15 Min      42820 (CPT®) Hc Pt Self Care/Mgmt/Train Ea 15 Min      85257 (CPT®) Hc Pt Prosthetic (S) Train Initial Encounter, Each 15 Min      22544 (CPT®) Hc Pt Orthotic(S)/Prosthetic(S) Encounter, Each 15 Min      57307 (CPT®) Hc Orthotic(S) Mgmt/Train Initial Encounter, Each 15min      Total  35 2        Therapy Charges for Today     Code Description Service Date Service Provider Modifiers Qty    06633249300 HC PT THER PROC EA 15 MIN 11/20/2018 Al Hobson, PTA GP 2    03586072889 HC GAIT TRAINING EA 15 MIN 11/20/2018 Al Hobson, PTA GP 1    19228055414 HC GAIT TRAINING EA 15 MIN 11/20/2018 Al Hobson, PTA GP 1    38356437212 HC PT THERAPEUTIC ACT EA 15 MIN 11/20/2018 Al Hobson, PTA GP 1          PT G-Codes  PT Professional Judgement Used?: Yes  Outcome Measure Options: AM-PAC 6 Clicks Daily Activity (OT)  AM-PAC 6 Clicks Score: 17  Score: 21  Functional Limitation: Mobility: Walking and moving around  Mobility: Walking and Moving Around Current Status (): At least 40 percent but less than 60 percent impaired, limited or restricted  Mobility: Walking and Moving Around Goal Status (): At least 1 percent but less than 20 percent impaired, limited or  restricted    Al Hobson, PTA  11/20/2018

## 2018-11-20 NOTE — PLAN OF CARE
Problem: Patient Care Overview  Goal: Plan of Care Review  Outcome: Ongoing (interventions implemented as appropriate)   11/20/18 7490   Coping/Psychosocial   Plan of Care Reviewed With patient   Plan of Care Review   Progress improving   OTHER   Outcome Summary Pt tolerated well this date. Pt was SBA with bed mobility. Pt was SBA with toilet t/f. Pt completed an ADL. Pt met 4 goals this tx. Continue OT POC.

## 2018-11-20 NOTE — THERAPY TREATMENT NOTE
Acute Care - Occupational Therapy Treatment Note  [facility]     Patient Name: Priti Orr  : 1965  MRN: [id number]  Today's Date: 2018  Onset of Illness/Injury or Date of Surgery: 18  Date of Referral to OT: 18  Referring Physician: Dr. Junior    Admit Date: 2018       ICD-10-CM ICD-9-CM   1. Impaired functional mobility, balance, gait, and endurance Z74.09 V49.89   2. Primary osteoarthritis of left knee M17.12 715.16   3. NICK (obstructive sleep apnea) G47.33 327.23   4. Seizure-like activity (CMS/HCC) R56.9 780.39   5. Presence of left artificial shoulder joint Z96.612 V43.61   6. History of brain surgery Z98.890 V15.29   7. Morbid obesity with BMI of 40.0-44.9, adult (CMS/[facility]) E66.01 278.01    Z68.41 V85.41   8. Impaired mobility and ADLs Z74.09 799.89     Patient Active Problem List   Diagnosis   • Osteoporosis   • Chronic left shoulder pain   • Presence of left artificial shoulder joint   • Left knee pain   • Anxiety   • Arthritis   • Asthma   • Depression   • Enchondroma of humerus   • Headache, chronic migraine without aura, intractable   • History of brain surgery   • Humeral fracture   • Hypercholesteremia   • Hyperlipidemia   • Essential hypertension   • Meningioma (CMS/[facility])   • Morbid obesity (CMS/[facility])   • NICK (obstructive sleep apnea)   • Vitamin D deficiency   • Seizure-like activity (CMS/[facility])   • S/p reverse total shoulder arthroplasty   • Primary osteoarthritis of left knee   • Morbid obesity with BMI of 40.0-44.9, adult (CMS/[facility])     Past Medical History:   Diagnosis Date   • Allergic rhinitis    • Arthritis    • Asthma    • Benign neoplasm of meninges (CMS/[facility])     Post op       • Cobalamin deficiency    • Corneal abrasion    • Encounter for gynecological examination (general) (routine) without abnormal findings    • Essential hypertension    • Fatigue    • Female stress incontinence    • Head injury    • History of delayed wound healing      Delayed healing of surgical wound      • Hyperlipidemia    • Low back pain    • Nosebleed, symptom     Nosebleed/epistaxis symptom      • Osteoporosis    • PONV (postoperative nausea and vomiting)    • Sleep apnea     not wearing c-pap     Past Surgical History:   Procedure Laterality Date   • ARM LESION/CYST EXCISION  2014    Remove arm bone lesion (1)   • ARM SKIN LESION BIOPSY / EXCISION     • ARM WOUND REPAIR / CLOSURE  2014    Repair Superficial Wound TR-EXT 2.5 < CM 23196 (1)      • BRAIN SURGERY     • BREAST LUMPECTOMY     •  SECTION  1986     Section (1)     • ENDOSCOPY  2008    Colon endoscopy 96880 (1)     • EXCISION BREAST LESION W/ PREOP NEEDLE LOC  1981    Excision, breast lesion (1)     • HEMORRHOIDECTOMY     • HEMORRHOIDECTOMY  2008    Hemorrhoidectomy (2)      • HYSTERECTOMY     • INJECTION OF MEDICATION  2013    Celestone (betamethasone) (1)      • INJECTION OF MEDICATION  2013    Depo Medrol (Methylprednisone) (1)      • INJECTION OF MEDICATION  2013    Dexamethasone (2)      • INJECTION OF MEDICATION  2014    Kenalog (6)   • KNEE ARTHROSCOPY  2007    Knee arthroscopy, surgery (1)     • LAPAROSCOPIC TUBAL LIGATION  1991    Tubal ligation (1)      • PAP SMEAR  2007    PAP SMEAR (1)      • SHOULDER SURGERY Left     x 4   • TOTAL ABDOMINAL HYSTERECTOMY WITH SALPINGO OOPHORECTOMY      JOSE/BSO (1)          Therapy Treatment    Rehabilitation Treatment Summary     Row Name 18 1015 18 0858          Treatment Time/Intention    Discipline  physical therapy assistant  -EMERY  occupational therapy assistant  -CS     Document Type  therapy note (daily note)  -EMERY  therapy note (daily note)  -CS     Subjective Information  complains of;pain  -EMERY  complains of;pain  -CS     Mode of Treatment  physical therapy  -EMERY  occupational therapy  -CS     Therapy Frequency (PT Clinical Impression)  2 times/day  -EMERY   --     Therapy Frequency (OT Eval)  --  daily  -CS     Patient Effort  good  -JA  good  -CS2     Existing Precautions/Restrictions  fall  -JA  fall  -CS2     Recorded by [JA] Al Hobson, PTA 11/20/18 1125 [CS] Sera Eddy WANG/L 11/20/18 0912  [CS2] Sera Eddy WANG/L 11/20/18 0942     Row Name 11/20/18 1015 11/20/18 0858          Vital Signs    Pre Systolic BP Rehab  --  104  -CS     Pre Treatment Diastolic BP  --  62  -CS     Post Systolic BP Rehab  106  -JA  108  -CS2     Post Treatment Diastolic BP  63  -JA  58  -CS2     Pretreatment Heart Rate (beats/min)  --  76  -CS     Posttreatment Heart Rate (beats/min)  54  -JA  73  -CS2     Pre SpO2 (%)  --  95  -CS     O2 Delivery Pre Treatment  --  room air  -CS     Post SpO2 (%)  97  -JA  94  -CS2     O2 Delivery Post Treatment  room air  -JA  room air  -CS2     Pre Patient Position  --  Supine  -CS     Intra Patient Position  --  Sitting  -CS2     Post Patient Position  --  Supine  -CS2     Recorded by [JA] Al Hobson, PTA 11/20/18 1125 [CS] Sera Eddy WNAG/L 11/20/18 0912  [CS2] Sera Eddy WANG/L 11/20/18 0942     Row Name 11/20/18 1015 11/20/18 0858          Cognitive Assessment/Intervention- PT/OT    Affect/Mental Status (Cognitive)  WFL  -JA  WFL  -CS     Orientation Status (Cognition)  oriented x 4  -JA  oriented x 4  -CS     Follows Commands (Cognition)  over 90% accuracy;verbal cues/prompting required  -JA  WFL  -CS     Recorded by [JA] Al Hobson, PTA 11/20/18 1125 [CS] Sera Eddy WANG/L 11/20/18 0912     Row Name 11/20/18 1015             Safety Issues, Functional Mobility    Impairments Affecting Function (Mobility)  endurance/activity tolerance;strength;range of motion (ROM);pain  -JA      Recorded by [JA] Al Hobson, PTA 11/20/18 1125      Row Name 11/20/18 1015             Mobility Assessment/Intervention    Extremity Weight-bearing Status  left upper extremity  -      Left  Upper Extremity (Weight-bearing Status)  weight-bearing as tolerated (WBAT)  -JA      Left Lower Extremity (Weight-bearing Status)  weight-bearing as tolerated (WBAT)  -JA      Recorded by [JA] Al Hobson, PTA 11/20/18 1125      Row Name 11/20/18 1015 11/20/18 0858          Bed Mobility Assessment/Treatment    Bed Mobility Assessment/Treatment  supine-sit  -JA  supine-sit-supine  -CS     Supine-Sit [place] (Bed Mobility)  supervision  -JA  supervision  -CS     Sit-Supine [place] (Bed Mobility)  --  -JA  supervision  -CS     Bed Mobility, Safety Issues  decreased use of arms for pushing/pulling;decreased use of legs for bridging/pushing  -JA  decreased use of arms for pushing/pulling;decreased use of legs for bridging/pushing  -CS     Assistive Device (Bed Mobility)  bed rails;head of bed elevated  -JA  bed rails;head of bed elevated  -CS     Recorded by [JA] Al Hobson, PTA 11/20/18 1125 [CS] Sera Eddy WANG/L 11/20/18 1205     Row Name 11/20/18 1015 11/20/18 0858          Transfer Assessment/Treatment    Transfer Assessment/Treatment  sit-stand transfer;stand-sit transfer  -JA  sit-stand transfer;stand-sit transfer  -CS     Recorded by [JA] Al Hobson, PTA 11/20/18 1125 [CS] Sera Eddy WANG/L 11/20/18 0920     Row Name 11/20/18 1015 11/20/18 0858          Sit-Stand Transfer    Sit-Stand [place] (Transfers)  contact guard  -JA  contact guard  -CS     Assistive Device (Sit-Stand Transfers)  walker, front-wheeled  -EMERY  walker, front-wheeled  -CS     Recorded by [JA] Al Hobson, PTA 11/20/18 1125 [CS] Sera Eddy WANG/L 11/20/18 0920     Row Name 11/20/18 1015 11/20/18 0858          Stand-Sit Transfer    Stand-Sit [place] (Transfers)  contact guard  -JA  contact guard  -CS     Assistive Device (Stand-Sit Transfers)  walker, front-wheeled  -EMERY  walker, front-wheeled  -CS     Recorded by [JA] Al Hobson, PTA 11/20/18 1125 [CS]  Sera Eddy COTA/L 11/20/18 0920     Row Name 11/20/18 1015 11/20/18 0858          Toilet Transfer    Type (Toilet Transfer)  --  -JA  sit-stand;stand-sit  -CS     [place] Level (Toilet Transfer)  --  -JA  contact guard  -CS     Recorded by [JA] Al Hobson, PTA 11/20/18 1125 [CS] Sera Eddy COTA/L 11/20/18 0942     Row Name 11/20/18 1015             Gait/Stairs Assessment/Training    Gait/Stairs Assessment/Training  gait/ambulation assistive device;gait/ambulation independence;distance ambulated;gait pattern;gait deviations  -JA      [place] Level (Gait)  contact guard  -JA      Assistive Device (Gait)  walker, front-wheeled  -JA      Distance in Feet (Gait)  130  -JA      Pattern (Gait)  step-to;step-through  -JA      Deviations/Abnormal Patterns (Gait)  antalgic  -JA      Bilateral Gait Deviations  heel strike decreased;leans left leans to left due to L shoulder pain  -JA      Left Sided Gait Deviations  --  -JA      Comment (Gait/Stairs)  Pt. requesting possible use of platform to rest L UE on during gt. to decrease discomfort with gt. for p.m. treatment  -JA      Recorded by [JA] Al Hobson, PTA 11/20/18 1125      Row Name 11/20/18 0858             ADL Assessment/Intervention    BADL Assessment/Intervention  bathing;upper body dressing;lower body dressing;grooming;toileting  -CS      Recorded by [CS] Sera Eddy COTA/L 11/20/18 0942      Row Name 11/20/18 0858             Bathing Assessment/Intervention    Bathing [place] Level  bathing skills;upper body;set up;lower body;supervision  -CS      Assistive Devices (Bathing)  bath mitt;long-handled sponge  -CS      Recorded by [CS] Sera Eddy COTA/L 11/20/18 0929      Row Name 11/20/18 0858             Upper Body Dressing Assessment/Training    Upper Body Dressing [place] Level  upper body dressing skills;doff;don;pull-over garment;independent  -CS      Upper Body Dressing Position  edge of bed  sitting  -CS      Recorded by [CS] Sera Eddy WANG/L 11/20/18 0929      Row Name 11/20/18 0858             Lower Body Dressing Assessment/Training    Lower Body Dressing [place] Level  doff;don;pants/bottoms;socks;undergarment;supervision  -CS      Assistive Devices (Lower Body Dressing)  sock-aid  -CS      Lower Body Dressing Position  edge of bed sitting;supported standing  -CS      Recorded by [CS] Sera Eddy WANG/L 11/20/18 0929      Row Name 11/20/18 0858             Grooming Assessment/Training    [place] Level (Grooming)  grooming skills;wash face, hands apply deodorant/lotion  -CS      Grooming Position  edge of bed sitting  -CS2      Recorded by [CS] Sera Eddy WANG/L 11/20/18 0920  [CS2] Sera Eddy WANG/L 11/20/18 0942      Row Name 11/20/18 0858             Toileting Assessment/Training    [place] Level (Toileting)  toileting skills;adjust/manage clothing;perform perineal hygiene;supervision  -CS      Assistive Devices (Toileting)  raised toilet seat;grab bar/safety frame  -CS      Toileting Position  unsupported sitting;supported standing  -CS      Recorded by [CS] Sera Eddy WANG/L 11/20/18 0942      Row Name 11/20/18 1015             Left Lower Ext    Lt Knee Extension/Flexion AROM  0-79AAROM  -JA      Recorded by [JA] Al Hobson, PTA 11/20/18 1125      Row Name 11/20/18 1015             Lower Extremity Seated Therapeutic Exercise    Performed, Seated Lower Extremity (Therapeutic Exercise)  -- seated [place]   -JA      Exercise Type, Seated Lower Extremity (Therapeutic Exercise)  AROM (active range of motion);AAROM (active assistive range of motion)  -JA      Sets/Reps Detail, Seated Lower Extremity (Therapeutic Exercise)  ~5mins total A & AAROM  -JA      Recorded by [JA] Al Hobson, PTA 11/20/18 1125      Row Name 11/20/18 1015             Lower Extremity Supine Therapeutic Exercise    Performed, Supine Lower Extremity  (Therapeutic Exercise)  ankle dorsiflexion/plantarflexion;SLR (straight leg raise);quadriceps sets;gluteal sets  -      Exercise Type, Supine Lower Extremity (Therapeutic Exercise)  AAROM (active assistive range of motion);AROM (active range of motion);isometric contraction, static  -      Sets/Reps Detail, Supine Lower Extremity (Therapeutic Exercise)  x20 AP, x20 Qsets, x20 Gsets, AA SLR L x10  -JA      Recorded by [JA] Al Hobson, PTA 11/20/18 1125      Row Name 11/20/18 1015 11/20/18 0858          Positioning and Restraints    Pre-Treatment Position  in bed  -JA  in bed  -CS     Post Treatment Position  chair  -  bed  -CS2     In Bed  --  supine;call light within reach;encouraged to call for assist;exit alarm on  -CS2     Recorded by [JA] Al Hobson, PTA 11/20/18 1125 [CS] Sera Eddy, WANG/L 11/20/18 0912  [CS2] Sera Eddy, WANG/L 11/20/18 0942     Row Name 11/20/18 1015 11/20/18 0858          Pain Scale: Numbers Pre/Post-Treatment    Pain Scale: Numbers, Pretreatment  6/10  -  6/10  -CS     Pain Scale: Numbers, Post-Treatment  6/10  -  6/10  -CS2     Pain Location  -- left knee and shoulder  -  knee L  -CS2     Pain Intervention(s)  Medication (See MAR);Repositioned;Ambulation/increased activity;Cold applied  -  Medication (See MAR)  -CS2     Recorded by [JA] Al Hobson, PTA 11/20/18 1125 [CS] Sera Eddy, WANG/L 11/20/18 0912  [CS2] Sera Eddy, WANG/L 11/20/18 0942     Row Name 11/20/18 1015             Sensory Assessment/Intervention    Sensory General Assessment  --  -JA      Recorded by [JA] Al Hobson, PTA 11/20/18 1125      Row Name 11/20/18 1015             Light Touch Sensation Assessment    Left Lower Extremity: Light Touch Sensation Assessment  --  -JA      Recorded by [JA] Al Hobson, PTA 11/20/18 1125      Row Name                Wound 11/19/18 1106 Left midline knee incision;surgical    Wound - [employer]  Date first assessed: 11/19/18 [KM] Time first assessed: 1106 [KM] Present On Admission : no [KM] Side: Left [KM] Orientation: midline [KM] Location: knee [KM] Type: incision;surgical [KM] Recorded by:  [KM] Madeline Miller, RN 11/19/18 1107    Row Name 11/20/18 0858             Outcome Summary/Treatment Plan (OT)    Daily Summary of Progress (OT)  progress toward functional goals as expected  -CS      Plan for Continued Treatment (OT)  cont OT POC  -CS      Anticipated Discharge Disposition (OT)  home with 24/7 care;home with OP services;home with home health  -CS      Recorded by [CS] Sera Eddy COTA/L 11/20/18 0942      Row Name 11/20/18 1015             Outcome Summary/Treatment Plan (PT)    Daily Summary of Progress (PT)  progress toward functional goals as expected  -JA      Anticipated Equipment Needs at Discharge (PT)  front wheeled walker possible platform at L UE  -JA      Anticipated Discharge Disposition (PT)  home with assist;home with OP services  -JA      Recorded by [EMERY] Al Hobson, PTA 11/20/18 1125        User Key  (r) = Recorded By, (t) = Taken By, (c) = Cosigned By    Initials Name Effective Dates Discipline    Al Ye, PTA 03/07/18 -  PT    CS Sera Eddy WANG/L 03/07/18 -  OT    KM Madeline Miller, RN 05/21/18 -  Nurse        Wound 11/19/18 1106 Left midline knee incision;surgical (Active)   Dressing Appearance dry;intact 11/20/2018  8:53 AM   Closure MALLY 11/20/2018  8:53 AM   Base dressing in place, unable to visualize 11/20/2018  8:53 AM     OT Rehab Goals     Row Name 11/20/18 0858             Transfer Goal 1 (OT)    Activity/Assistive Device (Transfer Goal 1, OT)  toilet  -CS      [place] Level/Cues Needed (Transfer Goal 1, OT)  supervision required  -CS      Time Frame (Transfer Goal 1, OT)  long term goal (LTG);by discharge  -CS      Progress/Outcome (Transfer Goal 1, OT)  goal met  (Significant)   -CS         Bathing Goal 1 (OT)     Activity/Assistive Device (Bathing Goal 1, OT)  bathing skills, all  -CS      [place] Level/Cues Needed (Bathing Goal 1, OT)  supervision required;set-up required  -CS      Time Frame (Bathing Goal 1, OT)  long term goal (LTG);by discharge  -CS      Progress/Outcomes (Bathing Goal 1, OT)  goal met  (Significant)   -CS         Dressing Goal 1 (OT)    Activity/Assistive Device (Dressing Goal 1, OT)  dressing skills, all  -CS      [place]/Cues Needed (Dressing Goal 1, OT)  set-up required;supervision required  -CS      Time Frame (Dressing Goal 1, OT)  long term goal (LTG);by discharge  -CS      Progress/Outcome (Dressing Goal 1, OT)  goal met  (Significant)   -CS         Toileting Goal 1 (OT)    Activity/Device (Toileting Goal 1, OT)  toileting skills, all  -CS      [place] Level/Cues Needed (Toileting Goal 1, OT)  supervision required  -CS      Time Frame (Toileting Goal 1, OT)  long term goal (LTG);by discharge  -CS      Progress/Outcome (Toileting Goal 1, OT)  goal met  (Significant)   -CS         Patient Education Goal (OT)    Activity (Patient Education Goal, OT)  Pt will communicate at least 5 home safety education recommendations.   -CS      [place]/Cues/Accuracy (Memory Goal 2, OT)  verbalizes understanding  -CS      Time Frame (Patient Education Goal, OT)  long term goal (LTG);by discharge  -CS      Progress/Outcome (Patient Education Goal, OT)  goal not met  -CS        User Key  (r) = Recorded By, (t) = Taken By, (c) = Cosigned By    Initials Name Provider Type Discipline    CS Sera Eddy COTA/ALANIS Occupational Therapy Assistant OT        Occupational Therapy Education     Title: PT OT SLP Therapies (Active)     Topic: Occupational Therapy (Active)     Point: ADL training (Active)     Description: Instruct learner(s) on proper safety adaptation and remediation techniques during self care or transfers.   Instruct in proper use of assistive devices.    Learning Progress Summary            Patient Acceptance, E,TB,D, NR by  at 11/20/2018 12:36 PM    Acceptance, E, VU,NR by  at 11/19/2018  3:58 PM    Comment:  Educated about OT and POC. Educated to call for assist. Educated pt on need for RW and bath bench. Educated on safety throughout.   Family Acceptance, E, VU,NR by  at 11/19/2018  3:58 PM    Comment:  Educated about OT and POC. Educated to call for assist. Educated pt on need for RW and bath bench. Educated on safety throughout.                   Point: Home exercise program (Active)     Description: Instruct learner(s) on appropriate technique for monitoring, assisting and/or progressing therapeutic exercises/activities.    Learning Progress Summary           Patient Acceptance, E,TB,D, NR by  at 11/20/2018 12:36 PM                   Point: Precautions (Active)     Description: Instruct learner(s) on prescribed precautions during self-care and functional transfers.    Learning Progress Summary           Patient Acceptance, E,TB,D, NR by  at 11/20/2018 12:36 PM    Acceptance, E, VU,NR by  at 11/19/2018  3:58 PM    Comment:  Educated about OT and POC. Educated to call for assist. Educated pt on need for RW and bath bench. Educated on safety throughout.   Family Acceptance, E, VU,NR by  at 11/19/2018  3:58 PM    Comment:  Educated about OT and POC. Educated to call for assist. Educated pt on need for RW and bath bench. Educated on safety throughout.                   Point: Body mechanics (Active)     Description: Instruct learner(s) on proper positioning and spine alignment during self-care, functional mobility activities and/or exercises.    Learning Progress Summary           Patient Acceptance, E,TB,D, NR by  at 11/20/2018 12:36 PM                               User Key     Initials Effective Dates Name Provider Type Discipline     06/08/18 -  Ninfa Waddell OTR/L Occupational Therapist OT     03/07/18 -  Sera Eddy COTA/L Occupational Therapy Assistant OT                 OT Recommendation and Plan  Outcome Summary/Treatment Plan (OT)  Daily Summary of Progress (OT): progress toward functional goals as expected  Plan for Continued Treatment (OT): cont OT POC  Anticipated Discharge Disposition (OT): home with 24/7 care, home with OP services, home with home health  Therapy Frequency (OT Eval): daily  Daily Summary of Progress (OT): progress toward functional goals as expected  Plan of Care Review  Plan of Care Reviewed With: patient  Plan of Care Reviewed With: patient  Outcome Summary: Pt tolerated well this date. Pt was SBA with bed mobility. Pt was SBA with toilet t/f. Pt completed an ADL. Pt met 4 goals this tx. Continue OT POC.  Outcome Measures     Row Name 11/20/18 1200 11/20/18 1015 11/20/18 0900       How much help from another person do you currently need...    Turning from your back to your side while in flat bed without using bedrails?  --  3  -JA  --    Moving from lying on back to sitting on the side of a flat bed without bedrails?  --  3  -JA  --    Moving to and from a bed to a chair (including a wheelchair)?  --  3  -JA  --    Standing up from a chair using your arms (e.g., wheelchair, bedside chair)?  --  3  -JA  --    Climbing 3-5 steps with a railing?  --  2  -JA  --    To walk in hospital room?  --  3  -JA  --    AM-PAC 6 Clicks Score  --  17  -JA  --       How much help from another is currently needed...    Putting on and taking off regular lower body clothing?  3  -CS  --  2  -CS    Bathing (including washing, rinsing, and drying)  3  -CS  --  3  -CS    Toileting (which includes using toilet bed pan or urinal)  3  -CS  --  3  -CS    Putting on and taking off regular upper body clothing  4  -CS  --  4  -CS    Taking care of personal grooming (such as brushing teeth)  4  -CS  --  4  -CS    Eating meals  4  -CS  --  4  -CS    Score  21  -CS  --  20  -CS       Functional Assessment    Outcome Measure Options  AM-PAC 6 Clicks Daily Activity (OT)  -CS   -PAC 6 Clicks Basic Mobility (PT)  -[facility] 6 Clicks Daily Activity (OT)  -    Row Name 11/19/18 1431 11/19/18 1430          How much help from another person do you currently need...    Turning from your back to your side while in flat bed without using bedrails?  3  -CW  --     Moving from lying on back to sitting on the side of a flat bed without bedrails?  3  -CW  --     Moving to and from a bed to a chair (including a wheelchair)?  2  -CW  --     Standing up from a chair using your arms (e.g., wheelchair, bedside chair)?  3  -CW  --     Climbing 3-5 steps with a railing?  1  -CW  --     To walk in hospital room?  2  -CW  --     AM-PAC 6 Clicks Score  14  -CW  --        How much help from another is currently needed...    Putting on and taking off regular lower body clothing?  --  2  -BH     Bathing (including washing, rinsing, and drying)  --  2  -BH     Toileting (which includes using toilet bed pan or urinal)  --  3  -BH     Putting on and taking off regular upper body clothing  --  3  -BH     Taking care of personal grooming (such as brushing teeth)  --  3  -BH     Eating meals  --  4  -     Score  --  17  -        Functional Assessment    Outcome Measure Options  -PAC 6 Clicks Basic Mobility (PT)  -[facility] 6 Clicks Daily Activity (OT)  -       User Key  (r) = Recorded By, (t) = Taken By, (c) = Cosigned By    Initials Name Provider Type     Ninfa Waddell, OTR/L Occupational Therapist    Al Ye, PTA Physical Therapy Assistant    CS Sera Eddy COTA/L Occupational Therapy Assistant    CW Chey Lam, PT Physical Therapist           Time Calculation:   Time Calculation- OT     Row Name 11/20/18 1242 11/20/18 1128          Time Calculation- OT    OT Start Time  0858  -  --     OT Stop Time  0945  -  --     OT Time Calculation (min)  47 min  -CS  --     Total Timed Code Minutes- OT  47 minute(s)  -CS  --     OT Received On  11/20/18  -  --        Timed  Charges    78467 - Gait Training Minutes   --  15  -EMERY       User Key  (r) = Recorded By, (t) = Taken By, (c) = Cosigned By    Initials Name Provider Type    Al Ye, JOESPH Physical Therapy Assistant    Sera Avelar COTA/ALANIS Occupational Therapy Assistant           Therapy Suggested Charges     Code   Minutes Charges    None           Therapy Charges for Today     Code Description Service Date Service Provider Modifiers Qty    [id number] HC OT SELF CARE/MGMT/TRAIN EA 15 MIN 11/20/2018 Sera Eddy COTA/L GO 3          OT G-codes  OT Professional Judgement Used?: Yes  OT Functional Scales Options: AM-PAC 6 Clicks Daily Activity (OT)  Score: 17  Functional Limitation: Self care  Self Care Current Status (): At least 40 percent but less than 60 percent impaired, limited or restricted  Self Care Goal Status (): At least 20 percent but less than 40 percent impaired, limited or restricted    ROCIO Carlson  11/20/2018

## 2018-11-20 NOTE — PROGRESS NOTES
"Anticoagulation by Pharmacy - Warfarin Day 2 of 42    Priti Orr is a 53 y.o.female  [Ht: 149.9 cm (59\"); Wt: 98.8 kg (217 lb 13 oz)] on Warfarin 5 mg PO  for indication of VTE prophylaxis s/p ortho procedure.    Goal INR: 1.7-2.5  Today's INR:   Lab Results   Component Value Date    INR 1.06 11/20/2018         Lab Results   Component Value Date    INR 1.06 11/20/2018    PROTIME 13.6 11/20/2018     Lab Results   Component Value Date    HGB 12.1 11/19/2018    HGB 13.4 11/13/2018     Lab Results   Component Value Date    HCT 36.3 11/19/2018    HCT 40.0 11/13/2018     Assessment/Plan:  INR 1.06, warfarin 5mg initiated yesterday. H&H down slightly, remain WNL. Continue 5mg daily as INR trend develops.     Cesar Villalta RPH  11/20/18 10:31 AM       "

## 2018-11-20 NOTE — PROGRESS NOTES
Orthopedic Total Knee Progress Note      Patient: Priti Orr  Date of Admission: 11/19/2018  YOB: 1965  Medical Record Number: [id number]    LOS: 1    Status Post: Procedure(s) (LRB):  TOTAL KNEE ARTHROPLASTY ATTUNE with adductor canal block - left (Left)        Systemic or Specific Complaints: Pain Control  Complications: None  Pain Relief: some relief    Physical Exam:  53 y.o. female alert and oriented  Temp:  [96.5 °F (35.8 °C)-98.1 °F (36.7 °C)] 98.1 °F (36.7 °C)  Heart Rate:  [54-81] 76  Resp:  [16-20] 18  BP: (100-154)/(59-88) 105/72    Abd: Soft, NT, with BS +  Ext: NV intact. ROM appropriate. Calf is soft and nontender. Negative Homans Sn  Skin: dressing clean dry and intact w/out signs or  symptoms of infection.    Activity: Mobilizing Per P.T.   Weight Bearing: As Tolerated    Data Review  Admission on 11/19/2018   Component Date Value Ref Range Status   • ABO Type 11/19/2018 A   Final   • RH type 11/19/2018 Positive   Final   • Antibody Screen 11/19/2018 Negative   Final   • T&S Expiration Date 11/19/2018 11/22/2018 11:59:59 PM   Final   • Previous History 11/19/2018 Previous Record on File   Final   • Hemoglobin 11/19/2018 12.1  12.0 - 15.5 g/dL Final   • Hematocrit 11/19/2018 36.3  35.0 - 45.0 % Final       No results found.    Medications    acetaminophen 1,000 mg Oral 4x Daily   famotidine 40 mg Oral Daily   ferrous sulfate 324 mg Oral Daily With Breakfast   fluticasone 1 spray Nasal Daily   oxyCODONE 10 mg Oral Q12H   sodium chloride 3 mL Intravenous Q12H   warfarin 5 mg Oral Daily     bacitracin  •  bisacodyl  •  docusate sodium  •  HYDROmorphone **AND** naloxone  •  influenza vaccine  •  ondansetron **OR** ondansetron ODT **OR** ondansetron  •  oxyCODONE  •  Pharmacy to dose warfarin  •  promethazine  •  sodium chloride  •  sodium chloride    Assessment:  Doing well following total knee replacement  Patient Active Problem List   Diagnosis   • Osteoporosis   • Chronic left  shoulder pain   • Presence of left artificial shoulder joint   • Left knee pain   • Anxiety   • Arthritis   • Asthma   • Depression   • Enchondroma of humerus   • Headache, chronic migraine without aura, intractable   • History of brain surgery   • Humeral fracture   • Hypercholesteremia   • Hyperlipidemia   • Essential hypertension   • Meningioma (CMS/[facility])   • Morbid obesity (CMS/[facility])   • NICK (obstructive sleep apnea)   • Vitamin D deficiency   • Seizure-like activity (CMS/[facility])   • S/p reverse total shoulder arthroplasty   • Primary osteoarthritis of left knee   • Morbid obesity with BMI of 40.0-44.9, adult (CMS/[facility])         Plan:   Continue efforts to mobilize  Continue Pain Control Measures  Continue incisional Care  DVT prophylaxis    Discharge Plan:    Anticipate home 1-2 days.  Probably home [place].  Progress as tolerated.    Brooks Junior MD    Date: 11/20/2018   Time: 7:17 AM

## 2018-11-21 ENCOUNTER — ANTICOAGULATION VISIT (OUTPATIENT)
Dept: PHARMACY | Facility: HOSPITAL | Age: 53
End: 2018-11-21

## 2018-11-21 VITALS
WEIGHT: 217.81 LBS | OXYGEN SATURATION: 98 % | BODY MASS INDEX: 43.91 KG/M2 | TEMPERATURE: 97.9 F | RESPIRATION RATE: 20 BRPM | HEART RATE: 80 BPM | SYSTOLIC BLOOD PRESSURE: 132 MMHG | HEIGHT: 59 IN | DIASTOLIC BLOOD PRESSURE: 69 MMHG

## 2018-11-21 LAB
HOLD SPECIMEN: NORMAL
INR PPP: 1.1 (ref 0.8–1.2)
PROTHROMBIN TIME: 14 SECONDS (ref 11.1–15.3)

## 2018-11-21 PROCEDURE — 99024 POSTOP FOLLOW-UP VISIT: CPT | Performed by: ORTHOPAEDIC SURGERY

## 2018-11-21 PROCEDURE — 97110 THERAPEUTIC EXERCISES: CPT

## 2018-11-21 PROCEDURE — 85610 PROTHROMBIN TIME: CPT | Performed by: ORTHOPAEDIC SURGERY

## 2018-11-21 PROCEDURE — 25010000002 HYDROMORPHONE PER 4 MG: Performed by: ORTHOPAEDIC SURGERY

## 2018-11-21 PROCEDURE — 97116 GAIT TRAINING THERAPY: CPT

## 2018-11-21 PROCEDURE — 97530 THERAPEUTIC ACTIVITIES: CPT

## 2018-11-21 PROCEDURE — 97535 SELF CARE MNGMENT TRAINING: CPT

## 2018-11-21 RX ORDER — WARFARIN SODIUM 5 MG/1
5 TABLET ORAL NIGHTLY
Qty: 40 TABLET | Refills: 0 | Status: SHIPPED | OUTPATIENT
Start: 2018-11-21 | End: 2018-12-31

## 2018-11-21 RX ORDER — OXYCODONE HYDROCHLORIDE 5 MG/1
10 TABLET ORAL ONCE
Status: COMPLETED | OUTPATIENT
Start: 2018-11-21 | End: 2018-11-21

## 2018-11-21 RX ORDER — HYDROCODONE BITARTRATE AND ACETAMINOPHEN 7.5; 325 MG/1; MG/1
1 TABLET ORAL EVERY 4 HOURS PRN
Qty: 45 TABLET | Refills: 0 | Status: SHIPPED | OUTPATIENT
Start: 2018-11-21 | End: 2018-12-05 | Stop reason: CLARIF

## 2018-11-21 RX ADMIN — OXYCODONE HYDROCHLORIDE 5 MG: 5 TABLET ORAL at 03:21

## 2018-11-21 RX ADMIN — OXYCODONE HYDROCHLORIDE 5 MG: 5 TABLET ORAL at 13:26

## 2018-11-21 RX ADMIN — ACETAMINOPHEN 1000 MG: 500 TABLET ORAL at 13:26

## 2018-11-21 RX ADMIN — OXYCODONE HYDROCHLORIDE 10 MG: 10 TABLET, FILM COATED, EXTENDED RELEASE ORAL at 09:12

## 2018-11-21 RX ADMIN — ACETAMINOPHEN 1000 MG: 500 TABLET ORAL at 08:08

## 2018-11-21 RX ADMIN — FAMOTIDINE 40 MG: 40 TABLET ORAL at 08:08

## 2018-11-21 RX ADMIN — OXYCODONE HYDROCHLORIDE 10 MG: 5 TABLET ORAL at 08:08

## 2018-11-21 RX ADMIN — FERROUS SULFATE TAB EC 324 MG (65 MG FE EQUIVALENT) 324 MG: 324 (65 FE) TABLET DELAYED RESPONSE at 08:08

## 2018-11-21 NOTE — THERAPY TREATMENT NOTE
Acute Care - Physical Therapy Treatment Note  [facility]     Patient Name: Priti Orr  : 1965  MRN: [id number]  Today's Date: 2018  Onset of Illness/Injury or Date of Surgery: 18  Date of Referral to PT: 18  Referring Physician: Dr. Junior    Admit Date: 2018    Visit Dx:    ICD-10-CM ICD-9-CM   1. Primary osteoarthritis of left knee M17.12 715.16   2. NICK (obstructive sleep apnea) G47.33 327.23   3. Seizure-like activity (CMS/HCC) R56.9 780.39   4. Presence of left artificial shoulder joint Z96.612 V43.61   5. History of brain surgery Z98.890 V15.29   6. Morbid obesity with BMI of 40.0-44.9, adult (CMS/[facility]) E66.01 278.01    Z68.41 V85.41   7. Impaired mobility and ADLs Z74.09 799.89   8. Impaired functional mobility, balance, gait, and endurance Z74.09 V49.89   9. Essential hypertension I10 401.9     Patient Active Problem List   Diagnosis   • Osteoporosis   • Chronic left shoulder pain   • Presence of left artificial shoulder joint   • Left knee pain   • Anxiety   • Arthritis   • Asthma   • Depression   • Enchondroma of humerus   • Headache, chronic migraine without aura, intractable   • History of brain surgery   • Humeral fracture   • Hypercholesteremia   • Hyperlipidemia   • Essential hypertension   • Meningioma (CMS/[facility])   • Morbid obesity (CMS/[facility])   • NICK (obstructive sleep apnea)   • Vitamin D deficiency   • Seizure-like activity (CMS/[facility])   • S/p reverse total shoulder arthroplasty   • Primary osteoarthritis of left knee   • Morbid obesity with BMI of 40.0-44.9, adult (CMS/[facility])       Therapy Treatment    Rehabilitation Treatment Summary     Row Name 18 0918             Treatment Time/Intention    Discipline  physical therapy assistant  -TW      Document Type  therapy note (daily note)  -TW      Subjective Information  complains of;pain  -TW      Mode of Treatment  physical therapy  -TW      Therapy Frequency (PT Clinical Impression)  2 times/day  -TW       Patient Effort  good  -TW      Existing Precautions/Restrictions  fall  -TW      Recorded by [TW] Zaid Bowers, PTA 11/21/18 1245      Row Name 11/21/18 0918             Vital Signs    Post Systolic BP Rehab  119  -TW      Post Treatment Diastolic BP  68  -TW      Pretreatment Heart Rate (beats/min)  80  -TW      Intratreatment Heart Rate (beats/min)  88  -TW      Posttreatment Heart Rate (beats/min)  70  -TW      Pre SpO2 (%)  98  -TW      O2 Delivery Pre Treatment  room air  -TW      Intra SpO2 (%)  96  -TW      Post SpO2 (%)  97  -TW      Pre Patient Position  Side Lying  -TW      Intra Patient Position  Standing  -TW      Post Patient Position  Sitting  -TW      Recorded by [TW] Zaid Bowers, PTA 11/21/18 1245      Row Name 11/21/18 0918             Cognitive Assessment/Intervention- PT/OT    Affect/Mental Status (Cognitive)  WFL  -TW      Orientation Status (Cognition)  oriented x 4  -TW      Follows Commands (Cognition)  over 90% accuracy;verbal cues/prompting required  -TW      Personal Safety Interventions  fall prevention program maintained;gait belt;nonskid shoes/slippers when out of bed  -TW      Recorded by [TW] Zaid Bowers, PTA 11/21/18 1245      Row Name 11/21/18 0918             Safety Issues, Functional Mobility    Impairments Affecting Function (Mobility)  endurance/activity tolerance;strength;range of motion (ROM);pain  -TW      Recorded by [TW] Zaid Bowers, PTA 11/21/18 1245      Row Name 11/21/18 0918             Mobility Assessment/Intervention    Extremity Weight-bearing Status  left upper extremity  -TW      Left Upper Extremity (Weight-bearing Status)  weight-bearing as tolerated (WBAT)  -TW      Left Lower Extremity (Weight-bearing Status)  weight-bearing as tolerated (WBAT)  -TW      Recorded by [TW] Zaid Bowers, PTA 11/21/18 1245      Row Name 11/21/18 0918             Bed Mobility Assessment/Treatment    Bed Mobility Assessment/Treatment  sit-supine   -TW      Sit-Supine [place] (Bed Mobility)  supervision  -TW      Bed Mobility, Safety Issues  decreased use of arms for pushing/pulling;decreased use of legs for bridging/pushing  -TW      Assistive Device (Bed Mobility)  bed rails;head of bed elevated  -TW      Recorded by [TW] Zaid Bowers, PTA 11/21/18 1245      Row Name 11/21/18 0918             Transfer Assessment/Treatment    Transfer Assessment/Treatment  sit-stand transfer;stand-sit transfer  -TW      Recorded by [TW] Zaid Bowers, PTA 11/21/18 1245      Row Name 11/21/18 0918             Sit-Stand Transfer    Sit-Stand [place] (Transfers)  supervision  -TW      Assistive Device (Sit-Stand Transfers)  walker, front-wheeled With L UE platform  -TW      Recorded by [TW] Zaid Bowers, PTA 11/21/18 1245      Row Name 11/21/18 0918             Stand-Sit Transfer    Stand-Sit [place] (Transfers)  supervision  -TW      Assistive Device (Stand-Sit Transfers)  walker, front-wheeled With L UE platform  -TW      Recorded by [TW] Zaid Bowers, PTA 11/21/18 1245      Row Name 11/21/18 0918             Gait/Stairs Assessment/Training    Gait/Stairs Assessment/Training  gait/ambulation assistive device;gait/ambulation independence;distance ambulated;gait pattern;gait deviations  -TW      [place] Level (Gait)  contact guard;stand by assist  -TW      Assistive Device (Gait)  walker, front-wheeled  -TW      Distance in Feet (Gait)  254ft  -TW      Pattern (Gait)  step-to;step-through  -TW      Deviations/Abnormal Patterns (Gait)  antalgic  -TW      Bilateral Gait Deviations  heel strike decreased  -TW      Recorded by [TW] Zaid Bowers, PTA 11/21/18 1245      Row Name 11/21/18 0918             Lower Extremity Seated Therapeutic Exercise    Performed, Seated Lower Extremity (Therapeutic Exercise)  knee flexion/extension;ankle dorsiflexion/plantarflexion  -TW      Exercise Type, Seated Lower Extremity (Therapeutic  Exercise)  AROM (active range of motion)  -TW      Sets/Reps Detail, Seated Lower Extremity (Therapeutic Exercise)  4mins  -TW      Recorded by [TW] Zaid Bowers PTA 11/21/18 1245      Row Name 11/21/18 0918             Lower Extremity Supine Therapeutic Exercise    Performed, Supine Lower Extremity (Therapeutic Exercise)  quadriceps sets;gluteal sets  -TW      Exercise Type, Supine Lower Extremity (Therapeutic Exercise)  AROM (active range of motion)  -TW      Sets/Reps Detail, Supine Lower Extremity (Therapeutic Exercise)  1/20  -TW      Recorded by [TW] Zaid Bowers, PTA 11/21/18 1245      Row Name 11/21/18 0918             Positioning and Restraints    Pre-Treatment Position  in bed  -TW      Post Treatment Position  chair  -TW      In Chair  reclined;call light within reach;encouraged to call for assist;exit alarm on  -TW      Restraints  notified nsg:  -TW      Recorded by [TW] Zaid Bowers PTA 11/21/18 1245      Row Name 11/21/18 0918             Pain Scale: Numbers Pre/Post-Treatment    Pain Scale: Numbers, Pretreatment  5/10  -TW      Pain Scale: Numbers, Post-Treatment  6/10  -TW      Pain Location  -- left knee  -TW      Recorded by [TW] Zaid Bowers, PTA 11/21/18 1245      Row Name                Wound 11/19/18 1106 Left midline knee incision;surgical    Wound - Properties Group Date first assessed: 11/19/18 [KM] Time first assessed: 1106 [KM] Present On Admission : no [KM] Side: Left [KM] Orientation: midline [KM] Location: knee [KM] Type: incision;surgical [KM] Recorded by:  [KM] Madeline Miller RN 11/19/18 1107    Row Name 11/21/18 0918             Outcome Summary/Treatment Plan (PT)    Daily Summary of Progress (PT)  progress toward functional goals as expected  -TW      Plan for Continued Treatment (PT)  Mobilize  -TW      Anticipated Equipment Needs at Discharge (PT)  platform walker RW  -TW      Anticipated Discharge Disposition (PT)  home with assist;home with home  health  -TW      Recorded by [TW] Zaid Bowers, PTA 11/21/18 1245        User Key  (r) = Recorded By, (t) = Taken By, (c) = Cosigned By    Initials Name Effective Dates Discipline    TW Zaid Bowers, PTA 03/07/18 -  PT    KM Madeline Miller, RN 05/21/18 -  Nurse          Wound 11/19/18 1106 Left midline knee incision;surgical (Active)   Dressing Appearance open to air 11/21/2018  8:08 AM   Closure [place] 11/21/2018  8:08 AM   Base dressing in place, unable to visualize 11/20/2018  8:56 PM       PT Rehab Goals     Row Name 11/21/18 0918             Bed Mobility Goal 1 (PT)    Activity/Assistive Device (Bed Mobility Goal 1, PT)  sit to supine;supine to sit;scooting;rolling to left;rolling to right;bridging  -TW      [place] Level/Cues Needed (Bed Mobility Goal 1, PT)  conditional independence  -TW      Time Frame (Bed Mobility Goal 1, PT)  2 days  -TW      Progress/Outcomes (Bed Mobility Goal 1, PT)  goal not met  -TW         Transfer Goal 1 (PT)    Activity/Assistive Device (Transfer Goal 1, PT)  transfers, all  -TW      [place] Level/Cues Needed (Transfer Goal 1, PT)  conditional independence  -TW      Time Frame (Transfer Goal 1, PT)  2 - 3 days  -TW      Progress/Outcome (Transfer Goal 1, PT)  goal not met  -TW         Gait Training Goal 1 (PT)    Activity/Assistive Device (Gait Training Goal 1, PT)  gait (walking locomotion);assistive device use;backward stepping;decrease asymmetrical patterns;decrease fall risk;diminish gait deviation;forward stepping;improve balance and speed;increase endurance/gait distance;increase energy conservation;normalize weight shifts  -TW      [place] Level (Gait Training Goal 1, PT)  conditional independence  -TW      Distance (Gait Goal 1, PT)  1x500 ft or more  -TW      Time Frame (Gait Training Goal 1, PT)  by discharge  -TW      Progress/Outcome (Gait Training Goal 1, PT)  goal not met  -TW         ROM Goal 1 (PT)    ROM Goal 1 (PT)  0-100' knee  flexion  -TW      Time Frame (ROM Goal 1, PT)  1 week  -TW      Progress/Outcome (ROM Goal 1, PT)  goal not met  -TW         Stairs Goal 1 (PT)    Activity/Assistive Device (Stairs Goal 1, PT)  using handrail, left;stairs, all skills  -TW      [place] Level/Cues Needed (Stairs Goal 1, PT)  supervision required  -TW      Number of Stairs (Stairs Goal 1, PT)  2  -TW      Time Frame (Stairs Goal 1, PT)  by discharge  -TW      Progress/Outcome (Stairs Goal 1, PT)  goal not met  -TW        User Key  (r) = Recorded By, (t) = Taken By, (c) = Cosigned By    Initials Name Provider Type Discipline    TW Zaid Bowers, PTA Physical Therapy Assistant PT          Physical Therapy Education     Title: PT OT SLP Therapies (Active)     Topic: Physical Therapy (Done)     Point: Mobility training (Done)     Learning Progress Summary           Patient Acceptance, E, VU,NR by CW at 11/19/2018  4:54 PM    Comment:  Patient education on role of PT in acute care, use of gait belt, AD, hand placement during transfers, plan of care, and encouraged to call for assist to reduce risk of falls.   Family Acceptance, E, VU,NR by CW at 11/19/2018  4:54 PM    Comment:  Patient education on role of PT in acute care, use of gait belt, AD, hand placement during transfers, plan of care, and encouraged to call for assist to reduce risk of falls.                   Point: Home exercise program (Done)     Learning Progress Summary           Patient Acceptance, E, VU,NR by CW at 11/19/2018  4:55 PM    Comment:  Patient education on importance of not keeping pillow under knee, moving knee often, practicing quadriceps sets 10x every hour for neuromuscular control and holding each for 5 seconds.   Family Acceptance, E, VU,NR by CW at 11/19/2018  4:55 PM    Comment:  Patient education on importance of not keeping pillow under knee, moving knee often, practicing quadriceps sets 10x every hour for neuromuscular control and holding each for 5 seconds.                    Point: Body mechanics (Done)     Learning Progress Summary           Patient Acceptance, E, VU,NR by CW at 11/19/2018  4:54 PM    Comment:  Patient education on role of PT in acute care, use of gait belt, AD, hand placement during transfers, plan of care, and encouraged to call for assist to reduce risk of falls.   Family Acceptance, E, VU,NR by CW at 11/19/2018  4:54 PM    Comment:  Patient education on role of PT in acute care, use of gait belt, AD, hand placement during transfers, plan of care, and encouraged to call for assist to reduce risk of falls.                   Point: Precautions (Done)     Learning Progress Summary           Patient Acceptance, E, VU,NR by CW at 11/19/2018  4:54 PM    Comment:  Patient education on role of PT in acute care, use of gait belt, AD, hand placement during transfers, plan of care, and encouraged to call for assist to reduce risk of falls.   Family Acceptance, E, VU,NR by CW at 11/19/2018  4:54 PM    Comment:  Patient education on role of PT in acute care, use of gait belt, AD, hand placement during transfers, plan of care, and encouraged to call for assist to reduce risk of falls.                               User Key     Initials Effective Dates Name Provider Type Discipline     10/04/18 -  Chey Lam, PT Physical Therapist PT                PT Recommendation and Plan  Anticipated Discharge Disposition (PT): home with assist, home with home health  Therapy Frequency (PT Clinical Impression): 2 times/day  Outcome Summary/Treatment Plan (PT)  Daily Summary of Progress (PT): progress toward functional goals as expected  Plan for Continued Treatment (PT): Mobilize  Anticipated Equipment Needs at Discharge (PT): platform walker(RW)  Anticipated Discharge Disposition (PT): home with assist, home with home health  Plan of Care Reviewed With: patient  Progress: improving  Outcome Summary: Pt states she had a very bad night and doesnt know how much she can  "do. Says her knee did fine but the room she is in got very cold and \"No one knew how to turn on the heater.\"  Pt did well with t/f'ing in/out of bed and amb 250+ft with no obvious issues noted. Pt elected to wait to perform step training until this pm.  Outcome Measures     Row Name 11/21/18 0918 11/20/18 1200 11/20/18 1015       How much help from another person do you currently need...    Turning from your back to your side while in flat bed without using bedrails?  4  -TW  --  3  -JA    Moving from lying on back to sitting on the side of a flat bed without bedrails?  4  -TW  --  3  -JA    Moving to and from a bed to a chair (including a wheelchair)?  3  -TW  --  3  -JA    Standing up from a chair using your arms (e.g., wheelchair, bedside chair)?  3  -TW  --  3  -JA    Climbing 3-5 steps with a railing?  2  -TW  --  2  -JA    To walk in hospital room?  3  -TW  --  3  -JA    AM-PAC 6 Clicks Score  19  -TW  --  17  -JA       How much help from another is currently needed...    Putting on and taking off regular lower body clothing?  --  3  -CS  --    Bathing (including washing, rinsing, and drying)  --  3  -CS  --    Toileting (which includes using toilet bed pan or urinal)  --  3  -CS  --    Putting on and taking off regular upper body clothing  --  4  -CS  --    Taking care of personal grooming (such as brushing teeth)  --  4  -CS  --    Eating meals  --  4  -CS  --    Score  --  21  -CS  --       Functional Assessment    Outcome Measure Options  AM-PAC 6 Clicks Basic Mobility (PT)  -TW  AM-PAC 6 Clicks Daily Activity (OT)  -CS  AM-PAC 6 Clicks Basic Mobility (PT)  -JA    Row Name 11/20/18 0900 11/19/18 1431 11/19/18 1430       How much help from another person do you currently need...    Turning from your back to your side while in flat bed without using bedrails?  --  3  -CW  --    Moving from lying on back to sitting on the side of a flat bed without bedrails?  --  3  -CW  --    Moving to and from a bed to a " chair (including a wheelchair)?  --  2  -CW  --    Standing up from a chair using your arms (e.g., wheelchair, bedside chair)?  --  3  -CW  --    Climbing 3-5 steps with a railing?  --  1  -CW  --    To walk in hospital room?  --  2  -CW  --    AM-PAC 6 Clicks Score  --  14  -CW  --       How much help from another is currently needed...    Putting on and taking off regular lower body clothing?  2  -CS  --  2  -BH    Bathing (including washing, rinsing, and drying)  3  -CS  --  2  -BH    Toileting (which includes using toilet bed pan or urinal)  3  -CS  --  3  -BH    Putting on and taking off regular upper body clothing  4  -CS  --  3  -BH    Taking care of personal grooming (such as brushing teeth)  4  -CS  --  3  -BH    Eating meals  4  -CS  --  4  -BH    Score  20  -CS  --  17  -BH       Functional Assessment    Outcome Measure Options  AM-PAC 6 Clicks Daily Activity (OT)  -CS  AM-PAC 6 Clicks Basic Mobility (PT)  -CW  AM-PAC 6 Clicks Daily Activity (OT)  -BH      User Key  (r) = Recorded By, (t) = Taken By, (c) = Cosigned By    Initials Name Provider Type     Ninfa Waddell, OTR/L Occupational Therapist    Al Ye, PTA Physical Therapy Assistant    TW Zaid Bowers, PTA Physical Therapy Assistant    CS Sera Eddy, WANG/L Occupational Therapy Assistant    CW Chey Lam, PT Physical Therapist         Time Calculation:   PT Charges     Row Name 11/21/18 1254             Time Calculation    Start Time  0918  -TW      Stop Time  0957  -TW      Time Calculation (min)  39 min  -TW      PT Received On  11/21/18  -TW      PT Goal Re-Cert Due Date  12/02/18  -TW         Time Calculation- PT    Total Timed Code Minutes- PT  39 minute(s)  -TW        User Key  (r) = Recorded By, (t) = Taken By, (c) = Cosigned By    Initials Name Provider Type     Zaid Bowers, PTA Physical Therapy Assistant        Therapy Suggested Charges     Code   Minutes Charges    91016 (CPT®) Hc Pt  Neuromusc Re Education Ea 15 Min      97428 (CPT®) Hc Pt Ther Proc Ea 15 Min      77618 (CPT®) Hc Gait Training Ea 15 Min 15 1    93674 (CPT®) Hc Pt Therapeutic Act Ea 15 Min 20 1    05410 (CPT®) Hc Pt Manual Therapy Ea 15 Min      58672 (CPT®) Hc Pt Iontophoresis Ea 15 Min      29671 (CPT®) Hc Pt Elec Stim Ea-Per 15 Min      80675 (CPT®) Hc Pt Ultrasound Ea 15 Min      30495 (CPT®) Hc Pt Self Care/Mgmt/Train Ea 15 Min      39937 (CPT®) Hc Pt Prosthetic (S) Train Initial Encounter, Each 15 Min      91665 (CPT®) Hc Pt Orthotic(S)/Prosthetic(S) Encounter, Each 15 Min      98846 (CPT®) Hc Orthotic(S) Mgmt/Train Initial Encounter, Each 15min      Total  35 2        Therapy Charges for Today     Code Description Service Date Service Provider Modifiers Qty    16374897890 HC GAIT TRAINING EA 15 MIN 11/21/2018 Zaid Bowers, PTA GP 1    56295837186 HC PT THERAPEUTIC ACT EA 15 MIN 11/21/2018 Zaid Bowers, PTA GP 1    36110267745 HC PT THER PROC EA 15 MIN 11/21/2018 Zaid Bowers, PTA GP 1          PT G-Codes  PT Professional Judgement Used?: Yes  Outcome Measure Options: AM-PAC 6 Clicks Basic Mobility (PT)  AM-PAC 6 Clicks Score: 19  Score: 21  Functional Limitation: Mobility: Walking and moving around  Mobility: Walking and Moving Around Current Status (): At least 40 percent but less than 60 percent impaired, limited or restricted  Mobility: Walking and Moving Around Goal Status (): At least 1 percent but less than 20 percent impaired, limited or restricted    Zaid Bowers PTA  11/21/2018

## 2018-11-21 NOTE — THERAPY DISCHARGE NOTE
Acute Care - Occupational Therapy Discharge Summary  [facility]     Patient Name: Priti Orr  : 1965  MRN: [id number]    Today's Date: 2018  Onset of Illness/Injury or Date of Surgery: 18    Date of Referral to OT: 18  Referring Physician: Dr. Junior      Admit Date: 2018        OT Recommendation and Plan    Visit Dx:    ICD-10-CM ICD-9-CM   1. Primary osteoarthritis of left knee M17.12 715.16   2. NICK (obstructive sleep apnea) G47.33 327.23   3. Seizure-like activity (CMS/[facility]) R56.9 780.39   4. Presence of left artificial shoulder joint Z96.612 V43.61   5. History of brain surgery Z98.890 V15.29   6. Morbid obesity with BMI of 40.0-44.9, adult (CMS/[facility]) E66.01 278.01    Z68.41 V85.41   7. Impaired mobility and ADLs Z74.09 799.89   8. Impaired functional mobility, balance, gait, and endurance Z74.09 V49.89   9. Essential hypertension I10 401.9         Time Calculation- OT     Row Name 18 1417             Time Calculation- OT    OT Start Time  1312  -CS      OT Stop Time  1347  -CS      OT Time Calculation (min)  35 min  -CS      Total Timed Code Minutes- OT  35 minute(s)  -CS      OT Received On  18  -CS        User Key  (r) = Recorded By, (t) = Taken By, (c) = Cosigned By    Initials Name Provider Type    CS Sera Eddy COTA/ALANIS Occupational Therapy Assistant            OT Rehab Goals     Row Name 18 1312             Transfer Goal 1 (OT)    Activity/Assistive Device (Transfer Goal 1, OT)  toilet  -CS      [place] Level/Cues Needed (Transfer Goal 1, OT)  supervision required  -CS      Time Frame (Transfer Goal 1, OT)  long term goal (LTG);by discharge  -CS      Progress/Outcome (Transfer Goal 1, OT)  goal met  -CS         Bathing Goal 1 (OT)    Activity/Assistive Device (Bathing Goal 1, OT)  bathing skills, all  -CS      [place] Level/Cues Needed (Bathing Goal 1, OT)  supervision required;set-up required  -CS      Time Frame (Bathing Goal  1, OT)  long term goal (LTG);by discharge  -CS      Progress/Outcomes (Bathing Goal 1, OT)  goal met  -CS         Dressing Goal 1 (OT)    Activity/Assistive Device (Dressing Goal 1, OT)  dressing skills, all  -CS      [place]/Cues Needed (Dressing Goal 1, OT)  set-up required;supervision required  -CS      Time Frame (Dressing Goal 1, OT)  long term goal (LTG);by discharge  -CS      Progress/Outcome (Dressing Goal 1, OT)  goal met  -CS         Toileting Goal 1 (OT)    Activity/Device (Toileting Goal 1, OT)  toileting skills, all  -CS      [place] Level/Cues Needed (Toileting Goal 1, OT)  supervision required  -CS      Time Frame (Toileting Goal 1, OT)  long term goal (LTG);by discharge  -CS      Progress/Outcome (Toileting Goal 1, OT)  goal met  -CS         Patient Education Goal (OT)    Activity (Patient Education Goal, OT)  Pt will communicate at least 5 home safety education recommendations.   -CS      [place]/Cues/Accuracy (Memory Goal 2, OT)  verbalizes understanding  -CS      Time Frame (Patient Education Goal, OT)  long term goal (LTG);by discharge  -CS      Progress/Outcome (Patient Education Goal, OT)  goal met  -CS        User Key  (r) = Recorded By, (t) = Taken By, (c) = Cosigned By    Initials Name Provider Type Discipline    CS Sera Eddy COTA/ALANIS Occupational Therapy Assistant OT          Outcome Measures     Row Name 11/21/18 1400 11/21/18 0918 11/20/18 1200       How much help from another person do you currently need...    Turning from your back to your side while in flat bed without using bedrails?  --  4  -TW  --    Moving from lying on back to sitting on the side of a flat bed without bedrails?  --  4  -TW  --    Moving to and from a bed to a chair (including a wheelchair)?  --  3  -TW  --    Standing up from a chair using your arms (e.g., wheelchair, bedside chair)?  --  3  -TW  --    Climbing 3-5 steps with a railing?  --  2  -TW  --    To walk in hospital room?  --  3  -TW   --    AM-PAC 6 Clicks Score  --  19  -TW  --       How much help from another is currently needed...    Putting on and taking off regular lower body clothing?  3  -CS  --  3  -CS    Bathing (including washing, rinsing, and drying)  3  -CS  --  3  -CS    Toileting (which includes using toilet bed pan or urinal)  3  -CS  --  3  -CS    Putting on and taking off regular upper body clothing  4  -CS  --  4  -CS    Taking care of personal grooming (such as brushing teeth)  4  -CS  --  4  -CS    Eating meals  4  -CS  --  4  -CS    Score  21  -CS  --  21  -CS       Functional Assessment    Outcome Measure Options  AM-PAC 6 Clicks Daily Activity (OT)  -CS  AM-PAC 6 Clicks Basic Mobility (PT)  -TW  AM-PAC 6 Clicks Daily Activity (OT)  -CS    Row Name 11/20/18 1015 11/20/18 0900 11/19/18 1431       How much help from another person do you currently need...    Turning from your back to your side while in flat bed without using bedrails?  3  -JA  --  3  -CW    Moving from lying on back to sitting on the side of a flat bed without bedrails?  3  -JA  --  3  -CW    Moving to and from a bed to a chair (including a wheelchair)?  3  -JA  --  2  -CW    Standing up from a chair using your arms (e.g., wheelchair, bedside chair)?  3  -JA  --  3  -CW    Climbing 3-5 steps with a railing?  2  -JA  --  1  -CW    To walk in hospital room?  3  -JA  --  2  -CW    AM-PAC 6 Clicks Score  17  -JA  --  14  -CW       How much help from another is currently needed...    Putting on and taking off regular lower body clothing?  --  2  -CS  --    Bathing (including washing, rinsing, and drying)  --  3  -CS  --    Toileting (which includes using toilet bed pan or urinal)  --  3  -CS  --    Putting on and taking off regular upper body clothing  --  4  -CS  --    Taking care of personal grooming (such as brushing teeth)  --  4  -CS  --    Eating meals  --  4  -CS  --    Score  --  20  -CS  --       Functional Assessment    Outcome Measure Options  AM-PAC 6  Clicks Basic Mobility (PT)  -EMERY  AM-PAC 6 Clicks Daily Activity (OT)  -CHELY  AM-PAC 6 Clicks Basic Mobility (PT)  -LEONA    Row Name 11/19/18 7714             How much help from another is currently needed...    Putting on and taking off regular lower body clothing?  2  -BH      Bathing (including washing, rinsing, and drying)  2  -BH      Toileting (which includes using toilet bed pan or urinal)  3  -BH      Putting on and taking off regular upper body clothing  3  -BH      Taking care of personal grooming (such as brushing teeth)  3  -      Eating meals  4  -      Score  17  -         Functional Assessment    Outcome Measure Options  AM-PAC 6 Clicks Daily Activity (OT)  -        User Key  (r) = Recorded By, (t) = Taken By, (c) = Cosigned By    Initials Name Provider Type    Ninfa Daley, OTR/L Occupational Therapist    Al Ye, PTA Physical Therapy Assistant    TW Zaid Bowers, PTA Physical Therapy Assistant    CS Sera Eddy, WANG/L Occupational Therapy Assistant    Chey Rodriguez, PT Physical Therapist          Therapy Suggested Charges     Code   Minutes Charges    None                 OT Discharge Summary  Anticipated Discharge Disposition (OT): home with assist, home with home health, home with OP services  Reason for Discharge: Discharge from facility  Outcomes Achieved: Able to achieve all goals within established timeline  Discharge Destination: Home with home health      Ninfa Waddell OTR/L  11/21/2018

## 2018-11-21 NOTE — THERAPY TREATMENT NOTE
Acute Care - Occupational Therapy Treatment Note  [facility]     Patient Name: Priti Orr  : 1965  MRN: [id number]  Today's Date: 2018  Onset of Illness/Injury or Date of Surgery: 18  Date of Referral to OT: 18  Referring Physician: Dr. Junior    Admit Date: 2018       ICD-10-CM ICD-9-CM   1. Primary osteoarthritis of left knee M17.12 715.16   2. NICK (obstructive sleep apnea) G47.33 327.23   3. Seizure-like activity (CMS/HCC) R56.9 780.39   4. Presence of left artificial shoulder joint Z96.612 V43.61   5. History of brain surgery Z98.890 V15.29   6. Morbid obesity with BMI of 40.0-44.9, adult (CMS/[facility]) E66.01 278.01    Z68.41 V85.41   7. Impaired mobility and ADLs Z74.09 799.89   8. Impaired functional mobility, balance, gait, and endurance Z74.09 V49.89   9. Essential hypertension I10 401.9     Patient Active Problem List   Diagnosis   • Osteoporosis   • Chronic left shoulder pain   • Presence of left artificial shoulder joint   • Left knee pain   • Anxiety   • Arthritis   • Asthma   • Depression   • Enchondroma of humerus   • Headache, chronic migraine without aura, intractable   • History of brain surgery   • Humeral fracture   • Hypercholesteremia   • Hyperlipidemia   • Essential hypertension   • Meningioma (CMS/[facility])   • Morbid obesity (CMS/[facility])   • NICK (obstructive sleep apnea)   • Vitamin D deficiency   • Seizure-like activity (CMS/[facility])   • S/p reverse total shoulder arthroplasty   • Primary osteoarthritis of left knee   • Morbid obesity with BMI of 40.0-44.9, adult (CMS/[facility])     Past Medical History:   Diagnosis Date   • Allergic rhinitis    • Arthritis    • Asthma    • Benign neoplasm of meninges (CMS/[facility])     Post op       • Cobalamin deficiency    • Corneal abrasion    • Encounter for gynecological examination (general) (routine) without abnormal findings    • Essential hypertension    • Fatigue    • Female stress incontinence    • Head injury    •  History of delayed wound healing     Delayed healing of surgical wound      • Hyperlipidemia    • Low back pain    • Nosebleed, symptom     Nosebleed/epistaxis symptom      • Osteoporosis    • PONV (postoperative nausea and vomiting)    • Sleep apnea     not wearing c-pap     Past Surgical History:   Procedure Laterality Date   • ARM LESION/CYST EXCISION  2014    Remove arm bone lesion (1)   • ARM SKIN LESION BIOPSY / EXCISION     • ARM WOUND REPAIR / CLOSURE  2014    Repair Superficial Wound TR-EXT 2.5 < CM 90995 (1)      • BRAIN SURGERY     • BREAST LUMPECTOMY     •  SECTION  1986     Section (1)     • ENDOSCOPY  2008    Colon endoscopy 22517 (1)     • EXCISION BREAST LESION W/ PREOP NEEDLE LOC  1981    Excision, breast lesion (1)     • HEMORRHOIDECTOMY     • HEMORRHOIDECTOMY  2008    Hemorrhoidectomy (2)      • HYSTERECTOMY     • INJECTION OF MEDICATION  2013    Celestone (betamethasone) (1)      • INJECTION OF MEDICATION  2013    Depo Medrol (Methylprednisone) (1)      • INJECTION OF MEDICATION  2013    Dexamethasone (2)      • INJECTION OF MEDICATION  2014    Kenalog (6)   • KNEE ARTHROSCOPY  2007    Knee arthroscopy, surgery (1)     • LAPAROSCOPIC TUBAL LIGATION  1991    Tubal ligation (1)      • PAP SMEAR  2007    PAP SMEAR (1)      • SHOULDER SURGERY Left     x 4   • TOTAL ABDOMINAL HYSTERECTOMY WITH SALPINGO OOPHORECTOMY      JOSE/BSO (1)          Therapy Treatment    Rehabilitation Treatment Summary     Row Name 18 1312 18 0918          Treatment Time/Intention    Discipline  occupational therapy assistant  -CS  physical therapy assistant  -TW     Document Type  therapy note (daily note)  -CS  therapy note (daily note)  -TW     Subjective Information  complains of;pain  -CS  complains of;pain  -TW     Mode of Treatment  occupational therapy  -CS  physical therapy  -TW     Therapy Frequency (PT  Clinical Impression)  --  2 times/day  -TW     Therapy Frequency (OT Eval)  daily  -CS  --     Patient Effort  good  -CS  good  -TW     Existing Precautions/Restrictions  fall  -CS  fall  -TW     Recorded by [CS] Sera Eddy COTA/ALANIS 11/21/18 1406 [TW] Zaid Bowers PTA 11/21/18 1245     Row Name 11/21/18 1312 11/21/18 0918          Vital Signs    Pre Systolic BP Rehab  135  -CS  --     Pre Treatment Diastolic BP  73  -CS  --     Post Systolic BP Rehab  118  -CS  119  -TW     Post Treatment Diastolic BP  59  -CS  68  -TW     Pretreatment Heart Rate (beats/min)  68  -CS  80  -TW     Intratreatment Heart Rate (beats/min)  --  88  -TW     Posttreatment Heart Rate (beats/min)  67  -CS  70  -TW     Pre SpO2 (%)  95  -CS  98  -TW     O2 Delivery Pre Treatment  room air  -CS  room air  -TW     Intra SpO2 (%)  --  96  -TW     Post SpO2 (%)  98  -CS  97  -TW     O2 Delivery Post Treatment  room air  -CS  --     Pre Patient Position  Sitting  -CS  Side Lying  -TW     Intra Patient Position  Sitting  -CS  Standing  -TW     Post Patient Position  Sitting  -CS  Sitting  -TW     Recorded by [CS] Sera Eddy COTA/ALANIS 11/21/18 1406 [TW] Zaid Bowers PTA 11/21/18 1245     Row Name 11/21/18 1312 11/21/18 0918          Cognitive Assessment/Intervention- PT/OT    Affect/Mental Status (Cognitive)  WFL  -CS  WFL  -TW     Orientation Status (Cognition)  oriented x 4  -CS  oriented x 4  -TW     Follows Commands (Cognition)  over 90% accuracy;verbal cues/prompting required  -CS  over 90% accuracy;verbal cues/prompting required  -TW     Personal Safety Interventions  --  fall prevention program maintained;gait belt;nonskid shoes/slippers when out of bed  -TW     Recorded by [CS] Sera Eddy COTA/ALANIS 11/21/18 1406 [TW] Zaid Bowers PTA 11/21/18 1245     Row Name 11/21/18 0918             Safety Issues, Functional Mobility    Impairments Affecting Function (Mobility)  endurance/activity  tolerance;strength;range of motion (ROM);pain  -TW      Recorded by [TW] Zaid Bowers, PTA 11/21/18 1245      Row Name 11/21/18 0918             Mobility Assessment/Intervention    Extremity Weight-bearing Status  left upper extremity  -TW      Left Upper Extremity (Weight-bearing Status)  weight-bearing as tolerated (WBAT)  -TW      Left Lower Extremity (Weight-bearing Status)  weight-bearing as tolerated (WBAT)  -TW      Recorded by [TW] Zaid Bowers, PTA 11/21/18 1245      Row Name 11/21/18 0918             Bed Mobility Assessment/Treatment    Bed Mobility Assessment/Treatment  sit-supine  -TW      Sit-Supine [place] (Bed Mobility)  supervision  -TW      Bed Mobility, Safety Issues  decreased use of arms for pushing/pulling;decreased use of legs for bridging/pushing  -TW      Assistive Device (Bed Mobility)  bed rails;head of bed elevated  -TW      Recorded by [TW] Zaid Bowers, PTA 11/21/18 1245      Row Name 11/21/18 0918             Transfer Assessment/Treatment    Transfer Assessment/Treatment  sit-stand transfer;stand-sit transfer  -TW      Recorded by [TW] Zaid Bowers, PTA 11/21/18 1245      Row Name 11/21/18 0918             Sit-Stand Transfer    Sit-Stand [place] (Transfers)  supervision  -TW      Assistive Device (Sit-Stand Transfers)  walker, front-wheeled With L UE platform  -TW      Recorded by [TW] Zaid Bowers, PTA 11/21/18 1245      Row Name 11/21/18 0918             Stand-Sit Transfer    Stand-Sit [place] (Transfers)  supervision  -TW      Assistive Device (Stand-Sit Transfers)  walker, front-wheeled With L UE platform  -TW      Recorded by [TW] Zaid Bowers, PTA 11/21/18 1245      Row Name 11/21/18 0918             Gait/Stairs Assessment/Training    Gait/Stairs Assessment/Training  gait/ambulation assistive device;gait/ambulation independence;distance ambulated;gait pattern;gait deviations  -TW      [place] Level (Gait)  contact  guard;stand by assist  -TW      Assistive Device (Gait)  walker, front-wheeled  -TW      Distance in Feet (Gait)  254ft  -TW      Pattern (Gait)  step-to;step-through  -TW      Deviations/Abnormal Patterns (Gait)  antalgic  -TW      Bilateral Gait Deviations  heel strike decreased  -TW      Recorded by [TW] Zaid Bowers PTA 11/21/18 1245      Row Name 11/21/18 1312             Motor Skills Assessment/Interventions    Additional Documentation  Therapeutic Exercise (Group)  -CS      Recorded by [CS] Sera Eddy COTA/L 11/21/18 1406      Row Name 11/21/18 0918             Lower Extremity Seated Therapeutic Exercise    Performed, Seated Lower Extremity (Therapeutic Exercise)  knee flexion/extension;ankle dorsiflexion/plantarflexion  -TW      Exercise Type, Seated Lower Extremity (Therapeutic Exercise)  AROM (active range of motion)  -TW      Sets/Reps Detail, Seated Lower Extremity (Therapeutic Exercise)  4mins  -TW      Recorded by [TW] Zaid Bowers, JOESPH 11/21/18 1245      Row Name 11/21/18 0918             Lower Extremity Supine Therapeutic Exercise    Performed, Supine Lower Extremity (Therapeutic Exercise)  quadriceps sets;gluteal sets  -TW      Exercise Type, Supine Lower Extremity (Therapeutic Exercise)  AROM (active range of motion)  -TW      Sets/Reps Detail, Supine Lower Extremity (Therapeutic Exercise)  1/20  -TW      Recorded by [TW] Zaid Bowers, JOESPH 11/21/18 1245      Row Name 11/21/18 1312             Therapeutic Exercise    Upper Extremity (Therapeutic Exercise)  bicep curl, bilateral  -CS      Upper Extremity Range of Motion (Therapeutic Exercise)  shoulder flexion/extension, left;shoulder internal/external rotation, left;elbow flexion/extension, bilateral;forearm supination/pronation, bilateral;wrist flexion/extension, bilateral  -CS      Hand (Therapeutic Exercise)  finger flexion/extension, bilateral  -CS      Weight/Resistance (Therapeutic Exercise)  1 pound  -CS       Exercise Type (Therapeutic Exercise)  AROM (active range of motion)  -CS      Position (Therapeutic Exercise)  seated  -CS      Sets/Reps (Therapeutic Exercise)  1/20  -CS      Equipment (Therapeutic Exercise)  free weight, barbell  -CS      Expected Outcome (Therapeutic Exercise)  improve functional tolerance, self-care activity;improve object manipulation, self-care activity;improve performance, transfer skills  -CS      Comment (Therapeutic Exercise)  Limited ROM in RUE.  -CS2      Recorded by [CS] Sera Eddy COTA/L 11/21/18 1406  [CS2] Sera Eddy COTA/L 11/21/18 1417      Row Name 11/21/18 1312 11/21/18 0918          Positioning and Restraints    Pre-Treatment Position  sitting in chair/recliner  -CS  in bed  -TW     Post Treatment Position  chair  -CS  chair  -TW     In Bed  sitting;call light within reach;encouraged to call for assist  -CS  --     In Chair  --  reclined;call light within reach;encouraged to call for assist;exit alarm on  -TW     Restraints  --  notified nsg:  -TW     Recorded by [CS] Sera Eddy COTA/ALANIS 11/21/18 1406 [TW] Zaid Bowers, PTA 11/21/18 1245     Row Name 11/21/18 1312 11/21/18 0918          Pain Scale: Numbers Pre/Post-Treatment    Pain Scale: Numbers, Pretreatment  7/10  -CS  5/10  -TW     Pain Scale: Numbers, Post-Treatment  6/10  -CS  6/10  -TW     Pain Location  knee Left  -CS  -- left knee  -TW     Recorded by [CS] Sera Eddy COTA/ALANIS 11/21/18 1406 [TW] Zaid Bowers, PTA 11/21/18 1245     Row Name                Wound 11/19/18 1106 Left midline knee incision;surgical    Wound - Properties Group Date first assessed: 11/19/18 [KM] Time first assessed: 1106 [KM] Present On Admission : no [KM] Side: Left [KM] Orientation: midline [KM] Location: knee [KM] Type: incision;surgical [KM] Recorded by:  [KM] Madeline Miller RN 11/19/18 1107    Row Name 11/21/18 1312             Outcome Summary/Treatment Plan (OT)    Daily Summary of Progress (OT)   progress toward functional goals as expected  -CS      Plan for Continued Treatment (OT)  cont OT POC  -CS      Anticipated Discharge Disposition (OT)  home with assist;home with home health;home with OP services  -CS      Recorded by [CS] Sera Eddy WANG/L 11/21/18 1406      Row Name 11/21/18 0918             Outcome Summary/Treatment Plan (PT)    Daily Summary of Progress (PT)  progress toward functional goals as expected  -TW      Plan for Continued Treatment (PT)  Mobilize  -TW      Anticipated Equipment Needs at Discharge (PT)  platform walker RW  -TW      Anticipated Discharge Disposition (PT)  home with assist;home with home health  -TW      Recorded by [TW] Zaid Bowers, PTA 11/21/18 1245        User Key  (r) = Recorded By, (t) = Taken By, (c) = Cosigned By    Initials Name Effective Dates Discipline    TW Zaid Bowers, PTA 03/07/18 -  PT    CS Sera Eddy WANG/L 03/07/18 -  OT    KM Madeline Miller, RN 05/21/18 -  Nurse        Wound 11/19/18 1106 Left midline knee incision;surgical (Active)   Dressing Appearance open to air 11/21/2018  8:08 AM   Closure [place] 11/21/2018  8:08 AM   Base dressing in place, unable to visualize 11/20/2018  8:56 PM     OT Rehab Goals     Row Name 11/21/18 1312             Transfer Goal 1 (OT)    Activity/Assistive Device (Transfer Goal 1, OT)  toilet  -CS      [place] Level/Cues Needed (Transfer Goal 1, OT)  supervision required  -CS      Time Frame (Transfer Goal 1, OT)  long term goal (LTG);by discharge  -CS      Progress/Outcome (Transfer Goal 1, OT)  goal met  -CS         Bathing Goal 1 (OT)    Activity/Assistive Device (Bathing Goal 1, OT)  bathing skills, all  -CS      [place] Level/Cues Needed (Bathing Goal 1, OT)  supervision required;set-up required  -CS      Time Frame (Bathing Goal 1, OT)  long term goal (LTG);by discharge  -CS      Progress/Outcomes (Bathing Goal 1, OT)  goal met  -CS         Dressing Goal 1 (OT)     Activity/Assistive Device (Dressing Goal 1, OT)  dressing skills, all  -CS      [place]/Cues Needed (Dressing Goal 1, OT)  set-up required;supervision required  -CS      Time Frame (Dressing Goal 1, OT)  long term goal (LTG);by discharge  -CS      Progress/Outcome (Dressing Goal 1, OT)  goal met  -CS         Toileting Goal 1 (OT)    Activity/Device (Toileting Goal 1, OT)  toileting skills, all  -CS      [place] Level/Cues Needed (Toileting Goal 1, OT)  supervision required  -CS      Time Frame (Toileting Goal 1, OT)  long term goal (LTG);by discharge  -CS      Progress/Outcome (Toileting Goal 1, OT)  goal met  -CS         Patient Education Goal (OT)    Activity (Patient Education Goal, OT)  Pt will communicate at least 5 home safety education recommendations.   -CS      [place]/Cues/Accuracy (Memory Goal 2, OT)  verbalizes understanding  -CS      Time Frame (Patient Education Goal, OT)  long term goal (LTG);by discharge  -CS      Progress/Outcome (Patient Education Goal, OT)  goal met  -CS        User Key  (r) = Recorded By, (t) = Taken By, (c) = Cosigned By    Initials Name Provider Type Discipline    CS Sera Eddy COTA/L Occupational Therapy Assistant OT        Occupational Therapy Education     Title: PT OT SLP Therapies (Done)     Topic: Occupational Therapy (Done)     Point: ADL training (Done)     Description: Instruct learner(s) on proper safety adaptation and remediation techniques during self care or transfers.   Instruct in proper use of assistive devices.    Learning Progress Summary           Patient Acceptance, E,TB,D, VU by  at 11/21/2018  2:13 PM    Comment:  Pt was educated on HEP and home safety.    Acceptance, E,TB,D, NR by  at 11/20/2018 12:36 PM    Acceptance, E, VU,NR by  at 11/19/2018  3:58 PM    Comment:  Educated about OT and POC. Educated to call for assist. Educated pt on need for RW and bath bench. Educated on safety throughout.   Family Acceptance, E, VU,NR by   at 11/19/2018  3:58 PM    Comment:  Educated about OT and POC. Educated to call for assist. Educated pt on need for RW and bath bench. Educated on safety throughout.                   Point: Home exercise program (Done)     Description: Instruct learner(s) on appropriate technique for monitoring, assisting and/or progressing therapeutic exercises/activities.    Learning Progress Summary           Patient Acceptance, E,TB,D, VU by  at 11/21/2018  2:13 PM    Comment:  Pt was educated on HEP and home safety.    Acceptance, E,TB,D, NR by  at 11/20/2018 12:36 PM                   Point: Precautions (Done)     Description: Instruct learner(s) on prescribed precautions during self-care and functional transfers.    Learning Progress Summary           Patient Acceptance, E,TB,D, VU by  at 11/21/2018  2:13 PM    Comment:  Pt was educated on HEP and home safety.    Acceptance, E,TB,D, NR by  at 11/20/2018 12:36 PM    Acceptance, E, VU,NR by  at 11/19/2018  3:58 PM    Comment:  Educated about OT and POC. Educated to call for assist. Educated pt on need for RW and bath bench. Educated on safety throughout.   Family Acceptance, E, VU,NR by  at 11/19/2018  3:58 PM    Comment:  Educated about OT and POC. Educated to call for assist. Educated pt on need for RW and bath bench. Educated on safety throughout.                   Point: Body mechanics (Done)     Description: Instruct learner(s) on proper positioning and spine alignment during self-care, functional mobility activities and/or exercises.    Learning Progress Summary           Patient Acceptance, E,TB,D, VU by  at 11/21/2018  2:13 PM    Comment:  Pt was educated on HEP and home safety.    Acceptance, E,TB,D, NR by  at 11/20/2018 12:36 PM                               User Key     Initials Effective Dates Name Provider Type Discipline     06/08/18 -  Ninfa Waddell OTR/L Occupational Therapist OT     03/07/18 -  Sera Eddy COTA/ALANIS Occupational  Therapy Assistant OT                OT Recommendation and Plan  Outcome Summary/Treatment Plan (OT)  Daily Summary of Progress (OT): progress toward functional goals as expected  Plan for Continued Treatment (OT): cont OT POC  Anticipated Discharge Disposition (OT): home with assist, home with home health, home with OP services  Therapy Frequency (OT Eval): daily  Daily Summary of Progress (OT): progress toward functional goals as expected  Plan of Care Review  Plan of Care Reviewed With: patient  Plan of Care Reviewed With: patient  Outcome Summary: Pt tolerated well this date. Pt was SBA with bed mobility. Pt was SBA with toilet t/f. Pt completed an ADL. Pt met 4 goals this tx. Continue OT POC.  Outcome Measures     Row Name 11/21/18 1400 11/21/18 0918 11/20/18 1200       How much help from another person do you currently need...    Turning from your back to your side while in flat bed without using bedrails?  --  4  -TW  --    Moving from lying on back to sitting on the side of a flat bed without bedrails?  --  4  -TW  --    Moving to and from a bed to a chair (including a wheelchair)?  --  3  -TW  --    Standing up from a chair using your arms (e.g., wheelchair, bedside chair)?  --  3  -TW  --    Climbing 3-5 steps with a railing?  --  2  -TW  --    To walk in hospital room?  --  3  -TW  --    AM-PAC 6 Clicks Score  --  19  -TW  --       How much help from another is currently needed...    Putting on and taking off regular lower body clothing?  3  -CS  --  3  -CS    Bathing (including washing, rinsing, and drying)  3  -CS  --  3  -CS    Toileting (which includes using toilet bed pan or urinal)  3  -CS  --  3  -CS    Putting on and taking off regular upper body clothing  4  -CS  --  4  -CS    Taking care of personal grooming (such as brushing teeth)  4  -CS  --  4  -CS    Eating meals  4  -CS  --  4  -CS    Score  21  -CS  --  21  -CS       Functional Assessment    Outcome Measure Options  AM-PAC 6 Clicks Daily  Activity (OT)  -CS  AM-PAC 6 Clicks Basic Mobility (PT)  -TW  AM-PAC 6 Clicks Daily Activity (OT)  -CS    Row Name 11/20/18 1015 11/20/18 0900 11/19/18 1431       How much help from another person do you currently need...    Turning from your back to your side while in flat bed without using bedrails?  3  -JA  --  3  -CW    Moving from lying on back to sitting on the side of a flat bed without bedrails?  3  -JA  --  3  -CW    Moving to and from a bed to a chair (including a wheelchair)?  3  -JA  --  2  -CW    Standing up from a chair using your arms (e.g., wheelchair, bedside chair)?  3  -JA  --  3  -CW    Climbing 3-5 steps with a railing?  2  -JA  --  1  -CW    To walk in hospital room?  3  -JA  --  2  -CW    AM-PAC 6 Clicks Score  17  -JA  --  14  -CW       How much help from another is currently needed...    Putting on and taking off regular lower body clothing?  --  2  -CS  --    Bathing (including washing, rinsing, and drying)  --  3  -CS  --    Toileting (which includes using toilet bed pan or urinal)  --  3  -CS  --    Putting on and taking off regular upper body clothing  --  4  -CS  --    Taking care of personal grooming (such as brushing teeth)  --  4  -CS  --    Eating meals  --  4  -CS  --    Score  --  20  -CS  --       Functional Assessment    Outcome Measure Options  AM-PAC 6 Clicks Basic Mobility (PT)  -JA  AM-PAC 6 Clicks Daily Activity (OT)  -CS  AM-PAC 6 Clicks Basic Mobility (PT)  -CW    Row Name 11/19/18 1430             How much help from another is currently needed...    Putting on and taking off regular lower body clothing?  2  -BH      Bathing (including washing, rinsing, and drying)  2  -BH      Toileting (which includes using toilet bed pan or urinal)  3  -BH      Putting on and taking off regular upper body clothing  3  -BH      Taking care of personal grooming (such as brushing teeth)  3  -BH      Eating meals  4  -BH      Score  17  -         Functional Assessment    Outcome Measure  Options  AM-PAC 6 Clicks Daily Activity (OT)  -        User Key  (r) = Recorded By, (t) = Taken By, (c) = Cosigned By    Initials Name Provider Type     Ninfa Waddell, OTR/L Occupational Therapist    Al Ye, PTA Physical Therapy Assistant    TW Zaid Bowers, PTA Physical Therapy Assistant    CS Sera Eddy, WANG/L Occupational Therapy Assistant    CW Chey Lam, PT Physical Therapist           Time Calculation:   Time Calculation- OT     Row Name 11/21/18 1417             Time Calculation- OT    OT Start Time  1312  -CS      OT Stop Time  1347  -CS      OT Time Calculation (min)  35 min  -CS      Total Timed Code Minutes- OT  35 minute(s)  -CS      OT Received On  11/21/18  -        User Key  (r) = Recorded By, (t) = Taken By, (c) = Cosigned By    Initials Name Provider Type     Sera Eddy, WANG/L Occupational Therapy Assistant           Therapy Suggested Charges     Code   Minutes Charges    None           Therapy Charges for Today     Code Description Service Date Service Provider Modifiers Qty    05129421979 HC OT SELF CARE/MGMT/TRAIN EA 15 MIN 11/20/2018 Sera Eddy, WANG/L GO 3    44165816313 HC OT SELF CARE/MGMT/TRAIN EA 15 MIN 11/21/2018 Sera Eddy, WANG/L GO 1    10226211024 HC OT THER PROC EA 15 MIN 11/21/2018 Sera Eddy WANG/L GO 1          OT G-codes  OT Professional Judgement Used?: Yes  OT Functional Scales Options: AM-PAC 6 Clicks Daily Activity (OT)  Score: 17  Functional Limitation: Self care  Self Care Current Status (): At least 40 percent but less than 60 percent impaired, limited or restricted  Self Care Goal Status (): At least 20 percent but less than 40 percent impaired, limited or restricted    FELIPE Carlson/ALANIS  11/21/2018

## 2018-11-21 NOTE — SIGNIFICANT NOTE
11/21/18 1320   Rehab Treatment   Discipline physical therapy assistant   Reason Treatment Not Performed unavailable for treatment  (Pt with WANG at this time. Will check back.)

## 2018-11-21 NOTE — PROGRESS NOTES
Orthopedic Total Knee Progress Note      Patient: Priti Orr  Date of Admission: 11/19/2018  YOB: 1965  Medical Record Number: [id number]    LOS: 2    Status Post: Procedure(s) (LRB):  TOTAL KNEE ARTHROPLASTY ATTUNE with adductor canal block - left (Left)        Systemic or Specific Complaints: Pain Control  Complications: None  Pain Relief: worsening    Physical Exam:  53 y.o. female alert and oriented  Temp:  [97.5 °F (36.4 °C)-98.2 °F (36.8 °C)] 97.5 °F (36.4 °C)  Heart Rate:  [62-76] 76  Resp:  [18] 18  BP: ()/(56-70) 120/57    Abd: Soft, NT, with BS +  Ext: NV intact. ROM appropriate. Calf is soft and nontender. Negative Homans Sn  Skin: Incision clean dry and intact w/out signs or  symptoms of infection.    Activity: Mobilizing Per P.T.   Weight Bearing: As Tolerated    Data Review  Admission on 11/19/2018   Component Date Value Ref Range Status   • ABO Type 11/19/2018 A   Final   • RH type 11/19/2018 Positive   Final   • Antibody Screen 11/19/2018 Negative   Final   • T&S Expiration Date 11/19/2018 11/22/2018 11:59:59 PM   Final   • Previous History 11/19/2018 Previous Record on File   Final   • Case Report 11/19/2018    Final                    Value:Surgical Pathology Report                         Case: [id number]                                  Authorizing Provider:  Brooks Junior MD Collected:           11/19/2018 12:10 PM          Ordering Location:     [facility]             Received:            11/19/2018 01:03 PM                                 [facility]                                                              Pathologist:           Jitendra Waller MD                                                        Specimen:    Knee, Left                                                                                • Final Diagnosis 11/19/2018    Final                    Value:This result contains rich text formatting which cannot be displayed here.    • Gross Description 11/19/2018    Final                    Value:This result contains rich text formatting which cannot be displayed here.   • Hemoglobin 11/19/2018 12.1  12.0 - 15.5 g/dL Final   • Hematocrit 11/19/2018 36.3  35.0 - 45.0 % Final   • Protime 11/20/2018 13.6  11.1 - 15.3 Seconds Final   • INR 11/20/2018 1.06  0.80 - 1.20 Final   • Extra Tube 11/20/2018 Hold for add-ons.   Final    Auto resulted.       No results found.    Medications    acetaminophen 1,000 mg Oral 4x Daily   famotidine 40 mg Oral Daily   ferrous sulfate 324 mg Oral Daily With Breakfast   fluticasone 1 spray Nasal Daily   oxyCODONE 10 mg Oral Q12H   sodium chloride 3 mL Intravenous Q12H   warfarin 5 mg Oral Daily     bacitracin  •  bisacodyl  •  docusate sodium  •  HYDROmorphone **AND** naloxone  •  influenza vaccine  •  ondansetron **OR** ondansetron ODT **OR** ondansetron  •  oxyCODONE  •  Pharmacy to dose warfarin  •  promethazine  •  sodium chloride  •  sodium chloride    Assessment:  Doing well following total knee replacement  Patient Active Problem List   Diagnosis   • Osteoporosis   • Chronic left shoulder pain   • Presence of left artificial shoulder joint   • Left knee pain   • Anxiety   • Arthritis   • Asthma   • Depression   • Enchondroma of humerus   • Headache, chronic migraine without aura, intractable   • History of brain surgery   • Humeral fracture   • Hypercholesteremia   • Hyperlipidemia   • Essential hypertension   • Meningioma (CMS/[facility])   • Morbid obesity (CMS/[facility])   • NICK (obstructive sleep apnea)   • Vitamin D deficiency   • Seizure-like activity (CMS/[facility])   • S/p reverse total shoulder arthroplasty   • Primary osteoarthritis of left knee   • Morbid obesity with BMI of 40.0-44.9, adult (CMS/[facility])         Plan:   Continue efforts to mobilize  Continue Pain Control Measures  Continue incisional Care  DVT prophylaxis    Discharge Plan:Home today or tomorrow pending pain control, anticipate home health, possible  outpatient    Brooks Junior MD    Date: 11/21/2018   Time: 7:09 AM

## 2018-11-21 NOTE — THERAPY DISCHARGE NOTE
Acute Care - Physical Therapy Discharge Summary  [facility]       Patient Name: Priti Orr  : 1965  MRN: [id number]    Today's Date: 2018  Onset of Illness/Injury or Date of Surgery: 18    Date of Referral to PT: 18  Referring Physician: Dr. Junior      Admit Date: 2018      PT Recommendation and Plan    Visit Dx:    ICD-10-CM ICD-9-CM   1. Primary osteoarthritis of left knee M17.12 715.16   2. NICK (obstructive sleep apnea) G47.33 327.23   3. Seizure-like activity (CMS/[facility]) R56.9 780.39   4. Presence of left artificial shoulder joint Z96.612 V43.61   5. History of brain surgery Z98.890 V15.29   6. Morbid obesity with BMI of 40.0-44.9, adult (CMS/[facility]) E66.01 278.01    Z68.41 V85.41   7. Impaired mobility and ADLs Z74.09 799.89   8. Impaired functional mobility, balance, gait, and endurance Z74.09 V49.89   9. Essential hypertension I10 401.9       Outcome Measures     Row Name 18 1400 18 0918 18 1200       How much help from another person do you currently need...    Turning from your back to your side while in flat bed without using bedrails?  --  4  -TW  --    Moving from lying on back to sitting on the side of a flat bed without bedrails?  --  4  -TW  --    Moving to and from a bed to a chair (including a wheelchair)?  --  3  -TW  --    Standing up from a chair using your arms (e.g., wheelchair, bedside chair)?  --  3  -TW  --    Climbing 3-5 steps with a railing?  --  2  -TW  --    To walk in hospital room?  --  3  -TW  --    AM-PAC 6 Clicks Score  --  19  -TW  --       How much help from another is currently needed...    Putting on and taking off regular lower body clothing?  3  -CS  --  3  -CS    Bathing (including washing, rinsing, and drying)  3  -CS  --  3  -CS    Toileting (which includes using toilet bed pan or urinal)  3  -CS  --  3  -CS    Putting on and taking off regular upper body clothing  4  -CS  --  4  -CS    Taking care of personal  grooming (such as brushing teeth)  4  -CS  --  4  -CS    Eating meals  4  -CS  --  4  -CS    Score  21  -CS  --  21  -CS       Functional Assessment    Outcome Measure Options  AM-PAC 6 Clicks Daily Activity (OT)  -CS  AM-PAC 6 Clicks Basic Mobility (PT)  -TW  AM-PAC 6 Clicks Daily Activity (OT)  -CS    Row Name 11/20/18 1015 11/20/18 0900 11/19/18 1431       How much help from another person do you currently need...    Turning from your back to your side while in flat bed without using bedrails?  3  -JA  --  3  -CW    Moving from lying on back to sitting on the side of a flat bed without bedrails?  3  -JA  --  3  -CW    Moving to and from a bed to a chair (including a wheelchair)?  3  -JA  --  2  -CW    Standing up from a chair using your arms (e.g., wheelchair, bedside chair)?  3  -JA  --  3  -CW    Climbing 3-5 steps with a railing?  2  -JA  --  1  -CW    To walk in hospital room?  3  -JA  --  2  -CW    AM-PAC 6 Clicks Score  17  -JA  --  14  -CW       How much help from another is currently needed...    Putting on and taking off regular lower body clothing?  --  2  -CS  --    Bathing (including washing, rinsing, and drying)  --  3  -CS  --    Toileting (which includes using toilet bed pan or urinal)  --  3  -CS  --    Putting on and taking off regular upper body clothing  --  4  -CS  --    Taking care of personal grooming (such as brushing teeth)  --  4  -CS  --    Eating meals  --  4  -CS  --    Score  --  20  -CS  --       Functional Assessment    Outcome Measure Options  AM-PAC 6 Clicks Basic Mobility (PT)  -JA  AM-PAC 6 Clicks Daily Activity (OT)  -CS  AM-PAC 6 Clicks Basic Mobility (PT)  -CW    Row Name 11/19/18 1430             How much help from another is currently needed...    Putting on and taking off regular lower body clothing?  2  -BH      Bathing (including washing, rinsing, and drying)  2  -BH      Toileting (which includes using toilet bed pan or urinal)  3  -BH      Putting on and taking off  regular upper body clothing  3  -BH      Taking care of personal grooming (such as brushing teeth)  3  -BH      Eating meals  4  -      Score  17  -         Functional Assessment    Outcome Measure Options  AM-PAC 6 Clicks Daily Activity (OT)  -BH        User Key  (r) = Recorded By, (t) = Taken By, (c) = Cosigned By    Initials Name Provider Type     Ninfa Waddell, OTR/L Occupational Therapist    Al Ye, PTA Physical Therapy Assistant    TW Zaid Bowers, PTA Physical Therapy Assistant    CS Sera Eddy, WANG/L Occupational Therapy Assistant    CW Chey Lam, PT Physical Therapist          PT Charges     Row Name 11/21/18 1254             Time Calculation    Start Time  0918  -TW      Stop Time  0957  -TW      Time Calculation (min)  39 min  -TW      PT Received On  11/21/18  -TW      PT Goal Re-Cert Due Date  12/02/18  -TW         Time Calculation- PT    Total Timed Code Minutes- PT  39 minute(s)  -TW        User Key  (r) = Recorded By, (t) = Taken By, (c) = Cosigned By    Initials Name Provider Type     Zaid Bowers, PTA Physical Therapy Assistant        Therapy Suggested Charges     Code   Minutes Charges    76282 (CPT®) Hc Pt Neuromusc Re Education Ea 15 Min      33988 (CPT®) Hc Pt Ther Proc Ea 15 Min      81244 (CPT®) Hc Gait Training Ea 15 Min 15 1    90575 (CPT®) Hc Pt Therapeutic Act Ea 15 Min 20 1    96210 (CPT®) Hc Pt Manual Therapy Ea 15 Min      86858 (CPT®) Hc Pt Iontophoresis Ea 15 Min      44546 (CPT®) Hc Pt Elec Stim Ea-Per 15 Min      15519 (CPT®) Hc Pt Ultrasound Ea 15 Min      74411 (CPT®) Hc Pt Self Care/Mgmt/Train Ea 15 Min      31483 (CPT®) Hc Pt Prosthetic (S) Train Initial Encounter, Each 15 Min      83415 (CPT®) Hc Pt Orthotic(S)/Prosthetic(S) Encounter, Each 15 Min      68564 (CPT®) Hc Orthotic(S) Mgmt/Train Initial Encounter, Each 15min      Total  35 2          PT Rehab Goals     Row Name 11/21/18 0918             Bed Mobility Goal 1  (PT)    Activity/Assistive Device (Bed Mobility Goal 1, PT)  sit to supine;supine to sit;scooting;rolling to left;rolling to right;bridging  -TW      [place] Level/Cues Needed (Bed Mobility Goal 1, PT)  conditional independence  -TW      Time Frame (Bed Mobility Goal 1, PT)  2 days  -TW      Progress/Outcomes (Bed Mobility Goal 1, PT)  goal not met  -TW         Transfer Goal 1 (PT)    Activity/Assistive Device (Transfer Goal 1, PT)  transfers, all  -TW      [place] Level/Cues Needed (Transfer Goal 1, PT)  conditional independence  -TW      Time Frame (Transfer Goal 1, PT)  2 - 3 days  -TW      Progress/Outcome (Transfer Goal 1, PT)  goal not met  -TW         Gait Training Goal 1 (PT)    Activity/Assistive Device (Gait Training Goal 1, PT)  gait (walking locomotion);assistive device use;backward stepping;decrease asymmetrical patterns;decrease fall risk;diminish gait deviation;forward stepping;improve balance and speed;increase endurance/gait distance;increase energy conservation;normalize weight shifts  -TW      [place] Level (Gait Training Goal 1, PT)  conditional independence  -TW      Distance (Gait Goal 1, PT)  1x500 ft or more  -TW      Time Frame (Gait Training Goal 1, PT)  by discharge  -TW      Progress/Outcome (Gait Training Goal 1, PT)  goal not met  -TW         ROM Goal 1 (PT)    ROM Goal 1 (PT)  0-100' knee flexion  -TW      Time Frame (ROM Goal 1, PT)  1 week  -TW      Progress/Outcome (ROM Goal 1, PT)  goal not met  -TW         Stairs Goal 1 (PT)    Activity/Assistive Device (Stairs Goal 1, PT)  using handrail, left;stairs, all skills  -TW      [place] Level/Cues Needed (Stairs Goal 1, PT)  supervision required  -TW      Number of Stairs (Stairs Goal 1, PT)  2  -TW      Time Frame (Stairs Goal 1, PT)  by discharge  -TW      Progress/Outcome (Stairs Goal 1, PT)  goal not met  -TW        User Key  (r) = Recorded By, (t) = Taken By, (c) = Cosigned By    Initials Name Provider Type  Discipline    TW Zaid Bowers, PTA Physical Therapy Assistant PT              PT Discharge Summary  Anticipated Discharge Disposition (PT): home with assist, home with home health  Reason for Discharge: Discharge from facility, Per MD order  Outcomes Achieved: Refer to plan of care for updates on goals achieved  Discharge Destination: Home with home health      Carlita Pitt, PT   11/21/2018

## 2018-11-21 NOTE — PLAN OF CARE
"Problem: Patient Care Overview  Goal: Plan of Care Review  Outcome: Ongoing (interventions implemented as appropriate)   11/21/18 0912   Coping/Psychosocial   Plan of Care Reviewed With patient   Plan of Care Review   Progress improving   OTHER   Outcome Summary Pt states she had a very bad night and doesnt know how much she can do. Says her knee did fine but the room she is in got very cold and \"No one knew how to turn on the heater.\" Pt did well with t/f'ing in/out of bed and amb 250+ft with no obvious issues noted. Pt elected to wait to perform step training until this pm.         "

## 2018-11-21 NOTE — NURSING NOTE
Dr Junior stated patient can take 1.5 pain pills for the first 1-2 days at home if pain is severe.

## 2018-11-21 NOTE — PROGRESS NOTES
Anticoagulation- Warfarin     Completed Discharge Warfarin Counseling    Discussed the followin. Reason for Medication and Length of therapy  2. Drug-Drug Interactions  3. Drug-Diet Interactions  4. Drug- Alcohol Interactions  5. Signs and Symptoms of Bleeding  6. Fall risk- go to the Emergency Room  7. Home procedures:  Patient is going home with [facility] x2 weeks  8. Sent the Rx to: meds to bed Pharmacy for warfarin 5mg, Number: 40  Si tablet every night or As Directed + 0 refills  9. Gave Warfarin booklet  10. Answered all Questions  11. Called in lab orders to [facility], consult had not been sent to them, spoke with ARIK Palacios on 3E, she confirmed she can add the PT/INR order for  to the admission order to be sent to [facility]    Cesar Villalta RPH  18  1:34 PM

## 2018-11-21 NOTE — PLAN OF CARE
Problem: Patient Care Overview  Goal: Plan of Care Review  Outcome: Ongoing (interventions implemented as appropriate)   11/21/18 0525   Coping/Psychosocial   Plan of Care Reviewed With patient   Plan of Care Review   Progress no change   OTHER   Outcome Summary VSS, pt complains of pain, resting most of shift, cont to monitor       Problem: Fall Risk (Adult)  Goal: Absence of Fall  Outcome: Ongoing (interventions implemented as appropriate)      Problem: Knee Arthroplasty (Total, Partial) (Adult)  Goal: Signs and Symptoms of Listed Potential Problems Will be Absent, Minimized or Managed (Knee Arthroplasty)  Outcome: Ongoing (interventions implemented as appropriate)

## 2018-11-22 ENCOUNTER — READMISSION MANAGEMENT (OUTPATIENT)
Dept: CALL CENTER | Facility: HOSPITAL | Age: 53
End: 2018-11-22

## 2018-11-22 NOTE — OUTREACH NOTE
Prep Survey      Responses   Facility patient discharged from?  [facility]   Is patient eligible?  Yes   Discharge diagnosis  Total Knee Relacement    Does the patient have one of the following disease processes/diagnoses(primary or secondary)?  Total Joint Replacement   Does the patient have Home health ordered?  Yes   What is the Home health agency?   [facility]    Is there a DME ordered?  No   Medication alerts for this patient  Coumadin   Prep survey completed?  Yes          Fawn Roy RN

## 2018-11-23 ENCOUNTER — ANTICOAGULATION VISIT (OUTPATIENT)
Dept: PHARMACY | Facility: HOSPITAL | Age: 53
End: 2018-11-23

## 2018-11-23 LAB — INR PPP: 1.3

## 2018-11-23 NOTE — PROGRESS NOTES
Spoke with [facility]. Reviewed current lab.  No s/s of bleeding. No new meds. No missed doses.     INR 1.3, day 4 tonight, trending appropriately.  Reviewed INR goal with [facility].  Continue warfarin 5 mg nightly.    Reviewed schedule for following week. Pt read back regimen and verbalized understanding. All questions answered. Next INR on 11/28 provided by [facility].    Kevin Shaw [facility]  11/23/18  10:14 AM

## 2018-11-26 ENCOUNTER — TELEPHONE (OUTPATIENT)
Dept: ORTHOPEDIC SURGERY | Facility: CLINIC | Age: 53
End: 2018-11-26

## 2018-11-27 ENCOUNTER — READMISSION MANAGEMENT (OUTPATIENT)
Dept: CALL CENTER | Facility: HOSPITAL | Age: 53
End: 2018-11-27

## 2018-11-27 NOTE — TELEPHONE ENCOUNTER
PHONE CALL:    Patient was contacted by phone to ensure that there were no issues.  All questions were answered.  Patient conveyed that they were doing well and that there were no current problems.  Patient will keep their current follow-up appointment.    11/26/18 at 6:26 PM by Brooks Junior MD      
Skin normal color for race, warm, dry and intact. No evidence of rash. Moist mucous membranes

## 2018-11-27 NOTE — OUTREACH NOTE
Total Joint Week 1 Survey      Responses   Facility patient discharged from?  [facility]   Does the patient have one of the following disease processes/diagnoses(primary or secondary)?  Total Joint Replacement   Is there a successful TCM telephone encounter documented?  No   Joint surgery performed?  Knee   Week 1 attempt successful?  Yes   Call start time  1142   Rescheduled  Rescheduled-pt requested   Call end time  1143   Discharge diagnosis  Total Knee Relacement           Anat Caldwell RN

## 2018-11-28 ENCOUNTER — ANTICOAGULATION VISIT (OUTPATIENT)
Dept: PHARMACY | Facility: HOSPITAL | Age: 53
End: 2018-11-28

## 2018-11-28 ENCOUNTER — READMISSION MANAGEMENT (OUTPATIENT)
Dept: CALL CENTER | Facility: HOSPITAL | Age: 53
End: 2018-11-28

## 2018-11-28 LAB — INR PPP: 3.7

## 2018-11-28 NOTE — OUTREACH NOTE
Total Joint Week 1 Survey      Responses   Facility patient discharged from?  [facility]   Does the patient have one of the following disease processes/diagnoses(primary or secondary)?  Total Joint Replacement   Is there a successful TCM telephone encounter documented?  No   Joint surgery performed?  Knee   Week 1 attempt successful?  Yes   Call start time  1402   Call end time  1405   Has the patient been back in either the hospital or Emergency Department since discharge?  No   Discharge diagnosis  Total Knee Relacement    Is patient permission given to speak with other caregiver?  No [Patient only]   Does the patient have all medications related to this admission filled (includes all antibiotics, pain medications, etc.)  Yes   Is the patient taking all medications as directed (includes completed medication regime)?  Yes   Is the patient able to teach back alternate methods of pain control?  Ice, Knee-elevation/no pillow under knee, Reposition, Correct alignment, Short, frequent activity   Does the patient have a follow up appointment with their surgeon?  Yes   Has the patient kept scheduled appointments due by today?  N/A   What is the Home health agency?   [facility]    Has home health visited the patient within 72 hours of discharge?  Yes   Psychosocial issues?  No   Has the patient began therapy sessions (either in the home or as an out patient)?  Yes   If the patient has started attending therapy, what post op day did they begin to attend (either in home or as an out patient)?    11/23/2018   Does the patient have a wound vac in place?  No   Has the patient fallen since discharge?  No   Did the patient receive a copy of their discharge instructions?  Yes   Nursing interventions  Reviewed instructions with patient, Educated on MyChart   What is the patient's perception of their functional status since discharge?  Improving   Is the patient able to teach back signs and symptoms of infection?  Temp >100.4 for 24h or  longer, Incisional drainage, Blisters around incision, Increased swelling or redness around incision (not associated with surgical edema), Severe discomfort or pain, Changes in mobility, Shortness of breath or chest pain   Is the patient able to teach back how to prevent infection?  Check incision daily, Wash hands before and after touching incision   Is the patient able to teach back signs and symptoms of DVT?  Redness in calf, Area hot to touch, Shortness of breath or chest pain, Severe pain in calf, Swelling in calf   Is the patient/caregiver able to teach back the hierarchy of who to call/visit for symptoms/problems? PCP, Specialist, Home health nurse, Urgent Care, ED, 911  Yes   Week 1 call completed?  Yes          April Caldwell RN

## 2018-11-28 NOTE — PROGRESS NOTES
Spoke with Susan  [facility] RN. Reviewed current lab.  No s/s of bleeding. No new meds. No missed doses. Reviewed schedule for following week. Pt read back regimen and verbalized understanding. All questions answered. Next INR on 11/30 provided by [facility].    Cesar Villalta RPH  11/28/18  12:41 PM

## 2018-11-30 ENCOUNTER — ANTICOAGULATION VISIT (OUTPATIENT)
Dept: PHARMACY | Facility: HOSPITAL | Age: 53
End: 2018-11-30

## 2018-11-30 LAB — INR PPP: 1.9 (ref 1.7–2.5)

## 2018-11-30 RX ORDER — PYRAZINAMIDE 500 MG/1
1 TABLET ORAL EVERY 4 HOURS PRN
Qty: 40 TABLET | Refills: 0 | Status: SHIPPED | OUTPATIENT
Start: 2018-11-30 | End: 2019-01-08

## 2018-11-30 NOTE — PROGRESS NOTES
Spoke with ARIK Coppola. Reviewed current lab.  No s/s of bleeding. MD told her to stop Mobic while on warfarin. No missed doses. Reviewed schedule for next 4 days. RN read back regimen and verbalized understanding. All questions answered. Next INR on 12/04 provided by [facility].    Khloe Pitts RPH  11/30/18  10:32 AM

## 2018-11-30 NOTE — DISCHARGE SUMMARY
Patient Name: Priti Orr  Patient YOB: 1965    Date of Admission:  11/19/2018  Date of Discharge:  11/21/2018  Discharge Diagnosis:   Primary osteoarthritis of left knee    NICK (obstructive sleep apnea)    Seizure-like activity (CMS/HCC)    Presence of left artificial shoulder joint    History of brain surgery    Morbid obesity with BMI of 40.0-44.9, adult (CMS/HCC)    Impaired mobility and ADLs    Impaired functional mobility, balance, gait, and endurance    Essential hypertension    Presenting Problem/History of Present Illness: NICK (obstructive sleep apnea) [G47.33]  Seizure-like activity (CMS/HCC) [R56.9]  History of brain surgery [Z98.890]  Presence of left artificial shoulder joint [Z96.612]  Primary osteoarthritis of left knee [M17.12]  Morbid obesity with BMI of 40.0-44.9, adult (CMS/HCC) [E66.01, Z68.41]  Primary osteoarthritis of left knee [M17.12]  Primary osteoarthritis of left knee [M17.12]    Procedure:  Procedure(s) (LRB):  TOTAL KNEE ARTHROPLASTY ATTUNE with adductor canal block - left (Left)    Admitting Physician:  Brooks Junior MD    Consults:   Consults     No orders found from 10/21/2018 to 11/20/2018.          DETAILS OF HOSPITAL STAY:  Patient is a 53 y.o. female was admitted to the floor following the above procedure and underwent an uncomplicated hospital stay.  Patient did well with physical therapy and was ambulating well without problems.  On the day of discharge the wound was clean, dry and intact and calf was soft and non tender and Homans sign was negative.  Patient was tolerating diet without problems.  Patient was discharged home.    Condition on Discharge:  Stable      LABS:      Admission on 11/19/2018, Discharged on 11/21/2018   Component Date Value Ref Range Status   • ABO Type 11/19/2018 A   Final   • RH type 11/19/2018 Positive   Final   • Antibody Screen 11/19/2018 Negative   Final   • T&S Expiration Date 11/19/2018 11/22/2018 11:59:59 PM    Final   • Previous History 11/19/2018 Previous Record on File   Final   • Case Report 11/19/2018    Final                    Value:Surgical Pathology Report                         Case: [id number]                                  Authorizing Provider:  Brooks Junior MD Collected:           11/19/2018 12:10 PM          Ordering Location:     [facility]             Received:            11/19/2018 01:03 PM                                 [place]                                                              Pathologist:           Jitendra Waller MD                                                        Specimen:    Knee, Left                                                                                • Final Diagnosis 11/19/2018    Final                    Value:This result contains rich text formatting which cannot be displayed here.   • Gross Description 11/19/2018    Final                    Value:This result contains rich text formatting which cannot be displayed here.   • Hemoglobin 11/19/2018 12.1  12.0 - 15.5 g/dL Final   • Hematocrit 11/19/2018 36.3  35.0 - 45.0 % Final   • Protime 11/20/2018 13.6  11.1 - 15.3 Seconds Final   • INR 11/20/2018 1.06  0.80 - 1.20 Final   • Extra Tube 11/20/2018 Hold for add-ons.   Final    Auto resulted.   • Protime 11/21/2018 14.0  11.1 - 15.3 Seconds Final   • INR 11/21/2018 1.10  0.80 - 1.20 Final   • Extra Tube 11/21/2018 Hold for add-ons.   Final    Auto resulted.       Xr Knee 1 Or 2 View Left    Result Date: 11/19/2018  Narrative: PROCEDURE: Left knee, two views. INDICATION: Postop knee replacement. COMPARISON: 7/16/2018 left knee exam. There has been interval left total knee replacement. Anatomic alignment of the components with no evidence of loosening. Surgical drain in the suprapatellar region. Anterior longitudinal cutaneous surgical staples.     Impression: Postop total knee replacement with anatomic alignment components. Suprapatellar drain  and anterior longitudinal cutaneous staples. [id number] Electronically signed by:  Gregg Tanner MD  11/19/2018 1:35 PM CST Workstation: [employer] [facility]      Discharge Medications     Discharge Medications      New Medications      Instructions Start Date   HYDROcodone-acetaminophen 7.5-325 MG per tablet  Commonly known as:  NORCO   1 tablet, Oral, Every 4 Hours PRN      PHARMACY TO DOSE WARFARIN   Does not apply, Continuous PRN      warfarin 5 MG tablet  Commonly known as:  COUMADIN   5 mg, Oral, Nightly         Continue These Medications      Instructions Start Date   fluticasone 50 MCG/ACT nasal spray  Commonly known as:  FLONASE   1 spray, Nasal, Daily      GLUCOSAMINE CHONDROITIN JOINT PO   1 tablet, Oral, Daily      lisinopril-hydrochlorothiazide 20-12.5 MG per tablet  Commonly known as:  PRINZIDE,ZESTORETIC   1 tablet, Oral, Daily      meloxicam 15 MG tablet  Commonly known as:  MOBIC   15 mg, Oral, Daily      montelukast 10 MG tablet  Commonly known as:  SINGULAIR   10 mg, Oral, Daily PRN      vitamin D 95094 units capsule capsule  Commonly known as:  ERGOCALCIFEROL   50,000 Units, Oral, Weekly             Discharge Diet:   Diet Instructions     Diet: Regular      Discharge Diet:  Regular          Activity at Discharge:   Activity Instructions     Activity as Tolerated            Discharge Instructions: Patient is to continue with physical therapy exercises daily and continue working with the physical therapist as ordered. Patient may weight bear as tolerated. Continue ice regularly and use ace bandages from the foot toward the knee as needed for swelling. Patient instructed on frequent calf pumping exercises.  Patient also instructed on incentive spirometer during hospitalization and encouraged to continue to use at home regularly. Patient may shower on POD#2. The wound should be gently cleaned with antibacterial soap then allowed to dry.  If there is any drainage then it can be covered with a dry sterile  "dressing.  If there is drainage, redness or swelling, then the physician should be notified. Follow up appointment in 2 weeks - patient to call the office at [phone] to schedule.  Coumadin management per hospital pharmacy.  All other meds per the discharge med/rec    Discharge Diagnosis:    Patient Active Problem List   Diagnosis   • Osteoporosis   • Chronic left shoulder pain   • Presence of left artificial shoulder joint   • Left knee pain   • Anxiety   • Arthritis   • Asthma   • Depression   • Enchondroma of humerus   • Headache, chronic migraine without aura, intractable   • History of brain surgery   • Humeral fracture   • Hypercholesteremia   • Hyperlipidemia   • Essential hypertension   • Meningioma (CMS/[facility])   • Morbid obesity (CMS/[facility])   • NICK (obstructive sleep apnea)   • Vitamin D deficiency   • Seizure-like activity (CMS/[facility])   • S/p reverse total shoulder arthroplasty   • Primary osteoarthritis of left knee   • Morbid obesity with BMI of 40.0-44.9, adult (CMS/[facility])       Follow-up Appointments  Future Appointments   Date Time Provider Department Center   12/5/2018  8:50 AM Kennedi Dow APRN [facility] None     Additional Instructions for the Follow-ups that You Need to Schedule     Call MD With Problems / Concerns   As directed      Instructions:   Call the office with questions, problems, or concerns.  Remember that pain medication can not be \"called in\" but a prescription must be written and picked up from the office.   So, be sure to notify the office with a little notice prior to running out.    Order Comments:  Instructions: Call the office with questions, problems, or concerns. Remember that pain medication can not be \"called in\" but a prescription must be written and picked up from the office. So, be sure to notify the office with a little notice prior to running out.          Discharge Follow-up with Specified Provider: DOMENICA Watkins, MARCELLUS Russell; 2 Weeks   As directed      To:  DOMENICA" DOMENICA Junior R Peyton    Follow Up:  2 Weeks         Referral to Home Health   As directed      Face to Face Visit Date:  11/21/2018    Follow-up Provider for Plan of Care?:  I will be treating the patient on an ongoing basis.  Please send me the Plan of Care for signature.    Follow-up Provider:  MARIANN JUNIOR [[id number]]    Reason/Clinical Findings:  total knee    Describe mobility limitations that make leaving home difficult:  total knee, limited mobility, transportation    Nursing/Therapeutic Services Requested:  Physical Therapy Occupational Therapy Skilled Nursing    Skilled nursing orders:  Other (INR per pharmacy)    PT orders:  Total joint pathway Gait Training Transfer training Strengthening Home safety assessment    Weight Bearing Status:  As Tolerated    Occupational orders:  Activities of daily living Strengthening Home safety assessment    Frequency:  1 Week 1                  Mariann Junior MD  11/30/18  6:49 AM

## 2018-12-04 ENCOUNTER — ANTICOAGULATION VISIT (OUTPATIENT)
Dept: PHARMACY | Facility: HOSPITAL | Age: 53
End: 2018-12-04

## 2018-12-04 DIAGNOSIS — M17.12 PRIMARY OSTEOARTHRITIS OF LEFT KNEE: Primary | ICD-10-CM

## 2018-12-04 DIAGNOSIS — Z79.01 LONG TERM (CURRENT) USE OF ANTICOAGULANTS: Primary | ICD-10-CM

## 2018-12-04 LAB — INR PPP: 1.6

## 2018-12-04 NOTE — PROGRESS NOTES
Spoke with Sarai [facility] RN. Reviewed current lab.  No s/s of bleeding. Priti was still taking her mobic up until last week, has since stopped. No missed doses. Reviewed schedule for following week. Pt read back regimen and verbalized understanding. All questions answered. Next INR on 12/11 provided by RAMILA Villalta RPH  12/04/18  11:53 AM

## 2018-12-05 ENCOUNTER — OFFICE VISIT (OUTPATIENT)
Dept: ORTHOPEDIC SURGERY | Facility: CLINIC | Age: 53
End: 2018-12-05

## 2018-12-05 ENCOUNTER — READMISSION MANAGEMENT (OUTPATIENT)
Dept: CALL CENTER | Facility: HOSPITAL | Age: 53
End: 2018-12-05

## 2018-12-05 VITALS — BODY MASS INDEX: 42.33 KG/M2 | WEIGHT: 210 LBS | HEIGHT: 59 IN

## 2018-12-05 DIAGNOSIS — Z96.652 STATUS POST LEFT KNEE REPLACEMENT: Primary | ICD-10-CM

## 2018-12-05 PROCEDURE — 99024 POSTOP FOLLOW-UP VISIT: CPT | Performed by: NURSE PRACTITIONER

## 2018-12-05 NOTE — OUTREACH NOTE
Total Joint Week 2 Survey      Responses   Facility patient discharged from?  [facility]   Does the patient have one of the following disease processes/diagnoses(primary or secondary)?  Total Joint Replacement   Joint surgery performed?  Knee   Week 2 attempt successful?  Yes   Call start time  1118   Call end time  1127   Has the patient been back in either the hospital or Emergency Department since discharge?  No   Discharge diagnosis  Total Knee Relacement    Is patient permission given to speak with other caregiver?  No   Medication alerts for this patient  Coumadin will be on that until approx Jan 1. Having INR again 12-6 last INR was high.    Does the patient have all medications related to this admission filled (includes all antibiotics, pain medications, etc.)  Yes   Is the patient taking all medications as directed (includes completed medication regime)?  Yes   Is the patient able to teach back alternate methods of pain control?  Ice, Shoulder-elevate above heart/ keep in sling as advised, Reposition   Does the patient have a follow up appointment with their surgeon?  Yes   Has the patient kept scheduled appointments due by today?  Yes   What is the Home health agency?   [facility]    Home health comments  Will transition to outpt PT 12-10   DME comments  using cane now for ambulation.    Psychosocial issues?  No   Has the patient began therapy sessions (either in the home or as an out patient)?  Yes   Does the patient have a wound vac in place?  N/A   Has the patient fallen since discharge?  Yes   If the patient has fallen, were there any injuries?  Yes   Fall comments  fell 3-4 days ago in yard, tripped over leash in yard while walking dog. Landed on bottom. Did not hurt knee per pt. She did not let MD know. Advised her to alert MD.    Comments  Incision is MATHEW with steri-strips in place, without s/sx of inf.    What is the patient's perception of their functional status since discharge?  Improving   Is the  patient able to teach back signs and symptoms of infection?  Temp >100.4 for 24h or longer, Increased swelling or redness around incision (not associated with surgical edema), Changes in mobility, Shortness of breath or chest pain   Is the patient able to teach back how to prevent infection?  Check incision daily, Keep incision covered if pets in house, Monitor blood sugar if diabetic, Eat well-balanced diet, Shower only as directed by surgeon   Is the patient able to teach back signs and symptoms of DVT?  Redness in calf, Area hot to touch, Shortness of breath or chest pain   Is the patient able to teach back home safety measures?  Ability to shower, Accessibility to necessary areas in home, Modifications to reach items   Is the patient/caregiver able to teach back the hierarchy of who to call/visit for symptoms/problems? PCP, Specialist, Home health nurse, Urgent Care, ED, 911  Yes   Week 2 call completed?  Yes          Chantell Salguero RN

## 2018-12-05 NOTE — PROGRESS NOTES
"Priti Orr : 1965 MRN: [id number] DATE: 2018    Chief Complaint:  Chief Complaint   Patient presents with   • Left Knee - Post-op   • Suture / Staple Removal       SUBJECTIVE: Patient presents to office for postoperative follow up status post left total knee arthroplasty performed on 2018 per Dr. Junior. Patient is doing well postoperatively. She is progressing expected with no unusual complaints or concerns noted. She has been doing physical therapy via Home Health and will start outpatient physical therapy next week. Left knee pain and swelling are gradually improving. Patient is taking Tylenol #3 with codeine as needed for pain. Patient is ambulatory with use a cane in office today without difficulty. Patient states she is not sleeping well.  X-rays are repeated today.     OBJECTIVE:   Vitals:    18 0850   Weight: 95.3 kg (210 lb)   Height: 149.9 cm (59\")       Exam:. The surgical incision is healing appropriately. Incision is well-approximated with no erythema and no drainage. No signs of infection noted.   Range of motion:   Flexion: 90 degrees  Extension: 0 degrees  Moderate, generalized swelling to the knee. The calf is soft and nontender with a negative Homans sign.    DIAGNOSTIC STUDIES  Xrays:      Xr Knee 1 Or 2 View Left    Result Date: 2018  Narrative: Lateral view of the left knee reveals postsurgical changes post total knee arthroplasty.  Prostheses are well positioned and well aligned with no evidence of hardware failure or loosening noted.  No significant changes in the hardware are noted when compared with prior images from 2018.  There is a small joint effusion noted.  No acute bony radiologic abnormalities are noted at this time.18 at 2:16 PM by MELISSA Ochoa     Xr Knee 1 Or 2 View Left    Result Date: 2018  Narrative: PROCEDURE: Left knee, two views. INDICATION: Postop knee replacement. COMPARISON: 2018 left knee exam. There " has been interval left total knee replacement. Anatomic alignment of the components with no evidence of loosening. Surgical drain in the suprapatellar region. Anterior longitudinal cutaneous surgical staples.     Impression: Postop total knee replacement with anatomic alignment components. Suprapatellar drain and anterior longitudinal cutaneous staples. [id number] Electronically signed by:  Gregg Tanner MD  11/19/2018 1:35 PM CST Workstation: [employer]    Xr Knee Bilateral Ap Standing    Result Date: 12/5/2018  Narrative: AP standing view of bilateral knees reveals postsurgical changes in the left knee post total knee arthroplasty.  Prostheses are well aligned and well positioned with no evidence of hardware failure or loosening noted.  Subcutaneous staples are noted to the anterior left knee The presence of the knee implant is new in the interim when compared with previous images from 6/26/2018.  There are mild degenerative changes noted to the right knee with mild loss of medial compartmental joint spacing, unchanged from prior images.  No acute bony radiologic abnormalities are noted at this time.12/05/18 at 2:14 PM by MELISSA Ochoa     ASSESSMENT: 2 week status post left knee replacement.    PLAN: 1) Staples removed and steri strips applied. Wound care instructions provided, including care of steri-strips. Patient is instructed to continue to      monitor the incision for any signs of infection including increased redness, increased swelling, increased warmth, increased tenderness                    and/or purulent drainage. Patient is instructed to notify the office immediately if any signs of infection are noted.    2) Continue physical therapy. Patient will start outpatient physical therapy next week.    3) Discontinue GLORIA hose. Continue with elevation and ice therapy to minimize pain/swelling.    4) Progress activity and weightbearing as tolerated with use of cane.    5) Continue Tylenol #3 with  codeine as needed for pain.    6) Continue Coumadin as instructed for 6 weeks total postop for DVT prophylaxis, as managed per hospital pharmacy.    7) Follow up in 4 weeks for recheck.        This document has been electronically signed by MELISSA Ochoa on December 8, 2018 11:20 AM    12/08/18 at 8:49 AM by MELISSA Ochoa

## 2018-12-06 ENCOUNTER — LAB (OUTPATIENT)
Dept: LAB | Facility: HOSPITAL | Age: 53
End: 2018-12-06

## 2018-12-06 ENCOUNTER — ANTICOAGULATION VISIT (OUTPATIENT)
Dept: PHARMACY | Facility: HOSPITAL | Age: 53
End: 2018-12-06

## 2018-12-06 DIAGNOSIS — Z79.01 LONG TERM (CURRENT) USE OF ANTICOAGULANTS: Primary | ICD-10-CM

## 2018-12-06 LAB — INR PPP: 1.6 (ref 0.8–1.2)

## 2018-12-06 PROCEDURE — 85610 PROTHROMBIN TIME: CPT

## 2018-12-06 NOTE — PROGRESS NOTES
Spoke with Priti. Reviewed current lab.  No s/s of bleeding. No new meds. No missed doses. Reviewed schedule for following week. Pt read back regimen and verbalized understanding. All questions answered. Next INR on 12/13 provided by RAMILA.    Cesar Villalta RP  12/06/18  4:45 PM

## 2018-12-07 ENCOUNTER — HOSPITAL ENCOUNTER (OUTPATIENT)
Dept: PHYSICAL THERAPY | Facility: HOSPITAL | Age: 53
Setting detail: THERAPIES SERIES
Discharge: HOME | End: 2018-12-07

## 2018-12-07 DIAGNOSIS — Z96.652 STATUS POST LEFT KNEE REPLACEMENT: Primary | ICD-10-CM

## 2018-12-07 DIAGNOSIS — M17.12 PRIMARY OSTEOARTHRITIS OF LEFT KNEE: Primary | ICD-10-CM

## 2018-12-07 DIAGNOSIS — Z96.652 STATUS POST LEFT KNEE REPLACEMENT: ICD-10-CM

## 2018-12-07 PROCEDURE — 97110 THERAPEUTIC EXERCISES: CPT | Performed by: PHYSICAL THERAPIST

## 2018-12-07 PROCEDURE — 97162 PT EVAL MOD COMPLEX 30 MIN: CPT | Performed by: PHYSICAL THERAPIST

## 2018-12-07 NOTE — THERAPY EVALUATION
Outpatient Physical Therapy Ortho Initial Evaluation  [facility]     Patient Name: Priti Orr  : 1965  MRN: [id number]  Today's Date: 2018      Visit Date: 2018  Attendance:  (25/yr?)  Subjective Improvement: n/a  Next MD Appt: 19  Recert Date: 18    Therapy Diagnosis: L total knee arthroplasty 18         Past Medical History:   Diagnosis Date   • Allergic rhinitis    • Arthritis    • Asthma    • Benign neoplasm of meninges (CMS/HCC)     Post op       • Cobalamin deficiency    • Corneal abrasion    • Encounter for gynecological examination (general) (routine) without abnormal findings    • Essential hypertension    • Fatigue    • Female stress incontinence    • Head injury    • History of delayed wound healing     Delayed healing of surgical wound      • Hyperlipidemia    • Low back pain    • Nosebleed, symptom     Nosebleed/epistaxis symptom      • Osteoporosis    • PONV (postoperative nausea and vomiting)    • Sleep apnea     not wearing c-pap        Past Surgical History:   Procedure Laterality Date   • ARM LESION/CYST EXCISION  2014    Remove arm bone lesion (1)   • ARM SKIN LESION BIOPSY / EXCISION     • ARM WOUND REPAIR / CLOSURE  2014    Repair Superficial Wound TR-EXT 2.5 < CM 06781 (1)      • BRAIN SURGERY     • BREAST LUMPECTOMY     •  SECTION  1986     Section (1)     • ENDOSCOPY  2008    Colon endoscopy 56597 (1)     • EXCISION BREAST LESION W/ PREOP NEEDLE LOC  1981    Excision, breast lesion (1)     • HEMORRHOIDECTOMY     • HEMORRHOIDECTOMY  2008    Hemorrhoidectomy (2)      • HYSTERECTOMY     • INJECTION OF MEDICATION  2013    Celestone (betamethasone) (1)      • INJECTION OF MEDICATION  2013    Depo Medrol (Methylprednisone) (1)      • INJECTION OF MEDICATION  2013    Dexamethasone (2)      • INJECTION OF MEDICATION  2014    Kenalog (6)   • KNEE ARTHROSCOPY   03/05/2007    Knee arthroscopy, surgery (1)     • LAPAROSCOPIC TUBAL LIGATION  04/16/1991    Tubal ligation (1)      • PAP SMEAR  01/18/2007    PAP SMEAR (1)      • SHOULDER SURGERY Left     x 4   • TOTAL ABDOMINAL HYSTERECTOMY WITH SALPINGO OOPHORECTOMY  1993    JOSE/BSO (1)          Current Outpatient Medications:   •  acetaminophen-codeine (TYLENOL/CODEINE #3) 300-30 MG per tablet, Take 1 tablet by mouth Every 4 (Four) Hours As Needed for Moderate Pain ., Disp: 40 tablet, Rfl: 0  •  fluticasone (FLONASE) 50 MCG/ACT nasal spray, 1 spray into the nostril(s) as directed by provider Daily., Disp: , Rfl:   •  Glucos-Chondroit-Hyaluron-MSM (GLUCOSAMINE CHONDROITIN JOINT PO), Take 1 tablet by mouth Daily., Disp: , Rfl:   •  lisinopril-hydrochlorothiazide (PRINZIDE,ZESTORETIC) 20-12.5 MG per tablet, Take 1 tablet by mouth Daily., Disp: , Rfl:   •  meloxicam (MOBIC) 15 MG tablet, Take 15 mg by mouth Daily., Disp: , Rfl: 5  •  montelukast (SINGULAIR) 10 MG tablet, Take 10 mg by mouth Daily As Needed., Disp: , Rfl:   •  vitamin D (ERGOCALCIFEROL) 80171 units capsule capsule, Take 50,000 Units by mouth 1 (One) Time Per Week., Disp: , Rfl:   •  warfarin (COUMADIN) 5 MG tablet, Take 1 tablet by mouth Every Night for 40 days, or as directed by [facility] pharmacists., Disp: 40 tablet, Rfl: 0  •  Warfarin Sodium (PHARMACY TO DOSE WARFARIN), Continuous As Needed (6 weeks)., Disp: 1 each, Rfl: 1    Allergies   Allergen Reactions   • Pseudoephedrine Shortness Of Breath and Swelling     LIPS SWELL AND TONGUE BECOMES THICK   • Morphine Other (See Comments)     States felt like itching from within, hallucinations, agitation   • Allegra [Fexofenadine] Swelling   • Zyrtec [Cetirizine] Swelling       Visit Dx:     ICD-10-CM ICD-9-CM   1. Primary osteoarthritis of left knee M17.12 715.16   2. Status post left knee replacement Z96.652 V43.65       Patient History     Row Name 12/07/18 0800          Chief Complaint  Difficulty Walking;Difficulty  "with daily activities;Pain  -SS    Type of Pain  Knee pain left  -SS    Brief Description of Current Complaint  Patient underwent left total knee arthroplasty on 11/19/18. She had home health until 12/5/18. Returned to work for 2 hours that day. She is doing well at this time. A little difficulty getting out of bathtub but able to get in and out of shower without trouble. Able to do light housework.  female with children. Lives in a single story house with 1 step to enter and none inside.   -SS    Patient/Caregiver Goals  Return to prior level of function;Improve mobility  -SS    Current Tobacco Use  no  -SS    Smoking Status  no  -SS    Patient's Rating of General Health  Very good  -SS    Occupation/sports/leisure activities  [employer] -  of Operations. Hobbies: grandchild, power walk, read  -SS          Pain Location  Knee  -SS    Pain at Present  2  -SS    Pain at Best  2 since surgery  -SS    Pain at Worst  7 since surgery  -SS    Pain Frequency  Constant/continuous  -SS    Pain Description  Aching  -SS    What Performance Factors Make the Current Problem(s) WORSE?  prolonged standing/walking  -SS    What Performance Factors Make the Current Problem(s) BETTER?  ice  -SS    Is your sleep disturbed?  No  -SS    Is medication used to assist with sleep?  No  -SS    Difficulties at work?  decreased  -SS    Difficulties with ADL's?  decreased  -SS    Difficulties with recreational activities?  decreased  -SS          Any falls in the past year:  Yes  -SS    Number of falls reported in the last 12 months  1  -SS    Factors that contributed to the fall:  Tripped  -SS    Does patient have a fear of falling  Yes (comment) \"I try to be careful.\"  -SS          Primary Language  English  -SS          Are you being hurt, hit, or frightened by anyone at home or in your life?  No  -SS    Are you being neglected by a caregiver  No  -SS      User Key  (r) = Recorded By, (t) = Taken By, (c) = Cosigned By    " Initials Name Provider Type    Nathaniel Zhang, PT DPT Physical Therapist          PT Ortho     Row Name 12/07/18 0900       Subjective Comments    Subjective Comments  see Therapy Patient History  -       Precautions and Contraindications    Precautions  L TKA 11/19/18  -       Subjective Pain    Able to rate subjective pain?  yes  -    Pre-Treatment Pain Level  2  -SS    Post-Treatment Pain Level  4  -    Subjective Pain Comment  declines ice; will ice at work  -       Posture/Observations    Posture/Observations Comments  Presents ambulating with quad cane. Antalgic gait.   -SS       Right Lower Ext    Rt Knee Extension/Flexion AROM  0-16-75; achieves 90 deg flexion at end of treatment  -      User Key  (r) = Recorded By, (t) = Taken By, (c) = Cosigned By    Initials Name Provider Type    Nathaniel Zhang, PT DPT Physical Therapist                      Therapy Education  Education Details: seated knee flexion AROM, HS stretch, SLR  Given: HEP  How Provided: Verbal, Demonstration  Provided to: Patient  Level of Understanding: Verbalized, Demonstrated     PT OP Goals     Row Name 12/07/18 0800          STG Date to Achieve  12/28/18  -    STG 1  Note a >/= 30% subjective improvement  -    STG 2  Lower Extremity Functional Scale score to be >/= 40/80  -    STG 3  L knee active extension to be -5 deg or better  -    STG 4  L knee active flexion to be >/= 105 deg  -          LTG Date to Achieve  01/18/19  -    LTG 1  Independent with HEP  -    LTG 2  Demonstrate ability to ambulate community distances on firm level surfaces without assistive device  -    LTG 3  Lower Extremity Functional Scale Score to be >/= 55/80  -    LTG 4  L knee active extension to 0 deg  -    LTG 5  L knee active flexion to >/= 120 deg  -    LTG 6  Demonstrate ability to ascend and descend 5 steps reciprocally  -          PT Goal Re-Cert Due Date  12/28/18  -      User Key  (r) = Recorded  By, (t) = Taken By, (c) = Cosigned By    Initials Name Provider Type     Nathaniel Sharp, PT DPT Physical Therapist          PT Assessment/Plan     Row Name 12/07/18 0900          Functional Limitations  Impaired gait;Limitation in home management;Limitations in community activities;Limitations in functional capacity and performance;Performance in self-care ADL;Performance in work activities;Performance in leisure activities  -    Impairments  Balance;Gait;Endurance;Joint mobility;Muscle strength;Pain;Range of motion  -    Assessment Comments  2 weeks, 4 days s/p L TKA. Overall, patient is doing fairly well. Knee is stiff.   -    Rehab Potential  Good  -    Patient/caregiver participated in establishment of treatment plan and goals  Yes  -    Patient would benefit from skilled therapy intervention  Yes  -SS          PT Frequency  2x/week;3x/week  -    Predicted Duration of Therapy Intervention (Therapy Eval)  4-6 weeks  -    PT Plan Comments  ROM, stretching, strengthening, gait training, stair training, balance activities, ice with or without IFC estim as needed for pain  -      User Key  (r) = Recorded By, (t) = Taken By, (c) = Cosigned By    Initials Name Provider Type     Nathaniel Sharp, PT DPT Physical Therapist            Exercises     Row Name 12/07/18 0900          Subjective Comments  see Therapy Patient History  -          Able to rate subjective pain?  yes  -SS    Pre-Treatment Pain Level  2  -SS    Post-Treatment Pain Level  4  -SS    Subjective Pain Comment  declines ice; will ice at work  -          Exercise Name 1  Pro2, Seat 8, ROM  -SS    Cueing 1  Verbal  -    Time 1  8 mins  -    Additional Comments  Quick start  -          Exercise Name 2  Incline calf stretch  -    Cueing 2  Verbal;Demo  -SS    Sets 2  3  -SS    Time 2  30 sec hold  -SS          Exercise Name 3  Standing HS stretch  -SS    Cueing 3  Verbal;Demo  -SS    Sets 3  3  -SS    Time 3  30  sec hold  -SS          Exercise Name 4  Lunge stretch for flexion  -SS    Cueing 4  Verbal;Demo  -SS    Sets 4  3  -SS    Time 4  30 sec hold  -SS          Exercise Name 5  Seated knee flexion AROM  -SS    Cueing 5  Verbal;Demo  -SS    Reps 5  20  -SS      User Key  (r) = Recorded By, (t) = Taken By, (c) = Cosigned By    Initials Name Provider Type    Nathaniel Zhang PT DPT Physical Therapist                        Outcome Measure Options: Lower Extremity Functional Scale (LEFS)  Lower Extremity Functional Index  Any of your usual work, housework or school activities: Moderate difficulty  Your usual hobbies, recreational or sporting activities: Quite a bit of difficulty  Getting into or out of the bath: Moderate difficulty  Walking between rooms: A little bit of difficulty  Putting on your shoes or socks: Moderate difficulty  Squatting: Extreme difficulty or unable to perform activity  Lifting an object, like a bag of groceries from the floor: No difficulty  Performing light activities around your home: Moderate difficulty  Performing heavy activities around your home: Extreme difficulty or unable to perform activity  Getting into or out of a car: No difficulty  Walking 2 blocks: Extreme difficulty or unable to perform activity  Walking a mile: Extreme difficulty or unable to perform activity  Going up or down 10 stairs (about 1 flight of stairs): Extreme difficulty or unable to perform activity  Standing for 1 hour: Extreme difficulty or unable to perform activity  Sitting for 1 hour: A little bit of difficulty  Running on even ground: Extreme difficulty or unable to perform activity  Running on uneven ground: Extreme difficulty or unable to perform activity  Making sharp turns while running fast: Extreme difficulty or unable to perform activity  Hopping: Extreme difficulty or unable to perform activity  Rolling over in bed: A little bit of difficulty  Total: 26      Time Calculation:         Start Time:  0841  Stop Time: 0940  Time Calculation (min): 59 min     Therapy Charges for Today     Code Description Service Date Service Provider Modifiers Qty    22601040211 HC PT EVAL MOD COMPLEXITY 3 12/7/2018 Nathaniel Sharp, PT DPT GP 1    44132047388 HC PT THER PROC EA 15 MIN 12/7/2018 Nathaniel Sharp, PT DPT GP 1                   Nathaniel Sharp, PT, DPT, CHT  12/7/2018

## 2018-12-10 ENCOUNTER — HOSPITAL ENCOUNTER (OUTPATIENT)
Dept: PHYSICAL THERAPY | Facility: HOSPITAL | Age: 53
Setting detail: THERAPIES SERIES
Discharge: HOME | End: 2018-12-10

## 2018-12-10 DIAGNOSIS — M17.12 PRIMARY OSTEOARTHRITIS OF LEFT KNEE: Primary | ICD-10-CM

## 2018-12-10 DIAGNOSIS — Z96.652 STATUS POST LEFT KNEE REPLACEMENT: ICD-10-CM

## 2018-12-10 PROCEDURE — 97110 THERAPEUTIC EXERCISES: CPT

## 2018-12-10 PROCEDURE — G0283 ELEC STIM OTHER THAN WOUND: HCPCS

## 2018-12-10 NOTE — THERAPY TREATMENT NOTE
Outpatient Physical Therapy Ortho Treatment Note  [facility]   Maria Elena Davis       Patient Name: Priti Orr  : 1965  MRN: [id number]  Today's Date: 12/10/2018      Visit Date: 12/10/2018   Pt reports 5/10 pain pre treatment, 5/10 pain post treatment  Reports n/a% of improvement.  Attended 2/2 visits.  Insurance available:   Next MD appt: 2019.  Recertification: 2018.    Visit Dx:    ICD-10-CM ICD-9-CM   1. Primary osteoarthritis of left knee M17.12 715.16   2. Status post left knee replacement Z96.652 V43.65       Patient Active Problem List   Diagnosis   • Osteoporosis   • Chronic left shoulder pain   • Presence of left artificial shoulder joint   • Left knee pain   • Anxiety   • Arthritis   • Asthma   • Depression   • Enchondroma of humerus   • Headache, chronic migraine without aura, intractable   • History of brain surgery   • Humeral fracture   • Hypercholesteremia   • Hyperlipidemia   • Essential hypertension   • Meningioma (CMS/[facility])   • Morbid obesity (CMS/[facility])   • NICK (obstructive sleep apnea)   • Vitamin D deficiency   • Seizure-like activity (CMS/[facility])   • S/p reverse total shoulder arthroplasty   • Primary osteoarthritis of left knee   • Morbid obesity with BMI of 40.0-44.9, adult (CMS/[facility])   • Status post left knee replacement        Past Medical History:   Diagnosis Date   • Allergic rhinitis    • Arthritis    • Asthma    • Benign neoplasm of meninges (CMS/HCC)     Post op       • Cobalamin deficiency    • Corneal abrasion    • Encounter for gynecological examination (general) (routine) without abnormal findings    • Essential hypertension    • Fatigue    • Female stress incontinence    • Head injury    • History of delayed wound healing     Delayed healing of surgical wound      • Hyperlipidemia    • Low back pain    • Nosebleed, symptom     Nosebleed/epistaxis symptom      • Osteoporosis    • PONV (postoperative nausea and vomiting)    • Sleep apnea      not wearing c-pap        Past Surgical History:   Procedure Laterality Date   • ARM LESION/CYST EXCISION  2014    Remove arm bone lesion (1)   • ARM SKIN LESION BIOPSY / EXCISION     • ARM WOUND REPAIR / CLOSURE  2014    Repair Superficial Wound TR-EXT 2.5 < CM 84550 (1)      • BRAIN SURGERY     • BREAST LUMPECTOMY     •  SECTION  1986     Section (1)     • ENDOSCOPY  2008    Colon endoscopy 53102 (1)     • EXCISION BREAST LESION W/ PREOP NEEDLE LOC  1981    Excision, breast lesion (1)     • HEMORRHOIDECTOMY     • HEMORRHOIDECTOMY  2008    Hemorrhoidectomy (2)      • HYSTERECTOMY     • INJECTION OF MEDICATION  2013    Celestone (betamethasone) (1)      • INJECTION OF MEDICATION  2013    Depo Medrol (Methylprednisone) (1)      • INJECTION OF MEDICATION  2013    Dexamethasone (2)      • INJECTION OF MEDICATION  2014    Kenalog (6)   • KNEE ARTHROSCOPY  2007    Knee arthroscopy, surgery (1)     • LAPAROSCOPIC TUBAL LIGATION  1991    Tubal ligation (1)      • PAP SMEAR  2007    PAP SMEAR (1)      • SHOULDER SURGERY Left     x 4   • TOTAL ABDOMINAL HYSTERECTOMY WITH SALPINGO OOPHORECTOMY      JOSE/BSO (1)          PT Ortho     Row Name 12/10/18 1300       Precautions and Contraindications    Precautions  L TKA 18  (Pended)   -       Posture/Observations    Posture/Observations Comments  Presents ambulating with quad cane. Antalgic gait.   (Pended)   -       Right Lower Ext    Rt Knee Extension/Flexion AROM  0-10-95 (L knee AROM)  (Pended)   -      User Key  (r) = Recorded By, (t) = Taken By, (c) = Cosigned By    Initials Name Provider Type    Maria Elena Mendez                       PT Assessment/Plan     Row Name 12/10/18 1400          PT Assessment    Assessment Comments  3 weeks s/p L TKA. Patient had improvements in L knee AROM flexion and extension. Good effort throughout treatment.  Patient reports post-IFC Estim/ice that she did not like the sensation of Estim.   (Pended)   -        PT Plan    PT Frequency  2x/week;3x/week  (Pended)   -     Predicted Duration of Therapy Intervention (Therapy Eval)  4-6 weeks   (Pended)   -     PT Plan Comments  Continue with ROM emphasizing extension ROM. Progress quad strength as tolerated   (Pended)   -       User Key  (r) = Recorded By, (t) = Taken By, (c) = Cosigned By    Initials Name Provider Type    Maria Elena Mendez           Modalities     Row Name 12/10/18 1300             Ice    Ice Applied  Yes  (Pended)   -      Location  L knee  (Pended)  concurrent with IFC  -      Rx Minutes  15 mins  (Pended)   -      Ice Prior to Rx  No  (Pended)   -      Ice S/P Rx  Yes  (Pended)   -      Patient reports will apply ice at home to involved area  Yes  (Pended)   -         ELECTRICAL STIMULATION    Attended/Unattended  Unattended  (Pended)   -      Stimulation Type  IFC  (Pended)  concurrent with ice  -      Location/Electrode Placement/Other  L knee  (Pended)   -       PT E-Stim Unattended (Manual) Minutes  15  (Pended)   -        User Key  (r) = Recorded By, (t) = Taken By, (c) = Cosigned By    Initials Name Provider Type    Maria Elena Mendez           Exercises     Row Name 12/10/18 1300             Subjective Comments    Subjective Comments  Patient reports that she has been trying to take Tylenol 3 only as needed, with her last dos being taken on Saturday evening. She reports that he home exercises are going well.  (Pended)   -         Subjective Pain    Able to rate subjective pain?  yes  (Pended)   -      Pre-Treatment Pain Level  5  (Pended)   -      Post-Treatment Pain Level  5  (Pended)   -         Exercise 1    Exercise Name 1  Pro II, Seat 8, ROM  (Pended)   -      Time 1  12 mins  (Pended)   -         Exercise 2    Exercise Name 2  Heel slides with towel  (Pended)    "-      Reps 2  20  (Pended)   -      Time 2  3 sec hold   (Pended)   -      Additional Comments  95  (Pended)   -         Exercise 3    Exercise Name 3  QS with heel prop  (Pended)   -MC      Reps 3  20  (Pended)   -      Time 3  3 sec hold   (Pended)   -      Additional Comments  10 w/ prop, 10 without   (Pended)   -         Exercise 4    Exercise Name 4  LAQ  (Pended)   -      Sets 4  2  (Pended)   -      Reps 4  10  (Pended)   -         Exercise 5    Exercise Name 5  SLR   (Pended)   -      Sets 5  2  (Pended)   -      Reps 5  10  (Pended)   -      Additional Comments  15 degree ext lag   (Pended)   -         Exercise 6    Exercise Name 6  Ambulate w/ SC  (Pended)   -      Reps 6  270 ft   (Pended)   -         Exercise 7    Exercise Name 7  Standing incline stretch  (Pended)   -      Sets 7  3  (Pended)   -      Time 7  30'  (Pended)   -         Exercise 8    Exercise Name 8  Standing HS stretch  (Pended)   -      Sets 8  3  (Pended)   -      Time 8  30\"  (Pended)   -        User Key  (r) = Recorded By, (t) = Taken By, (c) = Cosigned By    Initials Name Provider Type    Maria Elena Mendez                          PT OP Goals     Row Name 12/10/18 1300          PT Short Term Goals    STG Date to Achieve  12/28/18  (Pended)   -     STG 1  Note a >/= 30% subjective improvement  (Pended)   -     STG 1 Progress  Not Met  (Pended)   -     STG 2  Lower Extremity Functional Scale score to be >/= 40/80  (Pended)   -     STG 2 Progress  Not Met  (Pended)   -     STG 3  L knee active extension to be -5 deg or better  (Pended)   -     STG 3 Progress  Not Met  (Pended)   -     STG 4  L knee active flexion to be >/= 105 deg  (Pended)   -     STG 4 Progress  Not Met  (Pended)   -        Long Term Goals    LTG Date to Achieve  01/18/19  (Pended)   -     LTG 1  Independent with HEP  (Pended)   -     LTG 2  Demonstrate ability to ambulate community " distances on firm level surfaces without assistive device  (Pended)   -     LTG 3  Lower Extremity Functional Scale Score to be >/= 55/80  (Pended)   -     LTG 4  L knee active extension to 0 deg  (Pended)   -     LTG 5  L knee active flexion to >/= 120 deg  (Pended)   -     LTG 6  Demonstrate ability to ascend and descend 5 steps reciprocally  (Pended)   -        Time Calculation    PT Goal Re-Cert Due Date  12/28/18  (Pended)   -       User Key  (r) = Recorded By, (t) = Taken By, (c) = Cosigned By    Initials Name Provider Type    Maria Elena Mendez           Therapy Education  Given: (P) HEP  How Provided: (P) Verbal  Provided to: (P) Patient  Level of Understanding: (P) Verbalized              Time Calculation:   Start Time: (P) 1307  Stop Time: (P) 1405  Time Calculation (min): (P) 58 min  Therapy Suggested Charges     Code   Minutes Charges    96908 (CPT®) Hc Pt Neuromusc Re Education Ea 15 Min      35849 (CPT®) Hc Pt Ther Proc Ea 15 Min      11659 (CPT®) Hc Gait Training Ea 15 Min      70621 (CPT®) Hc Pt Therapeutic Act Ea 15 Min      31397 (CPT®) Hc Pt Manual Therapy Ea 15 Min      33576 (CPT®) Hc Pt Ther Massage- Per 15 Min      88034 (CPT®) Hc Pt Iontophoresis Ea 15 Min      34118 (CPT®) Hc Pt Elec Stim Ea-Per 15 Min      09753 (CPT®) Hc Pt Ultrasound Ea 15 Min      86182 (CPT®) Hc Pt Self Care/Mgmt/Train Ea 15 Min      58352 (CPT®) Hc Pt Prosthetic (S) Train Initial Encounter, Each 15 Min      00357 (CPT®) Hc Orthotic(S) Mgmt/Train Initial Encounter, Each 15min      12288 (CPT®) Hc Pt Aquatic Therapy Ea 15 Min      69927 (CPT®) Hc Pt Orthotic(S)/Prosthetic(S) Encounter, Each 15 Min       (CPT®) Hc Pt Electrical Stim Unattended 15 1    Total  15 1        Therapy Charges for Today     Code Description Service Date Service Provider Modifiers Qty    58262426590 HC PT ELECTRICAL STIM UNATTENDED 12/10/2018 Maria Elena Davis  1    54239348964 HC PT THER SUPP EA 15 MIN  12/10/2018 Maria Elena Davis GP 1    [id number]  PT THER PROC EA 15 MIN 12/10/2018 Maria Elena Davis 3                    Maria Elena Davis  12/10/2018

## 2018-12-11 ENCOUNTER — DOCUMENTATION (OUTPATIENT)
Dept: ORTHOPEDIC SURGERY | Facility: CLINIC | Age: 53
End: 2018-12-11

## 2018-12-13 ENCOUNTER — APPOINTMENT (OUTPATIENT)
Dept: LAB | Facility: HOSPITAL | Age: 53
End: 2018-12-13

## 2018-12-13 ENCOUNTER — ANTICOAGULATION VISIT (OUTPATIENT)
Dept: PHARMACY | Facility: HOSPITAL | Age: 53
End: 2018-12-13

## 2018-12-13 ENCOUNTER — HOSPITAL ENCOUNTER (OUTPATIENT)
Dept: PHYSICAL THERAPY | Facility: HOSPITAL | Age: 53
Setting detail: THERAPIES SERIES
Discharge: HOME | End: 2018-12-13

## 2018-12-13 DIAGNOSIS — Z96.652 STATUS POST LEFT KNEE REPLACEMENT: ICD-10-CM

## 2018-12-13 DIAGNOSIS — M17.12 PRIMARY OSTEOARTHRITIS OF LEFT KNEE: Primary | ICD-10-CM

## 2018-12-13 DIAGNOSIS — Z79.01 LONG TERM (CURRENT) USE OF ANTICOAGULANTS: Primary | ICD-10-CM

## 2018-12-13 LAB — INR PPP: 1.8 (ref 0.8–1.2)

## 2018-12-13 PROCEDURE — 97110 THERAPEUTIC EXERCISES: CPT | Performed by: PHYSICAL THERAPIST

## 2018-12-13 PROCEDURE — 85610 PROTHROMBIN TIME: CPT

## 2018-12-13 NOTE — PROGRESS NOTES
Spoke with patient. Reviewed current lab.  No s/s of bleeding. No new meds. No missed doses. Reviewed schedule for following week. Pt read back regimen and verbalized understanding. All questions answered. Next INR on 12/20 provided by  Naveed.    Laurence Dodson RPH  12/13/18  4:10 PM

## 2018-12-13 NOTE — THERAPY TREATMENT NOTE
Outpatient Physical Therapy Ortho Treatment Note  [facility]     Patient Name: Priti Orr  : 1965  MRN: [id number]  Today's Date: 2018      Visit Date: 2018  Attendance: 3/3 (25/yr)  Subjective Improvement: 40%  Next MD Appt: 19  Recert Date: 18     Therapy Diagnosis: L total knee arthroplasty 18      Visit Dx:    ICD-10-CM ICD-9-CM   1. Primary osteoarthritis of left knee M17.12 715.16   2. Status post left knee replacement Z96.652 V43.65            Past Medical History:   Diagnosis Date   • Allergic rhinitis    • Arthritis    • Asthma    • Benign neoplasm of meninges (CMS/HCC)     Post op       • Cobalamin deficiency    • Corneal abrasion    • Encounter for gynecological examination (general) (routine) without abnormal findings    • Essential hypertension    • Fatigue    • Female stress incontinence    • Head injury    • History of delayed wound healing     Delayed healing of surgical wound      • Hyperlipidemia    • Low back pain    • Nosebleed, symptom     Nosebleed/epistaxis symptom      • Osteoporosis    • PONV (postoperative nausea and vomiting)    • Sleep apnea     not wearing c-pap        Past Surgical History:   Procedure Laterality Date   • ARM LESION/CYST EXCISION  2014    Remove arm bone lesion (1)   • ARM SKIN LESION BIOPSY / EXCISION     • ARM WOUND REPAIR / CLOSURE  2014    Repair Superficial Wound TR-EXT 2.5 < CM 04783 (1)      • BRAIN SURGERY     • BREAST LUMPECTOMY     •  SECTION  1986     Section (1)     • ENDOSCOPY  2008    Colon endoscopy 30496 (1)     • EXCISION BREAST LESION W/ PREOP NEEDLE LOC  1981    Excision, breast lesion (1)     • HEMORRHOIDECTOMY     • HEMORRHOIDECTOMY  2008    Hemorrhoidectomy (2)      • HYSTERECTOMY     • INJECTION OF MEDICATION  2013    Celestone (betamethasone) (1)      • INJECTION OF MEDICATION  2013    Depo Medrol (Methylprednisone)  (1)      • INJECTION OF MEDICATION  05/21/2013    Dexamethasone (2)      • INJECTION OF MEDICATION  01/29/2014    Kenalog (6)   • KNEE ARTHROSCOPY  03/05/2007    Knee arthroscopy, surgery (1)     • LAPAROSCOPIC TUBAL LIGATION  04/16/1991    Tubal ligation (1)      • PAP SMEAR  01/18/2007    PAP SMEAR (1)      • SHOULDER SURGERY Left     x 4   • TOTAL ABDOMINAL HYSTERECTOMY WITH SALPINGO OOPHORECTOMY  1993    JOSE/BSO (1)          PT Ortho     Row Name 12/13/18 1400       Precautions and Contraindications    Precautions  L TKA 11/19/18  -SS       Subjective Pain    Post-Treatment Pain Level  5  -    Subjective Pain Comment  requests to ice at home  -SS       Posture/Observations    Posture/Observations Comments  Presents ambulating with quad cane. Seem to be leaning over cane. Gait mechanics improves without cane. I adjusted quad cane higher, and gait mechanics improves.   -SS       Right Lower Ext    Rt Knee Extension/Flexion AROM  0-8-88   -      User Key  (r) = Recorded By, (t) = Taken By, (c) = Cosigned By    Initials Name Provider Type    SS Nathaniel Sharp, PT DPT Physical Therapist                      PT Assessment/Plan     Row Name 12/13/18 1400          Functional Limitations  Impaired gait;Limitation in home management;Limitations in community activities;Limitations in functional capacity and performance;Performance in self-care ADL;Performance in work activities;Performance in leisure activities  -    Impairments  Balance;Gait;Endurance;Joint mobility;Muscle strength;Pain;Range of motion  -    Assessment Comments  3 weeks, 3 days post-op. AROM progressing. Recommended that she try a STC instead of quad cane if one is available to her.  -SS    Rehab Potential  Good  -SS    Patient/caregiver participated in establishment of treatment plan and goals  Yes  -SS    Patient would benefit from skilled therapy intervention  Yes  -SS          PT Frequency  2x/week;3x/week  -SS    Predicted Duration of  Therapy Intervention (Therapy Eval)  4-6 weeks  -    PT Plan Comments  Cont POC. SAQ/LAQ next  -SS      User Key  (r) = Recorded By, (t) = Taken By, (c) = Cosigned By    Initials Name Provider Type    SS Nathaniel Sharp, PT DPT Physical Therapist              Exercises     Row Name 12/13/18 1400          Existing Precautions/Restrictions  -- L TKA 11/19/18  -SS          Subjective Comments  Achy all over. Has not been able to take her Mobic because of the blood thinner. She thinks that this is the reason she's achy. Knee feels tight. Has worked all day. Wants to be able to walk without cane. 40% subjective improvement.  -SS          Able to rate subjective pain?  yes  -SS    Pre-Treatment Pain Level  5  -SS    Post-Treatment Pain Level  5  -SS    Subjective Pain Comment  requests to ice at home  -SS          Exercise Name 1  Pro2, Seat 8, LE ROM  -SS    Cueing 1  Verbal  -SS    Time 1  12 mins  -SS    Additional Comments  Quickstart  -SS          Exercise Name 2  Standing HS stretch  -SS    Cueing 2  Verbal;Demo  -SS    Sets 2  3  -SS    Time 2  30 sec hold  -SS          Exercise Name 3  Lunge stretch for knee flexion  -SS    Cueing 3  Verbal;Demo  -SS    Sets 3  3  -SS    Time 3  30 sec hold  -SS          Exercise Name 4  Seated knee flexion   -SS    Cueing 4  Verbal  -SS    Reps 4  20  -SS    Time 4  3 sec hold  -SS          Exercise Name 5  QS  -SS    Cueing 5  Verbal;Tactile  -SS    Sets 5  2  -SS    Reps 5  10  -SS    Time 5  5 sec hold  -SS          Exercise Name 6  SLR  -SS    Cueing 6  Verbal;Tactile  -SS    Sets 6  1  -SS    Reps 6  10  -SS    Time 6  3 sec hold  -SS    Additional Comments  cues for performance  -SS      User Key  (r) = Recorded By, (t) = Taken By, (c) = Cosigned By    Initials Name Provider Type    SS Nathaniel Sharp, PT DPT Physical Therapist                         PT OP Goals     Row Name 12/13/18 1400          STG Date to Achieve  12/28/18  -SS    STG 1  Note a >/= 30%  subjective improvement  -SS    STG 1 Progress  Met  -    STG 2  Lower Extremity Functional Scale score to be >/= 40/80  -    STG 2 Progress  Not Met  -    STG 3  L knee active extension to be -5 deg or better  -    STG 3 Progress  Not Met;Progressing  -    STG 4  L knee active flexion to be >/= 105 deg  -    STG 4 Progress  Not Met;Progressing  -SS          LTG Date to Achieve  01/18/19  -    LTG 1  Independent with HEP  -    LTG 1 Progress  Ongoing  -    LTG 2  Demonstrate ability to ambulate community distances on firm level surfaces without assistive device  -    LTG 2 Progress  Ongoing  -    LTG 3  Lower Extremity Functional Scale Score to be >/= 55/80  -    LTG 3 Progress  Ongoing  -    LTG 4  L knee active extension to 0 deg  -    LTG 4 Progress  Ongoing  -    LTG 5  L knee active flexion to >/= 120 deg  -    LTG 5 Progress  Progressing  -    LTG 6  Demonstrate ability to ascend and descend 5 steps reciprocally  -          PT Goal Re-Cert Due Date  12/28/18  -      User Key  (r) = Recorded By, (t) = Taken By, (c) = Cosigned By    Initials Name Provider Type    SS Nathaniel Sharp, PT DPT Physical Therapist          Therapy Education  Education Details: recommend STC  Given: HEP, Mobility training  Program: Reinforced  How Provided: Verbal  Provided to: Patient  Level of Understanding: Verbalized              Time Calculation:   Start Time: 1429  Stop Time: 1525  Time Calculation (min): 56 min    Therapy Charges for Today     Code Description Service Date Service Provider Modifiers Qty    41093068452 HC PT THER PROC EA 15 MIN 12/13/2018 Nathaniel Sharp PT DPT GP 3    46672191459 HC PT THER SUPP EA 15 MIN 12/13/2018 Nathaniel Sharp, PT DPT GP 1                    Nathaniel Sharp, PT, DPT, CHT  12/13/2018

## 2018-12-18 ENCOUNTER — HOSPITAL ENCOUNTER (OUTPATIENT)
Dept: PHYSICAL THERAPY | Facility: HOSPITAL | Age: 53
Setting detail: THERAPIES SERIES
Discharge: HOME | End: 2018-12-18

## 2018-12-18 DIAGNOSIS — Z96.652 STATUS POST LEFT KNEE REPLACEMENT: ICD-10-CM

## 2018-12-18 DIAGNOSIS — M17.12 PRIMARY OSTEOARTHRITIS OF LEFT KNEE: Primary | ICD-10-CM

## 2018-12-18 PROCEDURE — 97110 THERAPEUTIC EXERCISES: CPT | Performed by: PHYSICAL THERAPIST

## 2018-12-18 NOTE — THERAPY TREATMENT NOTE
Outpatient Physical Therapy Ortho Treatment Note  [facility]     Patient Name: Priti Orr  : 1965  MRN: [id number]  Today's Date: 2018      Visit Date: 2018  Attendance:  (25/yr)  Subjective Improvement: 40+%  Next MD Appt: 19  Recert Date: 18     Therapy Diagnosis: L total knee arthroplasty 18      Visit Dx:    ICD-10-CM ICD-9-CM   1. Primary osteoarthritis of left knee M17.12 715.16   2. Status post left knee replacement Z96.652 V43.65            Past Medical History:   Diagnosis Date   • Allergic rhinitis    • Arthritis    • Asthma    • Benign neoplasm of meninges (CMS/HCC)     Post op       • Cobalamin deficiency    • Corneal abrasion    • Encounter for gynecological examination (general) (routine) without abnormal findings    • Essential hypertension    • Fatigue    • Female stress incontinence    • Head injury    • History of delayed wound healing     Delayed healing of surgical wound      • Hyperlipidemia    • Low back pain    • Nosebleed, symptom     Nosebleed/epistaxis symptom      • Osteoporosis    • PONV (postoperative nausea and vomiting)    • Sleep apnea     not wearing c-pap        Past Surgical History:   Procedure Laterality Date   • ARM LESION/CYST EXCISION  2014    Remove arm bone lesion (1)   • ARM SKIN LESION BIOPSY / EXCISION     • ARM WOUND REPAIR / CLOSURE  2014    Repair Superficial Wound TR-EXT 2.5 < CM 48384 (1)      • BRAIN SURGERY     • BREAST LUMPECTOMY     •  SECTION  1986     Section (1)     • ENDOSCOPY  2008    Colon endoscopy 63245 (1)     • EXCISION BREAST LESION W/ PREOP NEEDLE LOC  1981    Excision, breast lesion (1)     • HEMORRHOIDECTOMY     • HEMORRHOIDECTOMY  2008    Hemorrhoidectomy (2)      • HYSTERECTOMY     • INJECTION OF MEDICATION  2013    Celestone (betamethasone) (1)      • INJECTION OF MEDICATION  2013    Depo Medrol (Methylprednisone)  (1)      • INJECTION OF MEDICATION  05/21/2013    Dexamethasone (2)      • INJECTION OF MEDICATION  01/29/2014    Kenalog (6)   • KNEE ARTHROSCOPY  03/05/2007    Knee arthroscopy, surgery (1)     • LAPAROSCOPIC TUBAL LIGATION  04/16/1991    Tubal ligation (1)      • PAP SMEAR  01/18/2007    PAP SMEAR (1)      • SHOULDER SURGERY Left     x 4   • TOTAL ABDOMINAL HYSTERECTOMY WITH SALPINGO OOPHORECTOMY  1993    JOSE/BSO (1)          PT Ortho     Row Name 12/18/18 1300       Precautions and Contraindications    Precautions  L TKA 11/19/18  -SS       Posture/Observations    Posture/Observations Comments  Presents ambulating with quad cane. Ambulates in clinic without assistive device.  -SS       Right Lower Ext    Rt Knee Extension/Flexion AROM  0-95  -      User Key  (r) = Recorded By, (t) = Taken By, (c) = Cosigned By    Initials Name Provider Type    SS Nathaniel Sharp, PT DPT Physical Therapist                      PT Assessment/Plan     Row Name 12/18/18 1300          Functional Limitations  Impaired gait;Limitation in home management;Limitations in community activities;Limitations in functional capacity and performance;Performance in self-care ADL;Performance in work activities;Performance in leisure activities  -    Impairments  Balance;Gait;Endurance;Joint mobility;Muscle strength;Pain;Range of motion  -    Assessment Comments  4 weeks, 1 day post-op. Significantly improved AROM this date. Patient is safe to be without assistive device in the home and at the office as tolerated. I recommended that she continue to use cane when out and about.  -    Rehab Potential  Good  -SS    Patient/caregiver participated in establishment of treatment plan and goals  Yes  -SS    Patient would benefit from skilled therapy intervention  Yes  -SS          PT Frequency  2x/week  -SS    Predicted Duration of Therapy Intervention (Therapy Eval)  4-6 weeks  -    PT Plan Comments  Continue POC. Gait without assistive  device for distance next. Ankle weights for LAQ/SAQ next.  -SS      User Key  (r) = Recorded By, (t) = Taken By, (c) = Cosigned By    Initials Name Provider Type    Nathaniel Zhang, PT DPT Physical Therapist          Modalities     Row Name 12/18/18 1300             Subjective Pain    Post-Treatment Pain Level  0  -SS         Ice    Ice Applied  Yes  -SS      Location  L knee  -SS      Rx Minutes  15 mins  -SS      Ice Prior to Rx  No  -SS      Ice S/P Rx  Yes  -SS        User Key  (r) = Recorded By, (t) = Taken By, (c) = Cosigned By    Initials Name Provider Type    Nathaniel Zhang, PT DPT Physical Therapist          Exercises     Row Name 12/18/18 1300          Existing Precautions/Restrictions  -- L TKA 11/19/18  -SS          Subjective Comments  Reports that she has been up on her feet a lot at work today. Is walking around the house without cane most of the time. Uses cane when out and about. Some instability when is tired. Taking Tylenol to help manage pain.   -SS          Able to rate subjective pain?  yes  -SS    Pre-Treatment Pain Level  5  -SS    Post-Treatment Pain Level  0  -SS          Exercise Name 1  Pro2, Seat 8, LE ROM  -SS    Cueing 1  Verbal  -SS    Time 1  10 mins  -SS    Additional Comments  Level 3  -SS          Exercise Name 2  Incline calf stretch  -SS    Cueing 2  Verbal;Demo  -SS    Sets 2  3  -SS    Time 2  30 sec hold  -SS          Exercise Name 3  Standing HS stretch  -SS    Cueing 3  Verbal;Demo  -SS    Sets 3  3  -SS    Time 3  30 sec hold  -SS          Exercise Name 4  Lunge stretch for knee flexion  -SS    Cueing 4  Verbal;Demo  -SS    Sets 4  3  -SS    Time 4  30 sec hold  -SS          Exercise Name 5  LAQ  -SS    Cueing 5  Verbal;Demo  -SS    Sets 5  2  -SS    Reps 5  15  -SS    Time 5  3 sec hold  -SS          Exercise Name 6  Seated heel slides  -SS    Cueing 6  Verbal;Demo  -SS    Sets 6  1  -SS    Reps 6  20  -SS          Exercise Name 7  SLR flexion  -SS     "Cueing 7  Verbal  -    Sets 7  2  -SS    Reps 7  10  -SS    Time 7  3 sec hold  -          Exercise Name 8  Step ups -- 4\"  -    Cueing 8  Verbal;Demo  -SS    Sets 8  1  -SS    Reps 8  10  -SS      User Key  (r) = Recorded By, (t) = Taken By, (c) = Cosigned By    Initials Name Provider Type     Nathaniel Sharp, PT DPT Physical Therapist                         PT OP Goals     Row Name 12/18/18 1300          STG Date to Achieve  12/28/18  -    STG 1  Note a >/= 30% subjective improvement  -    STG 1 Progress  Met  -    STG 2  Lower Extremity Functional Scale score to be >/= 40/80  -    STG 2 Progress  Not Met  -    STG 3  L knee active extension to be -5 deg or better  -    STG 3 Progress  Met  -    STG 4  L knee active flexion to be >/= 105 deg  -    STG 4 Progress  Not Met;Progressing  -          LTG Date to Achieve  01/18/19  -    LTG 1  Independent with HEP  -    LTG 1 Progress  Ongoing  -    LTG 2  Demonstrate ability to ambulate community distances on firm level surfaces without assistive device  -    LTG 2 Progress  Ongoing  -    LTG 3  Lower Extremity Functional Scale Score to be >/= 55/80  -    LTG 3 Progress  Ongoing  -    LTG 4  L knee active extension to 0 deg  -    LTG 4 Progress  Ongoing  -    LTG 5  L knee active flexion to >/= 120 deg  -    LTG 5 Progress  Progressing  -    LTG 6  Demonstrate ability to ascend and descend 5 steps reciprocally  -          PT Goal Re-Cert Due Date  12/28/18  -      User Key  (r) = Recorded By, (t) = Taken By, (c) = Cosigned By    Initials Name Provider Type     Nathaniel Sharp, PT DPT Physical Therapist          Therapy Education  Education Details: gait without assistive device in home and as tolerated at the office; reviewed HEP  Given: HEP, Mobility training  How Provided: Verbal  Provided to: Patient  Level of Understanding: Verbalized              Time Calculation:   Start Time: 1301  Stop Time: " 1405  Time Calculation (min): 64 min    Therapy Charges for Today     Code Description Service Date Service Provider Modifiers Qty    33220566043 HC PT THER PROC EA 15 MIN 12/18/2018 Nathaniel Sharp, PT DPT GP 3    36570466764 HC PT THER SUPP EA 15 MIN 12/18/2018 Nathaniel Sharp, PT DPT GP 1                    Nathaniel Sharp, PT, DPT, CHT  12/18/2018

## 2018-12-19 ENCOUNTER — READMISSION MANAGEMENT (OUTPATIENT)
Dept: CALL CENTER | Facility: HOSPITAL | Age: 53
End: 2018-12-19

## 2018-12-19 NOTE — OUTREACH NOTE
Total Joint Month 1 Survey      Responses   Facility patient discharged from?  [place]   Does the patient have one of the following disease processes/diagnoses(primary or secondary)?  Total Joint Replacement   Joint surgery performed?  Knee   Month 1 attempt successful?  Yes   Call start time  1209   Call end time  1213   Has the patient been back in either the hospital or Emergency Department since discharge?  No   Discharge diagnosis  Total Knee Relacement    Is the patient taking all medications as directed (includes completed medication regime)?  Yes   Is the patient able to teach back alternate methods of pain control?  Ice, Knee-elevation/no pillow under knee, Reposition   Medication comments  Using Tylenol for her pain. At most pain has recently been 5/10. Still taking Coumadin.   Has the patient kept scheduled appointments due by today?  Yes   Is the patient still receiving Home Health Services?  No   DME comments  using cane now for ambulation.    Is the patient still attending therapy sessions(either in the home or as an outpatient)?  Yes   Has the patient fallen since discharge?  Yes   If the patient has fallen, were there any injuries?  No   Fall comments  No further falls since beginning of Dec.   Comments  Incision healed well per pt.    What is the patient's perception of their functional status since discharge?  Improving   Is the patient/caregiver able to teach back the hierarchy of who to call/visit for symptoms/problems? PCP, Specialist, Home health nurse, Urgent Care, ED, 911  Yes   Month 1 call completed?  Yes          Chantell Salguero RN

## 2018-12-20 ENCOUNTER — LAB (OUTPATIENT)
Dept: LAB | Facility: HOSPITAL | Age: 53
End: 2018-12-20

## 2018-12-20 ENCOUNTER — ANTICOAGULATION VISIT (OUTPATIENT)
Dept: PHARMACY | Facility: HOSPITAL | Age: 53
End: 2018-12-20

## 2018-12-20 ENCOUNTER — HOSPITAL ENCOUNTER (OUTPATIENT)
Dept: PHYSICAL THERAPY | Facility: HOSPITAL | Age: 53
Setting detail: THERAPIES SERIES
Discharge: HOME | End: 2018-12-20

## 2018-12-20 DIAGNOSIS — M17.12 PRIMARY OSTEOARTHRITIS OF LEFT KNEE: Primary | ICD-10-CM

## 2018-12-20 DIAGNOSIS — Z96.652 STATUS POST LEFT KNEE REPLACEMENT: ICD-10-CM

## 2018-12-20 DIAGNOSIS — Z79.01 LONG TERM (CURRENT) USE OF ANTICOAGULANTS: ICD-10-CM

## 2018-12-20 LAB
INR PPP: 2.15 (ref 0.8–1.2)
PROTHROMBIN TIME: 23.1 SECONDS (ref 11.1–15.3)

## 2018-12-20 PROCEDURE — 97110 THERAPEUTIC EXERCISES: CPT | Performed by: PHYSICAL THERAPIST

## 2018-12-20 PROCEDURE — 85610 PROTHROMBIN TIME: CPT

## 2018-12-20 NOTE — THERAPY TREATMENT NOTE
Outpatient Physical Therapy Ortho Treatment Note  [facility]     Patient Name: Priti Orr  : 1965  MRN: [id number]  Today's Date: 2018      Visit Date: 2018  Attendance:  (25/yr)  Subjective Improvement: 70%  Next MD Appt: 19  Recert Date: 18     Therapy Diagnosis: L total knee arthroplasty 18      Visit Dx:    ICD-10-CM ICD-9-CM   1. Primary osteoarthritis of left knee M17.12 715.16   2. Status post left knee replacement Z96.652 V43.65       Patient Active Problem List   Diagnosis   • Osteoporosis   • Chronic left shoulder pain   • Presence of left artificial shoulder joint   • Left knee pain   • Anxiety   • Arthritis   • Asthma   • Depression   • Enchondroma of humerus   • Headache, chronic migraine without aura, intractable   • History of brain surgery   • Humeral fracture   • Hypercholesteremia   • Hyperlipidemia   • Essential hypertension   • Meningioma (CMS/HCC)   • Morbid obesity (CMS/[facility])   • NICK (obstructive sleep apnea)   • Vitamin D deficiency   • Seizure-like activity (CMS/[facility])   • S/p reverse total shoulder arthroplasty   • Primary osteoarthritis of left knee   • Morbid obesity with BMI of 40.0-44.9, adult (CMS/HCC)   • Status post left knee replacement        Past Medical History:   Diagnosis Date   • Allergic rhinitis    • Arthritis    • Asthma    • Benign neoplasm of meninges (CMS/HCC)     Post op       • Cobalamin deficiency    • Corneal abrasion    • Encounter for gynecological examination (general) (routine) without abnormal findings    • Essential hypertension    • Fatigue    • Female stress incontinence    • Head injury    • History of delayed wound healing     Delayed healing of surgical wound      • Hyperlipidemia    • Low back pain    • Nosebleed, symptom     Nosebleed/epistaxis symptom      • Osteoporosis    • PONV (postoperative nausea and vomiting)    • Sleep apnea     not wearing c-pap        Past Surgical History:    Procedure Laterality Date   • ARM LESION/CYST EXCISION  2014    Remove arm bone lesion (1)   • ARM SKIN LESION BIOPSY / EXCISION     • ARM WOUND REPAIR / CLOSURE  2014    Repair Superficial Wound TR-EXT 2.5 < CM 14527 (1)      • BRAIN SURGERY     • BREAST LUMPECTOMY     •  SECTION  1986     Section (1)     • ENDOSCOPY  2008    Colon endoscopy 87385 (1)     • EXCISION BREAST LESION W/ PREOP NEEDLE LOC  1981    Excision, breast lesion (1)     • HEMORRHOIDECTOMY     • HEMORRHOIDECTOMY  2008    Hemorrhoidectomy (2)      • HYSTERECTOMY     • INJECTION OF MEDICATION  2013    Celestone (betamethasone) (1)      • INJECTION OF MEDICATION  2013    Depo Medrol (Methylprednisone) (1)      • INJECTION OF MEDICATION  2013    Dexamethasone (2)      • INJECTION OF MEDICATION  2014    Kenalog (6)   • KNEE ARTHROSCOPY  2007    Knee arthroscopy, surgery (1)     • LAPAROSCOPIC TUBAL LIGATION  1991    Tubal ligation (1)      • PAP SMEAR  2007    PAP SMEAR (1)      • SHOULDER SURGERY Left     x 4   • TOTAL ABDOMINAL HYSTERECTOMY WITH SALPINGO OOPHORECTOMY      JOSE/BSO (1)      • TOTAL KNEE ARTHROPLASTY Left 2018    Procedure: TOTAL KNEE ARTHROPLASTY ATTUNE with adductor canal block - left;  Surgeon: Brooks Junior MD;  Location: [facility];  Service: Orthopedics       PT Ortho     Row Name 18 1300       Precautions and Contraindications    Precautions  L TKA 18  -SS       Posture/Observations    Posture/Observations Comments  Presents ambulating with quad cane. Does not use quad cane in clinic.  -SS       Right Lower Ext    Rt Knee Extension/Flexion AROM  0-97  -SS      User Key  (r) = Recorded By, (t) = Taken By, (c) = Cosigned By    Initials Name Provider Type    Nathaniel Zhang, PT DPT Physical Therapist                      PT Assessment/Plan     Row Name 18 1300          Functional  "Limitations  Impaired gait;Limitation in home management;Limitations in community activities;Limitations in functional capacity and performance;Performance in self-care ADL;Performance in work activities;Performance in leisure activities  -    Impairments  Balance;Gait;Endurance;Joint mobility;Muscle strength;Pain;Range of motion  -    Assessment Comments  4 weeks, 3 days post-op. Improving function. Good effort.,  -SS    Rehab Potential  Good  -SS    Patient/caregiver participated in establishment of treatment plan and goals  Yes  -SS    Patient would benefit from skilled therapy intervention  Yes  -SS          PT Frequency  2x/week  -SS    Predicted Duration of Therapy Intervention (Therapy Eval)  4-6 weeks  -SS    PT Plan Comments  Cont POC. Start Cybex Leg Press and Hip Abd/Add next.   -      User Key  (r) = Recorded By, (t) = Taken By, (c) = Cosigned By    Initials Name Provider Type     Nathaniel Sharp, PT DPT Physical Therapist              Exercises     Row Name 12/20/18 1300          Existing Precautions/Restrictions  -- L TKA 11/19/18  -          Subjective Comments  Overall doing better. Uses cane when outside. Not using the cane inside buildings right now. 70% subjective improvement.  -SS          Able to rate subjective pain?  yes  -SS    Pre-Treatment Pain Level  4  -SS    Post-Treatment Pain Level  4  -SS          Exercise Name 1  Pro2, Seat 8, LE   -SS    Cueing 1  Verbal  -SS    Time 1  12 mins  -    Additional Comments  Level 3  -SS          Exercise Name 2  Incline calf stretch  -    Cueing 2  Verbal;Demo  -SS    Sets 2  3  -SS    Time 2  30 sec hold  -SS          Exercise Name 3  Standing HS stretch  -    Cueing 3  Verbal;Demo  -SS    Sets 3  3  -SS    Time 3  30 sec hold  -SS          Exercise Name 4  Lunge stretch for knee flexion  -    Cueing 4  Verbal;Demo  -SS    Sets 4  3  -SS    Time 4  30 sec hold  -SS          Exercise Name 5  Step ups forward -- 4\"  -SS    " "Cueing 5  Verbal  -SS    Sets 5  1  -SS    Reps 5  20  -SS          Exercise Name 6  Step ups lateral -- 4\"  -SS    Cueing 6  Verbal;Demo  -SS    Sets 6  1  -SS    Reps 6  10  -SS          Exercise Name 7  Seated sartorius  -SS    Cueing 7  Verbal;Demo  -SS    Sets 7  1  -SS    Reps 7  10  -SS          Exercise Name 8  LAQ  -SS    Cueing 8  Verbal  -SS    Sets 8  3  -SS    Reps 8  10  -SS    Time 8  2 sec hold  -SS    Additional Comments  2# AW  -      User Key  (r) = Recorded By, (t) = Taken By, (c) = Cosigned By    Initials Name Provider Type     Nathaniel Sharp, PT DPT Physical Therapist                         PT OP Goals     Row Name 12/20/18 1300          STG Date to Achieve  12/28/18  -    STG 1  Note a >/= 30% subjective improvement  -    STG 1 Progress  Met  -    STG 2  Lower Extremity Functional Scale score to be >/= 40/80  -    STG 2 Progress  Not Met  -    STG 3  L knee active extension to be -5 deg or better  -    STG 3 Progress  Met  -    STG 4  L knee active flexion to be >/= 105 deg  -    STG 4 Progress  Not Met;Progressing  -          LTG Date to Achieve  01/18/19  -    LTG 1  Independent with HEP  -    LTG 1 Progress  Ongoing  -    LTG 2  Demonstrate ability to ambulate community distances on firm level surfaces without assistive device  -    LTG 2 Progress  Ongoing  -    LTG 3  Lower Extremity Functional Scale Score to be >/= 55/80  -    LTG 3 Progress  Ongoing  -    LTG 4  L knee active extension to 0 deg  -    LTG 4 Progress  Ongoing  -    LTG 5  L knee active flexion to >/= 120 deg  -    LTG 5 Progress  Progressing  -    LTG 6  Demonstrate ability to ascend and descend 5 steps reciprocally  -          PT Goal Re-Cert Due Date  12/28/18  -      User Key  (r) = Recorded By, (t) = Taken By, (c) = Cosigned By    Initials Name Provider Type    Nathaniel Zhang, PT DPT Physical Therapist          Therapy Education  Given: HEP  Program: " Reinforced  How Provided: Verbal  Provided to: Patient  Level of Understanding: Verbalized              Time Calculation:   Start Time: 1303  Stop Time: 1347  Time Calculation (min): 44 min    Therapy Charges for Today     Code Description Service Date Service Provider Modifiers Qty    37364998941 HC PT THER PROC EA 15 MIN 12/20/2018 Nathaniel Sharp, PT DPT GP 3                    Nathaniel Sharp, PT, DPT, CHT  12/20/2018

## 2018-12-20 NOTE — PROGRESS NOTES
Spoke with patient. Reviewed current lab.  No s/s of bleeding. No new meds. No missed doses. Reviewed schedule for following week. Pt read back regimen and verbalized understanding. All questions answered. No further blood work, patient has been therapeutic the last two weeks and therapy ends 12/31.    Laurence Dodson RP  12/20/18  4:13 PM

## 2018-12-24 ENCOUNTER — HOSPITAL ENCOUNTER (OUTPATIENT)
Dept: PHYSICAL THERAPY | Facility: HOSPITAL | Age: 53
Setting detail: THERAPIES SERIES
Discharge: HOME | End: 2018-12-24

## 2018-12-24 DIAGNOSIS — Z96.652 STATUS POST LEFT KNEE REPLACEMENT: ICD-10-CM

## 2018-12-24 DIAGNOSIS — M17.12 PRIMARY OSTEOARTHRITIS OF LEFT KNEE: Primary | ICD-10-CM

## 2018-12-24 PROCEDURE — 97110 THERAPEUTIC EXERCISES: CPT | Performed by: PHYSICAL THERAPIST

## 2018-12-24 NOTE — THERAPY TREATMENT NOTE
Outpatient Physical Therapy Ortho Treatment Note  [facility]     Patient Name: Priti Orr  : 1965  MRN: [id number]  Today's Date: 2018      Visit Date: 2018  Attendance:  (25/yr)  Subjective Improvement: 70%  Next MD Appt: 19  Recert Date: 18     Therapy Diagnosis: L total knee arthroplasty 18      Visit Dx:    ICD-10-CM ICD-9-CM   1. Primary osteoarthritis of left knee M17.12 715.16   2. Status post left knee replacement Z96.652 V43.65            Past Medical History:   Diagnosis Date   • Allergic rhinitis    • Arthritis    • Asthma    • Benign neoplasm of meninges (CMS/HCC)     Post op       • Cobalamin deficiency    • Corneal abrasion    • Encounter for gynecological examination (general) (routine) without abnormal findings    • Essential hypertension    • Fatigue    • Female stress incontinence    • Head injury    • History of delayed wound healing     Delayed healing of surgical wound      • Hyperlipidemia    • Low back pain    • Nosebleed, symptom     Nosebleed/epistaxis symptom      • Osteoporosis    • PONV (postoperative nausea and vomiting)    • Sleep apnea     not wearing c-pap        Past Surgical History:   Procedure Laterality Date   • ARM LESION/CYST EXCISION  2014    Remove arm bone lesion (1)   • ARM SKIN LESION BIOPSY / EXCISION     • ARM WOUND REPAIR / CLOSURE  2014    Repair Superficial Wound TR-EXT 2.5 < CM 04982 (1)      • BRAIN SURGERY     • BREAST LUMPECTOMY     •  SECTION  1986     Section (1)     • ENDOSCOPY  2008    Colon endoscopy 78047 (1)     • EXCISION BREAST LESION W/ PREOP NEEDLE LOC  1981    Excision, breast lesion (1)     • HEMORRHOIDECTOMY     • HEMORRHOIDECTOMY  2008    Hemorrhoidectomy (2)      • HYSTERECTOMY     • INJECTION OF MEDICATION  2013    Celestone (betamethasone) (1)      • INJECTION OF MEDICATION  2013    Depo Medrol (Methylprednisone)  (1)      • INJECTION OF MEDICATION  05/21/2013    Dexamethasone (2)      • INJECTION OF MEDICATION  01/29/2014    Kenalog (6)   • KNEE ARTHROSCOPY  03/05/2007    Knee arthroscopy, surgery (1)     • LAPAROSCOPIC TUBAL LIGATION  04/16/1991    Tubal ligation (1)      • PAP SMEAR  01/18/2007    PAP SMEAR (1)      • SHOULDER SURGERY Left     x 4   • TOTAL ABDOMINAL HYSTERECTOMY WITH SALPINGO OOPHORECTOMY  1993    JOSE/BSO (1)      • TOTAL KNEE ARTHROPLASTY Left 11/19/2018    Procedure: TOTAL KNEE ARTHROPLASTY ATTUNE with adductor canal block - left;  Surgeon: Brooks Junior MD;  Location: [facility];  Service: Orthopedics       PT Ortho     Row Name 12/24/18 0800       Precautions and Contraindications    Precautions  L TKA 11/19/18  -SS       Subjective Pain    Post-Treatment Pain Level  4  -    Subjective Pain Comment  declines modalities  -SS       Posture/Observations    Posture/Observations Comments  Presents ambulating without assistive device.  -SS       Right Lower Ext    Rt Knee Extension/Flexion AROM  0-104  -      User Key  (r) = Recorded By, (t) = Taken By, (c) = Cosigned By    Initials Name Provider Type    SS Nathaniel Sharp, PT DPT Physical Therapist                      PT Assessment/Plan     Row Name 12/24/18 0800          Functional Limitations  Impaired gait;Limitation in home management;Limitations in community activities;Limitations in functional capacity and performance;Performance in self-care ADL;Performance in work activities;Performance in leisure activities  -    Impairments  Balance;Gait;Endurance;Joint mobility;Muscle strength;Pain;Range of motion  -    Assessment Comments  5 weeks post-op. Motion is progressing. Recommended cane use on uneven surfaces and PRN. Some soreness with increased activity this date.   -    Rehab Potential  Good  -    Patient/caregiver participated in establishment of treatment plan and goals  Yes  -    Patient would benefit from skilled  "therapy intervention  Yes  -SS          PT Frequency  2x/week  -SS    Predicted Duration of Therapy Intervention (Therapy Eval)  4-6 weeks  -SS    PT Plan Comments  Continue POC. Progress as tolerated. SLS next. Recheck next.  -SS      User Key  (r) = Recorded By, (t) = Taken By, (c) = Cosigned By    Initials Name Provider Type    SS Nathaniel Sharp, PT DPT Physical Therapist              Exercises     Row Name 12/24/18 0800          Existing Precautions/Restrictions  -- L TKA 11/19/18  -SS          Subjective Comments  Sore from up on feet cooking yesterday. Doing well. Left house without her cane this morning.   -SS          Able to rate subjective pain?  yes  -SS    Pre-Treatment Pain Level  3  -SS    Post-Treatment Pain Level  4  -SS    Subjective Pain Comment  declines modalities  -SS          Exercise Name 1  Pro2, Seat 7, LE ROM  -SS    Cueing 1  Verbal  -SS    Time 1  10 mins  -SS    Additional Comments  Level 3  -SS          Exercise Name 2  Incline calf stretch  -SS    Cueing 2  Verbal;Demo  -SS    Sets 2  3  -SS    Time 2  30 sec hold  -SS          Exercise Name 3  Standing HS stretch  -SS    Cueing 3  Verbal;Demo  -SS    Sets 3  3  -SS    Time 3  30 sec hold  -SS          Exercise Name 4  Lunge stretch for knee flexion  -SS    Cueing 4  Verbal;Demo  -SS    Sets 4  3  -SS    Time 4  30 sec hold  -SS          Exercise Name 5  Step ups forward -- 4\"  -SS    Cueing 5  Verbal  -SS    Sets 5  1  -SS    Reps 5  20  -SS          Exercise Name 6  Step ups lateral -- 4\"  -SS    Cueing 6  Verbal;Demo  -SS    Sets 6  1  -SS    Reps 6  20  -SS          Exercise Name 7  Cybex Leg Extension  -SS    Cueing 7  Verbal  -SS    Sets 7  3  -SS    Reps 7  10  -SS    Additional Comments  10#  -SS          Exercise Name 8  Cybex Hip Abd  -SS    Cueing 8  Verbal  -SS    Sets 8  2  -SS    Reps 8  10  -SS    Additional Comments  30#  -SS          Exercise Name 9  Cybex Hip Add  -SS    Cueing 9  Verbal  -SS    Sets 9  3  -SS "    Reps 9  10  -    Additional Comments  35#  -          Exercise Name 10  Mini squats  -    Cueing 10  Verbal  -    Sets 10  2  -SS    Reps 10  20  -SS      User Key  (r) = Recorded By, (t) = Taken By, (c) = Cosigned By    Initials Name Provider Type    Nathaniel Zhang, PT DPT Physical Therapist                         PT OP Goals     Row Name 12/24/18 0800          STG Date to Achieve  12/28/18  -    STG 1  Note a >/= 30% subjective improvement  -    STG 1 Progress  Met  -    STG 2  Lower Extremity Functional Scale score to be >/= 40/80  -    STG 2 Progress  Not Met  -    STG 3  L knee active extension to be -5 deg or better  -    STG 3 Progress  Met  -    STG 4  L knee active flexion to be >/= 105 deg  -    STG 4 Progress  Not Met;Progressing  -          LTG Date to Achieve  01/18/19  -    LTG 1  Independent with HEP  -    LTG 1 Progress  Ongoing  -    LTG 2  Demonstrate ability to ambulate community distances on firm level surfaces without assistive device  -    LTG 2 Progress  Ongoing  -    LTG 3  Lower Extremity Functional Scale Score to be >/= 55/80  -    LTG 3 Progress  Ongoing  -    LTG 4  L knee active extension to 0 deg  -    LTG 4 Progress  Ongoing  -    LTG 5  L knee active flexion to >/= 120 deg  -    LTG 5 Progress  Progressing  -    LTG 6  Demonstrate ability to ascend and descend 5 steps reciprocally  -          PT Goal Re-Cert Due Date  12/28/18  -      User Key  (r) = Recorded By, (t) = Taken By, (c) = Cosigned By    Initials Name Provider Type    Nathaniel Zhang, PT DPT Physical Therapist          Therapy Education  Education Details: gait without assistive device unless uneven surfaces and PRN  Given: Mobility training  How Provided: Verbal  Provided to: Patient  Level of Understanding: Verbalized              Time Calculation:   Start Time: 0800  Stop Time: 0844  Time Calculation (min): 44 min    Therapy Charges for Today      Code Description Service Date Service Provider Modifiers Qty    35775979686 HC PT THER PROC EA 15 MIN 12/24/2018 Nathaniel Sharp, PT DPT GP 3                    Nathaniel Sharp, PT, DPT, CHT  12/24/2018

## 2018-12-28 ENCOUNTER — HOSPITAL ENCOUNTER (OUTPATIENT)
Dept: PHYSICAL THERAPY | Facility: HOSPITAL | Age: 53
Setting detail: THERAPIES SERIES
Discharge: HOME | End: 2018-12-28

## 2018-12-28 DIAGNOSIS — Z96.652 STATUS POST LEFT KNEE REPLACEMENT: ICD-10-CM

## 2018-12-28 DIAGNOSIS — M17.12 PRIMARY OSTEOARTHRITIS OF LEFT KNEE: Primary | ICD-10-CM

## 2018-12-28 PROCEDURE — 97110 THERAPEUTIC EXERCISES: CPT | Performed by: PHYSICAL THERAPIST

## 2018-12-28 NOTE — THERAPY PROGRESS REPORT/RE-CERT
Outpatient Physical Therapy Ortho Progress Note  [facility]     Patient Name: Priti Orr  : 1965  MRN: [id number]  Today's Date: 2018      Visit Date: 2018  Attendance:  (25/yr)  Subjective Improvement: 80%  Next MD Appt: 19  Recert Date: 19     Therapy Diagnosis: L total knee arthroplasty 18    Changes in Medications: none noted  Changes in MD Orders: none noted  Number of Work Days Lost: 2-3 weeks         Past Medical History:   Diagnosis Date   • Allergic rhinitis    • Arthritis    • Asthma    • Benign neoplasm of meninges (CMS/HCC)     Post op       • Cobalamin deficiency    • Corneal abrasion    • Encounter for gynecological examination (general) (routine) without abnormal findings    • Essential hypertension    • Fatigue    • Female stress incontinence    • Head injury    • History of delayed wound healing     Delayed healing of surgical wound      • Hyperlipidemia    • Low back pain    • Nosebleed, symptom     Nosebleed/epistaxis symptom      • Osteoporosis    • PONV (postoperative nausea and vomiting)    • Sleep apnea     not wearing c-pap        Past Surgical History:   Procedure Laterality Date   • ARM LESION/CYST EXCISION  2014    Remove arm bone lesion (1)   • ARM SKIN LESION BIOPSY / EXCISION     • ARM WOUND REPAIR / CLOSURE  2014    Repair Superficial Wound TR-EXT 2.5 < CM 70045 (1)      • BRAIN SURGERY     • BREAST LUMPECTOMY     •  SECTION  1986     Section (1)     • ENDOSCOPY  2008    Colon endoscopy 64771 (1)     • EXCISION BREAST LESION W/ PREOP NEEDLE LOC  1981    Excision, breast lesion (1)     • HEMORRHOIDECTOMY     • HEMORRHOIDECTOMY  2008    Hemorrhoidectomy (2)      • HYSTERECTOMY     • INJECTION OF MEDICATION  2013    Celestone (betamethasone) (1)      • INJECTION OF MEDICATION  2013    Depo Medrol (Methylprednisone) (1)      • INJECTION OF MEDICATION   05/21/2013    Dexamethasone (2)      • INJECTION OF MEDICATION  01/29/2014    Kenalog (6)   • KNEE ARTHROSCOPY  03/05/2007    Knee arthroscopy, surgery (1)     • LAPAROSCOPIC TUBAL LIGATION  04/16/1991    Tubal ligation (1)      • PAP SMEAR  01/18/2007    PAP SMEAR (1)      • SHOULDER SURGERY Left     x 4   • TOTAL ABDOMINAL HYSTERECTOMY WITH SALPINGO OOPHORECTOMY  1993    JOSE/BSO (1)      • TOTAL KNEE ARTHROPLASTY Left 11/19/2018    Procedure: TOTAL KNEE ARTHROPLASTY ATTUNE with adductor canal block - left;  Surgeon: Brooks Junior MD;  Location: [facility] OR;  Service: Orthopedics       Visit Dx:     ICD-10-CM ICD-9-CM   1. Primary osteoarthritis of left knee M17.12 715.16   2. Status post left knee replacement Z96.652 V43.65           PT Ortho     Row Name 12/28/18 0900       Precautions and Contraindications    Precautions  L TKA 11/19/18  -SS       Posture/Observations    Posture/Observations Comments  Presents ambulating without assistive device.   -SS       Right Lower Ext    Rt Knee Extension/Flexion AROM  0-107  -SS       MMT Left Lower Ext    Lt Hip Flexion MMT, Gross Movement  (5/5) normal  -SS    Lt Hip Extension MMT, Gross Movement  (4+/5) good plus  -SS    Lt Hip ABduction MMT, Gross Movement  (4+/5) good plus  -SS    Lt Hip ADduction MMT, Gross Movement  (4+/5) good plus  -SS    Lt Hip Internal (Medial) Rotation MMT, Gross Movement  (5/5) normal  -SS    Lt Hip External (Lateral) Rotation MMT, Gross Movement  (3+/5) fair plus anteromedial knee pain  -SS    Lt Knee Extension MMT, Gross Movement  (4+/5) good plus  -SS    Lt Knee Flexion MMT, Gross Movement  (4-/5) good minus posterior knee pain  -SS      User Key  (r) = Recorded By, (t) = Taken By, (c) = Cosigned By    Initials Name Provider Type    Nathaniel Zhang, MARICRUZ DPT Physical Therapist                      Therapy Education  Given: HEP  Program: Reinforced  How Provided: Verbal  Provided to: Patient  Level of Understanding:  Verbalized     PT OP Goals     Row Name 12/28/18 0900          STG Date to Achieve  -- further STGs deferred  -    STG 1  Note a >/= 30% subjective improvement  -SS    STG 1 Progress  Met  -    STG 2  Lower Extremity Functional Scale score to be >/= 40/80  -    STG 2 Progress  Not Met  -    STG 3  L knee active extension to be -5 deg or better  -    STG 3 Progress  Met  -    STG 4  L knee active flexion to be >/= 105 deg  -    STG 4 Progress  Met  -          LTG Date to Achieve  01/18/19  -    LTG 1  Independent with HEP  -    LTG 1 Progress  Ongoing  -    LTG 2  Demonstrate ability to ambulate community distances on firm level surfaces without assistive device  -    LTG 2 Progress  Met  -    LTG 3  Lower Extremity Functional Scale Score to be >/= 55/80  -    LTG 3 Progress  Ongoing  -SS    LTG 4  L knee active extension to 0 deg  -    LTG 4 Progress  Met  -    LTG 5  L knee active flexion to >/= 120 deg  -    LTG 5 Progress  Progressing  -    LTG 6  Demonstrate ability to ascend and descend 5 steps reciprocally  -          PT Goal Re-Cert Due Date  01/18/19  -      User Key  (r) = Recorded By, (t) = Taken By, (c) = Cosigned By    Initials Name Provider Type     Nathaniel Sharp, PT DPT Physical Therapist          PT Assessment/Plan     Row Name 12/28/18 0900          Functional Limitations  Impaired gait;Limitation in home management;Limitations in community activities;Limitations in functional capacity and performance;Performance in self-care ADL;Performance in work activities;Performance in leisure activities  -    Impairments  Balance;Gait;Endurance;Joint mobility;Muscle strength;Pain;Range of motion  -    Assessment Comments  5 weeks, 4 days post-op. Muscle soreness post-therapy but not painful. Good effort. Improving motion.   -    Rehab Potential  Good  -    Patient/caregiver participated in establishment of treatment plan and goals  Yes  -    Patient  "would benefit from skilled therapy intervention  Yes  -SS          PT Frequency  2x/week  -SS    Predicted Duration of Therapy Intervention (Therapy Eval)  3-4 weeks  -SS    PT Plan Comments  ROM, stretch, strengthen, stairs, balance training  -SS      User Key  (r) = Recorded By, (t) = Taken By, (c) = Cosigned By    Initials Name Provider Type    SS Nathaniel Sharp, PT DPT Physical Therapist            Exercises     Row Name 12/28/18 0900          Existing Precautions/Restrictions  -- L TKA 11/19/18  -SS          Subjective Comments  Knee was sore last night, but had cleaned her car, cleaned her garage, cooked, got ready for company, etc.80% subjective improvement. Still not able to bend knee like the right or climb into big trucks. Walked around the block where she lives yesterday as well.  -SS          Able to rate subjective pain?  yes  -SS    Pre-Treatment Pain Level  0  -SS    Post-Treatment Pain Level  -- \"sore\"  -SS    Subjective Pain Comment  declines modalities post-treatment  -SS          Exercise Name 1  Pro2, Seat 7, LE  -SS    Cueing 1  Verbal  -SS    Time 1  10 mins  -SS    Additional Comments  Level 4  -SS          Exercise Name 2  Incline calf stretch  -SS    Cueing 2  Verbal  -SS    Sets 2  3  -SS    Time 2  30 sec hold  -SS          Exercise Name 3  Standing HS stretch  -SS    Cueing 3  Verbal  -SS    Sets 3  3  -SS    Time 3  30 sec hold  -SS          Exercise Name 4  Lunge stretch for flexion  -SS    Cueing 4  Verbal  -SS    Sets 4  3  -SS    Time 4  30 sec hold  -SS          Exercise Name 5  L SLS EO  -SS    Cueing 5  Verbal;Demo  -SS    Sets 5  3  -SS    Time 5  30 sec  -SS          Exercise Name 6  Reciprocal stairs  -SS    Cueing 6  Verbal;Demo  -SS    Reps 6  5 laps  -SS    Additional Comments  4-inch  -SS          Exercise Name 7  Cybex Leg Extension  -SS    Cueing 7  Verbal  -SS    Sets 7  3  -SS    Reps 7  10  -SS    Additional Comments  15#  -SS          Exercise Name 8  Cybex " Leg Press  -SS    Cueing 8  Verbal  -SS    Sets 8  3  -SS    Reps 8  10  -SS    Additional Comments  90#  -SS          Exercise Name 9  Cybex Hip Add  -SS    Cueing 9  Verbal  -SS    Sets 9  3  -SS    Reps 9  10  -SS    Additional Comments  35#  -SS          Exercise Name 10  Cybex Hip Abd  -SS    Cueing 10  Verbal  -SS    Sets 10  3  -SS    Reps 10  10  -SS    Additional Comments  30#  -SS      User Key  (r) = Recorded By, (t) = Taken By, (c) = Cosigned By    Initials Name Provider Type    Nathaniel Zhang, PT DPT Physical Therapist                        Outcome Measure Options: Lower Extremity Functional Scale (LEFS)  Lower Extremity Functional Index  Any of your usual work, housework or school activities: No difficulty  Your usual hobbies, recreational or sporting activities: Extreme difficulty or unable to perform activity  Getting into or out of the bath: No difficulty  Walking between rooms: No difficulty  Putting on your shoes or socks: Moderate difficulty  Squatting: Quite a bit of difficulty  Lifting an object, like a bag of groceries from the floor: No difficulty  Performing light activities around your home: No difficulty  Performing heavy activities around your home: Quite a bit of difficulty  Getting into or out of a car: A little bit of difficulty  Walking 2 blocks: A little bit of difficulty  Walking a mile: Quite a bit of difficulty  Going up or down 10 stairs (about 1 flight of stairs): A little bit of difficulty  Standing for 1 hour: Quite a bit of difficulty  Sitting for 1 hour: Quite a bit of difficulty  Running on even ground: Extreme difficulty or unable to perform activity  Running on uneven ground: Extreme difficulty or unable to perform activity  Making sharp turns while running fast: Extreme difficulty or unable to perform activity  Hopping: Extreme difficulty or unable to perform activity  Rolling over in bed: Moderate difficulty  Total: 38      Time Calculation:         Start  Time: 0848  Stop Time: 0945  Time Calculation (min): 57 min     Therapy Charges for Today     Code Description Service Date Service Provider Modifiers Qty    57768221383 HC PT THER PROC EA 15 MIN 12/28/2018 Nathaniel Sharp, PT DPT GP 4                   Nathaniel Sharp, PT, DPT, CHT  12/28/2018

## 2018-12-31 ENCOUNTER — HOSPITAL ENCOUNTER (OUTPATIENT)
Dept: PHYSICAL THERAPY | Facility: HOSPITAL | Age: 53
Setting detail: THERAPIES SERIES
Discharge: HOME | End: 2018-12-31

## 2018-12-31 ENCOUNTER — TELEPHONE (OUTPATIENT)
Dept: PHARMACY | Facility: HOSPITAL | Age: 53
End: 2018-12-31

## 2018-12-31 DIAGNOSIS — M17.12 PRIMARY OSTEOARTHRITIS OF LEFT KNEE: Primary | ICD-10-CM

## 2018-12-31 DIAGNOSIS — Z96.652 STATUS POST LEFT KNEE REPLACEMENT: ICD-10-CM

## 2018-12-31 PROCEDURE — 97110 THERAPEUTIC EXERCISES: CPT | Performed by: PHYSICAL THERAPIST

## 2018-12-31 NOTE — TELEPHONE ENCOUNTER
Spoke with patient and reminded her she had completed her warfarin therapy for Dr. Junior. Patient reported no additional issues. Provided disposal instructions.

## 2019-01-02 ENCOUNTER — APPOINTMENT (OUTPATIENT)
Dept: PHYSICAL THERAPY | Facility: HOSPITAL | Age: 54
End: 2019-01-02

## 2019-01-04 ENCOUNTER — HOSPITAL ENCOUNTER (OUTPATIENT)
Dept: PHYSICAL THERAPY | Facility: HOSPITAL | Age: 54
Setting detail: THERAPIES SERIES
Discharge: HOME | End: 2019-01-04

## 2019-01-04 DIAGNOSIS — Z96.652 STATUS POST LEFT KNEE REPLACEMENT: ICD-10-CM

## 2019-01-04 DIAGNOSIS — M17.12 PRIMARY OSTEOARTHRITIS OF LEFT KNEE: Primary | ICD-10-CM

## 2019-01-04 PROCEDURE — 97110 THERAPEUTIC EXERCISES: CPT | Performed by: PHYSICAL THERAPIST

## 2019-01-04 NOTE — THERAPY TREATMENT NOTE
Outpatient Physical Therapy Ortho Treatment Note  [facility]     Patient Name: Priti Orr  : 1965  MRN: [id number]  Today's Date: 2019      Visit Date: 2019  Attendance:  (25/yr)  Subjective Improvement: 80%  Next MD Appt: 19  Recert Date: 19     Therapy Diagnosis: L total knee arthroplasty 18      Visit Dx:    ICD-10-CM ICD-9-CM   1. Primary osteoarthritis of left knee M17.12 715.16   2. Status post left knee replacement Z96.652 V43.65            Past Medical History:   Diagnosis Date   • Allergic rhinitis    • Arthritis    • Asthma    • Benign neoplasm of meninges (CMS/HCC)     Post op       • Cobalamin deficiency    • Corneal abrasion    • Encounter for gynecological examination (general) (routine) without abnormal findings    • Essential hypertension    • Fatigue    • Female stress incontinence    • Head injury    • History of delayed wound healing     Delayed healing of surgical wound      • Hyperlipidemia    • Low back pain    • Nosebleed, symptom     Nosebleed/epistaxis symptom      • Osteoporosis    • PONV (postoperative nausea and vomiting)    • Sleep apnea     not wearing c-pap        Past Surgical History:   Procedure Laterality Date   • ARM LESION/CYST EXCISION  2014    Remove arm bone lesion (1)   • ARM SKIN LESION BIOPSY / EXCISION     • ARM WOUND REPAIR / CLOSURE  2014    Repair Superficial Wound TR-EXT 2.5 < CM 76603 (1)      • BRAIN SURGERY     • BREAST LUMPECTOMY     •  SECTION  1986     Section (1)     • ENDOSCOPY  2008    Colon endoscopy 90841 (1)     • EXCISION BREAST LESION W/ PREOP NEEDLE LOC  1981    Excision, breast lesion (1)     • HEMORRHOIDECTOMY     • HEMORRHOIDECTOMY  2008    Hemorrhoidectomy (2)      • HYSTERECTOMY     • INJECTION OF MEDICATION  2013    Celestone (betamethasone) (1)      • INJECTION OF MEDICATION  2013    Depo Medrol (Methylprednisone) (1)       • INJECTION OF MEDICATION  05/21/2013    Dexamethasone (2)      • INJECTION OF MEDICATION  01/29/2014    Kenalog (6)   • KNEE ARTHROSCOPY  03/05/2007    Knee arthroscopy, surgery (1)     • LAPAROSCOPIC TUBAL LIGATION  04/16/1991    Tubal ligation (1)      • PAP SMEAR  01/18/2007    PAP SMEAR (1)      • SHOULDER SURGERY Left     x 4   • TOTAL ABDOMINAL HYSTERECTOMY WITH SALPINGO OOPHORECTOMY  1993    JOSE/BSO (1)      • TOTAL KNEE ARTHROPLASTY Left 11/19/2018    Procedure: TOTAL KNEE ARTHROPLASTY ATTUNE with adductor canal block - left;  Surgeon: Brooks Junior MD;  Location: [facility];  Service: Orthopedics       PT Ortho     Row Name 01/04/19 0700       Precautions and Contraindications    Precautions  L TKA 11/19/18  -SS       Posture/Observations    Posture/Observations Comments  Ambulates without assistive device  -SS       Right Lower Ext    Rt Knee Extension/Flexion AROM  0-113  -SS      User Key  (r) = Recorded By, (t) = Taken By, (c) = Cosigned By    Initials Name Provider Type    SS Nathaniel Sharp PT DPT Physical Therapist                      PT Assessment/Plan     Row Name 01/04/19 0800          Functional Limitations  Impaired gait;Limitation in home management;Limitations in community activities;Limitations in functional capacity and performance;Performance in self-care ADL;Performance in work activities;Performance in leisure activities  -    Impairments  Balance;Gait;Endurance;Joint mobility;Muscle strength;Pain;Range of motion  -    Assessment Comments  Difficulty descending 8-inch stairs reciprocally but not 4-inch. Not trouble ascending stairs reciprocally with handrail. Patient continues to improve with mobility and function.   -SS    Rehab Potential  Good  -SS    Patient/caregiver participated in establishment of treatment plan and goals  Yes  -SS    Patient would benefit from skilled therapy intervention  Yes  -SS          PT Frequency  2x/week  -SS    Predicted Duration  of Therapy Intervention (Therapy Eval)  3-4 weeks  -SS    PT Plan Comments  Standing leg swings B next. Continue POC.  -SS      User Key  (r) = Recorded By, (t) = Taken By, (c) = Cosigned By    Initials Name Provider Type    Nathaniel Zhang, PT DPT Physical Therapist              Exercises     Row Name 01/04/19 0700          Existing Precautions/Restrictions  -- L TKA 11/19/18  -SS          Subjective Comments  Sore for a few hours after last therapy session but no swelling. Taking stairs one at a time at work. 80% subjective improvement.   -SS          Able to rate subjective pain?  yes  -SS    Pre-Treatment Pain Level  2  -SS    Post-Treatment Pain Level  3  -SS    Subjective Pain Comment  declines modalities  -SS          Exercise Name 1  Pro2, Seat 8, LE ROM  -SS    Cueing 1  Verbal  -SS    Time 1  10 mins  -SS    Additional Comments  Level 4.5  -SS          Exercise Name 2  Incline calf stretch  -SS    Cueing 2  Verbal  -SS    Sets 2  3  -SS    Time 2  30 sec hold  -SS          Exercise Name 3  Standing HS stretch  -SS    Cueing 3  Verbal  -SS    Sets 3  3  -SS    Time 3  30 sec hold  -SS          Exercise Name 4  Lunge stretch for flexion  -SS    Cueing 4  Verbal  -SS    Sets 4  3  -SS    Time 4  30 sec hold  -SS          Exercise Name 5  Prone quad stretch  -SS    Cueing 5  Verbal  -SS    Sets 5  3  -SS    Time 5  30 sec hold  -SS          Exercise Name 6  Prone knee flexion AROM  -SS    Cueing 6  Verbal  -SS    Sets 6  3  -SS    Reps 6  10  -SS    Time 6  3 sec hold  -SS          Exercise Name 7  LAQ  -SS    Cueing 7  Verbal  -SS    Sets 7  3  -SS    Reps 7  10  -SS    Additional Comments  3# AW  -SS          Exercise Name 8  Reciprocal stairs - stair bridge  -SS    Cueing 8  Verbal;Demo  -SS    Reps 8  4 laps  -SS      User Key  (r) = Recorded By, (t) = Taken By, (c) = Cosigned By    Initials Name Provider Type    Nathaniel Zhang, PT DPT Physical Therapist                         PT OP  Goals     Row Name 01/04/19 0700          STG Date to Achieve  -- further STGs deferred  -          LTG Date to Achieve  01/18/19  -    LTG 1  Independent with HEP  -    LTG 1 Progress  Ongoing  -    LTG 2  Demonstrate ability to ambulate community distances on firm level surfaces without assistive device  -    LTG 2 Progress  Met  -SS    LTG 3  Lower Extremity Functional Scale Score to be >/= 55/80  -    LTG 3 Progress  Ongoing  -SS    LTG 4  L knee active extension to 0 deg  -    LTG 4 Progress  Met  -    LTG 5  L knee active flexion to >/= 120 deg  -    LTG 5 Progress  Progressing  -    LTG 6  Demonstrate ability to ascend and descend 5 steps reciprocally  -    LTG 6 Progress  Progressing  -          PT Goal Re-Cert Due Date  01/18/19  -      User Key  (r) = Recorded By, (t) = Taken By, (c) = Cosigned By    Initials Name Provider Type    SS Nathaniel Sharp, PT DPT Physical Therapist          Therapy Education  Given: HEP  Program: Reinforced  How Provided: Verbal  Provided to: Patient  Level of Understanding: Verbalized              Time Calculation:   Start Time: 0757  Stop Time: 0847  Time Calculation (min): 50 min    Therapy Charges for Today     Code Description Service Date Service Provider Modifiers Qty    24795741973 HC PT THER PROC EA 15 MIN 1/4/2019 Nathaniel Sharp, PT DPT GP 3                    Nathaniel Sharp, PT, DPT, CHT  1/4/2019

## 2019-01-07 ENCOUNTER — HOSPITAL ENCOUNTER (OUTPATIENT)
Dept: PHYSICAL THERAPY | Facility: HOSPITAL | Age: 54
Setting detail: THERAPIES SERIES
Discharge: HOME | End: 2019-01-07

## 2019-01-07 DIAGNOSIS — Z96.652 STATUS POST LEFT KNEE REPLACEMENT: ICD-10-CM

## 2019-01-07 DIAGNOSIS — M17.12 PRIMARY OSTEOARTHRITIS OF LEFT KNEE: Primary | ICD-10-CM

## 2019-01-07 PROCEDURE — 97110 THERAPEUTIC EXERCISES: CPT | Performed by: PHYSICAL THERAPIST

## 2019-01-07 NOTE — THERAPY TREATMENT NOTE
Outpatient Physical Therapy Ortho Treatment Note  [facility]     Patient Name: Priti Orr  : 1965  MRN: [id number]  Today's Date: 2019      Visit Date: 2019  Attendance: 10/10 (25/yr)  Subjective Improvement: 85%  Next MD Appt: 19  Recert Date: 19     Therapy Diagnosis: L total knee arthroplasty 18      Visit Dx:    ICD-10-CM ICD-9-CM   1. Primary osteoarthritis of left knee M17.12 715.16   2. Status post left knee replacement Z96.652 V43.65        Past Medical History:   Diagnosis Date   • Allergic rhinitis    • Arthritis    • Asthma    • Benign neoplasm of meninges (CMS/HCC)     Post op       • Cobalamin deficiency    • Corneal abrasion    • Encounter for gynecological examination (general) (routine) without abnormal findings    • Essential hypertension    • Fatigue    • Female stress incontinence    • Head injury    • History of delayed wound healing     Delayed healing of surgical wound      • Hyperlipidemia    • Low back pain    • Nosebleed, symptom     Nosebleed/epistaxis symptom      • Osteoporosis    • PONV (postoperative nausea and vomiting)    • Sleep apnea     not wearing c-pap        Past Surgical History:   Procedure Laterality Date   • ARM LESION/CYST EXCISION  2014    Remove arm bone lesion (1)   • ARM SKIN LESION BIOPSY / EXCISION     • ARM WOUND REPAIR / CLOSURE  2014    Repair Superficial Wound TR-EXT 2.5 < CM 20726 (1)      • BRAIN SURGERY     • BREAST LUMPECTOMY     •  SECTION  1986     Section (1)     • ENDOSCOPY  2008    Colon endoscopy 99791 (1)     • EXCISION BREAST LESION W/ PREOP NEEDLE LOC  1981    Excision, breast lesion (1)     • HEMORRHOIDECTOMY     • HEMORRHOIDECTOMY  2008    Hemorrhoidectomy (2)      • HYSTERECTOMY     • INJECTION OF MEDICATION  2013    Celestone (betamethasone) (1)      • INJECTION OF MEDICATION  2013    Depo Medrol (Methylprednisone) (1)       • INJECTION OF MEDICATION  05/21/2013    Dexamethasone (2)      • INJECTION OF MEDICATION  01/29/2014    Kenalog (6)   • KNEE ARTHROSCOPY  03/05/2007    Knee arthroscopy, surgery (1)     • LAPAROSCOPIC TUBAL LIGATION  04/16/1991    Tubal ligation (1)      • PAP SMEAR  01/18/2007    PAP SMEAR (1)      • SHOULDER SURGERY Left     x 4   • TOTAL ABDOMINAL HYSTERECTOMY WITH SALPINGO OOPHORECTOMY  1993    JOSE/BSO (1)      • TOTAL KNEE ARTHROPLASTY Left 11/19/2018    Procedure: TOTAL KNEE ARTHROPLASTY ATTUNE with adductor canal block - left;  Surgeon: Brooks Junior MD;  Location: [facility];  Service: Orthopedics       PT Ortho     Row Name 01/07/19 0800       Precautions and Contraindications    Precautions  L TKA 11/19/18  -SS       Posture/Observations    Posture/Observations Comments  Ambulates without assistive device  -SS       Right Lower Ext    Rt Knee Extension/Flexion AROM  0-114  -SS      User Key  (r) = Recorded By, (t) = Taken By, (c) = Cosigned By    Initials Name Provider Type    SS Nathaniel Sharp, PT DPT Physical Therapist                      PT Assessment/Plan     Row Name 01/07/19 0800          Functional Limitations  Impaired gait;Limitation in home management;Limitations in community activities;Limitations in functional capacity and performance;Performance in self-care ADL;Performance in work activities;Performance in leisure activities  -    Impairments  Balance;Gait;Endurance;Joint mobility;Muscle strength;Pain;Range of motion  -    Assessment Comments  Good effort this date. Sore with end-range flexion. Rehabilitation Progress Note sent with patient this date.  -SS    Rehab Potential  Good  -SS    Patient/caregiver participated in establishment of treatment plan and goals  Yes  -SS    Patient would benefit from skilled therapy intervention  Yes  -SS          PT Frequency  2x/week  -SS    Predicted Duration of Therapy Intervention (Therapy Eval)  3-4 weeks  -SS    PT Plan  Comments  Continue POC. Balance activities next.  -SS      User Key  (r) = Recorded By, (t) = Taken By, (c) = Cosigned By    Initials Name Provider Type    SS Nathaniel Sharp, MARICRUZ DPT Physical Therapist              Exercises     Row Name 01/07/19 0800          Existing Precautions/Restrictions  -- L TKA 11/19/18  -SS          Subjective Comments  Walked 2 miles yesterday. Knee swollen for 3 hours afterward and pain increased to 5/10. Feels better now. 85% subjective improvement.  -SS          Able to rate subjective pain?  yes  -SS    Pre-Treatment Pain Level  3  -SS    Post-Treatment Pain Level  4  -SS          Exercise Name 1  Pro2, Seat 8, LE ROM  -SS    Cueing 1  Verbal  -SS    Time 1  10 mins  -SS    Additional Comments  Level 4.5  -SS          Exercise Name 2  Incline calf stretch  -SS    Cueing 2  Verbal  -SS    Sets 2  3  -SS    Time 2  30 sec hold  -SS          Exercise Name 3  Standing HS stretch  -SS    Cueing 3  Verbal  -SS    Sets 3  3  -SS    Time 3  30 sec hold  -SS          Exercise Name 4  Lunge stretch for flexion  -SS    Cueing 4  Verbal  -SS    Sets 4  3  -SS    Time 4  30 sec hold  -SS          Exercise Name 5  Prone quad stretch  -SS    Cueing 5  Verbal  -SS    Sets 5  3  -SS    Time 5  30 sec hold  -SS          Exercise Name 6  Prone knee flexion  -SS    Cueing 6  Verbal  -SS    Sets 6  1  -SS    Reps 6  15  -SS    Time 6  3 sec hold  -SS          Exercise Name 7  Seated hip ER/IR  -SS    Cueing 7  Verbal  -SS    Sets 7  3  -SS    Reps 7  10  -SS    Additional Comments  2# AW  -SS          Exercise Name 8  Cybex Ham Curl  -SS    Cueing 8  Verbal  -SS    Sets 8  3  -SS    Reps 8  10  -SS    Additional Comments  40#  -SS      User Key  (r) = Recorded By, (t) = Taken By, (c) = Cosigned By    Initials Name Provider Type    SS Nathaniel Sharp, MARICRUZ DPT Physical Therapist                         PT OP Goals     Row Name 01/07/19 0800          STG Date to Achieve  -- further STGs deferred   -          LTG Date to Achieve  01/18/19  -    LTG 1  Independent with HEP  -    LTG 1 Progress  Ongoing  -    LTG 2  Demonstrate ability to ambulate community distances on firm level surfaces without assistive device  -    LTG 2 Progress  Met  -    LTG 3  Lower Extremity Functional Scale Score to be >/= 55/80  -    LTG 3 Progress  Ongoing  -SS    LTG 4  L knee active extension to 0 deg  -    LTG 4 Progress  Met  -    LTG 5  L knee active flexion to >/= 120 deg  -    LTG 5 Progress  Progressing  -    LTG 6  Demonstrate ability to ascend and descend 5 steps reciprocally  -    LTG 6 Progress  Progressing  -          PT Goal Re-Cert Due Date  01/18/19  -      User Key  (r) = Recorded By, (t) = Taken By, (c) = Cosigned By    Initials Name Provider Type    Nathaniel Zhang, PT DPT Physical Therapist          Therapy Education  Given: HEP  Program: Reinforced  How Provided: Verbal  Provided to: Patient  Level of Understanding: Verbalized              Time Calculation:   Start Time: 0845  Stop Time: 0931  Time Calculation (min): 46 min    Therapy Charges for Today     Code Description Service Date Service Provider Modifiers Qty    38457345198 HC PT THER PROC EA 15 MIN 1/7/2019 Nathaniel Sharp, PT DPT GP 3                    Nathaniel Sharp, PT, DPT, CHT  1/7/2019

## 2019-01-08 ENCOUNTER — OFFICE VISIT (OUTPATIENT)
Dept: ORTHOPEDIC SURGERY | Facility: CLINIC | Age: 54
End: 2019-01-08

## 2019-01-08 VITALS — HEIGHT: 59 IN | BODY MASS INDEX: 43.44 KG/M2 | WEIGHT: 215.5 LBS

## 2019-01-08 DIAGNOSIS — M25.562 LEFT KNEE PAIN, UNSPECIFIED CHRONICITY: ICD-10-CM

## 2019-01-08 DIAGNOSIS — Z96.652 STATUS POST LEFT KNEE REPLACEMENT: Primary | ICD-10-CM

## 2019-01-08 DIAGNOSIS — M17.12 PRIMARY OSTEOARTHRITIS OF LEFT KNEE: ICD-10-CM

## 2019-01-08 PROCEDURE — 99024 POSTOP FOLLOW-UP VISIT: CPT | Performed by: ORTHOPAEDIC SURGERY

## 2019-01-08 NOTE — PROGRESS NOTES
"Priti Orr : 1965 MRN: [id number] DATE: 2019    Chief Complaint:  Chief Complaint   Patient presents with   • Left Knee - Follow-up     Date of surgery:  18    SUBJECTIVE:  Patient returns today for 6 week follow up of left total knee replacement. Patient reports doing well with no unusual complaints. Appears to be progressing appropriately.  Using:  [x]  No assistive device  []  Cane  []  walker    OBJECTIVE:   Vitals:    19 0752   Weight: 97.8 kg (215 lb 8 oz)   Height: 149.9 cm (59\")       Exam left knee:   The incision is healed. No sign of infection.   Range of motion:   Flexion: 115  Extension:  0  Good stability  The calf is soft and nontender with a negative Homans sign.        ASSESSMENT: 6 week status post left knee replacement.    PLAN: 1) continue strength and conditioning   2) continue to progress range of motion   3) continue HEP   4) progress activity as tolerated   5) Follow up in 8 weeks with repeat xrays      19 at 7:52 AM by Brooks Junior MD       "

## 2019-01-10 ENCOUNTER — HOSPITAL ENCOUNTER (OUTPATIENT)
Dept: PHYSICAL THERAPY | Facility: HOSPITAL | Age: 54
Setting detail: THERAPIES SERIES
Discharge: HOME | End: 2019-01-10

## 2019-01-10 DIAGNOSIS — M17.12 PRIMARY OSTEOARTHRITIS OF LEFT KNEE: Primary | ICD-10-CM

## 2019-01-10 DIAGNOSIS — Z96.652 STATUS POST LEFT KNEE REPLACEMENT: ICD-10-CM

## 2019-01-10 PROCEDURE — 97110 THERAPEUTIC EXERCISES: CPT | Performed by: PHYSICAL THERAPIST

## 2019-01-11 NOTE — THERAPY TREATMENT NOTE
Outpatient Physical Therapy Ortho Treatment Note  [facility]     Patient Name: Priti Orr  : 1965  MRN: [id number]  Today's Date: 1/10/2019      Visit Date: 01/10/2019  Attendance:  (25/yr)  Subjective Improvement: 80-85%  Next MD Appt: 3/5/19  Recert Date: 19     Therapy Diagnosis: L total knee arthroplasty 18      Visit Dx:    ICD-10-CM ICD-9-CM   1. Primary osteoarthritis of left knee M17.12 715.16   2. Status post left knee replacement Z96.652 V43.65            Past Medical History:   Diagnosis Date   • Allergic rhinitis    • Arthritis    • Asthma    • Benign neoplasm of meninges (CMS/HCC)     Post op       • Cobalamin deficiency    • Corneal abrasion    • Encounter for gynecological examination (general) (routine) without abnormal findings    • Essential hypertension    • Fatigue    • Female stress incontinence    • Head injury    • History of delayed wound healing     Delayed healing of surgical wound      • Hyperlipidemia    • Low back pain    • Nosebleed, symptom     Nosebleed/epistaxis symptom      • Osteoporosis    • PONV (postoperative nausea and vomiting)    • Sleep apnea     not wearing c-pap        Past Surgical History:   Procedure Laterality Date   • ARM LESION/CYST EXCISION  2014    Remove arm bone lesion (1)   • ARM SKIN LESION BIOPSY / EXCISION     • ARM WOUND REPAIR / CLOSURE  2014    Repair Superficial Wound TR-EXT 2.5 < CM 83845 (1)      • BRAIN SURGERY     • BREAST LUMPECTOMY     •  SECTION  1986     Section (1)     • ENDOSCOPY  2008    Colon endoscopy 77268 (1)     • EXCISION BREAST LESION W/ PREOP NEEDLE LOC  1981    Excision, breast lesion (1)     • HEMORRHOIDECTOMY     • HEMORRHOIDECTOMY  2008    Hemorrhoidectomy (2)      • HYSTERECTOMY     • INJECTION OF MEDICATION  2013    Celestone (betamethasone) (1)      • INJECTION OF MEDICATION  2013    Depo Medrol  (Methylprednisone) (1)      • INJECTION OF MEDICATION  05/21/2013    Dexamethasone (2)      • INJECTION OF MEDICATION  01/29/2014    Kenalog (6)   • KNEE ARTHROSCOPY  03/05/2007    Knee arthroscopy, surgery (1)     • LAPAROSCOPIC TUBAL LIGATION  04/16/1991    Tubal ligation (1)      • PAP SMEAR  01/18/2007    PAP SMEAR (1)      • SHOULDER SURGERY Left     x 4   • TOTAL ABDOMINAL HYSTERECTOMY WITH SALPINGO OOPHORECTOMY  1993    JOSE/BSO (1)      • TOTAL KNEE ARTHROPLASTY Left 11/19/2018    Procedure: TOTAL KNEE ARTHROPLASTY ATTUNE with adductor canal block - left;  Surgeon: Brooks Junior MD;  Location: [facility];  Service: Orthopedics       PT Ortho     Row Name 01/10/19 0800       Precautions and Contraindications    Precautions  L TKA 11/19/18  -SS       Posture/Observations    Posture/Observations Comments  Ambulates without assistive device. Minimal gait deviation.  -SS       Right Lower Ext    Rt Knee Extension/Flexion AROM  0-113  -SS      User Key  (r) = Recorded By, (t) = Taken By, (c) = Cosigned By    Initials Name Provider Type    SS Nathaniel Sharp, PT DPT Physical Therapist                      PT Assessment/Plan     Row Name 01/10/19 0800          Functional Limitations  Impaired gait;Limitation in home management;Limitations in community activities;Limitations in functional capacity and performance;Performance in self-care ADL;Performance in work activities;Performance in leisure activities  -    Impairments  Balance;Gait;Endurance;Joint mobility;Muscle strength;Pain;Range of motion  -    Assessment Comments  Good effort. L LE fatigues with SLS through it. Generally more achy due to temperature and weather change.  -SS    Rehab Potential  Good  -SS    Patient/caregiver participated in establishment of treatment plan and goals  Yes  -SS    Patient would benefit from skilled therapy intervention  Yes  -SS          PT Frequency  2x/week  -SS    Predicted Duration of Therapy Intervention  (Therapy Eval)  3-4 weeks  -SS    PT Plan Comments  Continue POC. Cybex equipment next.  -SS      User Key  (r) = Recorded By, (t) = Taken By, (c) = Cosigned By    Initials Name Provider Type    SS Nathaniel Sharp, PT DPT Physical Therapist              Exercises     Row Name 01/10/19 0800          Existing Precautions/Restrictions  -- L TKA 11/19/18  -SS          Subjective Comments  Achy today. Patient reports that she thinks it's due to weather turning cold. Whole L side is achy. Working longer hours right now and can tell it. 80-85% subjective improvement.  MD follow-up 3/5/19.  -SS          Able to rate subjective pain?  yes  -SS    Pre-Treatment Pain Level  4  -SS    Post-Treatment Pain Level  6  -SS    Subjective Pain Comment  declines modalities  -SS          Exercise Name 1  Pro2, Seat 7, LE ROM  -SS    Cueing 1  Verbal  -SS    Time 1  10 mins  -SS    Additional Comments  Level 4.5  -SS          Exercise Name 2  Incline calf stretch  -SS    Cueing 2  Verbal  -SS    Sets 2  3  -SS    Time 2  30 sec hold  -SS          Exercise Name 3  Standing HS stretch  -SS    Cueing 3  Verbal  -SS    Sets 3  3  -SS    Time 3  30 sec hold  -SS          Exercise Name 4  Prone quad stretch  -SS    Cueing 4  Verbal  -SS    Sets 4  3  -SS    Time 4  30 sec hold  -SS          Exercise Name 5  SLS  -SS    Cueing 5  Verbal;Demo  -SS    Reps 5  4  -SS    Additional Comments  max hold  -SS          Exercise Name 6  L SLS with R leg swings - abd, F/B  -SS    Cueing 6  Verbal;Demo  -SS    Sets 6  1  -SS    Reps 6  15 each  -SS          Exercise Name 7  L LE PNF D1 flex/ext  -SS    Cueing 7  Verbal;Demo  -SS    Sets 7  1  -SS    Reps 7  10  -SS          Exercise Name 8  Standing Knee flexion - L  -SS    Cueing 8  Verbal;Demo  -SS    Sets 8  2  -SS    Reps 8  10  -SS          Exercise Name 9  Wall mini sit  -SS    Cueing 9  Verbal;Demo  -SS    Sets 9  1  -SS    Reps 9  10  -SS    Time 9  10 sec hold  -SS      User Key  (r) =  Recorded By, (t) = Taken By, (c) = Cosigned By    Initials Name Provider Type    Nathaneil Zhang, PT DPT Physical Therapist                         PT OP Goals     Row Name 01/10/19 0800          STG Date to Achieve  -- further STGs deferred  -          LTG Date to Achieve  01/18/19  -    LTG 1  Independent with HEP  -    LTG 1 Progress  Ongoing  -    LTG 2  Demonstrate ability to ambulate community distances on firm level surfaces without assistive device  -    LTG 2 Progress  Met  -    LTG 3  Lower Extremity Functional Scale Score to be >/= 55/80  -    LTG 3 Progress  Ongoing  -    LTG 4  L knee active extension to 0 deg  -    LTG 4 Progress  Met  -    LTG 5  L knee active flexion to >/= 120 deg  -    LTG 5 Progress  Progressing  -    LTG 6  Demonstrate ability to ascend and descend 5 steps reciprocally  -    LTG 6 Progress  Progressing  -          PT Goal Re-Cert Due Date  01/18/19  -      User Key  (r) = Recorded By, (t) = Taken By, (c) = Cosigned By    Initials Name Provider Type    Nathaniel Zahng, PT DPT Physical Therapist          Therapy Education  Given: HEP  Program: Reinforced  How Provided: Verbal  Provided to: Patient  Level of Understanding: Verbalized              Time Calculation:   Start Time: 0848  Stop Time: 0930  Time Calculation (min): 42 min    Therapy Charges for Today     Code Description Service Date Service Provider Modifiers Qty    52846637789 HC PT THER PROC EA 15 MIN 1/10/2019 Nathaniel Sharp, PT DPT GP 3                    Nathaniel Sharp PT, DPT, CHT  1/10/2019

## 2019-01-14 ENCOUNTER — HOSPITAL ENCOUNTER (OUTPATIENT)
Dept: PHYSICAL THERAPY | Facility: HOSPITAL | Age: 54
Setting detail: THERAPIES SERIES
Discharge: HOME | End: 2019-01-14

## 2019-01-14 DIAGNOSIS — M17.12 PRIMARY OSTEOARTHRITIS OF LEFT KNEE: Primary | ICD-10-CM

## 2019-01-14 DIAGNOSIS — Z96.652 STATUS POST LEFT KNEE REPLACEMENT: ICD-10-CM

## 2019-01-14 PROCEDURE — 97110 THERAPEUTIC EXERCISES: CPT | Performed by: PHYSICAL THERAPIST

## 2019-01-15 NOTE — THERAPY TREATMENT NOTE
Outpatient Physical Therapy Ortho Treatment Note  [facility]     Patient Name: Priti Orr  : 1965  MRN: [id number]  Today's Date: 2019      Visit Date: 2019  Attendance:  (25/yr)  Subjective Improvement: 80-85%  Next MD Appt: 3/5/19  Recert Date: 19     Therapy Diagnosis: L total knee arthroplasty 18      Visit Dx:    ICD-10-CM ICD-9-CM   1. Primary osteoarthritis of left knee M17.12 715.16   2. Status post left knee replacement Z96.652 V43.65            Past Medical History:   Diagnosis Date   • Allergic rhinitis    • Arthritis    • Asthma    • Benign neoplasm of meninges (CMS/HCC)     Post op       • Cobalamin deficiency    • Corneal abrasion    • Encounter for gynecological examination (general) (routine) without abnormal findings    • Essential hypertension    • Fatigue    • Female stress incontinence    • Head injury    • History of delayed wound healing     Delayed healing of surgical wound      • Hyperlipidemia    • Low back pain    • Nosebleed, symptom     Nosebleed/epistaxis symptom      • Osteoporosis    • PONV (postoperative nausea and vomiting)    • Sleep apnea     not wearing c-pap        Past Surgical History:   Procedure Laterality Date   • ARM LESION/CYST EXCISION  2014    Remove arm bone lesion (1)   • ARM SKIN LESION BIOPSY / EXCISION     • ARM WOUND REPAIR / CLOSURE  2014    Repair Superficial Wound TR-EXT 2.5 < CM 32701 (1)      • BRAIN SURGERY     • BREAST LUMPECTOMY     •  SECTION  1986     Section (1)     • ENDOSCOPY  2008    Colon endoscopy 45851 (1)     • EXCISION BREAST LESION W/ PREOP NEEDLE LOC  1981    Excision, breast lesion (1)     • HEMORRHOIDECTOMY     • HEMORRHOIDECTOMY  2008    Hemorrhoidectomy (2)      • HYSTERECTOMY     • INJECTION OF MEDICATION  2013    Celestone (betamethasone) (1)      • INJECTION OF MEDICATION  2013    Depo Medrol  (Methylprednisone) (1)      • INJECTION OF MEDICATION  05/21/2013    Dexamethasone (2)      • INJECTION OF MEDICATION  01/29/2014    Kenalog (6)   • KNEE ARTHROSCOPY  03/05/2007    Knee arthroscopy, surgery (1)     • LAPAROSCOPIC TUBAL LIGATION  04/16/1991    Tubal ligation (1)      • PAP SMEAR  01/18/2007    PAP SMEAR (1)      • SHOULDER SURGERY Left     x 4   • TOTAL ABDOMINAL HYSTERECTOMY WITH SALPINGO OOPHORECTOMY  1993    JOSE/BSO (1)      • TOTAL KNEE ARTHROPLASTY Left 11/19/2018    Procedure: TOTAL KNEE ARTHROPLASTY ATTUNE with adductor canal block - left;  Surgeon: Brooks Junior MD;  Location: [facility];  Service: Orthopedics       PT Ortho     Row Name 01/14/19 0800       Precautions and Contraindications    Precautions  L TKA 11/19/18  -SS       Posture/Observations    Posture/Observations Comments  Ambulates without assistive device. Minimal gait deviation.  -SS       Right Lower Ext    Rt Knee Extension/Flexion AROM  0-116  -SS      User Key  (r) = Recorded By, (t) = Taken By, (c) = Cosigned By    Initials Name Provider Type    SS Nathaniel Sharp, PT DPT Physical Therapist                      PT Assessment/Plan     Row Name 01/14/19 0800          Functional Limitations  Impaired gait;Limitation in home management;Limitations in community activities;Limitations in functional capacity and performance;Performance in self-care ADL;Performance in work activities;Performance in leisure activities  -    Impairments  Balance;Gait;Endurance;Joint mobility;Muscle strength;Pain;Range of motion  -SS    Assessment Comments  Emphasis of today's treatment on strength and balance. Patient did well with therex.   -SS    Rehab Potential  Good  -SS    Patient/caregiver participated in establishment of treatment plan and goals  Yes  -SS    Patient would benefit from skilled therapy intervention  Yes  -SS          PT Frequency  2x/week  -SS    Predicted Duration of Therapy Intervention (Therapy Eval)  3-4  weeks  -SS    PT Plan Comments  Continue POC. Recheck next. Heel-toe line walk next.   -SS      User Key  (r) = Recorded By, (t) = Taken By, (c) = Cosigned By    Initials Name Provider Type    Nathaniel Zhang, PT DPT Physical Therapist              Exercises     Row Name 01/14/19 0800          Existing Precautions/Restrictions  -- L TKA 11/19/18  -SS          Subjective Comments  80-85% subjective improvement. No pain this morning. Pain 4/10 yesterday but was really busy Saturday.   -SS          Able to rate subjective pain?  yes  -SS    Pre-Treatment Pain Level  0  -SS    Post-Treatment Pain Level  0  -SS          Exercise Name 1  Pro2, Seat 7, LE ROM  -SS    Cueing 1  Verbal  -SS    Time 1  12 mins  -SS    Additional Comments  Level 4.5  -SS          Exercise Name 2  Prone calf stretch  -SS    Cueing 2  Verbal  -SS    Sets 2  3  -SS    Time 2  30 sec hold  -SS          Exercise Name 3  Cybex Leg Extension  -SS    Cueing 3  Verbal  -SS    Sets 3  3  -SS    Reps 3  10  -SS    Additional Comments  20#  -SS          Exercise Name 4  Cybex Leg Press  -SS    Cueing 4  Verbal  -SS    Sets 4  3  -SS    Reps 4  10  -SS    Additional Comments  100#  -SS          Exercise Name 5  Wall sits  -SS    Cueing 5  Verbal;Demo  -SS    Sets 5  1  -SS    Reps 5  10  -SS    Time 5  5 sec  -SS          Exercise Name 6  Airex balance beam  -SS    Cueing 6  Verbal  -SS    Reps 6  2 laps  -SS          Exercise Name 7  Airex stance with eyes closed  -SS    Cueing 7  Verbal  -SS    Reps 7  2  -SS    Time 7  30 sec hold  -SS      User Key  (r) = Recorded By, (t) = Taken By, (c) = Cosigned By    Initials Name Provider Type    Nathaniel Zhang, PT DPT Physical Therapist                         PT OP Goals     Row Name 01/14/19 0800          STG Date to Achieve  -- further STGs deferred  -SS          LTG Date to Achieve  01/18/19  -SS    LTG 1  Independent with HEP  -SS    LTG 1 Progress  Ongoing  -SS    LTG 2  Demonstrate  ability to ambulate community distances on firm level surfaces without assistive device  -    LTG 2 Progress  Met  -    LTG 3  Lower Extremity Functional Scale Score to be >/= 55/80  -    LTG 3 Progress  Ongoing  -SS    LTG 4  L knee active extension to 0 deg  -    LTG 4 Progress  Met  -    LTG 5  L knee active flexion to >/= 120 deg  -    LTG 5 Progress  Progressing  -    LTG 6  Demonstrate ability to ascend and descend 5 steps reciprocally  -    LTG 6 Progress  Progressing  -          PT Goal Re-Cert Due Date  01/18/19  -      User Key  (r) = Recorded By, (t) = Taken By, (c) = Cosigned By    Initials Name Provider Type     Nathaniel Sharp, PT DPT Physical Therapist          Therapy Education  Given: HEP  Program: Reinforced  How Provided: Verbal  Provided to: Patient  Level of Understanding: Verbalized              Time Calculation:   Start Time: 0849  Stop Time: 0932  Time Calculation (min): 43 min    Therapy Charges for Today     Code Description Service Date Service Provider Modifiers Qty    39656771297 HC PT THER PROC EA 15 MIN 1/14/2019 Nathaniel Sharp, PT DPT GP 3                    Nathaniel Sharp, PT, DPT, CHT  1/14/2019

## 2019-01-17 ENCOUNTER — HOSPITAL ENCOUNTER (OUTPATIENT)
Dept: PHYSICAL THERAPY | Facility: HOSPITAL | Age: 54
Setting detail: THERAPIES SERIES
Discharge: HOME | End: 2019-01-17

## 2019-01-17 DIAGNOSIS — M17.12 PRIMARY OSTEOARTHRITIS OF LEFT KNEE: Primary | ICD-10-CM

## 2019-01-17 DIAGNOSIS — Z96.652 STATUS POST LEFT KNEE REPLACEMENT: ICD-10-CM

## 2019-01-17 PROCEDURE — 97110 THERAPEUTIC EXERCISES: CPT | Performed by: PHYSICAL THERAPIST

## 2019-01-17 NOTE — THERAPY PROGRESS REPORT/RE-CERT
Outpatient Physical Therapy Ortho Progress Note  [facility]     Patient Name: Priti Orr  : 1965  MRN: [id number]  Today's Date: 2019      Visit Date: 2019  Attendance:  (25/yr)  Subjective Improvement: 90%  Next MD Appt: 3/5/19  Recert Date: 19     Therapy Diagnosis: L total knee arthroplasty 18    Changes in Medications: none noted  Changes in MD Orders: none noted  Number of Work Days Lost: 2-3 weeks     Past Medical History:   Diagnosis Date   • Allergic rhinitis    • Arthritis    • Asthma    • Benign neoplasm of meninges (CMS/HCC)     Post op       • Cobalamin deficiency    • Corneal abrasion    • Encounter for gynecological examination (general) (routine) without abnormal findings    • Essential hypertension    • Fatigue    • Female stress incontinence    • Head injury    • History of delayed wound healing     Delayed healing of surgical wound      • Hyperlipidemia    • Low back pain    • Nosebleed, symptom     Nosebleed/epistaxis symptom      • Osteoporosis    • PONV (postoperative nausea and vomiting)    • Sleep apnea     not wearing c-pap        Past Surgical History:   Procedure Laterality Date   • ARM LESION/CYST EXCISION  2014    Remove arm bone lesion (1)   • ARM SKIN LESION BIOPSY / EXCISION     • ARM WOUND REPAIR / CLOSURE  2014    Repair Superficial Wound TR-EXT 2.5 < CM 27877 (1)      • BRAIN SURGERY     • BREAST LUMPECTOMY     •  SECTION  1986     Section (1)     • ENDOSCOPY  2008    Colon endoscopy 44650 (1)     • EXCISION BREAST LESION W/ PREOP NEEDLE LOC  1981    Excision, breast lesion (1)     • HEMORRHOIDECTOMY     • HEMORRHOIDECTOMY  2008    Hemorrhoidectomy (2)      • HYSTERECTOMY     • INJECTION OF MEDICATION  2013    Celestone (betamethasone) (1)      • INJECTION OF MEDICATION  2013    Depo Medrol (Methylprednisone) (1)      • INJECTION OF MEDICATION  2013     Dexamethasone (2)      • INJECTION OF MEDICATION  01/29/2014    Kenalog (6)   • KNEE ARTHROSCOPY  03/05/2007    Knee arthroscopy, surgery (1)     • LAPAROSCOPIC TUBAL LIGATION  04/16/1991    Tubal ligation (1)      • PAP SMEAR  01/18/2007    PAP SMEAR (1)      • SHOULDER SURGERY Left     x 4   • TOTAL ABDOMINAL HYSTERECTOMY WITH SALPINGO OOPHORECTOMY  1993    JOSE/BSO (1)      • TOTAL KNEE ARTHROPLASTY Left 11/19/2018    Procedure: TOTAL KNEE ARTHROPLASTY ATTUNE with adductor canal block - left;  Surgeon: Brooks Junior MD;  Location: [facility];  Service: Orthopedics       Visit Dx:     ICD-10-CM ICD-9-CM   1. Primary osteoarthritis of left knee M17.12 715.16   2. Status post left knee replacement Z96.652 V43.65           PT Ortho     Row Name 01/17/19 0800       Subjective Comments    Subjective Comments  Whole body achy today. Doing better overall. 90% subjective improvement.   -SS       Precautions and Contraindications    Precautions  L TKA 11/19/18  -SS       Subjective Pain    Able to rate subjective pain?  yes  -SS    Pre-Treatment Pain Level  5  -SS    Post-Treatment Pain Level  5  -SS       Posture/Observations    Posture/Observations Comments  Ambulates without assistive device. Minimal gait deviation.  -SS       Right Lower Ext    Rt Knee Extension/Flexion AROM  0-115, pain with flexion  -SS       MMT Left Lower Ext    Lt Hip Flexion MMT, Gross Movement  (4+/5) good plus  -SS    Lt Hip Extension MMT, Gross Movement  (5/5) normal  -SS    Lt Hip ABduction MMT, Gross Movement  (5/5) normal  -SS    Lt Hip ADduction MMT, Gross Movement  (5/5) normal  -SS    Lt Hip Internal (Medial) Rotation MMT, Gross Movement  (5/5) normal  -SS    Lt Hip External (Lateral) Rotation MMT, Gross Movement  (5/5) normal  -SS    Lt Knee Extension MMT, Gross Movement  (5/5) normal  -SS    Lt Knee Flexion MMT, Gross Movement  (5/5) normal  -SS      User Key  (r) = Recorded By, (t) = Taken By, (c) = Cosigned By    Initials  Name Provider Type     Nathainel Sharp, PT DPT Physical Therapist                      Therapy Education  Given: HEP  Program: Reinforced  How Provided: Verbal  Provided to: Patient  Level of Understanding: Verbalized     PT OP Goals     Row Name 01/17/19 0800          STG Date to Achieve  -- further STGs deferred  -          LTG Date to Achieve  02/07/19  -    LTG 1  Independent with HEP  -    LTG 1 Progress  Not Met;Ongoing  -    LTG 2  Demonstrate ability to ambulate community distances on firm level surfaces without assistive device  -    LTG 2 Progress  Met  -    LTG 3  Lower Extremity Functional Scale Score to be >/= 55/80  -    LTG 3 Progress  Met  -    LTG 4  L knee active extension to 0 deg  -    LTG 4 Progress  Met  -    LTG 5  L knee active flexion to >/= 120 deg  -    LTG 5 Progress  Not Met;Ongoing  -    LTG 6  Demonstrate ability to ascend and descend 5 steps reciprocally  -    LTG 6 Progress  Progressing  -          PT Goal Re-Cert Due Date  02/07/19  -      User Key  (r) = Recorded By, (t) = Taken By, (c) = Cosigned By    Initials Name Provider Type     Nathaniel Sharp, PT DPT Physical Therapist          PT Assessment/Plan     Row Name 01/17/19 0800          Functional Limitations  Impaired gait;Limitation in home management;Limitations in community activities;Limitations in functional capacity and performance;Performance in self-care ADL;Performance in work activities;Performance in leisure activities  -    Impairments  Balance;Gait;Endurance;Joint mobility;Muscle strength;Pain;Range of motion  -    Assessment Comments  Good effort with therex. Generalized pain with change in temperature and rainy weather this date. Continuing to make progress at this time.  -    Rehab Potential  Good  -SS    Patient/caregiver participated in establishment of treatment plan and goals  Yes  -    Patient would benefit from skilled therapy intervention  Yes  -SS           PT Frequency  2x/week  -SS    Predicted Duration of Therapy Intervention (Therapy Eval)  2-3 weeks  -SS    PT Plan Comments  Endurance, balance, and strengthening activities  -SS      User Key  (r) = Recorded By, (t) = Taken By, (c) = Cosigned By    Initials Name Provider Type    Nathaniel Zhang, PT DPT Physical Therapist            Exercises     Row Name 01/17/19 0800          Subjective Comments  Whole body achy today. Doing better overall. 90% subjective improvement.   -SS          Able to rate subjective pain?  yes  -SS    Pre-Treatment Pain Level  5  -SS    Post-Treatment Pain Level  5  -SS          Exercise Name 1  Pro2, Seat 7, LE  -SS    Cueing 1  Verbal  -SS    Time 1  10 mins  -SS    Additional Comments  Level 5  -SS          Exercise Name 2  Incline calf stretch  -SS    Cueing 2  Verbal  -SS    Sets 2  3  -SS    Time 2  30 sec hold  -SS          Exercise Name 3  Prone quad stretch  -SS    Cueing 3  Verbal  -SS    Sets 3  3  -SS    Time 3  30 sec hold  -SS          Exercise Name 4  Heel-toe line walk  -SS    Cueing 4  Verbal  -SS    Sets 4  4  -SS    Additional Comments  8 feet  -SS          Exercise Name 5  Single leg wall sit - L  -SS    Cueing 5  Verbal;Demo  -SS    Sets 5  1  -SS    Reps 5  5  -SS    Time 5  3 sec hold  -SS          Exercise Name 6  Cybex Leg Press  -SS    Cueing 6  Verbal  -SS    Sets 6  3  -SS    Reps 6  10  -SS    Additional Comments  90#  -SS      User Key  (r) = Recorded By, (t) = Taken By, (c) = Cosigned By    Initials Name Provider Type    Nathaniel Zhang, PT DPT Physical Therapist                        Outcome Measure Options: Lower Extremity Functional Scale (LEFS)  Lower Extremity Functional Index  Any of your usual work, housework or school activities: No difficulty  Your usual hobbies, recreational or sporting activities: Moderate difficulty  Getting into or out of the bath: A little bit of difficulty  Walking between rooms: No  difficulty  Putting on your shoes or socks: No difficulty  Squatting: Moderate difficulty  Lifting an object, like a bag of groceries from the floor: No difficulty  Performing light activities around your home: No difficulty  Performing heavy activities around your home: A little bit of difficulty  Getting into or out of a car: No difficulty  Walking 2 blocks: A little bit of difficulty  Walking a mile: A little bit of difficulty  Going up or down 10 stairs (about 1 flight of stairs): A little bit of difficulty  Standing for 1 hour: Moderate difficulty  Sitting for 1 hour: No difficulty  Running on even ground: Extreme difficulty or unable to perform activity  Running on uneven ground: Extreme difficulty or unable to perform activity  Making sharp turns while running fast: Extreme difficulty or unable to perform activity  Hopping: Moderate difficulty  Rolling over in bed: No difficulty  Total: 55      Time Calculation:         Start Time: 0853  Stop Time: 0934  Time Calculation (min): 41 min     Therapy Charges for Today     Code Description Service Date Service Provider Modifiers Qty    83745481475 HC PT THER PROC EA 15 MIN 1/17/2019 Nathaniel Sharp, PT DPT GP 3                   Nathaniel Sharp, PT, DPT, CHT  1/17/2019

## 2019-01-18 ENCOUNTER — READMISSION MANAGEMENT (OUTPATIENT)
Dept: CALL CENTER | Facility: HOSPITAL | Age: 54
End: 2019-01-18

## 2019-01-18 NOTE — OUTREACH NOTE
Total Joint Month 2 Survey      Responses   Facility patient discharged from?  [place]   Does the patient have one of the following disease processes/diagnoses(primary or secondary)?  Total Joint Replacement   Joint surgery performed?  Knee   Month 2 attempt successful?  No   Unsuccessful attempts  Attempt 1          Jennifer Santana RN

## 2019-01-19 ENCOUNTER — READMISSION MANAGEMENT (OUTPATIENT)
Dept: CALL CENTER | Facility: HOSPITAL | Age: 54
End: 2019-01-19

## 2019-01-19 NOTE — OUTREACH NOTE
Total Joint Month 2 Survey      Responses   Facility patient discharged from?  [place]   Does the patient have one of the following disease processes/diagnoses(primary or secondary)?  Total Joint Replacement   Joint surgery performed?  Knee   Month 2 attempt successful?  No   Unsuccessful attempts  Attempt 2          Lucy Lazcano RN

## 2019-01-21 ENCOUNTER — APPOINTMENT (OUTPATIENT)
Dept: PHYSICAL THERAPY | Facility: HOSPITAL | Age: 54
End: 2019-01-21

## 2019-01-23 ENCOUNTER — HOSPITAL ENCOUNTER (OUTPATIENT)
Dept: PHYSICAL THERAPY | Facility: HOSPITAL | Age: 54
Setting detail: THERAPIES SERIES
Discharge: HOME | End: 2019-01-23

## 2019-01-23 DIAGNOSIS — M17.12 PRIMARY OSTEOARTHRITIS OF LEFT KNEE: Primary | ICD-10-CM

## 2019-01-23 PROCEDURE — 97110 THERAPEUTIC EXERCISES: CPT

## 2019-01-23 PROCEDURE — 97116 GAIT TRAINING THERAPY: CPT

## 2019-01-23 NOTE — THERAPY TREATMENT NOTE
Outpatient Physical Therapy Ortho Treatment Note  [facility]     Patient Name: Priti Orr  : 1965  MRN: [id number]  Today's Date: 2019      Visit Date: 2019  Pt. Has attendfedf  visits  /year  Subjective improvement 90 percent  MD 19  Monserrat 19  Visit Dx:    ICD-10-CM ICD-9-CM   1. Primary osteoarthritis of left knee M17.12 715.16       Patient Active Problem List   Diagnosis   • Osteoporosis   • Chronic left shoulder pain   • Presence of left artificial shoulder joint   • Left knee pain   • Anxiety   • Arthritis   • Asthma   • Depression   • Enchondroma of humerus   • Headache, chronic migraine without aura, intractable   • History of brain surgery   • Humeral fracture   • Hypercholesteremia   • Hyperlipidemia   • Essential hypertension   • Meningioma (CMS/HCC)   • Morbid obesity (CMS/[facility])   • NICK (obstructive sleep apnea)   • Vitamin D deficiency   • Seizure-like activity (CMS/[facility])   • S/p reverse total shoulder arthroplasty   • Primary osteoarthritis of left knee   • Morbid obesity with BMI of 40.0-44.9, adult (CMS/[facility])   • Status post left knee replacement        Past Medical History:   Diagnosis Date   • Allergic rhinitis    • Arthritis    • Asthma    • Benign neoplasm of meninges (CMS/HCC)     Post op       • Cobalamin deficiency    • Corneal abrasion    • Encounter for gynecological examination (general) (routine) without abnormal findings    • Essential hypertension    • Fatigue    • Female stress incontinence    • Head injury    • History of delayed wound healing     Delayed healing of surgical wound      • Hyperlipidemia    • Low back pain    • Nosebleed, symptom     Nosebleed/epistaxis symptom      • Osteoporosis    • PONV (postoperative nausea and vomiting)    • Sleep apnea     not wearing c-pap        Past Surgical History:   Procedure Laterality Date   • ARM LESION/CYST EXCISION  2014    Remove arm bone lesion (1)   • ARM SKIN  LESION BIOPSY / EXCISION     • ARM WOUND REPAIR / CLOSURE  2014    Repair Superficial Wound TR-EXT 2.5 < CM 00826 (1)      • BRAIN SURGERY     • BREAST LUMPECTOMY     •  SECTION  1986     Section (1)     • ENDOSCOPY  2008    Colon endoscopy 09203 (1)     • EXCISION BREAST LESION W/ PREOP NEEDLE LOC  1981    Excision, breast lesion (1)     • HEMORRHOIDECTOMY     • HEMORRHOIDECTOMY  2008    Hemorrhoidectomy (2)      • HYSTERECTOMY     • INJECTION OF MEDICATION  2013    Celestone (betamethasone) (1)      • INJECTION OF MEDICATION  2013    Depo Medrol (Methylprednisone) (1)      • INJECTION OF MEDICATION  2013    Dexamethasone (2)      • INJECTION OF MEDICATION  2014    Kenalog (6)   • KNEE ARTHROSCOPY  2007    Knee arthroscopy, surgery (1)     • LAPAROSCOPIC TUBAL LIGATION  1991    Tubal ligation (1)      • PAP SMEAR  2007    PAP SMEAR (1)      • SHOULDER SURGERY Left     x 4   • TOTAL ABDOMINAL HYSTERECTOMY WITH SALPINGO OOPHORECTOMY      JOSE/BSO (1)      • TOTAL KNEE ARTHROPLASTY Left 2018    Procedure: TOTAL KNEE ARTHROPLASTY ATTUNE with adductor canal block - left;  Surgeon: Brooks Junior MD;  Location: [facility];  Service: Orthopedics                       PT Assessment/Plan     Row Name 19 0937          PT Assessment    Assessment Comments  Slight decrease in ROM this date as pt worked 15 hours and was so tired she didnt ice afterward.  Tolerates stairs well reciprocal up and descending uses step to pattern does attempt reciprocal down but has discomfort with this  -SP        PT Plan    PT Frequency  2x/week  -SP     Predicted Duration of Therapy Intervention (Therapy Eval)  2-3 weeks  -SP     PT Plan Comments  Continue toward remaining goals and reassess ROM next.  -SP       User Key  (r) = Recorded By, (t) = Taken By, (c) = Cosigned By    Initials Name Provider Type    Belinda Nicole PTA  Physical Therapy Assistant          Modalities     Row Name 01/23/19 0900             Ice    Patient reports will apply ice at home to involved area  Yes  -SP        User Key  (r) = Recorded By, (t) = Taken By, (c) = Cosigned By    Initials Name Provider Type    Belinda Nicole PTA Physical Therapy Assistant          Exercises     Row Name 01/23/19 0900 01/23/19 0800          Subjective Comments    Subjective Comments  --  Notes that she worked 15 hours yesterday and is paying for it today.  -SP        Subjective Pain    Able to rate subjective pain?  --  yes  -SP     Pre-Treatment Pain Level  --  4  -SP     Post-Treatment Pain Level  5  -SP  --        Exercise 1    Exercise Name 1  --  Pro 2 seat 7 Level 4  -SP     Time 1  --  10 min  -SP        Exercise 2    Exercise Name 2  --  Incline S HS S  -SP     Reps 2  --  3  -SP     Time 2  --  30 sec  -SP        Exercise 3    Exercise Name 3  --  prone quad S  -SP     Reps 3  --  3  -SP     Time 3  --  30 sec  -SP        Exercise 4    Exercise Name 4  --  cybex leg press  -SP     Reps 4  --  30  -SP     Additional Comments  --  90#  -SP        Exercise 5    Exercise Name 5  --  cybex hip abd  -SP     Reps 5  --  30  -SP     Additional Comments  --  20#  -SP        Exercise 6    Exercise Name 6  --  stairs (5)  -SP     Reps 6  --  3  -SP     Additional Comments  --  reciprocal up and step to descending  -SP        Exercise 7    Exercise Name 7  --  reverse step ups on 4 inch step  -SP     Reps 7  --  20  -SP        Exercise 8    Exercise Name 8  --  bridge with add  -SP     Reps 8  --  20  -SP        Exercise 9    Exercise Name 9  --  tball ham curls  -SP     Reps 9  --  20  -SP        Exercise 10    Exercise Name 10  foam beam heel/toe and side to side stepping  -SP  --     Reps 10  3  -SP  --        Exercise 11    Exercise Name 11  bijan stepping x 5 hurdles fwd and side  -SP  --     Reps 11  2  -SP  --       User Key  (r) = Recorded By, (t) = Taken By, (c) =  Cosigned By    Initials Name Provider Type    Belinda Nicole PTA Physical Therapy Assistant                         PT OP Goals     Row Name 01/23/19 1209 01/23/19 0800       PT Short Term Goals    STG Date to Achieve  --  -- further STGs deferred  -SP       Long Term Goals    LTG Date to Achieve  --  02/07/19  -SP    LTG 1  --  Independent with HEP  -SP    LTG 1 Progress  --  Ongoing  -SP    LTG 2  --  Demonstrate ability to ambulate community distances on firm level surfaces without assistive device  -SP    LTG 2 Progress  --  Met  -SP    LTG 3  --  Lower Extremity Functional Scale Score to be >/= 55/80  -SP    LTG 3 Progress  --  Met  -SP    LTG 4  --  L knee active extension to 0 deg  -SP    LTG 4 Progress  --  Met  -SP    LTG 5  --  L knee active flexion to >/= 120 deg  -SP    LTG 5 Progress  --  Ongoing  -SP    LTG 5 Progress Comments  --  110 this date ;but had edema after long work day  -SP    LTG 6  --  Demonstrate ability to ascend and descend 5 steps reciprocally  -SP    LTG 6 Progress  --  Progressing  -SP       Time Calculation    PT Goal Re-Cert Due Date  02/07/19  -SP  02/07/19  -      User Key  (r) = Recorded By, (t) = Taken By, (c) = Cosigned By    Initials Name Provider Type    Belinda Nicole PTA Physical Therapy Assistant                         Time Calculation:   Start Time: 0800  Stop Time: 0850  Time Calculation (min): 50 min  Total Timed Code Minutes- PT: 50 minute(s)  Therapy Suggested Charges     Code   Minutes Charges    None           Therapy Charges for Today     Code Description Service Date Service Provider Modifiers Qty    27304608909 HC PT THER PROC EA 15 MIN 1/23/2019 Belinda Herbert PTA GP 2    92282848026 HC GAIT TRAINING EA 15 MIN 1/23/2019 Belinda Herbert PTA GP 1                    Belinda Herbert PTA  1/23/2019

## 2019-01-29 ENCOUNTER — APPOINTMENT (OUTPATIENT)
Dept: PHYSICAL THERAPY | Facility: HOSPITAL | Age: 54
End: 2019-01-29

## 2019-01-31 ENCOUNTER — HOSPITAL ENCOUNTER (OUTPATIENT)
Dept: PHYSICAL THERAPY | Facility: HOSPITAL | Age: 54
Setting detail: THERAPIES SERIES
Discharge: HOME | End: 2019-01-31

## 2019-01-31 DIAGNOSIS — M17.12 PRIMARY OSTEOARTHRITIS OF LEFT KNEE: Primary | ICD-10-CM

## 2019-01-31 DIAGNOSIS — Z96.652 STATUS POST LEFT KNEE REPLACEMENT: ICD-10-CM

## 2019-01-31 PROCEDURE — 97110 THERAPEUTIC EXERCISES: CPT | Performed by: PHYSICAL THERAPIST

## 2019-02-03 ENCOUNTER — APPOINTMENT (OUTPATIENT)
Dept: GENERAL RADIOLOGY | Facility: HOSPITAL | Age: 54
End: 2019-02-03

## 2019-02-03 ENCOUNTER — HOSPITAL ENCOUNTER (EMERGENCY)
Facility: HOSPITAL | Age: 54
Discharge: HOME | End: 2019-02-03
Attending: FAMILY MEDICINE | Admitting: FAMILY MEDICINE

## 2019-02-03 VITALS
TEMPERATURE: 97.7 F | BODY MASS INDEX: 42.33 KG/M2 | HEIGHT: 59 IN | OXYGEN SATURATION: 95 % | HEART RATE: 70 BPM | DIASTOLIC BLOOD PRESSURE: 78 MMHG | RESPIRATION RATE: 20 BRPM | WEIGHT: 210 LBS | SYSTOLIC BLOOD PRESSURE: 136 MMHG

## 2019-02-03 DIAGNOSIS — S80.01XA CONTUSION OF RIGHT KNEE, INITIAL ENCOUNTER: Primary | ICD-10-CM

## 2019-02-03 DIAGNOSIS — M54.50 ACUTE RIGHT-SIDED LOW BACK PAIN WITHOUT SCIATICA: ICD-10-CM

## 2019-02-03 PROCEDURE — 72100 X-RAY EXAM L-S SPINE 2/3 VWS: CPT

## 2019-02-03 PROCEDURE — 73564 X-RAY EXAM KNEE 4 OR MORE: CPT

## 2019-02-03 PROCEDURE — 99284 EMERGENCY DEPT VISIT MOD MDM: CPT

## 2019-02-03 RX ORDER — IBUPROFEN 800 MG/1
800 TABLET ORAL
Qty: 12 TABLET | Refills: 0 | Status: SHIPPED | OUTPATIENT
Start: 2019-02-03 | End: 2019-04-19

## 2019-02-04 NOTE — ED PROVIDER NOTES
"Subjective   53-year-old white female presents to emergency department with a complaint of low back pain and right knee pain after a fall.  She states that she was carrying a large box and tripped and fell.  She had some difficulty getting up but her neighbor helped her up.  She was able to walk and ambulate without difficulty after this fall.  As she went inside and sat down she started getting more and more sore so she came to the emergency department to \"just get checked out.\"            Review of Systems   Constitutional: Negative for activity change, appetite change, chills, fatigue, fever and unexpected weight change.   HENT: Negative for nosebleeds, rhinorrhea, sore throat, trouble swallowing and voice change.    Eyes: Negative for photophobia, pain and visual disturbance.   Respiratory: Negative for apnea, cough, chest tightness, shortness of breath, wheezing and stridor.    Cardiovascular: Negative for chest pain, palpitations and leg swelling.   Gastrointestinal: Negative for abdominal distention, abdominal pain, blood in stool, constipation, diarrhea, nausea and vomiting.   Endocrine: Negative for cold intolerance, heat intolerance, polydipsia and polyuria.   Genitourinary: Negative for decreased urine volume, difficulty urinating, dysuria, flank pain, hematuria and urgency.   Musculoskeletal: Negative for arthralgias, myalgias, neck pain and neck stiffness.   Skin: Negative for color change, pallor and rash.   Allergic/Immunologic: Negative for immunocompromised state.   Neurological: Negative for dizziness, seizures, syncope, weakness, light-headedness and numbness.   Hematological: Negative for adenopathy.   Psychiatric/Behavioral: Negative for agitation, confusion, dysphoric mood and suicidal ideas. The patient is not nervous/anxious.        Past Medical History:   Diagnosis Date   • Allergic rhinitis    • Arthritis    • Asthma    • Benign neoplasm of meninges (CMS/[facility])     Post op 2011      • " Cobalamin deficiency    • Corneal abrasion    • Encounter for gynecological examination (general) (routine) without abnormal findings    • Essential hypertension    • Fatigue    • Female stress incontinence    • Head injury    • History of delayed wound healing     Delayed healing of surgical wound      • Hyperlipidemia    • Low back pain    • Nosebleed, symptom     Nosebleed/epistaxis symptom      • Osteoporosis    • PONV (postoperative nausea and vomiting)    • Sleep apnea     not wearing c-pap       Allergies   Allergen Reactions   • Pseudoephedrine Shortness Of Breath and Swelling     LIPS SWELL AND TONGUE BECOMES THICK   • Morphine Other (See Comments)     States felt like itching from within, hallucinations, agitation   • Allegra [Fexofenadine] Swelling   • Zyrtec [Cetirizine] Swelling       Past Surgical History:   Procedure Laterality Date   • ARM LESION/CYST EXCISION  2014    Remove arm bone lesion (1)   • ARM SKIN LESION BIOPSY / EXCISION     • ARM WOUND REPAIR / CLOSURE  2014    Repair Superficial Wound TR-EXT 2.5 < CM 98241 (1)      • BRAIN SURGERY     • BREAST LUMPECTOMY     •  SECTION  1986     Section (1)     • ENDOSCOPY  2008    Colon endoscopy 17614 (1)     • EXCISION BREAST LESION W/ PREOP NEEDLE LOC  1981    Excision, breast lesion (1)     • HEMORRHOIDECTOMY     • HEMORRHOIDECTOMY  2008    Hemorrhoidectomy (2)      • HYSTERECTOMY     • INJECTION OF MEDICATION  2013    Celestone (betamethasone) (1)      • INJECTION OF MEDICATION  2013    Depo Medrol (Methylprednisone) (1)      • INJECTION OF MEDICATION  2013    Dexamethasone (2)      • INJECTION OF MEDICATION  2014    Kenalog (6)   • KNEE ARTHROSCOPY  2007    Knee arthroscopy, surgery (1)     • LAPAROSCOPIC TUBAL LIGATION  1991    Tubal ligation (1)      • PAP SMEAR  2007    PAP SMEAR (1)      • SHOULDER SURGERY Left     x 4   • TOTAL ABDOMINAL  HYSTERECTOMY WITH SALPINGO OOPHORECTOMY  1993    JOSE/BSO (1)      • TOTAL KNEE ARTHROPLASTY Left 11/19/2018    Procedure: TOTAL KNEE ARTHROPLASTY ATTUNE with adductor canal block - left;  Surgeon: Brooks Junior MD;  Location: [facility];  Service: Orthopedics       Family History   Problem Relation Age of Onset   • Alzheimer's disease Other    • Breast cancer Other         other   • Cancer Other         other - GYN   • Colon cancer Other         Colorectal Cancer   • Depression Other    • Diabetes Other    • Hyperlipidemia Other    • Hypertension Other    • Stroke Other    • Ovarian cancer Other    • Colon cancer Other    • Hypertension Mother    • Hypertension Father        Social History     Socioeconomic History   • Marital status:      Spouse name: Not on file   • Number of children: Not on file   • Years of education: Not on file   • Highest education level: Not on file   Tobacco Use   • Smoking status: Never Smoker   • Smokeless tobacco: Never Used   Substance and Sexual Activity   • Alcohol use: Yes     Comment: occasional   • Drug use: No   • Sexual activity: Defer           Objective   Physical Exam   Constitutional: She is oriented to person, place, and time. She appears well-developed and well-nourished. No distress.   HENT:   Head: Normocephalic and atraumatic.   Right Ear: External ear normal.   Left Ear: External ear normal.   Mouth/Throat: Oropharynx is clear and moist. No oropharyngeal exudate.   Eyes: Conjunctivae and EOM are normal. Pupils are equal, round, and reactive to light. Right eye exhibits no discharge. Left eye exhibits no discharge. No scleral icterus.   Neck: Neck supple. No JVD present. No tracheal deviation present. No thyromegaly present.   Cardiovascular: Normal rate, regular rhythm, normal heart sounds and intact distal pulses. Exam reveals no friction rub.   No murmur heard.  Pulmonary/Chest: Effort normal and breath sounds normal. No stridor. No respiratory  distress. She has no wheezes. She has no rales. She exhibits no tenderness.   Abdominal: Soft. Bowel sounds are normal. She exhibits no distension and no mass. There is no tenderness. There is no rebound and no guarding.   Musculoskeletal: She exhibits no edema, tenderness or deformity.   Mild R paraspinal TTP. Full ROM without difficulty other than soreness.  R knee abrasion and tenderness along the patella. Nml varus & valgus stress.   Lymphadenopathy:     She has no cervical adenopathy.   Neurological: She is alert and oriented to person, place, and time. No cranial nerve deficit. She exhibits normal muscle tone. Coordination normal.   Skin: Skin is warm and dry. Capillary refill takes 2 to 3 seconds. No rash noted. She is not diaphoretic. No erythema.   Psychiatric: She has a normal mood and affect. Her behavior is normal. Judgment and thought content normal.   Nursing note and vitals reviewed.      Procedures           ED Course  ED Course as of Feb 03 2202   Sun Feb 03, 2019 2137 Patient was was in return for evaluate by me.  Physical exam was consistent with muscular skeletal pain but x-rays were obtained.  Patient declined pain medications in the emergency department.  At this point I believe she is stable for discharge and will follow up with her primary care provider as an outpatient.  [CE]      ED Course User Index  [CE] Hai Staples, DO          Labs Reviewed - No data to display  Xr Spine Lumbar 2 Or 3 View    Result Date: 2/3/2019  Narrative: Lumbar spine three view on 2/3/2019 CLINICAL INDICATION: Low back pain after fall COMPARISON: 10/16/2013 FINDINGS: Bilateral pars defects are noted at L5. There is grade 2 spondylolisthesis at L5-S1 with retrolisthesis of S1 in relation to L5. Sclerotic degenerative endplate changes are noted at L5-S1. Mild degenerative disc disease is noted in the lower thoracic spine. The lumbar spine is otherwise well aligned. There are no fractures.     Impression:  Bilateral pars defects at L5 with grade 2 spondylolisthesis at L5-S1 with no acute bony abnormality. Electronically signed by:  Kevin Cordero  2/3/2019 9:56 PM CST Workstation: [id number]    Xr Knee 4+ View Right    Result Date: 2/3/2019  Narrative: Right knee four view on 2/3/2019 CLINICAL INDICATION: Right knee pain after fall COMPARISON: 12/5/2018 FINDINGS: There is mild joint space narrowing in the medial compartment. No joint effusion is noted. There are no fractures. Visualized joints are well aligned.     Impression: No acute bony abnormality. Electronically signed by:  Kevin Cordero  2/3/2019 9:53 PM CST Workstation: [id number]          [facility]      Final diagnoses:   Contusion of right knee, initial encounter   Acute right-sided low back pain without sciatica            Hai Staples DO  02/03/19 0394

## 2019-02-18 ENCOUNTER — READMISSION MANAGEMENT (OUTPATIENT)
Dept: CALL CENTER | Facility: HOSPITAL | Age: 54
End: 2019-02-18

## 2019-02-18 NOTE — OUTREACH NOTE
Total Joint Month 3 Survey      Responses   Facility patient discharged from?  [facility]   Does the patient have one of the following disease processes/diagnoses(primary or secondary)?  Total Joint Replacement   Joint surgery performed?  Knee   Month 3 attempt successful?  Yes   Call start time  1442   Call end time  1444   Has the patient been back in either the hospital or Emergency Department since discharge?  No   Discharge diagnosis  Total Knee   Is the patient taking all medications as directed (includes completed medication regime)?  Yes   Is the patient able to teach back alternate methods of pain control?  Ice   Medication comments  Not using pain meds   Has the patient kept scheduled appointments due by today?  Yes   Is the patient still receiving Home Health Services?  No   Is the patient still attending therapy sessions(either in the home or as an outpatient)?  No   Has the patient fallen since discharge?  Yes   If the patient has fallen, were there any injuries?  Yes   Fall comments  Tripped and fell and hurt other knee.    What is the patient's perception of their functional status since discharge?  Improving   Is the patient/caregiver able to teach back the hierarchy of who to call/visit for symptoms/problems? PCP, Specialist, Home health nurse, Urgent Care, ED, 911  Yes   Additional teach back comments  States she is doing well. Bought a gym membership today.    Graduated  Yes   Did the patient feel the follow up calls were helpful during their recovery period?  Yes          Anat Caldwell RN

## 2019-02-21 ENCOUNTER — OFFICE VISIT (OUTPATIENT)
Dept: FAMILY MEDICINE CLINIC | Facility: CLINIC | Age: 54
End: 2019-02-21

## 2019-02-21 VITALS
OXYGEN SATURATION: 59 % | WEIGHT: 216.25 LBS | DIASTOLIC BLOOD PRESSURE: 72 MMHG | HEART RATE: 85 BPM | HEIGHT: 59 IN | SYSTOLIC BLOOD PRESSURE: 126 MMHG | BODY MASS INDEX: 43.6 KG/M2

## 2019-02-21 DIAGNOSIS — I10 ESSENTIAL HYPERTENSION: Primary | ICD-10-CM

## 2019-02-21 DIAGNOSIS — F41.9 ANXIETY: ICD-10-CM

## 2019-02-21 DIAGNOSIS — E55.9 VITAMIN D DEFICIENCY: ICD-10-CM

## 2019-02-21 DIAGNOSIS — E78.00 HYPERCHOLESTEREMIA: ICD-10-CM

## 2019-02-21 PROCEDURE — 99214 OFFICE O/P EST MOD 30 MIN: CPT | Performed by: FAMILY MEDICINE

## 2019-02-21 RX ORDER — ERGOCALCIFEROL 1.25 MG/1
50000 CAPSULE ORAL WEEKLY
Qty: 5 CAPSULE | Refills: 6 | Status: SHIPPED | OUTPATIENT
Start: 2019-02-21 | End: 2019-03-25

## 2019-02-21 NOTE — PROGRESS NOTES
Subjective   Priti Orr is a 53 y.o. female.    cc:follow up  History of Present Illness The patient comes in for check of their chronic medical issues which include Anxiety,Hypertension and Hyperlipidemia.She is at her base line.   .      The following portions of the patient's history were reviewed and updated as appropriate: allergies, current medications, past family history, past medical history, past social history, past surgical history and problem list.    Review of Systems   Constitutional: Negative for diaphoresis, fatigue and fever.   Respiratory: Negative for cough, chest tightness and stridor.    Cardiovascular: Negative for chest pain, palpitations and leg swelling.   Musculoskeletal: Negative for arthralgias, back pain, gait problem, joint swelling, myalgias and bursitis.       Objective   Physical Exam   Constitutional: She appears well-developed and well-nourished.   HENT:   Head: Normocephalic and atraumatic.   Right Ear: External ear normal.   Left Ear: External ear normal.   Nose: Nose normal.   Mouth/Throat: Oropharynx is clear and moist.   Eyes: Pupils are equal, round, and reactive to light.   Cardiovascular: Normal rate, regular rhythm and normal heart sounds. Exam reveals no gallop and no friction rub.   No murmur heard.  Pulmonary/Chest: Effort normal and breath sounds normal. No stridor. No respiratory distress. She has no wheezes. She has no rales.   Abdominal: Soft. Bowel sounds are normal. She exhibits no distension and no mass. There is no tenderness. There is no guarding.   Skin: Skin is dry.   Vitals reviewed.        Assessment/Plan   Priti was seen today for establish care.    Diagnoses and all orders for this visit:    Essential hypertension  -     Comprehensive metabolic panel; Future  -     CBC w AUTO Differential; Future    Hypercholesteremia  -     Lipid panel; Future    Anxiety    Vitamin D deficiency  -     Vitamin D 25 Hydroxy; Future    Other orders  -      vitamin D (ERGOCALCIFEROL) 65198 units capsule capsule; Take 1 capsule by mouth 1 (One) Time Per Week.  -     Cancel: Hemoglobin A1c; Future  -     Cancel: CBC w AUTO Differential; Future  -     Cancel: Comprehensive Metabolic Panel; Future  -     Cancel: TSH; Future  -     Cancel: T4, Free; Future      Return to the clinic in 6 month/s.  Will contact with results as needed.

## 2019-03-04 DIAGNOSIS — Z96.652 STATUS POST LEFT KNEE REPLACEMENT: Primary | ICD-10-CM

## 2019-03-07 ENCOUNTER — OFFICE VISIT (OUTPATIENT)
Dept: ORTHOPEDIC SURGERY | Facility: CLINIC | Age: 54
End: 2019-03-07

## 2019-03-07 VITALS — HEIGHT: 59 IN | WEIGHT: 218 LBS | BODY MASS INDEX: 43.95 KG/M2

## 2019-03-07 DIAGNOSIS — Z96.652 STATUS POST LEFT KNEE REPLACEMENT: Primary | ICD-10-CM

## 2019-03-07 DIAGNOSIS — M25.562 LEFT KNEE PAIN, UNSPECIFIED CHRONICITY: ICD-10-CM

## 2019-03-07 DIAGNOSIS — E66.01 MORBID OBESITY WITH BMI OF 40.0-44.9, ADULT (HCC): ICD-10-CM

## 2019-03-07 DIAGNOSIS — I10 ESSENTIAL HYPERTENSION: ICD-10-CM

## 2019-03-07 PROCEDURE — 99213 OFFICE O/P EST LOW 20 MIN: CPT | Performed by: ORTHOPAEDIC SURGERY

## 2019-03-07 NOTE — PROGRESS NOTES
"Priti Orr is a 53 y.o. female returns for     Chief Complaint   Patient presents with   • Left Knee - Follow-up       HISTORY OF PRESENT ILLNESS: f/u on left knee, patient had xrays done today,   No problems  No fevers or chills       CONCURRENT MEDICAL HISTORY:    The following portions of the patient's history were reviewed and updated as appropriate: allergies, current medications, past family history, past medical history, past social history, past surgical history and problem list.     ROS  No fevers or chills.  No chest pain or shortness of air.  No GI or  disturbances.    PHYSICAL EXAMINATION:       Ht 149.9 cm (59\")   Wt 98.9 kg (218 lb)   LMP 02/21/1994 (Within Months)   BMI 44.03 kg/m²     Physical Exam   Constitutional: She is oriented to person, place, and time. She appears well-developed and well-nourished.   Neurological: She is alert and oriented to person, place, and time.   Psychiatric: She has a normal mood and affect. Her behavior is normal. Judgment and thought content normal.       GAIT:     []  Normal  []  Antalgic    Assistive device: []  None  []  Walker     []  Crutches  []  Cane     []  Wheelchair  []  Stretcher    Left Knee Exam     Muscle Strength   The patient has normal left knee strength.    Tenderness   The patient is experiencing no tenderness.     Range of Motion   Extension: 0   Flexion: 110     Tests   Varus: negative Valgus: negative  Drawer:  Anterior - negative         Other   Erythema: absent  Scars: present  Sensation: normal  Pulse: present  Swelling: none              Xr Knee 1 Or 2 View Left    Result Date: 3/7/2019  Narrative: Ordering Provider:  Brooks Junior MD Ordering Diagnosis/Indication:  Status post left knee replacement Procedure:  XR KNEE 1 OR 2 VW LEFT Exam Date:  3/7/19 COMPARISON:  Todays X-rays were compared to previous images dated December 5, 2018.     Impression:  AP bilateral standing of the knees with lateral of the left knee " show acceptable position and alignment of a left total knee arthroplasty.  No sign of implant loosening or failure is noted.  Appropriate sizing is noted on both views.  The right knee shows very mild medial joint space narrowing but otherwise no acute findings. Brooks Junior MD 3/7/19     Xr Knee Bilateral Ap Standing    Result Date: 3/7/2019  Narrative: Ordering Provider:  Brooks Junior MD Ordering Diagnosis/Indication:  Status post left knee replacement Procedure:  XR KNEE BILATERAL AP STANDING Exam Date:  3/7/19 COMPARISON:  Todays X-rays were compared to previous images dated December 5, 2018.     Impression:  AP bilateral standing of the knees with lateral of the left knee show acceptable position and alignment of a left total knee arthroplasty.  No sign of implant loosening or failure is noted.  Appropriate sizing is noted on both views.  The right knee shows very mild medial joint space narrowing but otherwise no acute findings. Brooks Junior MD 3/7/19             ASSESSMENT:    Diagnoses and all orders for this visit:    Status post left knee replacement  -     Ambulatory Referral to [facility]    Left knee pain, unspecified chronicity  -     Ambulatory Referral to [facility]    Morbid obesity with BMI of 40.0-44.9, adult (CMS/[facility])    Essential hypertension          PLAN    Activity as tolerated  Continue strength and conditioning  No restrictions  Recheck in 1 year with xrays    Patient's Body mass index is 44.03 kg/m². BMI is above normal parameters. Recommendations include: exercise counseling and nutrition counseling.    Return in about 1 year (around 3/7/2020) for Recheck with repeat xrays.    Brooks Junior MD

## 2019-03-25 ENCOUNTER — OFFICE VISIT (OUTPATIENT)
Dept: ORTHOPEDIC SURGERY | Facility: CLINIC | Age: 54
End: 2019-03-25

## 2019-03-25 VITALS — WEIGHT: 215 LBS | HEIGHT: 59 IN | BODY MASS INDEX: 43.34 KG/M2

## 2019-03-25 DIAGNOSIS — E55.9 VITAMIN D DEFICIENCY: ICD-10-CM

## 2019-03-25 DIAGNOSIS — M81.0 POSTMENOPAUSAL OSTEOPOROSIS: Primary | ICD-10-CM

## 2019-03-25 PROCEDURE — 99214 OFFICE O/P EST MOD 30 MIN: CPT | Performed by: NURSE PRACTITIONER

## 2019-03-25 RX ORDER — ERGOCALCIFEROL 1.25 MG/1
50000 CAPSULE ORAL WEEKLY
Qty: 5 CAPSULE | Refills: 11 | Status: SHIPPED | OUTPATIENT
Start: 2019-03-25 | End: 2021-07-26

## 2019-03-25 NOTE — PROGRESS NOTES
Priti Orr is a 53 y.o. female returns for     Chief Complaint   Patient presents with   • Osteoporosis       HISTORY OF PRESENT ILLNESS: [facility] health evaluation and treatment of osteoporosis.  Last bone density study was performed on 4/6/2018.  Patient was previously treated with Prolia for her osteoporosis.  Her last Prolia injection was given on 8/29/2017.  Patient states she missed some injections and got off schedule due to various life events, including her daughter having a baby.  Patient would like to start back Prolia for treatment of her osteoporosis.  Overall, patient tolerated the injections well with no significant side effects.  She reports some mild flulike symptoms for a few hours following the injection that would resolve fairly quickly.  Patient has a history of a left proximal humerus fracture that occurred in February 2014 and multiple rib fractures.  Patient is unsure of any decrease in her height in recent years.  Patient reports a positive family history of osteoporosis.  Patient underwent a hysterectomy in 1993 at the age of 27 with no hormone replacement therapy.  Patient has no comorbid medical conditions that increase her risk for osteoporosis.  Patient was previously treated with anti-seizure medications, but is not currently taking any medications that increase her risk for osteoporosis.  Patient has a history of vitamin D deficiency and was previously prescribed a high-dose vitamin D supplement, but there was some mixup with her pharmacy and she never started the supplement.  Patient is a non-smoker with no history of smoking.  Patient is a postmenopausal  female.     CONCURRENT MEDICAL HISTORY:    The following portions of the patient's history were reviewed and updated as appropriate: allergies, current medications, past family history, past medical history, past social history, past surgical history and problem list.  "    ROS  No fevers or chills.  No chest pain or shortness of air.  No GI or  disturbances.    PHYSICAL EXAMINATION:       Ht 149.9 cm (59\")   Wt 97.5 kg (215 lb)   LMP 02/21/1994 (Within Months)   BMI 43.42 kg/m²     Physical Exam   Constitutional: She is oriented to person, place, and time. Vital signs are normal. She appears well-developed and well-nourished. She is active and cooperative. She does not appear ill. No distress.   HENT:   Head: Normocephalic.   Pulmonary/Chest: Effort normal. No respiratory distress.   Abdominal: Soft. She exhibits no distension.   Musculoskeletal: She exhibits no edema, tenderness or deformity.   Neurological: She is alert and oriented to person, place, and time. GCS eye subscore is 4. GCS verbal subscore is 5. GCS motor subscore is 6.   Skin: Skin is warm, dry and intact. Capillary refill takes less than 2 seconds. No erythema.   Psychiatric: She has a normal mood and affect. Her speech is normal and behavior is normal. Judgment and thought content normal. Cognition and memory are normal.   Vitals reviewed.      GAIT:     [x]  Normal  []  Antalgic    Assistive device: [x]  None  []  Walker     []  Crutches  []  Cane     []  Wheelchair  []  Stretcher    Back Exam     Tenderness   The patient is experiencing no tenderness.     Range of Motion   The patient has normal back ROM.    Muscle Strength   The patient has normal back strength.    Tests   Straight leg raise right: negative  Straight leg raise left: negative    Reflexes   Patellar: normal    Other   Sensation: normal  Gait: normal   Erythema: no back redness    Comments:  No pain or limitations with range of motion.  No kyphosis.  No focal neurological deficits.              PROCEDURE: A bone densitometry (DEXA) study of the lumbar spine  and both hips was performed.     HISTORY: osteoporosis, M81.0 Age-related osteoporosis without  current pathological fracture     BMD: Bone mineral density.     COMPARISON: " 5/23/2017     T-score: Represents the number of standard deviations (SD) that  an individual is above or below the reference value for young  normals.     Z-score: Represents the amount of bone an individual has compared  with other people in the same age group and gender.     AP SPINE  BMD (g/cm2): 0.863  T-score (SD vs young adult): -1.7  Z-score (SD vs age-matched): -0.9  Change versus previous: +3 %     Left femoral neck  BMD (g/cm2): 0.699  T-score (SD vs young adult): -1.3  Z-score (SD vs age-matched): -0.6  Change versus previous: +2.2%     Right femoral neck  BMD (g/cm2): 0.829  T-score (SD vs young adult): -0.2  Z-score (SD vs age-matched): 0.5  Change versus previous: +2.2%     Comment: World Health Organization (WHO) classifications:  Normal: T-score at or above -1 SD  Osteopenia: T-score between -1 and -2.5 SD  Osteoporosis: T-score at or below -2.5 SD     IMPRESSION:  CONCLUSION: Osteopenia of the lumbar spine and left femoral neck  according to World Health Organization criteria.     Electronically signed by:  Bruce Matthews MD  5/24/2018 2:11 PM CDT  Workstation: [id number]        ASSESSMENT:    Diagnoses and all orders for this visit:    Postmenopausal osteoporosis    Vitamin D deficiency    Other orders  -     vitamin D (ERGOCALCIFEROL) 70424 units capsule capsule; Take 1 capsule by mouth 1 (One) Time Per Week.    PLAN    Bone density study results from 5/24/2018 are reviewed and discussed with patient.  We discussed her T-scores and we discussed her individualized risk factors for osteoporosis, most notably her postmenopausal status since undergoing a hysterectomy at the age of 27.  Patient did not have any hormone replacement therapy at that time.  Patient was previously taking Prolia for treatment of her osteoporosis and tolerating it well.  She would like to start back with Prolia.  Her last injection was given on 8/29/2017.  We discussed that it will likely need a new prior authorization and she  will be notified via telephone once it is approved by her insurance.  We will send the prescription to her pharmacy and she will  the medication and bring it the office for the injection to be administered.  Patient is also requesting a new prescription for her vitamin D supplement.  This was previously prescribed in February but there was a mixup with her pharmacy and she never received it.  I will send a new order for the vitamin D supplement today.  We discussed the importance of proper nutrition and adequate dietary intake of calcium and vitamin D for optimal bone health and prevention of worsening osteoporosis.  We discussed the importance of consistent weightbearing exercise, such as walking, for optimal bone health and prevention of worsening osteoporosis.  Patient is a non-smoker so smoking cessation is not addressed or applicable.  Recommend a repeat bone density study in May 2019 and follow up with [facility].  Follow-up in office for Prolia injection to be administered, pending insurance approval.  Patient will likely need labs in the 2 weeks prior to her injection.    Return for follow up for Prolia injection.        This document has been electronically signed by MELISSA Ochoa on March 25, 2019 9:52 AM      MELISSA Ochoa

## 2019-04-05 ENCOUNTER — DOCUMENTATION (OUTPATIENT)
Dept: ORTHOPEDIC SURGERY | Facility: CLINIC | Age: 54
End: 2019-04-05

## 2019-04-05 NOTE — PROGRESS NOTES
prolia has been approved through insurance, auth # [id number] 3/29/2019-6/27/2019. rx called into [facility] at [phone]. They have set acct for the patient, once they reach out to the patient they will call back to setup a delivery. Called patient no answer, left a message.     Patient will come to the office for nancy to do the injection.

## 2019-04-24 ENCOUNTER — CLINICAL SUPPORT (OUTPATIENT)
Dept: ORTHOPEDIC SURGERY | Facility: CLINIC | Age: 54
End: 2019-04-24

## 2019-04-24 VITALS — WEIGHT: 215 LBS | HEIGHT: 60 IN | BODY MASS INDEX: 42.21 KG/M2

## 2019-04-24 DIAGNOSIS — M81.0 POSTMENOPAUSAL OSTEOPOROSIS: Primary | ICD-10-CM

## 2019-04-24 PROCEDURE — 96372 THER/PROPH/DIAG INJ SC/IM: CPT | Performed by: NURSE PRACTITIONER

## 2019-04-24 NOTE — PROGRESS NOTES
"Chief Complaint   Patient presents with   • Osteoporosis     prolia injection     Radha Gregorio MD    HPI: Patient returns for a prolia injection. No new complaints.  Patient has tolerated Prolia injections previously well with no adverse side effects noted.  Patient had a normal calcium level (9.6) as of 2/27/2019.      Current Outpatient Medications:   •  albuterol sulfate  (90 Base) MCG/ACT inhaler, Inhale 2 puffs Every 4 (Four) Hours As Needed for Wheezing for up to 30 days., Disp: 1 inhaler, Rfl: 0  •  cefprozil (CEFZIL) 500 MG tablet, Take 1 tablet by mouth 2 (Two) Times a Day for 10 days., Disp: 20 tablet, Rfl: 0  •  fluticasone (FLONASE) 50 MCG/ACT nasal spray, 1 spray into the nostril(s) as directed by provider Daily., Disp: , Rfl:   •  Glucos-Chondroit-Hyaluron-MSM (GLUCOSAMINE CHONDROITIN JOINT PO), Take 1 tablet by mouth Daily., Disp: , Rfl:   •  guaiFENesin (MUCINEX) 600 MG 12 hr tablet, Take 1 tablet by mouth 2 (Two) Times a Day., Disp: 20 tablet, Rfl: 0  •  lisinopril-hydrochlorothiazide (PRINZIDE,ZESTORETIC) 20-12.5 MG per tablet, Take 1 tablet by mouth Daily., Disp: , Rfl:   •  meloxicam (MOBIC) 15 MG tablet, Take 15 mg by mouth Daily., Disp: , Rfl: 5  •  montelukast (SINGULAIR) 10 MG tablet, Take 10 mg by mouth Daily As Needed., Disp: , Rfl:   •  phentermine 30 MG capsule, Take 30 mg by mouth., Disp: , Rfl:   •  vitamin D (ERGOCALCIFEROL) 62811 units capsule capsule, Take 1 capsule by mouth 1 (One) Time Per Week., Disp: 5 capsule, Rfl: 11    Allergies   Allergen Reactions   • Pseudoephedrine Shortness Of Breath and Swelling     LIPS SWELL AND TONGUE BECOMES THICK   • Morphine Other (See Comments)     States felt like itching from within, hallucinations, agitation       Vitals:    04/24/19 0923   Weight: 97.5 kg (215 lb)   Height: 151.1 cm (59.5\")     ASSESSMENT:    Diagnoses and all orders for this visit:    Postmenopausal osteoporosis      Plan:    The medicine was supplied by the " patient.   The patient was injected, under aseptic conditions, into the:    [x]  Right arm    []  Left arm  []  Right thigh    []  Left thigh  []  Right side of abdomen  []  Left side of abdomen    The patient tolerated the procedure well.  Plan: Followup in 6 months for next injection.    NDC #:28040-784-42 Lot #: [id number]      [id number]       This document has been electronically signed by MELISSA Ochoa on April 24, 2019 1:49 PM

## 2019-05-01 NOTE — PROGRESS NOTES
"Anticoagulation by Pharmacy - Warfarin Day 1 of 42    Priti Orr is a 53 y.o.female  [Ht: 149.9 cm (59\"); Wt: 98.8 kg (217 lb 13 oz)] on Warfarin 5 mg PO  for indication of VTE prophylaxis s/p ortho procedure.    Goal INR: 1.7-2.5  Today's INR: No results found for: INR      No results found for: INR, PROTIME  Lab Results   Component Value Date    HGB 12.1 11/19/2018    HGB 13.4 11/13/2018     Lab Results   Component Value Date    HCT 36.3 11/19/2018    HCT 40.0 11/13/2018     Assessment/Plan:  Warfarin 5mg daily initiated. H&H WNL. No major interactions.     Cesar Villalta RPH  11/19/18 2:25 PM       " Called by [facility] nurse to see patient in regards to possible PDPH. The patient had an uneventful spinal placement for a repeat  aprox 40 hours ago. The patient does not mention that her spinal placement was difficult. She currently complains of a positional headache, some light sensitivity, and head/neck pain. Her symptoms are somewhat helped by pain medicine. I discussed with her the options for treatment and the etiology of a PDPH. If she does have a PDPD, theoretically it should heal quicker because it occurred from a 25 gauge pencil point needle (as compared to a wet tap from an 18 gauge needle used for labor epidurals). The patient is scheduled to be discharged in two days. At this point, I recommend fluids, pain medicines, and time to recover. If her pain does not improve over the next 24-36 hours, we can revisit the idea of a blood patch. The patient is in agreement with this plan.     Michelle Mckinney MD

## 2019-09-16 DIAGNOSIS — M81.0 POSTMENOPAUSAL OSTEOPOROSIS: ICD-10-CM

## 2019-09-16 DIAGNOSIS — E55.9 VITAMIN D DEFICIENCY: Primary | ICD-10-CM

## 2019-10-07 NOTE — TELEPHONE ENCOUNTER
rx called into pharmacy. Last injection done on 4/24/2019, next injection is due after 10/24/2019. They will check/verify benefits contact patient once ready for delivery.   [facility] [phone]

## 2019-10-29 ENCOUNTER — TELEPHONE (OUTPATIENT)
Dept: ORTHOPEDIC SURGERY | Facility: CLINIC | Age: 54
End: 2019-10-29

## 2019-10-29 NOTE — TELEPHONE ENCOUNTER
Called and spoke with patient regarding prolia, pharmacy has been trying to contact patient to get medication mailed out to our office. She will call them back to get medication filled.

## 2020-05-22 ENCOUNTER — DOCUMENTATION (OUTPATIENT)
Dept: ORTHOPEDIC SURGERY | Facility: CLINIC | Age: 55
End: 2020-05-22

## 2020-05-22 NOTE — PROGRESS NOTES
Received letter from insurance, prolia denied, bone density results are not low enough and patient does not having hx of fragility fractures or breast cancer.

## 2020-05-29 ENCOUNTER — OFFICE VISIT (OUTPATIENT)
Dept: ORTHOPEDIC SURGERY | Facility: CLINIC | Age: 55
End: 2020-05-29

## 2020-05-29 VITALS — WEIGHT: 225.1 LBS | HEIGHT: 62 IN | BODY MASS INDEX: 41.42 KG/M2

## 2020-05-29 DIAGNOSIS — M17.11 PRIMARY OSTEOARTHRITIS OF RIGHT KNEE: ICD-10-CM

## 2020-05-29 DIAGNOSIS — M25.561 ACUTE PAIN OF RIGHT KNEE: Primary | ICD-10-CM

## 2020-05-29 DIAGNOSIS — M25.461 EFFUSION OF RIGHT KNEE: ICD-10-CM

## 2020-05-29 DIAGNOSIS — M25.561 RIGHT KNEE PAIN, UNSPECIFIED CHRONICITY: Primary | ICD-10-CM

## 2020-05-29 DIAGNOSIS — E66.01 MORBID OBESITY WITH BMI OF 40.0-44.9, ADULT (HCC): ICD-10-CM

## 2020-05-29 PROCEDURE — 20610 DRAIN/INJ JOINT/BURSA W/O US: CPT | Performed by: NURSE PRACTITIONER

## 2020-05-29 PROCEDURE — 99214 OFFICE O/P EST MOD 30 MIN: CPT | Performed by: NURSE PRACTITIONER

## 2020-05-29 RX ORDER — CELECOXIB 200 MG/1
200 CAPSULE ORAL DAILY
Qty: 90 CAPSULE | Refills: 3 | Status: SHIPPED | OUTPATIENT
Start: 2020-05-29 | End: 2020-08-24

## 2020-05-29 RX ORDER — LORATADINE 10 MG/1
10 TABLET ORAL DAILY
COMMUNITY
End: 2021-05-17 | Stop reason: SDUPTHER

## 2020-05-29 RX ORDER — TRAMADOL HYDROCHLORIDE 50 MG/1
50 TABLET ORAL EVERY 6 HOURS PRN
Qty: 40 TABLET | Refills: 0 | Status: SHIPPED | OUTPATIENT
Start: 2020-05-29 | End: 2020-06-08

## 2020-05-29 RX ADMIN — TRIAMCINOLONE ACETONIDE 40 MG: 40 INJECTION, SUSPENSION INTRA-ARTICULAR; INTRAMUSCULAR at 09:23

## 2020-05-29 RX ADMIN — LIDOCAINE HYDROCHLORIDE 2 ML: 10 INJECTION, SOLUTION EPIDURAL; INFILTRATION; INTRACAUDAL; PERINEURAL at 09:23

## 2020-06-01 RX ORDER — TRIAMCINOLONE ACETONIDE 40 MG/ML
40 INJECTION, SUSPENSION INTRA-ARTICULAR; INTRAMUSCULAR
Status: COMPLETED | OUTPATIENT
Start: 2020-05-29 | End: 2020-05-29

## 2020-06-01 RX ORDER — LIDOCAINE HYDROCHLORIDE 10 MG/ML
2 INJECTION, SOLUTION EPIDURAL; INFILTRATION; INTRACAUDAL; PERINEURAL
Status: COMPLETED | OUTPATIENT
Start: 2020-05-29 | End: 2020-05-29

## 2020-06-08 DIAGNOSIS — M17.11 PRIMARY OSTEOARTHRITIS OF RIGHT KNEE: ICD-10-CM

## 2020-06-08 DIAGNOSIS — M25.461 EFFUSION OF RIGHT KNEE: ICD-10-CM

## 2020-06-08 DIAGNOSIS — M25.561 ACUTE PAIN OF RIGHT KNEE: Primary | ICD-10-CM

## 2020-06-16 DIAGNOSIS — M25.461 EFFUSION OF RIGHT KNEE: ICD-10-CM

## 2020-06-16 DIAGNOSIS — M25.561 ACUTE PAIN OF RIGHT KNEE: ICD-10-CM

## 2020-06-16 DIAGNOSIS — M17.11 PRIMARY OSTEOARTHRITIS OF RIGHT KNEE: ICD-10-CM

## 2020-06-19 ENCOUNTER — OFFICE VISIT (OUTPATIENT)
Dept: ORTHOPEDIC SURGERY | Facility: CLINIC | Age: 55
End: 2020-06-19

## 2020-06-19 VITALS — HEIGHT: 62 IN | WEIGHT: 223.5 LBS | BODY MASS INDEX: 41.13 KG/M2

## 2020-06-19 DIAGNOSIS — S83.241A OTHER TEAR OF MEDIAL MENISCUS OF RIGHT KNEE AS CURRENT INJURY, INITIAL ENCOUNTER: ICD-10-CM

## 2020-06-19 DIAGNOSIS — S83.411A SPRAIN OF MEDIAL COLLATERAL LIGAMENT OF RIGHT KNEE, INITIAL ENCOUNTER: ICD-10-CM

## 2020-06-19 DIAGNOSIS — M25.461 EFFUSION OF RIGHT KNEE: ICD-10-CM

## 2020-06-19 DIAGNOSIS — M25.561 ACUTE PAIN OF RIGHT KNEE: Primary | ICD-10-CM

## 2020-06-19 DIAGNOSIS — M17.11 PRIMARY OSTEOARTHRITIS OF RIGHT KNEE: ICD-10-CM

## 2020-06-19 PROCEDURE — 20610 DRAIN/INJ JOINT/BURSA W/O US: CPT | Performed by: NURSE PRACTITIONER

## 2020-06-19 PROCEDURE — 99213 OFFICE O/P EST LOW 20 MIN: CPT | Performed by: NURSE PRACTITIONER

## 2020-06-19 RX ADMIN — LIDOCAINE HYDROCHLORIDE 2 ML: 10 INJECTION, SOLUTION EPIDURAL; INFILTRATION; INTRACAUDAL; PERINEURAL at 15:04

## 2020-06-19 RX ADMIN — TRIAMCINOLONE ACETONIDE 40 MG: 40 INJECTION, SUSPENSION INTRA-ARTICULAR; INTRAMUSCULAR at 15:04

## 2020-06-19 NOTE — PROGRESS NOTES
"Priti Orr is a 54 y.o. female returns for     Chief Complaint   Patient presents with   • Right Knee - Follow-up   • Results     MRI     MRI  HISTORY OF PRESENT ILLNESS: Patient presents to office for follow-up of acute right knee pain and MRI results of right knee.  Onset of pain occurred approximately 4 weeks ago with no known injury.  Patient was given intra-articular injection of steroid at last office visit on 5/29/2020, which she states did not improve her pain at all.  Patient continues to complain of moderate to severe pain in her right knee with joint swelling.  She has increased pain with weightbearing activity and motion of her knee joint.  She was previously taking meloxicam and this was changed to Celebrex at last office visit.  She has tried rest/activity modification, elevation, ice therapy, Tylenol, anti-inflammatory medications and tramadol with no improvement.  No new complaints or concerns noted since last office visit.  No injuries or falls reported.     CONCURRENT MEDICAL HISTORY:    The following portions of the patient's history were reviewed and updated as appropriate: allergies, current medications, past family history, past medical history, past social history, past surgical history and problem list.     ROS  No fevers or chills.  No chest pain or shortness of air.  No GI or  disturbances. Right knee pain.     PHYSICAL EXAMINATION:       Ht 157.5 cm (62\")   Wt 101 kg (223 lb 8 oz)   LMP 02/21/1994 (Within Months)   BMI 40.88 kg/m²     Physical Exam   Constitutional: She is oriented to person, place, and time. Vital signs are normal. She appears well-developed and well-nourished. She is active and cooperative. She does not appear ill. No distress.   HENT:   Head: Normocephalic.   Pulmonary/Chest: Effort normal. No respiratory distress.   Abdominal: Soft. She exhibits no distension.   Musculoskeletal: She exhibits edema (Mild, right knee) and tenderness (Mild, right knee). She " exhibits no deformity.        Right knee: She exhibits effusion (Mild).        Left knee: She exhibits no effusion.   Neurological: She is alert and oriented to person, place, and time. GCS eye subscore is 4. GCS verbal subscore is 5. GCS motor subscore is 6.   Skin: Skin is warm, dry and intact. Capillary refill takes less than 2 seconds. No erythema.   Psychiatric: She has a normal mood and affect. Her speech is normal and behavior is normal. Judgment and thought content normal. Cognition and memory are normal.   Vitals reviewed.      GAIT:     []  Normal  [x]  Antalgic    Assistive device: [x]  None  []  Walker     []  Crutches  []  Cane     []  Wheelchair  []  Stretcher    Right Knee Exam     Tenderness   Right knee tenderness location: Mild, diffuse.    Range of Motion   Extension: 5   Flexion: 100     Tests   Tony:  Medial - positive Lateral - positive  Varus: negative Valgus: negative    Other   Erythema: absent  Sensation: normal  Pulse: present  Swelling: mild  Effusion: effusion (Mild) present    Comments:  Pain and limitations with range of motion, primarily at the end range of flexion.  Pain increases with rotation of the knee both medially and laterally.  Mild swelling/effusion noted.  No erythema.  No warmth.  No signs of infection noted.      Left Knee Exam     Tenderness   The patient is experiencing no tenderness.     Range of Motion   Extension: 0   Flexion: 120     Tests   Tony:  Medial - negative Lateral - negative  Varus: negative Valgus: negative    Other   Erythema: absent  Scars: present (well-healed surgical scar to anterior knee)  Sensation: normal  Pulse: present  Swelling: none  Effusion: no effusion present            Xr Knee 1 Or 2 View Right    Result Date: 5/31/2020  Narrative: Lateral view of the right knee reveals no evidence of acute fracture or dislocation.  No significant degenerative changes are noted at the patellofemoral joint.  The knee joint appears well-aligned.   Soft tissues appear unremarkable.  A small suprapatellar joint effusion is noted.  No acute bony radiologic abnormalities are noted at this time.  No significant changes are noted when compared with prior images from 2/3/2019.05/31/20 at 10:11 AM by MELISSA Ochoa     Xr Knee Bilateral Ap Standing    Result Date: 5/31/2020  Narrative: AP standing view of the bilateral knees reveals postsurgical changes in the left knee post total knee arthroplasty.  Prostheses appear well-positioned and well-aligned with no evidence of hardware failure or loosening noted.  There are degenerative changes noted in medial compartment of the right knee with mild to moderate loss of medial compartmental joint spacing.  The loss of joint spacing in the medial compartment of the right knee appears to have progressed slightly when compared with prior images from 3/7/2019.  There are small marginal osteophyte formations noted in the lateral compartment of the right knee.  There is slight varus positioning noted of the right knee.  Soft tissues appear unremarkable.  No evidence of acute fracture or dislocation.  No acute bony radiologic abnormalities are noted at this time.05/31/20 at 10:13 AM by MELISSA Ochoa     MRI knee right without contrast 6/16/2020  [facility]  Result Impression       1.  Low-grade injury and/or bursitis around the upper medial collateral ligament.    2.  The midportion of the medial meniscus has an intrasubstance tear that extends to the superior articular surface with a small flap.    3.  Loss of cartilage in the medial compartment of the knee with early degenerative change.    [id number]   Result Narrative   Indication:  Medial pain, right knee.    MRI, Right Knee without Gadolinium:  No marrow edema or sign of bony injury.  The cruciate ligaments are intact.  The lateral meniscus has a normal appearance.  The medial collateral ligament does not appear disrupted, but the upper portion is  thickened   and has central fluid that would reflect some combination of bursitis and/or low-grade injury.  The medial compartment of the knee has cartilaginous thinning and early degenerative change and in the midportion of the medial meniscus there is a small   length of intrasubstance tear that extends through the superior articular surface near the annulus and the small portion of the superior articular surface is folded back.  There is a small flap.    The patella is normally seated and there is mild thinning and irregularity of the cartilage without signal change in the underlying bone.  There is a moderate joint effusion.   Other Result Information   Je, Rad Results In - 06/16/2020 11:11 AM CDT  Indication:  Medial pain, right knee.    MRI, Right Knee without Gadolinium:  No marrow edema or sign of bony injury.  The cruciate ligaments are intact.  The lateral meniscus has a normal appearance.  The medial collateral ligament does not appear disrupted, but the upper portion is thickened   and has central fluid that would reflect some combination of bursitis and/or low-grade injury.  The medial compartment of the knee has cartilaginous thinning and early degenerative change and in the midportion of the medial meniscus there is a small   length of intrasubstance tear that extends through the superior articular surface near the annulus and the small portion of the superior articular surface is folded back.  There is a small flap.    The patella is normally seated and there is mild thinning and irregularity of the cartilage without signal change in the underlying bone.  There is a moderate joint effusion.    IMPRESSION:       1.  Low-grade injury and/or bursitis around the upper medial collateral ligament.    2.  The midportion of the medial meniscus has an intrasubstance tear that extends to the superior articular surface with a small flap.    3.  Loss of cartilage in the medial compartment of the knee with early  degenerative change.           ASSESSMENT:    Diagnoses and all orders for this visit:    Acute pain of right knee  -     Large Joint Arthrocentesis: R knee    Primary osteoarthritis of right knee  -     Large Joint Arthrocentesis: R knee    Effusion of right knee  -     Large Joint Arthrocentesis: R knee    Other tear of medial meniscus of right knee as current injury, initial encounter    Sprain of medial collateral ligament of right knee, initial encounter    PLAN    Large Joint Arthrocentesis: R knee  Date/Time: 6/19/2020 3:04 PM  Consent given by: patient  Timeout: Immediately prior to procedure a time out was called to verify the correct patient, procedure, equipment, support staff and site/side marked as required   Supporting Documentation  Indications: pain, joint swelling and diagnostic evaluation   Procedure Details  Location: knee - R knee  Preparation: Patient was prepped and draped in the usual sterile fashion  Needle size: 22 G  Approach: anterolateral  Medications administered: 2 mL lidocaine PF 1% 1 %; 40 mg triamcinolone acetonide 40 MG/ML  Patient tolerance: patient tolerated the procedure well with no immediate complications        MRI of right knee reviewed and results discussed with patient.  We discussed evidence of mild degenerative changes in the medial compartment, low-grade injury to the upper portion of the medial collateral ligament and a medial meniscus tear, which extends to the articular surface with a small flap.  Patient reports she did not have any improvement following the intra-articular injection of steroid given at last office visit.  She has had previous injections into her left knee and states they have always helped her pain.  Recommend a one-time early repeat intra-articular injection of steroid to the right knee today for management of joint pain/inflammation.  We discussed continued conservative treatment efforts versus surgical consult for possible knee arthroscopy.   Recommend a hinged knee brace for added support.  This is placed/fitted in office today.  Recommend rest and activity modification as tolerated and based on her pain.  Recommend modified weightbearing off the right knee with use of a cane.  We discussed that she can gradually increase her weightbearing and activity as pain and swelling allow.  Recommend to continue with elevation and ice therapy to the right knee as needed to minimize pain/swelling/inflammation.  Continue Celebrex daily for consistent dosing of oral NSAIDs.  Patient can also take Tylenol as needed for additional pain control.  Patient has tramadol at home to take as needed for worse pain.  Follow-up in 4 weeks for recheck.  Consider referral to physical therapy.  Consider surgical consult for possible knee arthroscopy.     Patient's Body mass index is 40.88 kg/m². BMI is above normal parameters. Recommendations include: exercise counseling and nutrition counseling.    Return in about 4 weeks (around 7/17/2020), or if symptoms worsen or fail to improve, for Recheck.        This document has been electronically signed by MELISSA Ochoa on June 23, 2020 14:37      MELISSA Ochoa

## 2020-06-23 RX ORDER — LIDOCAINE HYDROCHLORIDE 10 MG/ML
2 INJECTION, SOLUTION EPIDURAL; INFILTRATION; INTRACAUDAL; PERINEURAL
Status: COMPLETED | OUTPATIENT
Start: 2020-06-19 | End: 2020-06-19

## 2020-06-23 RX ORDER — TRIAMCINOLONE ACETONIDE 40 MG/ML
40 INJECTION, SUSPENSION INTRA-ARTICULAR; INTRAMUSCULAR
Status: COMPLETED | OUTPATIENT
Start: 2020-06-19 | End: 2020-06-19

## 2020-06-30 ENCOUNTER — OFFICE VISIT (OUTPATIENT)
Dept: ORTHOPEDIC SURGERY | Facility: CLINIC | Age: 55
End: 2020-06-30

## 2020-06-30 ENCOUNTER — PREP FOR SURGERY (OUTPATIENT)
Dept: OTHER | Facility: HOSPITAL | Age: 55
End: 2020-06-30

## 2020-06-30 VITALS — BODY MASS INDEX: 40.67 KG/M2 | WEIGHT: 221 LBS | HEIGHT: 62 IN

## 2020-06-30 DIAGNOSIS — M17.11 PRIMARY OSTEOARTHRITIS OF RIGHT KNEE: ICD-10-CM

## 2020-06-30 DIAGNOSIS — S83.411D SPRAIN OF MEDIAL COLLATERAL LIGAMENT OF RIGHT KNEE, SUBSEQUENT ENCOUNTER: ICD-10-CM

## 2020-06-30 DIAGNOSIS — S83.241D OTHER TEAR OF MEDIAL MENISCUS OF RIGHT KNEE AS CURRENT INJURY, SUBSEQUENT ENCOUNTER: ICD-10-CM

## 2020-06-30 DIAGNOSIS — Z98.890 HISTORY OF BRAIN SURGERY: ICD-10-CM

## 2020-06-30 DIAGNOSIS — M25.461 EFFUSION OF RIGHT KNEE: ICD-10-CM

## 2020-06-30 DIAGNOSIS — M25.561 ACUTE PAIN OF RIGHT KNEE: Primary | ICD-10-CM

## 2020-06-30 DIAGNOSIS — M25.561 ACUTE PAIN OF RIGHT KNEE: ICD-10-CM

## 2020-06-30 DIAGNOSIS — S83.241D OTHER TEAR OF MEDIAL MENISCUS OF RIGHT KNEE AS CURRENT INJURY, SUBSEQUENT ENCOUNTER: Primary | ICD-10-CM

## 2020-06-30 DIAGNOSIS — I10 ESSENTIAL HYPERTENSION: ICD-10-CM

## 2020-06-30 DIAGNOSIS — G47.33 OSA (OBSTRUCTIVE SLEEP APNEA): ICD-10-CM

## 2020-06-30 DIAGNOSIS — E66.01 MORBID OBESITY WITH BMI OF 40.0-44.9, ADULT (HCC): ICD-10-CM

## 2020-06-30 PROCEDURE — 99214 OFFICE O/P EST MOD 30 MIN: CPT | Performed by: NURSE PRACTITIONER

## 2020-06-30 RX ORDER — BUPIVACAINE HCL/0.9 % NACL/PF 0.1 %
2 PLASTIC BAG, INJECTION (ML) EPIDURAL ONCE
Status: CANCELLED | OUTPATIENT
Start: 2020-07-13 | End: 2020-06-30

## 2020-06-30 NOTE — PROGRESS NOTES
"Priti Orr is a 54 y.o. female returns for     Chief Complaint   Patient presents with   • Right Knee - Follow-up       HISTORY OF PRESENT ILLNESS: Patient presents to office for follow-up of acute right knee pain.  Onset of pain occurred approximately 6 weeks ago with no known injury.  Recent MRI imaging was positive for mild degenerative changes in the medial compartment, low-grade injury to the upper portion of the medial collateral ligament and a medial meniscal tear, which extends to the articular surface with a small flap.  Patient has been treated conservatively with 2 intra-articular injections of steroid, rest/activity modification, bracing, elevation, ice therapy and oral NSAIDs with no improvement.  Patient continues to complain of persistent right knee pain with intermittent locking/catching symptoms.  She has increased pain with weightbearing activity.  Her current right knee pain is affecting her daily life and limiting daily activities.  Patient wants to proceed with discussion for surgical treatment with Dr. Junior today.  No new complaints or concerns noted since last office visit.     CONCURRENT MEDICAL HISTORY:    The following portions of the patient's history were reviewed and updated as appropriate: allergies, current medications, past family history, past medical history, past social history, past surgical history and problem list.     ROS  No fevers or chills.  No chest pain or shortness of air.  No GI or  disturbances. Right knee pain.     PHYSICAL EXAMINATION:       Ht 157.5 cm (62\")   Wt 100 kg (221 lb)   LMP 02/21/1994 (Within Months)   BMI 40.42 kg/m²     Physical Exam   Constitutional: She is oriented to person, place, and time. Vital signs are normal. She appears well-developed and well-nourished. She is active and cooperative. She does not appear ill. No distress.   HENT:   Head: Normocephalic.   Cardiovascular: Regular rhythm and normal heart sounds.   Pulmonary/Chest: " Effort normal and breath sounds normal. No respiratory distress.   Abdominal: Soft. She exhibits no distension.   Musculoskeletal: She exhibits edema (Mild, right knee) and tenderness (Right knee). She exhibits no deformity.        Right knee: She exhibits effusion (Mild).        Left knee: She exhibits no effusion.   Neurological: She is alert and oriented to person, place, and time. GCS eye subscore is 4. GCS verbal subscore is 5. GCS motor subscore is 6.   Skin: Skin is warm, dry and intact. Capillary refill takes less than 2 seconds. No erythema.   Psychiatric: She has a normal mood and affect. Her speech is normal and behavior is normal. Judgment and thought content normal. Cognition and memory are normal.   Vitals reviewed.      GAIT:     []  Normal  [x]  Antalgic    Assistive device: [x]  None  []  Walker     []  Crutches  []  Cane     []  Wheelchair  []  Stretcher    Right Knee Exam     Tenderness   The patient is experiencing tenderness in the medial joint line (Diffuse).    Range of Motion   Extension: 5   Flexion: 100     Tests   Tony:  Medial - positive Lateral - positive  Varus: negative Valgus: negative    Other   Erythema: absent  Sensation: normal  Pulse: present  Swelling: mild  Effusion: effusion (Mild) present    Comments:  Pain and limitations with range of motion, primarily at the end range of flexion.  Pain increases with rotation of the knee both medially and laterally.  Mild swelling/effusion noted.  No erythema.  No warmth.  No signs of infection noted.      Left Knee Exam     Tenderness   The patient is experiencing no tenderness.     Range of Motion   Extension: 0   Flexion: 120     Tests   Tony:  Medial - negative Lateral - negative  Varus: negative Valgus: negative    Other   Erythema: absent  Scars: present (well-healed surgical scar to anterior knee)  Sensation: normal  Pulse: present  Swelling: none  Effusion: no effusion present            Xr Knee 1 Or 2 View Right     Result  Date: 5/31/2020  Narrative: Lateral view of the right knee reveals no evidence of acute fracture or dislocation.  No significant degenerative changes are noted at the patellofemoral joint.  The knee joint appears well-aligned.  Soft tissues appear unremarkable.  A small suprapatellar joint effusion is noted.  No acute bony radiologic abnormalities are noted at this time.  No significant changes are noted when compared with prior images from 2/3/2019.05/31/20 at 10:11 AM by MELISSA Ochoa      Xr Knee Bilateral Ap Standing     Result Date: 5/31/2020  Narrative: AP standing view of the bilateral knees reveals postsurgical changes in the left knee post total knee arthroplasty.  Prostheses appear well-positioned and well-aligned with no evidence of hardware failure or loosening noted.  There are degenerative changes noted in medial compartment of the right knee with mild to moderate loss of medial compartmental joint spacing.  The loss of joint spacing in the medial compartment of the right knee appears to have progressed slightly when compared with prior images from 3/7/2019.  There are small marginal osteophyte formations noted in the lateral compartment of the right knee.  There is slight varus positioning noted of the right knee.  Soft tissues appear unremarkable.  No evidence of acute fracture or dislocation.  No acute bony radiologic abnormalities are noted at this time.05/31/20 at 10:13 AM by MELISSA Ochoa      MRI knee right without contrast 6/16/2020  [facility]  Result Impression       1.  Low-grade injury and/or bursitis around the upper medial collateral ligament.    2.  The midportion of the medial meniscus has an intrasubstance tear that extends to the superior articular surface with a small flap.    3.  Loss of cartilage in the medial compartment of the knee with early degenerative change.    [id number]   Result Narrative   Indication:  Medial pain, right knee.    MRI, Right Knee  without Gadolinium:  No marrow edema or sign of bony injury.  The cruciate ligaments are intact.  The lateral meniscus has a normal appearance.  The medial collateral ligament does not appear disrupted, but the upper portion is thickened   and has central fluid that would reflect some combination of bursitis and/or low-grade injury.  The medial compartment of the knee has cartilaginous thinning and early degenerative change and in the midportion of the medial meniscus there is a small   length of intrasubstance tear that extends through the superior articular surface near the annulus and the small portion of the superior articular surface is folded back.  There is a small flap.    The patella is normally seated and there is mild thinning and irregularity of the cartilage without signal change in the underlying bone.  There is a moderate joint effusion.   Other Result Information   Je, Rad Results In - 06/16/2020 11:11 AM CDT  Indication:  Medial pain, right knee.    MRI, Right Knee without Gadolinium:  No marrow edema or sign of bony injury.  The cruciate ligaments are intact.  The lateral meniscus has a normal appearance.  The medial collateral ligament does not appear disrupted, but the upper portion is thickened   and has central fluid that would reflect some combination of bursitis and/or low-grade injury.  The medial compartment of the knee has cartilaginous thinning and early degenerative change and in the midportion of the medial meniscus there is a small   length of intrasubstance tear that extends through the superior articular surface near the annulus and the small portion of the superior articular surface is folded back.  There is a small flap.    The patella is normally seated and there is mild thinning and irregularity of the cartilage without signal change in the underlying bone.  There is a moderate joint effusion.    IMPRESSION:       1.  Low-grade injury and/or bursitis around the upper medial  collateral ligament.    2.  The midportion of the medial meniscus has an intrasubstance tear that extends to the superior articular surface with a small flap.    3.  Loss of cartilage in the medial compartment of the knee with early degenerative change.         ASSESSMENT:    Diagnoses and all orders for this visit:    Acute pain of right knee    Primary osteoarthritis of right knee    Effusion of right knee    Sprain of medial collateral ligament of right knee, subsequent encounter    Other tear of medial meniscus of right knee as current injury, subsequent encounter    PLAN    Patient complains of persistent right knee pain that started acutely approximately 6 weeks ago with no known injury.  Patient has tried and failed multiple conservative treatment efforts.  She continues to have right knee pain and mechanical symptoms that are affecting her daily activities and quality of life.  Patient wants to proceed with surgical treatment in an effort to improve her right knee pain.    The patient voiced understanding of the risks, benefits, and alternative forms of treatment that were discussed and the patient consents to proceed with surgery.  All risks, benefits and alternatives were discussed.  Risks including but not exclusive to anesthetic complications, including death, MI, CVA, infection, bleeding DVT, fracture, residual pain and need for future surgery.    This discussion was held with the patient by Brooks Junior MD and all questions were answered.    Patient's Body mass index is 40.42 kg/m². BMI is above normal parameters. Recommendations include: exercise counseling and nutrition counseling.    Return for follow up postoperatively.        This document has been electronically signed by MELISAS Ochoa on June 30, 2020 21:59      MELISSA Ochoa

## 2020-07-09 NOTE — PAT
Patient relates that she was instructed on Covid date, time and location (Friday 7-10-20).  ERAS instructions given verbally over the phone 20 ounces gatorade to be completed 3 hours prior to surgery start time, pt will confirm with SDS when they call with time to arrive and surgery time.

## 2020-07-10 PROCEDURE — C9803 HOPD COVID-19 SPEC COLLECT: HCPCS | Performed by: ORTHOPAEDIC SURGERY

## 2020-07-10 PROCEDURE — U0003 INFECTIOUS AGENT DETECTION BY NUCLEIC ACID (DNA OR RNA); SEVERE ACUTE RESPIRATORY SYNDROME CORONAVIRUS 2 (SARS-COV-2) (CORONAVIRUS DISEASE [COVID-19]), AMPLIFIED PROBE TECHNIQUE, MAKING USE OF HIGH THROUGHPUT TECHNOLOGIES AS DESCRIBED BY CMS-2020-01-R: HCPCS | Performed by: ORTHOPAEDIC SURGERY

## 2020-07-11 ENCOUNTER — ANESTHESIA EVENT (OUTPATIENT)
Dept: PERIOP | Facility: HOSPITAL | Age: 55
End: 2020-07-11

## 2020-07-12 LAB
COVID LABCORP PRIORITY: NORMAL
SARS-COV-2 RNA RESP QL NAA+PROBE: NOT DETECTED

## 2020-07-13 ENCOUNTER — ANESTHESIA (OUTPATIENT)
Dept: PERIOP | Facility: HOSPITAL | Age: 55
End: 2020-07-13

## 2020-07-13 ENCOUNTER — HOSPITAL ENCOUNTER (OUTPATIENT)
Facility: HOSPITAL | Age: 55
Setting detail: HOSPITAL OUTPATIENT SURGERY
Discharge: HOME | End: 2020-07-13
Attending: ORTHOPAEDIC SURGERY | Admitting: ORTHOPAEDIC SURGERY

## 2020-07-13 VITALS
SYSTOLIC BLOOD PRESSURE: 134 MMHG | DIASTOLIC BLOOD PRESSURE: 72 MMHG | HEIGHT: 60 IN | OXYGEN SATURATION: 97 % | HEART RATE: 74 BPM | RESPIRATION RATE: 18 BRPM | BODY MASS INDEX: 43.2 KG/M2 | WEIGHT: 220.02 LBS | TEMPERATURE: 97.2 F

## 2020-07-13 DIAGNOSIS — E66.01 MORBID OBESITY WITH BMI OF 40.0-44.9, ADULT (HCC): ICD-10-CM

## 2020-07-13 DIAGNOSIS — Z98.890 HISTORY OF BRAIN SURGERY: ICD-10-CM

## 2020-07-13 DIAGNOSIS — I10 ESSENTIAL HYPERTENSION: ICD-10-CM

## 2020-07-13 DIAGNOSIS — G47.33 OSA (OBSTRUCTIVE SLEEP APNEA): ICD-10-CM

## 2020-07-13 DIAGNOSIS — S83.241D OTHER TEAR OF MEDIAL MENISCUS OF RIGHT KNEE AS CURRENT INJURY, SUBSEQUENT ENCOUNTER: ICD-10-CM

## 2020-07-13 DIAGNOSIS — M25.561 ACUTE PAIN OF RIGHT KNEE: ICD-10-CM

## 2020-07-13 DIAGNOSIS — M25.461 EFFUSION OF RIGHT KNEE: ICD-10-CM

## 2020-07-13 LAB
ANION GAP SERPL CALCULATED.3IONS-SCNC: 13 MMOL/L (ref 5–15)
BUN SERPL-MCNC: 30 MG/DL (ref 6–20)
BUN/CREAT SERPL: 26.5 (ref 7–25)
CALCIUM SPEC-SCNC: 9.5 MG/DL (ref 8.6–10.5)
CHLORIDE SERPL-SCNC: 100 MMOL/L (ref 98–107)
CO2 SERPL-SCNC: 24 MMOL/L (ref 22–29)
CREAT SERPL-MCNC: 1.13 MG/DL (ref 0.57–1)
GFR SERPL CREATININE-BSD FRML MDRD: 51 ML/MIN/1.73
GLUCOSE SERPL-MCNC: 97 MG/DL (ref 65–99)
POTASSIUM SERPL-SCNC: 3.8 MMOL/L (ref 3.5–5.2)
SODIUM SERPL-SCNC: 137 MMOL/L (ref 136–145)

## 2020-07-13 PROCEDURE — 29881 ARTHRS KNE SRG MNISECTMY M/L: CPT | Performed by: ORTHOPAEDIC SURGERY

## 2020-07-13 PROCEDURE — 25010000002 FENTANYL CITRATE (PF) 100 MCG/2ML SOLUTION: Performed by: NURSE ANESTHETIST, CERTIFIED REGISTERED

## 2020-07-13 PROCEDURE — 25010000002 HYDROMORPHONE 1 MG/ML SOLUTION: Performed by: NURSE ANESTHETIST, CERTIFIED REGISTERED

## 2020-07-13 PROCEDURE — 93010 ELECTROCARDIOGRAM REPORT: CPT | Performed by: INTERNAL MEDICINE

## 2020-07-13 PROCEDURE — 80048 BASIC METABOLIC PNL TOTAL CA: CPT | Performed by: ANESTHESIOLOGY

## 2020-07-13 PROCEDURE — 25010000002 MIDAZOLAM PER 1 MG: Performed by: NURSE ANESTHETIST, CERTIFIED REGISTERED

## 2020-07-13 PROCEDURE — 25010000002 PROPOFOL 10 MG/ML EMULSION: Performed by: NURSE ANESTHETIST, CERTIFIED REGISTERED

## 2020-07-13 PROCEDURE — 25010000002 ONDANSETRON PER 1 MG: Performed by: NURSE ANESTHETIST, CERTIFIED REGISTERED

## 2020-07-13 PROCEDURE — 93005 ELECTROCARDIOGRAM TRACING: CPT | Performed by: ANESTHESIOLOGY

## 2020-07-13 PROCEDURE — 25010000002 DEXAMETHASONE PER 1 MG: Performed by: NURSE ANESTHETIST, CERTIFIED REGISTERED

## 2020-07-13 PROCEDURE — 25010000002 SUCCINYLCHOLINE PER 20 MG: Performed by: NURSE ANESTHETIST, CERTIFIED REGISTERED

## 2020-07-13 RX ORDER — PROMETHAZINE HYDROCHLORIDE 25 MG/1
25 TABLET ORAL ONCE AS NEEDED
Status: DISCONTINUED | OUTPATIENT
Start: 2020-07-13 | End: 2020-07-13 | Stop reason: HOSPADM

## 2020-07-13 RX ORDER — SODIUM CHLORIDE, SODIUM GLUCONATE, SODIUM ACETATE, POTASSIUM CHLORIDE, AND MAGNESIUM CHLORIDE 526; 502; 368; 37; 30 MG/100ML; MG/100ML; MG/100ML; MG/100ML; MG/100ML
1000 INJECTION, SOLUTION INTRAVENOUS CONTINUOUS
Status: DISCONTINUED | OUTPATIENT
Start: 2020-07-13 | End: 2020-07-13 | Stop reason: HOSPADM

## 2020-07-13 RX ORDER — ROCURONIUM BROMIDE 10 MG/ML
INJECTION, SOLUTION INTRAVENOUS AS NEEDED
Status: DISCONTINUED | OUTPATIENT
Start: 2020-07-13 | End: 2020-07-13 | Stop reason: SURG

## 2020-07-13 RX ORDER — OXYCODONE AND ACETAMINOPHEN 7.5; 325 MG/1; MG/1
1 TABLET ORAL EVERY 6 HOURS PRN
Qty: 30 TABLET | Refills: 0 | Status: SHIPPED | OUTPATIENT
Start: 2020-07-13 | End: 2020-12-04

## 2020-07-13 RX ORDER — LIDOCAINE HYDROCHLORIDE 20 MG/ML
INJECTION, SOLUTION INFILTRATION; PERINEURAL AS NEEDED
Status: DISCONTINUED | OUTPATIENT
Start: 2020-07-13 | End: 2020-07-13 | Stop reason: SURG

## 2020-07-13 RX ORDER — BUPIVACAINE HYDROCHLORIDE AND EPINEPHRINE 2.5; 5 MG/ML; UG/ML
INJECTION, SOLUTION EPIDURAL; INFILTRATION; INTRACAUDAL; PERINEURAL AS NEEDED
Status: DISCONTINUED | OUTPATIENT
Start: 2020-07-13 | End: 2020-07-13 | Stop reason: HOSPADM

## 2020-07-13 RX ORDER — MIDAZOLAM HYDROCHLORIDE 1 MG/ML
INJECTION INTRAMUSCULAR; INTRAVENOUS AS NEEDED
Status: DISCONTINUED | OUTPATIENT
Start: 2020-07-13 | End: 2020-07-13 | Stop reason: SURG

## 2020-07-13 RX ORDER — SCOLOPAMINE TRANSDERMAL SYSTEM 1 MG/1
1 PATCH, EXTENDED RELEASE TRANSDERMAL ONCE
Status: DISCONTINUED | OUTPATIENT
Start: 2020-07-13 | End: 2020-07-13 | Stop reason: HOSPADM

## 2020-07-13 RX ORDER — FENTANYL CITRATE 50 UG/ML
INJECTION, SOLUTION INTRAMUSCULAR; INTRAVENOUS AS NEEDED
Status: DISCONTINUED | OUTPATIENT
Start: 2020-07-13 | End: 2020-07-13 | Stop reason: SURG

## 2020-07-13 RX ORDER — PROPOFOL 10 MG/ML
VIAL (ML) INTRAVENOUS AS NEEDED
Status: DISCONTINUED | OUTPATIENT
Start: 2020-07-13 | End: 2020-07-13 | Stop reason: SURG

## 2020-07-13 RX ORDER — SODIUM CHLORIDE, SODIUM LACTATE, POTASSIUM CHLORIDE, AND CALCIUM CHLORIDE .6; .31; .03; .02 G/100ML; G/100ML; G/100ML; G/100ML
IRRIGANT IRRIGATION AS NEEDED
Status: DISCONTINUED | OUTPATIENT
Start: 2020-07-13 | End: 2020-07-13 | Stop reason: HOSPADM

## 2020-07-13 RX ORDER — DEXAMETHASONE SODIUM PHOSPHATE 4 MG/ML
INJECTION, SOLUTION INTRA-ARTICULAR; INTRALESIONAL; INTRAMUSCULAR; INTRAVENOUS; SOFT TISSUE AS NEEDED
Status: DISCONTINUED | OUTPATIENT
Start: 2020-07-13 | End: 2020-07-13 | Stop reason: SURG

## 2020-07-13 RX ORDER — MEPERIDINE HYDROCHLORIDE 25 MG/ML
12.5 INJECTION INTRAMUSCULAR; INTRAVENOUS; SUBCUTANEOUS
Status: DISCONTINUED | OUTPATIENT
Start: 2020-07-13 | End: 2020-07-13 | Stop reason: HOSPADM

## 2020-07-13 RX ORDER — ONDANSETRON 2 MG/ML
4 INJECTION INTRAMUSCULAR; INTRAVENOUS ONCE AS NEEDED
Status: DISCONTINUED | OUTPATIENT
Start: 2020-07-13 | End: 2020-07-13 | Stop reason: HOSPADM

## 2020-07-13 RX ORDER — SUCCINYLCHOLINE CHLORIDE 20 MG/ML
INJECTION INTRAMUSCULAR; INTRAVENOUS AS NEEDED
Status: DISCONTINUED | OUTPATIENT
Start: 2020-07-13 | End: 2020-07-13 | Stop reason: SURG

## 2020-07-13 RX ORDER — PROMETHAZINE HYDROCHLORIDE 25 MG/ML
12.5 INJECTION, SOLUTION INTRAMUSCULAR; INTRAVENOUS ONCE AS NEEDED
Status: DISCONTINUED | OUTPATIENT
Start: 2020-07-13 | End: 2020-07-13 | Stop reason: HOSPADM

## 2020-07-13 RX ORDER — PROMETHAZINE HYDROCHLORIDE 25 MG/1
25 SUPPOSITORY RECTAL ONCE AS NEEDED
Status: DISCONTINUED | OUTPATIENT
Start: 2020-07-13 | End: 2020-07-13 | Stop reason: HOSPADM

## 2020-07-13 RX ORDER — BUPIVACAINE HCL/0.9 % NACL/PF 0.1 %
2 PLASTIC BAG, INJECTION (ML) EPIDURAL ONCE
Status: COMPLETED | OUTPATIENT
Start: 2020-07-13 | End: 2020-07-13

## 2020-07-13 RX ORDER — ONDANSETRON 2 MG/ML
INJECTION INTRAMUSCULAR; INTRAVENOUS AS NEEDED
Status: DISCONTINUED | OUTPATIENT
Start: 2020-07-13 | End: 2020-07-13 | Stop reason: SURG

## 2020-07-13 RX ADMIN — SUCCINYLCHOLINE CHLORIDE 100 MG: 20 INJECTION, SOLUTION INTRAMUSCULAR; INTRAVENOUS at 07:08

## 2020-07-13 RX ADMIN — FENTANYL CITRATE 50 MCG: 50 INJECTION, SOLUTION INTRAMUSCULAR; INTRAVENOUS at 07:48

## 2020-07-13 RX ADMIN — ONDANSETRON 4 MG: 2 INJECTION INTRAMUSCULAR; INTRAVENOUS at 07:48

## 2020-07-13 RX ADMIN — Medication 2 G: at 07:26

## 2020-07-13 RX ADMIN — DEXAMETHASONE SODIUM PHOSPHATE 10 MG: 4 INJECTION, SOLUTION INTRAMUSCULAR; INTRAVENOUS at 07:19

## 2020-07-13 RX ADMIN — FENTANYL CITRATE 50 MCG: 50 INJECTION, SOLUTION INTRAMUSCULAR; INTRAVENOUS at 07:08

## 2020-07-13 RX ADMIN — PROPOFOL 130 MG: 10 INJECTION, EMULSION INTRAVENOUS at 07:08

## 2020-07-13 RX ADMIN — MIDAZOLAM HYDROCHLORIDE 2 MG: 2 INJECTION, SOLUTION INTRAMUSCULAR; INTRAVENOUS at 07:00

## 2020-07-13 RX ADMIN — ROCURONIUM BROMIDE 5 MG: 10 INJECTION INTRAVENOUS at 07:08

## 2020-07-13 RX ADMIN — LIDOCAINE HYDROCHLORIDE 100 MG: 20 INJECTION, SOLUTION INFILTRATION; PERINEURAL at 07:08

## 2020-07-13 RX ADMIN — HYDROMORPHONE HYDROCHLORIDE 0.5 MG: 1 INJECTION, SOLUTION INTRAMUSCULAR; INTRAVENOUS; SUBCUTANEOUS at 08:27

## 2020-07-13 RX ADMIN — SODIUM CHLORIDE, SODIUM GLUCONATE, SODIUM ACETATE, POTASSIUM CHLORIDE, AND MAGNESIUM CHLORIDE 1000 ML: 526; 502; 368; 37; 30 INJECTION, SOLUTION INTRAVENOUS at 06:15

## 2020-07-13 NOTE — ANESTHESIA PREPROCEDURE EVALUATION
Anesthesia Evaluation     Patient summary reviewed   history of anesthetic complications: PONV  NPO Solid Status: > 8 hours  NPO Liquid Status: > 2 hours           Airway   Mallampati: II  TM distance: >3 FB  Neck ROM: full  No difficulty expected  Dental - normal exam         Pulmonary - normal exam    breath sounds clear to auscultation  (+) asthma,sleep apnea,   (-) not a smoker  Cardiovascular - normal exam  Exercise tolerance: good (4-7 METS)    ECG reviewed  Rhythm: regular  Rate: normal    (+) hypertension well controlled 2 medications or greater,   (-) valvular problems/murmurs, past MI, dysrhythmias, angina, PVD, hyperlipidemia    ROS comment: Normal sinus rhythm  Normal ECG  No previous ECGs available  [facility]    Neuro/Psych  (+) seizures well controlled, psychiatric history Depression,     (-) headaches  GI/Hepatic/Renal/Endo    (+) morbid obesity,    (-) GERD, hepatitis, liver disease, no renal disease, diabetes    Musculoskeletal     (+) arthralgias,   Abdominal   (+) obese,    Substance History   (+) alcohol use (occ),   (-) drug use     OB/GYN negative ob/gyn ROS   (-)  Pregnant        Other   arthritis (shoulders and knees),      (-) history of cancer  ROS/Med Hx Other: Asthma-Uses albuterol inhaler a couple times a week in the fall. Very minimal use in the winter- well controlled  NICK - not using CPAP  Did not take lisinopril-HCTZ this AM  Only hx of seizures was before brain tumor-Hasn't had a seizure since the meningioma resection in 2011  Hysterectomy @ 26 years old        Anesthesia Evaluation     history of anesthetic complications: PONV  NPO Solid Status: > 8 hours  NPO Liquid Status: > 2 hours           Airway   Mallampati: II  TM distance: >3 FB  Neck ROM: full  No difficulty expected  Dental - normal exam         Pulmonary - normal exam    breath sounds clear to auscultation  (+) asthma, sleep apnea,   (-) not a smoker  Cardiovascular - normal exam  Exercise tolerance: poor  (<4 METS)    ECG reviewed  Rhythm: regular  Rate: normal    (+) hypertension well controlled 2 medications or greater,   (-) valvular problems/murmurs, past MI, dysrhythmias, angina, PVD, hyperlipidemia    ROS comment: Normal sinus rhythm  Normal ECG  No previous ECGs available  Confirmed by [facility]    Neuro/Psych  (+) seizures well controlled,     (-) headaches, psychiatric history  GI/Hepatic/Renal/Endo    (+) morbid obesity,    (-) GERD, hepatitis, liver disease, no renal disease, diabetes, hypothyroidism    Musculoskeletal     (+) arthralgias,   Abdominal   (+) obese,    Substance History   (+) alcohol use (occ),   (-) drug use     OB/GYN negative ob/gyn ROS   (-)  Pregnant        Other   (+) arthritis (shoulders and knees)     (-) history of cancer                  Anesthesia Plan    ASA 3     MAC, regional and spinal   (Adductor canal block discussed and patient agrees to proceed.  Will place scop patch in case have to go general)  intravenous induction   Anesthetic plan, all risks, benefits, and alternatives have been provided, discussed and informed consent has been obtained with: patient and spouse/significant other.               Anesthesia Plan    ASA 3     general   (Discussed discussed general anesthesia with patient and she understands possible complications, risks, & agrees.)  intravenous induction     Anesthetic plan, all risks, benefits, and alternatives have been provided, discussed and informed consent has been obtained with: patient.  Use of blood products discussed with patient  Consented to blood products.

## 2020-07-13 NOTE — OP NOTE
NAME: Priti Orr      YOB: 1965     DATE OF SURGERY: 7/13/2020     PREOPERATIVE DIAGNOSIS:  Acute pain of right knee [M25.561]  Effusion of right knee [M25.461]  Other tear of medial meniscus of right knee as current injury, subsequent encounter [S83.520D]  Morbid obesity with BMI of 40.0-44.9, adult (CMS/[facility]) [E66.01, Z68.41]  Essential hypertension [I10]  NICK (obstructive sleep apnea) [G47.33]  History of brain surgery [Z98.890]     POSTOPERATIVE DIAGNOSIS:  Post-Op Diagnosis Codes:     * Acute pain of right knee [M25.561]     * Effusion of right knee [M25.461]     * Other tear of medial meniscus of right knee as current injury, subsequent encounter [S83.351D]     * Morbid obesity with BMI of 40.0-44.9, adult (CMS/[facility]) [E66.01, Z68.41]     * Essential hypertension [I10]     * NICK (obstructive sleep apnea) [G47.33]     * History of brain surgery [Z98.890]     PROCEDURE PERFORMED:  Procedure(s) (LRB):  KNEE ARTHROSCOPY with debridement medial meniscus (Right)       SURGEON:  Brooks Junior MD     Assistant:  PUJA Conway     Staff:    Circulator: Wojciech Macdonald RN  Scrub Person: Carla Rees  Endo Technician: Rosibel Choudhury CST  Assistant: Laureen Marina CSA     Anesthesia:  General     Estimated Blood Loss: minimal     Specimens : None     Complications: none     DESCRIPTION OF PROCEDURE: The patient was taken to the operating room and placed in the supine position. After adequate induction of general anesthesia the leg was prepped and draped in the usual sterile fashion exsanguinated with an Ace bandage and the tourniquet inflated to 250 mmHg.  The leg was placed in a well-padded leg jiang. An anterior lateral portal site was then created and the scope was introduced.  The knee was examined in sequential fashion.       The scope was then introduced into the medial joint.  Medial joint findings included mild marginal osteophytes along the medial side of the  joint.  There was cartilage change along the majority of the medial femoral condyle and an obvious complex tear the posterior horn of the meniscus.    At this point the anteromedial portal was created with spinal needle for localization.  The shaver was utilized to exchange the fluid and to remove any loose fragments.  Probe was used identify the extent of the meniscal tear as well as the extent of the articular cartilage change.  For the most part, there were grade 2 and grade 3 cartilage changes along the majority of the medial femoral condyle.  No bare bone was identified.  The meniscus was debrided to a smooth stable base.  There was a large pedunculated fragment of the meniscus that was tucked behind the PCL and into the posterior aspect of the joint that was identified, produced, and debrided.  Once the meniscus was felt to be smooth and stable then the shaver was allowed to run on suction to remove any remaining loose fragments.    The ACL was then examined and noted to be normal.    The lateral joint was examined and found to be intact with no articular cartilage change and no meniscal tearing.  The patellofemoral joint was examined and grade 2 cartilage changes on the undersurface of the patella.  The patellofemoral groove showed changes along the anterior aspect of the medial condyle.    The knee was then expressed of excess fluid, the portal sites were closed.  Sterile dressings were then applied the patient was awakened and transferred to the recovery room.  At the end of the case the sponge and needle counts were reported as being correct and there were no known complications.        Findings: As above              Brooks Junior MD      Date: 7/13/2020  Time: 08:04

## 2020-07-13 NOTE — ANESTHESIA POSTPROCEDURE EVALUATION
Patient: Priti Orr    Procedure Summary     Date:  07/13/20 Room / Location:  [facility] OR  / [facility] OR    Anesthesia Start:  0703 Anesthesia Stop:  0808    Procedure:  KNEE ARTHROSCOPY with debridement medial meniscus (Right Knee) Diagnosis:       Acute pain of right knee      Effusion of right knee      Other tear of medial meniscus of right knee as current injury, subsequent encounter      Morbid obesity with BMI of 40.0-44.9, adult (CMS/[facility])      Essential hypertension      NICK (obstructive sleep apnea)      History of brain surgery      (Acute pain of right knee [M25.561])      (Effusion of right knee [M25.461])      (Other tear of medial meniscus of right knee as current injury, subsequent encounter [S83.241D])      (Morbid obesity with BMI of 40.0-44.9, adult (CMS/[facility]) [E66.01, Z68.41])      (Essential hypertension [I10])      (NICK (obstructive sleep apnea) [G47.33])      (History of brain surgery [Z98.890])    Surgeon:  Brooks Junior MD Provider:  Neelima Bryant DO    Anesthesia Type:  general ASA Status:  3          Anesthesia Type: general    Vitals  No vitals data found for the desired time range.          Post Anesthesia Care and Evaluation    Patient location during evaluation: PACU  Patient participation: complete - patient participated  Level of consciousness: awake and alert  Pain score: 0  Pain management: adequate  Airway patency: patent  Anesthetic complications: No anesthetic complications  PONV Status: none  Cardiovascular status: acceptable  Respiratory status: acceptable and face mask  Hydration status: acceptable

## 2020-07-13 NOTE — ANESTHESIA PROCEDURE NOTES
Airway  Date/Time: 7/13/2020 7:11 AM  Airway not difficult    General Information and Staff    Patient location during procedure: OR    Indications and Patient Condition  Indications for airway management: airway protection    Preoxygenated: yes  Mask difficulty assessment: 0 - not attempted    Final Airway Details  Final airway type: endotracheal airway      Successful airway: ETT    Successful intubation technique: direct laryngoscopy  Facilitating devices/methods: intubating stylet  Endotracheal tube insertion site: oral  Blade: Jonathan  Blade size: 3  ETT size (mm): 7.0  Cormack-Lehane Classification: grade I - full view of glottis  Placement verified by: chest auscultation and capnometry   Measured from: lips  ETT/EBT  to lips (cm): 19  Number of attempts at approach: 1  Assessment: lips, teeth, and gum same as pre-op and atraumatic intubation    Additional Comments  ETT inserted by Mayra OREILLY

## 2020-07-13 NOTE — PROGRESS NOTES
NAME: Priti Orr     YOB: 1965    DATE OF SURGERY: 7/13/2020    PREOPERATIVE DIAGNOSIS:  Acute pain of right knee [M25.561]  Effusion of right knee [M25.461]  Other tear of medial meniscus of right knee as current injury, subsequent encounter [S83.244D]  Morbid obesity with BMI of 40.0-44.9, adult (CMS/[facility]) [E66.01, Z68.41]  Essential hypertension [I10]  NICK (obstructive sleep apnea) [G47.33]  History of brain surgery [Z98.890]    POSTOPERATIVE DIAGNOSIS:  Post-Op Diagnosis Codes:     * Acute pain of right knee [M25.561]     * Effusion of right knee [M25.461]     * Other tear of medial meniscus of right knee as current injury, subsequent encounter [S83.836D]     * Morbid obesity with BMI of 40.0-44.9, adult (CMS/[facility]) [E66.01, Z68.41]     * Essential hypertension [I10]     * NICK (obstructive sleep apnea) [G47.33]     * History of brain surgery [Z98.890]    PROCEDURE PERFORMED:  Procedure(s) (LRB):  KNEE ARTHROSCOPY with debridement medial meniscus (Right)      SURGEON:  Brooks Junior MD    Assistant:  PUJA Conway    Staff:    Circulator: Wojciech Macdonald RN  Scrub Person: Carla Rees  Endo Technician: Rosibel Choudhury CST  Assistant: Laureen Marina CSA    Anesthesia:  General    Estimated Blood Loss: minimal    Specimens : None    Complications: none    DESCRIPTION OF PROCEDURE: The patient was taken to the operating room and placed in the supine position. After adequate induction of general anesthesia the leg was prepped and draped in the usual sterile fashion exsanguinated with an Ace bandage and the tourniquet inflated to 250 mmHg.  The leg was placed in a well-padded leg jiang. An anterior lateral portal site was then created and the scope was introduced.  The knee was examined in sequential fashion.       The scope was then introduced into the medial joint.  Medial joint findings included mild marginal osteophytes along the medial side of the joint.  There  was cartilage change along the majority of the medial femoral condyle and an obvious complex tear the posterior horn of the meniscus.    At this point the anteromedial portal was created with spinal needle for localization.  The shaver was utilized to exchange the fluid and to remove any loose fragments.  Probe was used identify the extent of the meniscal tear as well as the extent of the articular cartilage change.  For the most part, there were grade 2 and grade 3 cartilage changes along the majority of the medial femoral condyle.  No bare bone was identified.  The meniscus was debrided to a smooth stable base.  There was a large pedunculated fragment of the meniscus that was tucked behind the PCL and into the posterior aspect of the joint that was identified, produced, and debrided.  Once the meniscus was felt to be smooth and stable then the shaver was allowed to run on suction to remove any remaining loose fragments.    The ACL was then examined and noted to be normal.    The lateral joint was examined and found to be intact with no articular cartilage change and no meniscal tearing.  The patellofemoral joint was examined and grade 2 cartilage changes on the undersurface of the patella.  The patellofemoral groove showed changes along the anterior aspect of the medial condyle.    The knee was then expressed of excess fluid, the portal sites were closed.  Sterile dressings were then applied the patient was awakened and transferred to the recovery room.  At the end of the case the sponge and needle counts were reported as being correct and there were no known complications.      Findings: As above          Brooks Junior MD     Date: 7/13/2020  Time: 08:04

## 2020-07-13 NOTE — INTERVAL H&P NOTE
H&P reviewed. The patient was examined and there are no changes to the H&P.     Past Medical History:   Diagnosis Date   • Allergic rhinitis    • Anesthesia     nausea and vomiting with anesthesia requires pre medicaiton   • Anxiety    • Arthritis    • Asthma    • Benign neoplasm of meninges (CMS/HCC)     Post op 2011      • Cobalamin deficiency    • Corneal abrasion    • Depression    • Encounter for gynecological examination (general) (routine) without abnormal findings    • Essential hypertension    • Fatigue    • Female stress incontinence    • Head injury 2011   • History of delayed wound healing     Delayed healing of surgical wound      • Hyperlipidemia    • Low back pain    • Macular degeneration disease    • Nosebleed, symptom     Nosebleed/epistaxis symptom      • Osteoporosis    • Osteoporosis    • PONV (postoperative nausea and vomiting)    • Seizures (CMS/HCC)    • Sleep apnea     not wearing c-pap   • Wears glasses        Allergies   Allergen Reactions   • Pseudoephedrine Shortness Of Breath and Swelling     LIPS SWELL AND TONGUE BECOMES THICK   • Morphine Other (See Comments)     States felt like itching from within, hallucinations, agitation       No current facility-administered medications on file prior to encounter.      Current Outpatient Medications on File Prior to Encounter   Medication Sig   • albuterol sulfate HFA (VENTOLIN HFA) 108 (90 Base) MCG/ACT inhaler Inhale 2 puffs Every 4 (Four) Hours As Needed for Wheezing.   • buPROPion HCl (WELLBUTRIN PO) Take 1 tablet by mouth Every Evening.   • fluticasone (FLONASE) 50 MCG/ACT nasal spray 1 spray into the nostril(s) as directed by provider Daily.   • Glucos-Chondroit-Hyaluron-MSM (GLUCOSAMINE CHONDROITIN JOINT PO) Take 1 tablet by mouth Daily.   • lisinopril-hydrochlorothiazide (PRINZIDE,ZESTORETIC) 20-12.5 MG per tablet Take 1 tablet by mouth Daily.   • loratadine (Claritin) 10 MG tablet Take 10 mg by mouth Daily.   • celecoxib (CeleBREX) 200  MG capsule Take 1 capsule by mouth Daily.   • denosumab (PROLIA) 60 MG/ML solution prefilled syringe syringe Inject 60 mg under the skin into the appropriate area as directed.   • vitamin D (ERGOCALCIFEROL) 24905 units capsule capsule Take 1 capsule by mouth 1 (One) Time Per Week.       Past Surgical History:   Procedure Laterality Date   • ARM LESION/CYST EXCISION  2014    Remove arm bone lesion (1)   • ARM SKIN LESION BIOPSY / EXCISION     • ARM WOUND REPAIR / CLOSURE  2014    Repair Superficial Wound TR-EXT 2.5 < CM 15678 (1)      • BRAIN SURGERY     • BREAST LUMPECTOMY     •  SECTION  1986     Section (1)     • ENDOSCOPY  2008    Colon endoscopy 88256 (1)     • EXCISION BREAST LESION W/ PREOP NEEDLE LOC  1981    Excision, breast lesion (1)     • HEMORRHOIDECTOMY     • HEMORRHOIDECTOMY  2008    Hemorrhoidectomy (2)      • HYSTERECTOMY     • INJECTION OF MEDICATION  2013    Celestone (betamethasone) (1)      • INJECTION OF MEDICATION  2013    Depo Medrol (Methylprednisone) (1)      • INJECTION OF MEDICATION  2013    Dexamethasone (2)      • INJECTION OF MEDICATION  2014    Kenalog (6)   • KNEE ARTHROSCOPY  2007    Knee arthroscopy, surgery (1)     • LAPAROSCOPIC TUBAL LIGATION  1991    Tubal ligation (1)      • PAP SMEAR  2007    PAP SMEAR (1)      • SHOULDER SURGERY Left     x 4   • TOTAL ABDOMINAL HYSTERECTOMY WITH SALPINGO OOPHORECTOMY      JOSE/BSO (1)      • TOTAL KNEE ARTHROPLASTY Left 2018    Procedure: TOTAL KNEE ARTHROPLASTY ATTUNE with adductor canal block - left;  Surgeon: Brooks Junior MD;  Location: [facility];  Service: Orthopedics       Family History   Problem Relation Age of Onset   • Alzheimer's disease Other    • Breast cancer Other         other   • Cancer Other         other - GYN   • Colon cancer Other         Colorectal Cancer   • Depression Other    • Diabetes Other    •  Hyperlipidemia Other    • Hypertension Other    • Stroke Other    • Deep vein thrombosis Other    • Heart disease Other    • Ovarian cancer Other    • Colon cancer Other    • Hypertension Mother    • Hypertension Father        Social History     Socioeconomic History   • Marital status:      Spouse name: Not on file   • Number of children: Not on file   • Years of education: Not on file   • Highest education level: Not on file   Tobacco Use   • Smoking status: Never Smoker   • Smokeless tobacco: Never Used   Substance and Sexual Activity   • Alcohol use: Never     Frequency: Never   • Drug use: No   • Sexual activity: Defer       Vitals:    07/13/20 0601   BP: 129/70   Pulse: 90   Resp: 20   Temp: 97.1 °F (36.2 °C)   SpO2: 96%     Plan arthroscopy right knee with debridement of medial meniscus      07/13/20 at 6:37 AM by Brooks Junior MD

## 2020-07-15 ENCOUNTER — HOSPITAL ENCOUNTER (OUTPATIENT)
Dept: PHYSICAL THERAPY | Facility: HOSPITAL | Age: 55
Setting detail: THERAPIES SERIES
Discharge: HOME | End: 2020-07-15

## 2020-07-15 DIAGNOSIS — S83.241D OTHER TEAR OF MEDIAL MENISCUS OF RIGHT KNEE AS CURRENT INJURY, SUBSEQUENT ENCOUNTER: Primary | ICD-10-CM

## 2020-07-15 DIAGNOSIS — M25.561 ACUTE PAIN OF RIGHT KNEE: ICD-10-CM

## 2020-07-15 PROCEDURE — 97162 PT EVAL MOD COMPLEX 30 MIN: CPT | Performed by: PHYSICAL THERAPIST

## 2020-07-15 NOTE — THERAPY EVALUATION
Outpatient Physical Therapy Ortho Initial Evaluation  [facility]     Patient Name: Priti Orr  : 1965  MRN: [id number]  Today's Date: 7/15/2020      Visit Date: 07/15/2020  Visit   Return to MD: ALLISON  Re-certification date: 2020    Patient Active Problem List   Diagnosis   • Osteoporosis   • Chronic left shoulder pain   • Presence of left artificial shoulder joint   • Left knee pain   • Anxiety   • Arthritis   • Asthma   • Depression   • Enchondroma of humerus   • Headache, chronic migraine without aura, intractable   • History of brain surgery   • Humeral fracture   • Hypercholesteremia   • Hyperlipidemia   • Essential hypertension   • Meningioma (CMS/[facility])   • Morbid obesity (CMS/[facility])   • NICK (obstructive sleep apnea)   • Vitamin D deficiency   • Seizure-like activity (CMS/[facility])   • S/p reverse total shoulder arthroplasty   • Primary osteoarthritis of left knee   • Morbid obesity with BMI of 40.0-44.9, adult (CMS/[facility])   • Status post left knee replacement   • Postmenopausal osteoporosis   • Effusion of right knee   • Primary osteoarthritis of right knee   • Acute pain of right knee   • Tear of medial meniscus of right knee, current   • Sprain of medial collateral ligament of right knee        Past Medical History:   Diagnosis Date   • Allergic rhinitis    • Anesthesia     nausea and vomiting with anesthesia requires pre medicaiton   • Anxiety    • Arthritis    • Asthma    • Benign neoplasm of meninges (CMS/HCC)     Post op       • Cobalamin deficiency    • Corneal abrasion    • Depression    • Encounter for gynecological examination (general) (routine) without abnormal findings    • Essential hypertension    • Fatigue    • Female stress incontinence    • Head injury    • History of delayed wound healing     Delayed healing of surgical wound      • Hyperlipidemia    • Low back pain    • Macular degeneration disease    • Nosebleed, symptom     Nosebleed/epistaxis symptom      •  Osteoporosis    • Osteoporosis    • PONV (postoperative nausea and vomiting)    • Seizures (CMS/HCC)    • Sleep apnea     not wearing c-pap   • Wears glasses         Past Surgical History:   Procedure Laterality Date   • ARM LESION/CYST EXCISION  2014    Remove arm bone lesion (1)   • ARM SKIN LESION BIOPSY / EXCISION     • ARM WOUND REPAIR / CLOSURE  2014    Repair Superficial Wound TR-EXT 2.5 < CM 83995 (1)      • BRAIN SURGERY     • BREAST LUMPECTOMY     •  SECTION  1986     Section (1)     • ENDOSCOPY  2008    Colon endoscopy 55795 (1)     • EXCISION BREAST LESION W/ PREOP NEEDLE LOC  1981    Excision, breast lesion (1)     • HEMORRHOIDECTOMY     • HEMORRHOIDECTOMY  2008    Hemorrhoidectomy (2)      • HYSTERECTOMY     • INJECTION OF MEDICATION  2013    Celestone (betamethasone) (1)      • INJECTION OF MEDICATION  2013    Depo Medrol (Methylprednisone) (1)      • INJECTION OF MEDICATION  2013    Dexamethasone (2)      • INJECTION OF MEDICATION  2014    Kenalog (6)   • KNEE ARTHROSCOPY  2007    Knee arthroscopy, surgery (1)     • KNEE ARTHROSCOPY Right 2020    Procedure: KNEE ARTHROSCOPY with debridement medial meniscus;  Surgeon: Brooks Junior MD;  Location: Health system;  Service: Orthopedics;  Laterality: Right;   • LAPAROSCOPIC TUBAL LIGATION  1991    Tubal ligation (1)      • PAP SMEAR  2007    PAP SMEAR (1)      • SHOULDER SURGERY Left     x 4   • TOTAL ABDOMINAL HYSTERECTOMY WITH SALPINGO OOPHORECTOMY      JOSE/BSO (1)      • TOTAL KNEE ARTHROPLASTY Left 2018    Procedure: TOTAL KNEE ARTHROPLASTY ATTUNE with adductor canal block - left;  Surgeon: Brooks Junior MD;  Location: Health system;  Service: Orthopedics       Visit Dx:     ICD-10-CM ICD-9-CM   1. Other tear of medial meniscus of right knee as current injury, subsequent encounter S83.241D V58.89     836.0   2. Acute pain of  right knee M25.561 719.46     Medications (Admitted on 7/15/2020)     albuterol sulfate HFA (VENTOLIN HFA) 108 (90 Base) MCG/ACT inhaler     buPROPion HCl (WELLBUTRIN PO)     celecoxib (CeleBREX) 200 MG capsule     denosumab (PROLIA) 60 MG/ML solution prefilled syringe syringe     fluticasone (FLONASE) 50 MCG/ACT nasal spray     Glucos-Chondroit-Hyaluron-MSM (GLUCOSAMINE CHONDROITIN JOINT PO)     lisinopril-hydrochlorothiazide (PRINZIDE,ZESTORETIC) 20-12.5 MG per tablet     loratadine (Claritin) 10 MG tablet     oxyCODONE-acetaminophen (PERCOCET) 7.5-325 MG per tablet     vitamin D (ERGOCALCIFEROL) 87883 units capsule capsule        Allergies       PseudoephedrineShortness Of Breath, Swelling   MorphineOther (See Comments)           PT Ortho     Row Name 07/15/20 0930       Subjective Comments    Subjective Comments  55 yo female with acute R knee pain s/p arthroscopy for debridement of medial R meniscus tear on 7/13/20. Had a fall 6 months ago and did have R knee pain at the time. However, she had increased R knee pain riding the treadmill starting back in March.   -BS       Precautions and Contraindications    Precautions  h/o L reverse TSA, L TKA  -BS       Subjective Pain    Able to rate subjective pain?  yes  -BS    Pre-Treatment Pain Level  4  -BS    Post-Treatment Pain Level  4  -BS       General ROM    GENERAL ROM COMMENTS  AROM: R knee 7-110 L knee 0-113   -BS       MMT (Manual Muscle Testing)    General MMT Comments  MMT: R LE-hip flex 5/5 knee ext 4/5 knee flex 5/5 ankle DF 5/5 L LE-5/5  -BS       Sensation    Sensation WNL?  WNL  -BS    Light Touch  No apparent deficits  -BS       Gait/Stairs Assessment/Training    Comment (Gait/Stairs)  gait training w/out AD with mildly antalgic gait pattern with R LE  -BS      User Key  (r) = Recorded By, (t) = Taken By, (c) = Cosigned By    Initials Name Provider Type    Steven Suresh, PT Physical Therapist                      Therapy Education  Education  Details: see flow sheet  Given: HEP  Program: New  How Provided: Demonstration, Verbal, Written  Provided to: Patient  Level of Understanding: Teach back education performed, Demonstrated, Verbalized     PT OP Goals     Row Name 07/15/20 0930          PT Short Term Goals    STG Date to Achieve  08/05/20  -BS     STG 1  Pt indep with HEP for R knee MMT/ROM  -BS     STG 1 Progress  New  -BS     STG 2  Improve R knee AROM to 0-125 degrees  -BS     STG 2 Progress  New  -BS     STG 3  Improve R knee ext (quads) MMT to 5/5  -BS     STG 3 Progress  New  -BS     STG 4  Able to ambulate with 0-1/10 R knee pain  -BS     STG 4 Progress  New  -BS     STG 5  Improve R hip abduction MMT to 4/5  -BS     STG 5 Progress  New  -BS        Time Calculation    PT Goal Re-Cert Due Date  08/05/20  -BS       User Key  (r) = Recorded By, (t) = Taken By, (c) = Cosigned By    Initials Name Provider Type    BS Steven Easton, PT Physical Therapist          PT Assessment/Plan     Row Name 07/15/20 0930          PT Assessment    Functional Limitations  Impaired gait;Performance in work activities;Performance in leisure activities  -BS     Impairments  Edema;Endurance;Gait;Range of motion;Pain;Muscle strength  -BS     Assessment Comments  Acute R knee pain s/p arthroscopy for debridement of medial meniscus tear on 7/13/2020.  -BS     Please refer to paper survey for additional self-reported information  Yes  -BS     Rehab Potential  Good  -BS     Patient/caregiver participated in establishment of treatment plan and goals  Yes  -BS     Patient would benefit from skilled therapy intervention  Yes  -BS        PT Plan    PT Frequency  2x/week  -BS     Predicted Duration of Therapy Intervention (Therapy Eval)  2-3 weeks  -BS     Planned CPT's?  PT EVAL MOD COMPLELITY: 99050;PT RE-EVAL: 59801;PT THER PROC EA 15 MIN: 31147;PT MANUAL THERAPY EA 15 MIN: 15586;PT NEUROMUSC RE-EDUCATION EA 15 MIN: 38825;PT GAIT TRAINING EA 15 MIN: 82550;PT ELECTRICAL STIM  "UNATTEND: ;PT HOT/COLD PACK  NONMCARE: 11076;PT THER SUPP EA 15 MIN  -BS     Physical Therapy Interventions (Optional Details)  balance training;gait training;home exercise program;joint mobilization;manual therapy techniques;modalities;neuromuscular re-education;patient/family education;strengthening;stretching  -BS     PT Plan Comments  address quad/hip abd strength, advance knee flex/ext ROM as able.   -BS       User Key  (r) = Recorded By, (t) = Taken By, (c) = Cosigned By    Initials Name Provider Type    Steven Suresh, PT Physical Therapist            OP Exercises     Row Name 07/15/20 0944             Subjective Comments    Subjective Comments  55 yo female with acute R knee pain s/p arthroscopy for debridement of medial R meniscus tear on 7/13/20. Had a fall 6 months ago and did have R knee pain at the time. However, she had increased R knee pain riding the treadmill starting back in March.   -BS         Subjective Pain    Able to rate subjective pain?  yes  -BS      Pre-Treatment Pain Level  4  -BS      Post-Treatment Pain Level  4  -BS         Exercise 1    Exercise Name 1  R heel slides w/ strap  -BS      Sets 1  1  -BS      Reps 1  15  -BS         Exercise 2    Exercise Name 2  QS w/ towel roll under knee  -BS      Sets 2  1  -BS      Reps 2  15  -BS      Time 2  5\" hold  -BS         Exercise 3    Exercise Name 3  R SLR  -BS      Sets 3  1  -BS      Reps 3  15  -BS      Additional Comments  5\" hold  -BS         Exercise 4    Exercise Name 4  bridges w/ iso B hip add  -BS      Sets 4  1  -BS      Reps 4  15  -BS         Exercise 5    Exercise Name 5  R LAQ's  -BS      Sets 5  1  -BS      Reps 5  15  -BS         Exercise 6    Exercise Name 6  sit to stand   -BS      Sets 6  1  -BS      Reps 6  10  -BS         Exercise 7    Exercise Name 7  fwd lunge S  -BS      Sets 7  1  -BS      Reps 7  2  -BS      Time 7  30\" hold  -BS         Exercise 8    Exercise Name 8  standing HS S  -BS      Sets 8  1  " "-BS      Reps 8  1  -BS      Time 8  30\" hold  -BS         Exercise 9    Exercise Name 9  incline board S  -BS      Sets 9  1  -BS      Reps 9  2  -BS      Time 9  30\" HOLD  -BS        User Key  (r) = Recorded By, (t) = Taken By, (c) = Cosigned By    Initials Name Provider Type    Steven Suresh, PT Physical Therapist                        Outcome Measure Options: Lower Extremity Functional Scale (LEFS)  Lower Extremity Functional Index  Any of your usual work, housework or school activities: Quite a bit of difficulty  Your usual hobbies, recreational or sporting activities: Quite a bit of difficulty  Getting into or out of the bath: Extreme difficulty or unable to perform activity  Walking between rooms: A little bit of difficulty  Putting on your shoes or socks: No difficulty  Squatting: Extreme difficulty or unable to perform activity  Lifting an object, like a bag of groceries from the floor: A little bit of difficulty  Performing light activities around your home: A little bit of difficulty  Performing heavy activities around your home: Extreme difficulty or unable to perform activity  Getting into or out of a car: A little bit of difficulty  Walking 2 blocks: Extreme difficulty or unable to perform activity  Walking a mile: Extreme difficulty or unable to perform activity  Going up or down 10 stairs (about 1 flight of stairs): Extreme difficulty or unable to perform activity  Standing for 1 hour: Extreme difficulty or unable to perform activity  Sitting for 1 hour: No difficulty  Running on even ground: Extreme difficulty or unable to perform activity  Running on uneven ground: Extreme difficulty or unable to perform activity  Making sharp turns while running fast: Extreme difficulty or unable to perform activity  Hopping: Extreme difficulty or unable to perform activity  Rolling over in bed: Moderate difficulty  Total: 24      Time Calculation:     Start Time: 0930  Stop Time: 1012  Time Calculation " (min): 42 min  Total Timed Code Minutes- PT: 42 minute(s)     Therapy Charges for Today     Code Description Service Date Service Provider Modifiers Qty    29473770830 HC PT EVAL MOD COMPLEXITY 3 7/15/2020 Steven Easton, PT GP 1          PT G-Codes  Outcome Measure Options: Lower Extremity Functional Scale (LEFS)  Total: 24         Steven Easton, PT  7/15/2020

## 2020-07-20 ENCOUNTER — HOSPITAL ENCOUNTER (OUTPATIENT)
Dept: PHYSICAL THERAPY | Facility: HOSPITAL | Age: 55
Setting detail: THERAPIES SERIES
Discharge: HOME | End: 2020-07-20

## 2020-07-20 DIAGNOSIS — S83.241D OTHER TEAR OF MEDIAL MENISCUS OF RIGHT KNEE AS CURRENT INJURY, SUBSEQUENT ENCOUNTER: Primary | ICD-10-CM

## 2020-07-20 DIAGNOSIS — M25.561 ACUTE PAIN OF RIGHT KNEE: ICD-10-CM

## 2020-07-20 PROCEDURE — 97110 THERAPEUTIC EXERCISES: CPT

## 2020-07-20 NOTE — THERAPY TREATMENT NOTE
Outpatient Physical Therapy Ortho Treatment Note  [facility]     Patient Name: Priti Orr  : 1965  MRN: [id number]  Today's Date: 2020      Visit Date: 2020     Subjective Improvement better  Visits 2/2  RTMD 2020  Recert Date 2020    S/P Right Meniscus arthroscopy on 2020    Visit Dx:    ICD-10-CM ICD-9-CM   1. Other tear of medial meniscus of right knee as current injury, subsequent encounter S83.241D V58.89     836.0   2. Acute pain of right knee M25.561 719.46       Patient Active Problem List   Diagnosis   • Osteoporosis   • Chronic left shoulder pain   • Presence of left artificial shoulder joint   • Left knee pain   • Anxiety   • Arthritis   • Asthma   • Depression   • Enchondroma of humerus   • Headache, chronic migraine without aura, intractable   • History of brain surgery   • Humeral fracture   • Hypercholesteremia   • Hyperlipidemia   • Essential hypertension   • Meningioma (CMS/[facility])   • Morbid obesity (CMS/[facility])   • NICK (obstructive sleep apnea)   • Vitamin D deficiency   • Seizure-like activity (CMS/[facility])   • S/p reverse total shoulder arthroplasty   • Primary osteoarthritis of left knee   • Morbid obesity with BMI of 40.0-44.9, adult (CMS/[facility])   • Status post left knee replacement   • Postmenopausal osteoporosis   • Effusion of right knee   • Primary osteoarthritis of right knee   • Acute pain of right knee   • Tear of medial meniscus of right knee, current   • Sprain of medial collateral ligament of right knee        Past Medical History:   Diagnosis Date   • Allergic rhinitis    • Anesthesia     nausea and vomiting with anesthesia requires pre medicaiton   • Anxiety    • Arthritis    • Asthma    • Benign neoplasm of meninges (CMS/HCC)     Post op       • Cobalamin deficiency    • Corneal abrasion    • Depression    • Encounter for gynecological examination (general) (routine) without abnormal findings    • Essential hypertension    • Fatigue     • Female stress incontinence    • Head injury    • History of delayed wound healing     Delayed healing of surgical wound      • Hyperlipidemia    • Low back pain    • Macular degeneration disease    • Nosebleed, symptom     Nosebleed/epistaxis symptom      • Osteoporosis    • Osteoporosis    • PONV (postoperative nausea and vomiting)    • Seizures (CMS/HCC)    • Sleep apnea     not wearing c-pap   • Wears glasses         Past Surgical History:   Procedure Laterality Date   • ARM LESION/CYST EXCISION  2014    Remove arm bone lesion (1)   • ARM SKIN LESION BIOPSY / EXCISION     • ARM WOUND REPAIR / CLOSURE  2014    Repair Superficial Wound TR-EXT 2.5 < CM 96579 (1)      • BRAIN SURGERY     • BREAST LUMPECTOMY     •  SECTION  1986     Section (1)     • ENDOSCOPY  2008    Colon endoscopy 46087 (1)     • EXCISION BREAST LESION W/ PREOP NEEDLE LOC  1981    Excision, breast lesion (1)     • HEMORRHOIDECTOMY     • HEMORRHOIDECTOMY  2008    Hemorrhoidectomy (2)      • HYSTERECTOMY     • INJECTION OF MEDICATION  2013    Celestone (betamethasone) (1)      • INJECTION OF MEDICATION  2013    Depo Medrol (Methylprednisone) (1)      • INJECTION OF MEDICATION  2013    Dexamethasone (2)      • INJECTION OF MEDICATION  2014    Kenalog (6)   • KNEE ARTHROSCOPY  2007    Knee arthroscopy, surgery (1)     • KNEE ARTHROSCOPY Right 2020    Procedure: KNEE ARTHROSCOPY with debridement medial meniscus;  Surgeon: Brooks Junior MD;  Location: [facility];  Service: Orthopedics;  Laterality: Right;   • LAPAROSCOPIC TUBAL LIGATION  1991    Tubal ligation (1)      • PAP SMEAR  2007    PAP SMEAR (1)      • SHOULDER SURGERY Left     x 4   • TOTAL ABDOMINAL HYSTERECTOMY WITH SALPINGO OOPHORECTOMY      JOSE/BSO (1)      • TOTAL KNEE ARTHROPLASTY Left 2018    Procedure: TOTAL KNEE ARTHROPLASTY ATTUNE with adductor canal block  - left;  Surgeon: Brooks Junior MD;  Location: [facility];  Service: Orthopedics       PT Ortho     Row Name 07/20/20 0900       Precautions and Contraindications    Precautions  L RTS and L TKA  -CP       General ROM    RT Lower Ext  Rt Knee Extension/Flexion  -CP       Right Lower Ext    Rt Knee Extension/Flexion AROM  0-125  -CP       MMT (Manual Muscle Testing)    General MMT Comments  No quad lag with SLR  -CP      User Key  (r) = Recorded By, (t) = Taken By, (c) = Cosigned By    Initials Name Provider Type    CP Khloe Tamayo, JOESPH Physical Therapy Assistant                      PT Assessment/Plan     Row Name 07/20/20 0945          PT Assessment    Assessment Comments  slight edema noted.  right knee AROM WNL.  no quad lag with SLR but quad shake at 20 SLR  -CP        PT Plan    PT Frequency  2x/week  -CP     Predicted Duration of Therapy Intervention (Therapy Eval)  2-3  weeks  -CP     PT Plan Comments  Cont withPOC.  MD note next visit  -CP       User Key  (r) = Recorded By, (t) = Taken By, (c) = Cosigned By    Initials Name Provider Type    Khloe Lazo, PTA Physical Therapy Assistant            OP Exercises     Row Name 07/20/20 0900             Subjective Comments    Subjective Comments  Patient rerports no pain in right knee pain   patient states that she didnt think that she needed therapy.  She believes that Friday will be her ast day  -CP         Subjective Pain    Able to rate subjective pain?  yes  -CP      Pre-Treatment Pain Level  0  -CP      Post-Treatment Pain Level  0  -CP         Exercise 1    Exercise Name 1  Pro II level 2  -CP      Time 1  10  -CP         Exercise 2    Exercise Name 2  incline stretch  -CP      Cueing 2  Verbal;Demo  -CP      Sets 2  3  -CP      Time 2  30  -CP         Exercise 3    Exercise Name 3  Standing HS stretch  -CP      Cueing 3  Verbal;Demo  -CP      Sets 3  3  -CP      Time 3  30  -CP         Exercise 4    Exercise Name 4  CR/TR  -CP       "Cueing 4  Verbal;Demo  -CP      Sets 4  2  -CP      Reps 4  10  -CP         Exercise 5    Exercise Name 5  side stepping at table  -CP      Cueing 5  Verbal;Demo  -CP      Reps 5  5  -CP         Exercise 6    Exercise Name 6  standing Knee Fl  -CP      Cueing 6  Verbal;Demo  -CP      Sets 6  2  -CP      Reps 6  10  -CP         Exercise 7    Exercise Name 7  SAQ  -CP      Cueing 7  Verbal;Demo  -CP      Sets 7  2  -CP      Reps 7  10  -CP      Time 7  5\"  -CP         Exercise 8    Exercise Name 8  heelsldies with towel  -CP      Cueing 8  Verbal;Tactile  -CP      Reps 8  20  -CP         Exercise 9    Exercise Name 9  SLR  -CP      Cueing 9  Verbal;Tactile  -CP      Sets 9  3  -CP      Reps 9  10  -CP      Time 9  3\"  -CP        User Key  (r) = Recorded By, (t) = Taken By, (c) = Cosigned By    Initials Name Provider Type    CP Khloe Tamayo, PTA Physical Therapy Assistant                       PT OP Goals     Row Name 07/20/20 0900          PT Short Term Goals    STG Date to Achieve  08/05/20  -CP     STG 1  Pt indep with HEP for R knee MMT/ROM  -CP     STG 1 Progress  Ongoing  -CP     STG 2  Improve R knee AROM to 0-125 degrees  -CP     STG 2 Progress  Met  -CP     STG 3  Improve R knee ext (quads) MMT to 5/5  -CP     STG 3 Progress  Progressing  -CP     STG 4  Able to ambulate with 0-1/10 R knee pain  -CP     STG 4 Progress  Met  -CP     STG 5  Improve R hip abduction MMT to 4/5  -CP     STG 5 Progress  Progressing  -CP        Time Calculation    PT Goal Re-Cert Due Date  08/05/20  -CP       User Key  (r) = Recorded By, (t) = Taken By, (c) = Cosigned By    Initials Name Provider Type    CP Khloe Tamayo, PTA Physical Therapy Assistant          Therapy Education  Education Details: CR/Tr, side stepping, standing knee fl, SAQ  Given: HEP  Program: New  How Provided: Verbal, Demonstration  Provided to: Patient  Level of Understanding: Teach back education performed, Verbalized, Demonstrated              Time " Calculation:   Start Time: 0845  Stop Time: 0928  Time Calculation (min): 43 min  Total Timed Code Minutes- PT: 43 minute(s)  Therapy Charges for Today     Code Description Service Date Service Provider Modifiers Qty    36412512806 HC PT THER PROC EA 15 MIN 7/20/2020 Khloe Tamayo, PTA GP 3                    Khloe Tamayo PTA  7/20/2020

## 2020-07-22 ENCOUNTER — APPOINTMENT (OUTPATIENT)
Dept: PHYSICAL THERAPY | Facility: HOSPITAL | Age: 55
End: 2020-07-22

## 2020-07-24 ENCOUNTER — OFFICE VISIT (OUTPATIENT)
Dept: ORTHOPEDIC SURGERY | Facility: CLINIC | Age: 55
End: 2020-07-24

## 2020-07-24 DIAGNOSIS — Z98.890 STATUS POST ARTHROSCOPIC SURGERY OF RIGHT KNEE: Primary | ICD-10-CM

## 2020-07-24 DIAGNOSIS — M17.11 PRIMARY OSTEOARTHRITIS OF RIGHT KNEE: ICD-10-CM

## 2020-07-24 DIAGNOSIS — S83.241D OTHER TEAR OF MEDIAL MENISCUS OF RIGHT KNEE AS CURRENT INJURY, SUBSEQUENT ENCOUNTER: ICD-10-CM

## 2020-07-24 PROCEDURE — 99024 POSTOP FOLLOW-UP VISIT: CPT | Performed by: NURSE PRACTITIONER

## 2020-07-24 NOTE — PROGRESS NOTES
Knee Scope follow Up 1st Visit      Patient: Priti Orr    YOB: 1965    Chief Complaint   Patient presents with   • Right Knee - Post-op     Date of surgery:    07/13/20 (11d) Brooks Junior MD     KNEE ARTHROSCOPY with debridement medial meniscus - Right     History of Present Illness: Patient presents to office for postoperative follow-up status post right knee arthroscopy with debridement of medial meniscus performed per Dr. Junior on 7/13/2020.  Patient is doing well postoperatively with no unusual complaints or concerns noted.  Patient reports that her right knee pain is significantly improved since surgery.  Patient has attended physical therapy as ordered, but states that she has requested to be released to her home exercises that she previously learned in physical therapy. Right knee swelling has been well controlled since surgery.  She has continued to utilize ice therapy.  Patient has previously removed her sutures on her own.  Patient is ambulatory in office today with a slightly antalgic gait and no assistive device.    Allergies:   Allergies   Allergen Reactions   • Pseudoephedrine Shortness Of Breath and Swelling     LIPS SWELL AND TONGUE BECOMES THICK   • Morphine Other (See Comments)     States felt like itching from within, hallucinations, agitation       Medications:   Current Outpatient Medications   Medication Sig Dispense Refill   • albuterol sulfate HFA (VENTOLIN HFA) 108 (90 Base) MCG/ACT inhaler Inhale 2 puffs Every 4 (Four) Hours As Needed for Wheezing. 1 inhaler 0   • buPROPion HCl (WELLBUTRIN PO) Take 1 tablet by mouth Every Evening.     • celecoxib (CeleBREX) 200 MG capsule Take 1 capsule by mouth Daily. 90 capsule 3   • denosumab (PROLIA) 60 MG/ML solution prefilled syringe syringe Inject 60 mg under the skin into the appropriate area as directed.     • fluticasone (FLONASE) 50 MCG/ACT nasal spray 1 spray into the nostril(s) as directed by provider Daily.      • Glucos-Chondroit-Hyaluron-MSM (GLUCOSAMINE CHONDROITIN JOINT PO) Take 1 tablet by mouth Daily.     • lisinopril-hydrochlorothiazide (PRINZIDE,ZESTORETIC) 20-12.5 MG per tablet Take 1 tablet by mouth Daily.     • loratadine (Claritin) 10 MG tablet Take 10 mg by mouth Daily.     • oxyCODONE-acetaminophen (PERCOCET) 7.5-325 MG per tablet Take 1 tablet by mouth Every 6 (Six) Hours As Needed for Moderate Pain. 30 tablet 0   • vitamin D (ERGOCALCIFEROL) 88237 units capsule capsule Take 1 capsule by mouth 1 (One) Time Per Week. 5 capsule 11     No current facility-administered medications for this visit.      Physical Exam: 54 y.o. female  General Appearance:    Alert, cooperative, in no acute distress. There were no vitals filed for this visit.     Patient is alert and oriented ×3. No acute distress. Normal mood and affect.    Physical Exam of the Right Knee: Surgical incisions are well approximated and nearly fully healed.  No erythema.  No drainage.  No signs of infection noted.  Mild/minimal generalized swelling noted of the knee joint.  Good range of motion noted.  Extension equals 0 degrees.  Flexion equals 125 degrees.  Calf is soft and nontender.  Homans sign is negative.    Diagnoses and all orders for this visit:    Status post arthroscopic surgery of right knee    Other tear of medial meniscus of right knee as current injury, subsequent encounter    Primary osteoarthritis of right knee    Plan: Patient is doing well postoperatively and progressing as expected.  She reports her right knee pain is significantly improved following surgery.  She is no longer having any mechanical pain/symptoms.  Right knee swelling is well controlled.  Recommend to continue with physical therapy or a home exercise program for range of motion and conditioning/strengthening exercises of the right knee/leg.  Patient has excellent range of motion today on physical exam.  Surgical incisions appear well-healed.  The patient reports  that she took her own sutures out.  No signs of infection are noted.  Continue with gradual progression of activity as tolerated.  Patient is full weightbearing today in office with a slightly antalgic gait and no assistive device.  Continue with elevation and ice therapy as needed to minimize pain/swelling/inflammation.  Continue Celebrex daily for consistent dosing of oral NSAIDs.  Patient can also take Tylenol as needed for additional pain control.  Follow-up in 4 weeks for recheck.       This document has been electronically signed by MELISSA Ochoa on July 26, 2020 21:57    07/26/20 at 9:46 AM by MELISSA Ochoa

## 2020-07-27 ENCOUNTER — APPOINTMENT (OUTPATIENT)
Dept: PHYSICAL THERAPY | Facility: HOSPITAL | Age: 55
End: 2020-07-27

## 2020-07-29 ENCOUNTER — APPOINTMENT (OUTPATIENT)
Dept: PHYSICAL THERAPY | Facility: HOSPITAL | Age: 55
End: 2020-07-29

## 2020-08-03 ENCOUNTER — APPOINTMENT (OUTPATIENT)
Dept: PHYSICAL THERAPY | Facility: HOSPITAL | Age: 55
End: 2020-08-03

## 2020-08-05 ENCOUNTER — APPOINTMENT (OUTPATIENT)
Dept: PHYSICAL THERAPY | Facility: HOSPITAL | Age: 55
End: 2020-08-05

## 2020-08-19 ENCOUNTER — TELEPHONE (OUTPATIENT)
Dept: ORTHOPEDIC SURGERY | Facility: CLINIC | Age: 55
End: 2020-08-19

## 2020-08-19 NOTE — TELEPHONE ENCOUNTER
----- Message from Natacha Spencer sent at 8/19/2020  1:36 PM CDT -----  If her primary is denying , [facility] will not touch it. Thanks       ----- Message -----  From: Radha Amaro MA  Sent: 8/19/2020   1:24 PM CDT  To: Natacha Spencer    Hey can you check on this for me. The patient primary insurance is not going to pay for it but she as [facility] secondary.   Thanks.   ----- Message -----  From: Nalini Travis  Sent: 8/19/2020  12:03 PM CDT  To: CHARLY Ordonez.  PATIENT IS CHECKING ON THE PROLIA INJECTION.  SHE HAD NOT CONTACTED YET FOR AN APPOINTMENT.  [phone]

## 2020-08-24 RX ORDER — CELECOXIB 200 MG/1
CAPSULE ORAL
Qty: 90 CAPSULE | Refills: 2 | Status: SHIPPED | OUTPATIENT
Start: 2020-08-24 | End: 2020-12-04

## 2020-11-11 DIAGNOSIS — M25.512 ACUTE PAIN OF LEFT SHOULDER: Primary | ICD-10-CM

## 2020-11-12 ENCOUNTER — OFFICE VISIT (OUTPATIENT)
Dept: ORTHOPEDIC SURGERY | Facility: CLINIC | Age: 55
End: 2020-11-12

## 2020-11-12 VITALS — WEIGHT: 222 LBS | HEIGHT: 60 IN | BODY MASS INDEX: 43.59 KG/M2

## 2020-11-12 DIAGNOSIS — Z96.612 PRESENCE OF LEFT ARTIFICIAL SHOULDER JOINT: ICD-10-CM

## 2020-11-12 DIAGNOSIS — Z96.612 STATUS POST REVERSE TOTAL REPLACEMENT OF LEFT SHOULDER: ICD-10-CM

## 2020-11-12 DIAGNOSIS — M25.512 ACUTE PAIN OF LEFT SHOULDER: Primary | ICD-10-CM

## 2020-11-12 PROCEDURE — 99214 OFFICE O/P EST MOD 30 MIN: CPT | Performed by: NURSE PRACTITIONER

## 2020-11-12 RX ORDER — HYDROCODONE BITARTRATE AND ACETAMINOPHEN 5; 325 MG/1; MG/1
1 TABLET ORAL EVERY 4 HOURS PRN
Qty: 30 TABLET | Refills: 0 | Status: SHIPPED | OUTPATIENT
Start: 2020-11-12 | End: 2020-11-22

## 2020-11-12 NOTE — PROGRESS NOTES
Priti Orr is a 55 y.o. female   Primary provider:  Radha Gregorio MD       Chief Complaint   Patient presents with   • Left Shoulder - Shoulder Pain       HISTORY OF PRESENT ILLNESS: Patient presents to office for follow-up of left shoulder pain.  Patient has an extensive history involving her left shoulder with multiple prior surgical procedures (see below).  She has a left shoulder prosthesis in place following previous reverse total shoulder arthroplasty.  Patient reports onset of left shoulder pain about 5 days ago with no known injury.  Patient denies any new activities or anything she associates with the onset of her pain.  She has pain with use of the left arm/shoulder but also states that it is painful at rest with it just hanging to her side.  Pain is described as constant and severe.  Pain is described as stabbing and grinding in nature with associated popping sensations.  No joint swelling reported.  No numbness or tingling.  No fever or chills.  No redness or warmth reported.  Pain is worse with driving and movement/use of the left arm.  Patient has tried rest without improvement.  She already takes Celebrex daily as a regular medication, which is also not helping.  Pain scale today is 7/10.    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 Keegan Castrejon at [facility]  -Underwent left proximal humeral resection and endoprosthetic reconstruction in December 2015 per Dr. Keegan Castrejon  -Underwent revision of left endoprosthesis to reverse total shoulder on 8/23/2016 per Dr. Glenn Sy at [facility]    Arm Pain   The incident occurred 5 to 7 days ago. There was no injury mechanism. The pain is present in the left shoulder. The quality of the pain is described as  stabbing (grinding). The pain does not radiate. The pain is severe. The pain has been intermittent since the incident. Pertinent negatives include no numbness or tingling. Exacerbated by: driving.  She has tried rest and NSAIDs for the symptoms. The treatment provided mild relief.     CONCURRENT MEDICAL HISTORY:    Past Medical History:   Diagnosis Date   • Allergic rhinitis    • Anesthesia     nausea and vomiting with anesthesia requires pre medicaiton   • Anxiety    • Arthritis    • Asthma    • Benign neoplasm of meninges (CMS/HCC)     Post op 2011      • Cobalamin deficiency    • Corneal abrasion    • Depression    • Encounter for gynecological examination (general) (routine) without abnormal findings    • Essential hypertension    • Fatigue    • Female stress incontinence    • Head injury 2011   • History of delayed wound healing     Delayed healing of surgical wound      • Hyperlipidemia    • Low back pain    • Macular degeneration disease    • Nosebleed, symptom     Nosebleed/epistaxis symptom      • Osteoporosis    • Osteoporosis    • PONV (postoperative nausea and vomiting)    • Seizures (CMS/HCC)    • Sleep apnea     not wearing c-pap   • Wears glasses        Allergies   Allergen Reactions   • Pseudoephedrine Shortness Of Breath and Swelling     LIPS SWELL AND TONGUE BECOMES THICK   • Morphine Other (See Comments)     States felt like itching from within, hallucinations, agitation         Current Outpatient Medications:   •  albuterol sulfate HFA (VENTOLIN HFA) 108 (90 Base) MCG/ACT inhaler, Inhale 2 puffs Every 4 (Four) Hours As Needed for Wheezing., Disp: 1 inhaler, Rfl: 0  •  buPROPion HCl (WELLBUTRIN PO), Take 1 tablet by mouth Every Evening., Disp: , Rfl:   •  celecoxib (CeleBREX) 200 MG capsule, TAKE ONE CAPSULE BY MOUTH DAILY, Disp: 90 capsule, Rfl: 2  •  denosumab (PROLIA) 60 MG/ML solution prefilled syringe syringe, Inject 60 mg under the skin into the appropriate area as directed., Disp: , Rfl:    •  fluticasone (FLONASE) 50 MCG/ACT nasal spray, 1 spray into the nostril(s) as directed by provider Daily., Disp: , Rfl:   •  Glucos-Chondroit-Hyaluron-MSM (GLUCOSAMINE CHONDROITIN JOINT PO), Take 1 tablet by mouth Daily., Disp: , Rfl:   •  lisinopril-hydrochlorothiazide (PRINZIDE,ZESTORETIC) 20-12.5 MG per tablet, Take 1 tablet by mouth Daily., Disp: , Rfl:   •  loratadine (Claritin) 10 MG tablet, Take 10 mg by mouth Daily., Disp: , Rfl:   •  oxyCODONE-acetaminophen (PERCOCET) 7.5-325 MG per tablet, Take 1 tablet by mouth Every 6 (Six) Hours As Needed for Moderate Pain., Disp: 30 tablet, Rfl: 0  •  vitamin D (ERGOCALCIFEROL) 22703 units capsule capsule, Take 1 capsule by mouth 1 (One) Time Per Week., Disp: 5 capsule, Rfl: 11  •  HYDROcodone-acetaminophen (NORCO) 5-325 MG per tablet, Take 1 tablet by mouth Every 4 (Four) Hours As Needed for Moderate Pain  or Severe Pain  for up to 10 days., Disp: 30 tablet, Rfl: 0    Past Surgical History:   Procedure Laterality Date   • ARM LESION/CYST EXCISION  2014    Remove arm bone lesion (1)   • ARM SKIN LESION BIOPSY / EXCISION     • ARM WOUND REPAIR / CLOSURE  2014    Repair Superficial Wound TR-EXT 2.5 < CM 28703 (1)      • BRAIN SURGERY     • BREAST LUMPECTOMY     •  SECTION  1986     Section (1)     • ENDOSCOPY  2008    Colon endoscopy 94609 (1)     • EXCISION BREAST LESION W/ PREOP NEEDLE LOC  1981    Excision, breast lesion (1)     • HEMORRHOIDECTOMY     • HEMORRHOIDECTOMY  2008    Hemorrhoidectomy (2)      • HYSTERECTOMY     • INJECTION OF MEDICATION  2013    Celestone (betamethasone) (1)      • INJECTION OF MEDICATION  2013    Depo Medrol (Methylprednisone) (1)      • INJECTION OF MEDICATION  2013    Dexamethasone (2)      • INJECTION OF MEDICATION  2014    Kenalog (6)   • KNEE ARTHROSCOPY  2007    Knee arthroscopy, surgery (1)     • KNEE ARTHROSCOPY Right 2020    Procedure:  KNEE ARTHROSCOPY with debridement medial meniscus;  Surgeon: Brooks Junior MD;  Location: [facility];  Service: Orthopedics;  Laterality: Right;   • LAPAROSCOPIC TUBAL LIGATION  04/16/1991    Tubal ligation (1)      • PAP SMEAR  01/18/2007    PAP SMEAR (1)      • SHOULDER SURGERY Left     x 4   • TOTAL ABDOMINAL HYSTERECTOMY WITH SALPINGO OOPHORECTOMY  1993    JOSE/BSO (1)      • TOTAL KNEE ARTHROPLASTY Left 11/19/2018    Procedure: TOTAL KNEE ARTHROPLASTY ATTUNE with adductor canal block - left;  Surgeon: Brooks Junior MD;  Location: [facility];  Service: Orthopedics       Family History   Problem Relation Age of Onset   • Alzheimer's disease Other    • Breast cancer Other         other   • Cancer Other         other - GYN   • Colon cancer Other         Colorectal Cancer   • Depression Other    • Diabetes Other    • Hyperlipidemia Other    • Hypertension Other    • Stroke Other    • Deep vein thrombosis Other    • Heart disease Other    • Ovarian cancer Other    • Colon cancer Other    • Hypertension Mother    • Hypertension Father         Social History     Socioeconomic History   • Marital status:      Spouse name: Not on file   • Number of children: Not on file   • Years of education: Not on file   • Highest education level: Not on file   Tobacco Use   • Smoking status: Never Smoker   • Smokeless tobacco: Never Used   Substance and Sexual Activity   • Alcohol use: Never     Frequency: Never   • Drug use: No   • Sexual activity: Defer        Review of Systems   Constitutional: Positive for activity change.   HENT: Negative.    Eyes: Negative.    Respiratory: Negative.    Cardiovascular: Negative.    Gastrointestinal: Negative.    Endocrine: Negative.    Genitourinary: Negative.    Musculoskeletal: Positive for arthralgias and myalgias.        Left shoulder pain.    Skin: Negative.    Allergic/Immunologic: Negative.    Neurological: Negative.  Negative for tingling and numbness.  "  Hematological: Negative.    Psychiatric/Behavioral: Positive for sleep disturbance.       PHYSICAL EXAMINATION:       Ht 152.4 cm (60\")   Wt 101 kg (222 lb)   LMP 02/21/1994 (Within Months)   BMI 43.36 kg/m²     Physical Exam  Vitals signs reviewed.   Constitutional:       General: She is not in acute distress.     Appearance: She is well-developed. She is obese. She is not ill-appearing.   HENT:      Head: Normocephalic.   Pulmonary:      Effort: Pulmonary effort is normal. No respiratory distress.   Abdominal:      General: There is no distension.      Palpations: Abdomen is soft.   Musculoskeletal:         General: Tenderness (Left shoulder) present. No swelling, deformity or signs of injury.   Skin:     General: Skin is warm and dry.      Capillary Refill: Capillary refill takes less than 2 seconds.      Findings: No erythema.   Neurological:      Mental Status: She is alert and oriented to person, place, and time.      GCS: GCS eye subscore is 4. GCS verbal subscore is 5. GCS motor subscore is 6.   Psychiatric:         Speech: Speech normal.         Behavior: Behavior normal.         Thought Content: Thought content normal.         Judgment: Judgment normal.         GAIT:     [x]  Normal  []  Antalgic    Assistive device: [x]  None  []  Walker     []  Crutches  []  Cane     []  Wheelchair  []  Stretcher    Right Shoulder Exam     Tenderness   The patient is experiencing no tenderness.    Range of Motion   The patient has normal right shoulder ROM.    Muscle Strength   The patient has normal right shoulder strength.    Other   Erythema: absent  Sensation: normal  Pulse: present      Left Shoulder Exam     Tenderness   Left shoulder tenderness location: Anterior shoulder at the joint line.    Range of Motion   Active abduction: 90   Forward flexion: 110     Muscle Strength   Left shoulder normal muscle strength: deferred.    Other   Erythema: absent  Scars: present (well-healed surgical scars)  Sensation: " normal  Pulse: present     Comments:  Pain and limitations with range of motion.  No swelling appreciated.  No deformity.  No ecchymosis.  No erythema.  No warmth.  No signs of infection noted.  Neurovascularly intact.            Xr Shoulder 2+ View Left    Result Date: 11/12/2020  Narrative: External rotation, internal rotation and scapular Y views of the left shoulder reveal postsurgical changes post reverse total shoulder arthroplasty, including an extended prosthesis for proximal humeral replacement.  Prostheses appear well-positioned and well-aligned with no evidence of hardware failure or loosening noted.  No bony abnormalities are noted.  Surrounding soft tissues appear unremarkable.  No acute interval changes are noted when compared with prior images from 8/3/2017.11/12/20 at 17:04 CST by MELISSA Ochoa     ASSESSMENT:    Diagnoses and all orders for this visit:    Acute pain of left shoulder  -     CBC & Differential; Future  -     C-reactive Protein; Future  -     Sedimentation Rate; Future  -     HYDROcodone-acetaminophen (NORCO) 5-325 MG per tablet; Take 1 tablet by mouth Every 4 (Four) Hours As Needed for Moderate Pain  or Severe Pain  for up to 10 days.  -     NM Bone Scan 3 Phase; Future    Status post reverse total replacement of left shoulder  -     CBC & Differential; Future  -     C-reactive Protein; Future  -     Sedimentation Rate; Future  -     HYDROcodone-acetaminophen (NORCO) 5-325 MG per tablet; Take 1 tablet by mouth Every 4 (Four) Hours As Needed for Moderate Pain  or Severe Pain  for up to 10 days.  -     NM Bone Scan 3 Phase; Future    Presence of left artificial shoulder joint  -     CBC & Differential; Future  -     C-reactive Protein; Future  -     Sedimentation Rate; Future  -     HYDROcodone-acetaminophen (NORCO) 5-325 MG per tablet; Take 1 tablet by mouth Every 4 (Four) Hours As Needed for Moderate Pain  or Severe Pain  for up to 10 days.  -     NM Bone Scan 3 Phase;  Future    PLAN    X-rays of the left shoulder performed in office today reviewed with no acute findings noted.  The left shoulder implant appears stable with no obvious evidence of hardware failure or loosening.  The patient has experienced acute and severe left shoulder pain over the past 5 days with no known injury or incident.  She is very concerned about this that she has had extensive issues with her left shoulder and underwent for prior surgical procedures (see HPI).  She has pain with motion and use of the left shoulder as well as at rest.  Recommend use of a sling to left arm for immobilization and rest of the left arm/shoulder.  This is placed/fitted in office today.  I have consulted with Dr. Junior today regarding the patient's complaints with recommendations for watchful waiting and rest of the left arm.  He had also recommended possible labs to check for inflammatory markers and consideration of a bone scan if she is not improving.  The patient is very anxious and tearful regarding her current pain and wants to proceed with this work-up instead of waiting a couple of weeks.  Labs are ordered today for evaluation of inflammatory markers.  A bone scan is ordered to evaluate for possible underlying infection and possible loosening of the shoulder prostheses.  We discussed use of the sling for rest but she can remove her arm and perform some gentle range of motion exercises or pendulum exercises intermittently throughout the day as tolerated.  Recommend ice therapy to the left shoulder as needed to minimize pain/inflammation.  We also discussed use of heat therapy as needed to minimize pain/muscle tension.  Patient already takes Celebrex daily as a regular medication.  Norco is prescribed to take as needed for moderate to severe pain as she reports her pain is severe.  Patient is instructed to take pain medication sparingly and only when needed.  She is instructed to take the least amount of pain medication  needed to control her pain.  She is cautioned that opioids can be addictive.  We also discussed other potential adverse side effects of opioids including constipation, drowsiness, dizziness, increased risk for falls and/or respiratory depression.  Recommend Tylenol as needed for milder pain.  Follow-up in 2 weeks for recheck and to discuss lab results and bone scan results.    This patient has tried and failed a course of multiple treatment modalities which include rest/activity modification and anti-inflammatory medications.  She reports her pain is severe.  Therefore, I will recommend a short course of narcotic pain medication for this patient in this acute period until their pain has been sufficiently reduced to a level that I deem acceptable to be treated with alternative treatment options. DANNI reviewed internally via Epic and is appropriate.     Return in about 2 weeks (around 11/26/2020) for Recheck.        This document has been electronically signed by MELISSA Ochoa on November 15, 2020 17:57 CST    MELISSA Ochoa

## 2020-11-13 ENCOUNTER — LAB (OUTPATIENT)
Dept: LAB | Facility: HOSPITAL | Age: 55
End: 2020-11-13

## 2020-11-13 DIAGNOSIS — M25.512 ACUTE PAIN OF LEFT SHOULDER: ICD-10-CM

## 2020-11-13 DIAGNOSIS — Z96.612 PRESENCE OF LEFT ARTIFICIAL SHOULDER JOINT: ICD-10-CM

## 2020-11-13 DIAGNOSIS — Z96.612 STATUS POST REVERSE TOTAL REPLACEMENT OF LEFT SHOULDER: ICD-10-CM

## 2020-11-13 LAB
BASOPHILS # BLD AUTO: 0.03 10*3/MM3 (ref 0–0.2)
BASOPHILS NFR BLD AUTO: 0.5 % (ref 0–1.5)
CRP SERPL-MCNC: 0.72 MG/DL (ref 0–0.5)
DEPRECATED RDW RBC AUTO: 42.1 FL (ref 37–54)
EOSINOPHIL # BLD AUTO: 0.09 10*3/MM3 (ref 0–0.4)
EOSINOPHIL NFR BLD AUTO: 1.5 % (ref 0.3–6.2)
ERYTHROCYTE [DISTWIDTH] IN BLOOD BY AUTOMATED COUNT: 13.9 % (ref 12.3–15.4)
ERYTHROCYTE [SEDIMENTATION RATE] IN BLOOD: 15 MM/HR (ref 0–30)
HCT VFR BLD AUTO: 39.4 % (ref 34–46.6)
HGB BLD-MCNC: 13.4 G/DL (ref 12–15.9)
IMM GRANULOCYTES # BLD AUTO: 0.01 10*3/MM3 (ref 0–0.05)
IMM GRANULOCYTES NFR BLD AUTO: 0.2 % (ref 0–0.5)
LYMPHOCYTES # BLD AUTO: 1.57 10*3/MM3 (ref 0.7–3.1)
LYMPHOCYTES NFR BLD AUTO: 26.2 % (ref 19.6–45.3)
MCH RBC QN AUTO: 28.9 PG (ref 26.6–33)
MCHC RBC AUTO-ENTMCNC: 34 G/DL (ref 31.5–35.7)
MCV RBC AUTO: 84.9 FL (ref 79–97)
MONOCYTES # BLD AUTO: 0.42 10*3/MM3 (ref 0.1–0.9)
MONOCYTES NFR BLD AUTO: 7 % (ref 5–12)
NEUTROPHILS NFR BLD AUTO: 3.88 10*3/MM3 (ref 1.7–7)
NEUTROPHILS NFR BLD AUTO: 64.6 % (ref 42.7–76)
NRBC BLD AUTO-RTO: 0 /100 WBC (ref 0–0.2)
PLATELET # BLD AUTO: 365 10*3/MM3 (ref 140–450)
PMV BLD AUTO: 10.3 FL (ref 6–12)
RBC # BLD AUTO: 4.64 10*6/MM3 (ref 3.77–5.28)
WBC # BLD AUTO: 6 10*3/MM3 (ref 3.4–10.8)

## 2020-11-13 PROCEDURE — 86140 C-REACTIVE PROTEIN: CPT

## 2020-11-13 PROCEDURE — 85652 RBC SED RATE AUTOMATED: CPT

## 2020-11-13 PROCEDURE — 85025 COMPLETE CBC W/AUTO DIFF WBC: CPT

## 2020-11-13 PROCEDURE — 36415 COLL VENOUS BLD VENIPUNCTURE: CPT

## 2020-12-04 ENCOUNTER — HOSPITAL ENCOUNTER (OUTPATIENT)
Dept: NUCLEAR MEDICINE | Facility: HOSPITAL | Age: 55
Discharge: HOME | End: 2020-12-04

## 2020-12-04 DIAGNOSIS — M25.512 ACUTE PAIN OF LEFT SHOULDER: ICD-10-CM

## 2020-12-04 DIAGNOSIS — Z96.612 STATUS POST REVERSE TOTAL REPLACEMENT OF LEFT SHOULDER: ICD-10-CM

## 2020-12-04 DIAGNOSIS — Z96.612 PRESENCE OF LEFT ARTIFICIAL SHOULDER JOINT: ICD-10-CM

## 2020-12-04 PROCEDURE — A9503 TC99M MEDRONATE: HCPCS | Performed by: NURSE PRACTITIONER

## 2020-12-04 PROCEDURE — 0 TECHNETIUM MEDRONATE KIT: Performed by: NURSE PRACTITIONER

## 2020-12-04 PROCEDURE — U0003 INFECTIOUS AGENT DETECTION BY NUCLEIC ACID (DNA OR RNA); SEVERE ACUTE RESPIRATORY SYNDROME CORONAVIRUS 2 (SARS-COV-2) (CORONAVIRUS DISEASE [COVID-19]), AMPLIFIED PROBE TECHNIQUE, MAKING USE OF HIGH THROUGHPUT TECHNOLOGIES AS DESCRIBED BY CMS-2020-01-R: HCPCS | Performed by: EMERGENCY MEDICINE

## 2020-12-04 PROCEDURE — 78315 BONE IMAGING 3 PHASE: CPT

## 2020-12-04 RX ORDER — TC 99M MEDRONATE 20 MG/10ML
27.5 INJECTION, POWDER, LYOPHILIZED, FOR SOLUTION INTRAVENOUS
Status: COMPLETED | OUTPATIENT
Start: 2020-12-04 | End: 2020-12-04

## 2020-12-04 RX ADMIN — Medication 27.5 MILLICURIE: at 09:33

## 2020-12-08 ENCOUNTER — OFFICE VISIT (OUTPATIENT)
Dept: ORTHOPEDIC SURGERY | Facility: CLINIC | Age: 55
End: 2020-12-08

## 2020-12-08 VITALS — BODY MASS INDEX: 43.19 KG/M2 | HEIGHT: 60 IN | WEIGHT: 220 LBS

## 2020-12-08 DIAGNOSIS — Z96.612 PRESENCE OF LEFT ARTIFICIAL SHOULDER JOINT: ICD-10-CM

## 2020-12-08 DIAGNOSIS — M25.512 ACUTE PAIN OF LEFT SHOULDER: Primary | ICD-10-CM

## 2020-12-08 DIAGNOSIS — T84.039A LOOSE ORTHOPEDIC IMPLANT, INITIAL ENCOUNTER (HCC): ICD-10-CM

## 2020-12-08 DIAGNOSIS — Z96.612 STATUS POST REVERSE TOTAL REPLACEMENT OF LEFT SHOULDER: ICD-10-CM

## 2020-12-08 PROCEDURE — 99214 OFFICE O/P EST MOD 30 MIN: CPT | Performed by: NURSE PRACTITIONER

## 2020-12-08 NOTE — PROGRESS NOTES
"Priti Orr is a 55 y.o. female returns for     Chief Complaint   Patient presents with   • Left Shoulder - Follow-up, Pain   • Results     bone scan done on 12/4/2020       HISTORY OF PRESENT ILLNESS: Patient presents to office for follow-up of acutely worsened left shoulder pain and bone scan results.  Onset of pain occurred abruptly around 11/7/2020 with no known injury.  Patient has an extensive history involving her left shoulder with multiple prior surgical procedures (see below).  Patient has transitioned out of the sling.  She was placed in a sling at last office visit for her complaints of severe pain.  She continues to complain of persistent pain in left shoulder, which is increased from her baseline.  She has more pain when she tries to use her left arm for any exertional activity.  No numbness or tingling.  No new complaints or concerns noted since last office visit.  No falls or injuries reported.  No redness or swelling reported.  Patient continues to take Celebrex daily as a regular medication.  Pain scale today is 5/10.     CONCURRENT MEDICAL HISTORY:    The following portions of the patient's history were reviewed and updated as appropriate: allergies, current medications, past family history, past medical history, past social history, past surgical history and problem list.     ROS  No fevers or chills.  No chest pain or shortness of air.  No GI or  disturbances. Left shoulder pain.     PHYSICAL EXAMINATION:       Ht 152.4 cm (60\")   Wt 99.8 kg (220 lb)   LMP 02/21/1994 (Within Months)   BMI 42.97 kg/m²     Physical Exam  Vitals signs reviewed.   Constitutional:       General: She is not in acute distress.     Appearance: She is well-developed. She is not ill-appearing.   HENT:      Head: Normocephalic.   Pulmonary:      Effort: Pulmonary effort is normal. No respiratory distress.   Abdominal:      General: There is no distension.      Palpations: Abdomen is soft.   Musculoskeletal:    "      General: Tenderness (Left shoulder) present. No swelling, deformity or signs of injury.   Skin:     General: Skin is warm and dry.      Capillary Refill: Capillary refill takes less than 2 seconds.      Findings: No erythema.   Neurological:      Mental Status: She is alert and oriented to person, place, and time.      GCS: GCS eye subscore is 4. GCS verbal subscore is 5. GCS motor subscore is 6.   Psychiatric:         Speech: Speech normal.         Behavior: Behavior normal.         Thought Content: Thought content normal.         Judgment: Judgment normal.         GAIT:     [x]  Normal  []  Antalgic    Assistive device: [x]  None  []  Walker     []  Crutches  []  Cane     []  Wheelchair  []  Stretcher    Right Shoulder Exam     Tenderness   The patient is experiencing no tenderness.    Range of Motion   The patient has normal right shoulder ROM.    Muscle Strength   The patient has normal right shoulder strength.    Other   Erythema: absent  Sensation: normal  Pulse: present      Left Shoulder Exam     Tenderness   Left shoulder tenderness location: Anterior shoulder at the joint line.    Range of Motion   Active abduction: 90   Forward flexion: 110     Muscle Strength   Left shoulder normal muscle strength: deferred.    Other   Erythema: absent  Scars: present (well-healed surgical scars)  Sensation: normal  Pulse: present     Comments:  Pain and limitations with range of motion.  No swelling appreciated.  No deformity.  No ecchymosis.  No erythema.  No warmth.  No signs of infection noted.  Neurovascularly intact.              Xr Shoulder 2+ View Left    Result Date: 11/12/2020  Narrative: External rotation, internal rotation and scapular Y views of the left shoulder reveal postsurgical changes post reverse total shoulder arthroplasty, including an extended prosthesis for proximal humeral replacement.  Prostheses appear well-positioned and well-aligned with no evidence of hardware failure or loosening  noted.  No bony abnormalities are noted.  Surrounding soft tissues appear unremarkable.  No acute interval changes are noted when compared with prior images from 8/3/2017.11/12/20 at 17:04 CST by MELISSA Ochoa Bone Scan 3 Phase    Result Date: 12/5/2020  Narrative: Nuclear medicine limited bone scan, three-phase. HISTORY: Acute left shoulder pain. Left reverse shoulder arthroplasty. Prior exam: Left shoulder November 12, 2020. Following the intravenous infusion of 27.5 milliliters of technetium 99 MDP three phase bone imaging of the chest and shoulders is obtained. Prior bone scan September 16, 2014. (Unavailable on PACS). Left shoulder August 3, 2017. Phase 1, flow imaging demonstrates normal vascularity. Phase 2 blood pool activity demonstrates no abnormalities. Phase 3, static bone imaging demonstrates an area of photopenia in the proximal left humerus at the site of the humeral prosthesis. There is intense focus of increased uptake in the proximal humerus at the site of the more distal aspect of the prosthesis. This is therefore suggestive of pathology probably loosening.     Impression: Intense uptake in the proximal left humeral shaft. This suggests prosthetic loosening. Electronically signed by:  Rickie Marsh MD  12/5/2020 9:55 AM CST Workstation: [id number]        ASSESSMENT:    Diagnoses and all orders for this visit:    Acute pain of left shoulder  -     Ambulatory Referral to Orthopedic Surgery    Presence of left artificial shoulder joint  -     Ambulatory Referral to Orthopedic Surgery    Status post reverse total replacement of left shoulder  -     Ambulatory Referral to Orthopedic Surgery    Loose orthopedic implant, initial encounter (CMS/[facility])  -     Ambulatory Referral to Orthopedic Surgery    PLAN    Bone scan from 12/5/2020 reviewed and results discussed with patient.  I have also discussed the results with Dr. Junior today in consultation.  We discussed evidence of intense uptake in the  proximal humeral shaft of the left shoulder, suggestive of prosthetic loosening.  We discussed that the blood pool activity showed no abnormalities so suspicion for osteomyelitis is low.  Patient also had some basic inflammatory marker labs performed following the last office visit with a normal CBC and normal sed rate.  Her CRP was slightly elevated at 0.72.  Patient had a sudden increase in left shoulder pain in early November with no known injury.  Her pain was severe at that time and she was placed in a sling to facilitate rest and healing.  She has transition out of the sling now.  She continues to complain of persistent left shoulder pain that is worse when she tries to use her arm.  Dr. Junior has recommended a referral back to her surgeon, Dr. Glenn Sy in [facility], who performed her last surgical procedure which involved a revision of left endoprosthesis to a reverse total shoulder.  This procedure was performed on 8/23/2016.  The patient has done well postoperatively with no known complications.  Patient is not sure that she wants to undergo revision but does agree to go for a consultation and treatment recommendations from Dr. Sy.  Patient is instructed to take a copy of her bone scan images with her to her referral appointment.  She will obtain a disc of these images today before leaving the office.  Continue with activity as tolerated in terms of her left arm/shoulder.  We discussed rest and activity modification if she experiences any increased pain.  We discussed avoidance of heavy lifting and over exertional use of her left arm for now.  Continue with use of ice therapy as needed to minimize pain/inflammation.  Patient already takes Celebrex as a regular medication.  Recommend Tylenol as needed for additional pain control.  Patient can follow-up with orthopedics on an as-needed basis, otherwise follow-up with Dr. Sy in regards to her current left shoulder pain and possible prosthetic  loosening.    Return if symptoms worsen or fail to improve.      This document has been electronically signed by MELISSA Ochoa on December 8, 2020 12:01 CST      MELISSA Ochoa

## 2021-01-20 ENCOUNTER — OFFICE VISIT (OUTPATIENT)
Dept: ORTHOPEDIC SURGERY | Facility: CLINIC | Age: 56
End: 2021-01-20

## 2021-01-20 DIAGNOSIS — G89.29 CHRONIC PAIN OF RIGHT KNEE: ICD-10-CM

## 2021-01-20 DIAGNOSIS — M25.561 CHRONIC PAIN OF RIGHT KNEE: ICD-10-CM

## 2021-01-20 DIAGNOSIS — M17.11 PRIMARY OSTEOARTHRITIS OF RIGHT KNEE: Primary | ICD-10-CM

## 2021-01-20 PROCEDURE — 99213 OFFICE O/P EST LOW 20 MIN: CPT | Performed by: NURSE PRACTITIONER

## 2021-01-20 PROCEDURE — 20610 DRAIN/INJ JOINT/BURSA W/O US: CPT | Performed by: NURSE PRACTITIONER

## 2021-01-20 RX ORDER — TRIAMCINOLONE ACETONIDE 40 MG/ML
40 INJECTION, SUSPENSION INTRA-ARTICULAR; INTRAMUSCULAR
Status: COMPLETED | OUTPATIENT
Start: 2021-01-20 | End: 2021-01-20

## 2021-01-20 RX ORDER — LIDOCAINE HYDROCHLORIDE 10 MG/ML
2 INJECTION, SOLUTION INFILTRATION; PERINEURAL
Status: COMPLETED | OUTPATIENT
Start: 2021-01-20 | End: 2021-01-20

## 2021-01-20 RX ADMIN — LIDOCAINE HYDROCHLORIDE 2 ML: 10 INJECTION, SOLUTION INFILTRATION; PERINEURAL at 10:15

## 2021-01-20 RX ADMIN — TRIAMCINOLONE ACETONIDE 40 MG: 40 INJECTION, SUSPENSION INTRA-ARTICULAR; INTRAMUSCULAR at 10:15

## 2021-01-20 NOTE — PROGRESS NOTES
Priti Orr is a 55 y.o. female returns for     Chief Complaint   Patient presents with   • Right Knee - Follow-up, Pain       HISTORY OF PRESENT ILLNESS: Patient presents to office for follow-up of chronic/recurrent right knee pain.  Patient previously experienced acute right knee pain without injury and was found to have a meniscal tear by MRI imaging.  She previously underwent right knee arthroscopy with debridement of medial meniscus per Dr. Junior on 7/13/2020 with improved pain following surgery.  She had a normal postoperative recovery.  Patient complains of increased/recurrent right knee pain recently with no known injury.  Patient complains of intermittent sharp, stabbing pains that occur in the medial aspect of her knee.  She has increased pain with rotation of her knee and walking.  Patient requests an evaluation for repeat injection today to help with her pain.  Last injection in the right knee was given on 6/19/2020.  Patient continues to take Meloxicam daily, which she has taken for several years.    CONCURRENT MEDICAL HISTORY:    The following portions of the patient's history were reviewed and updated as appropriate: allergies, current medications, past family history, past medical history, past social history, past surgical history and problem list.     ROS  No fevers or chills.  No chest pain or shortness of air.  No GI or  disturbances. Right knee pain.     PHYSICAL EXAMINATION:       LMP 02/21/1994 (Within Months)     Physical Exam  Vitals signs reviewed.   Constitutional:       Appearance: She is well-developed. She is not toxic-appearing or diaphoretic.   HENT:      Head: Normocephalic.   Pulmonary:      Effort: Pulmonary effort is normal. No respiratory distress.   Abdominal:      General: There is no distension.      Palpations: Abdomen is soft.   Musculoskeletal:         General: Swelling (Mild, right knee) and tenderness (Right knee) present. No deformity or signs of injury.       Right knee: She exhibits no effusion.      Left knee: She exhibits no effusion.   Skin:     General: Skin is warm and dry.      Capillary Refill: Capillary refill takes less than 2 seconds.      Findings: No erythema.   Neurological:      Mental Status: She is alert and oriented to person, place, and time.      GCS: GCS eye subscore is 4. GCS verbal subscore is 5. GCS motor subscore is 6.   Psychiatric:         Speech: Speech normal.         Behavior: Behavior normal.         Thought Content: Thought content normal.         Judgment: Judgment normal.         GAIT:     []  Normal  [x]  Antalgic    Assistive device: [x]  None  []  Walker     []  Crutches  []  Cane     []  Wheelchair  []  Stretcher    Right Knee Exam     Range of Motion   Right knee extension: 3.   Flexion: 110     Tests   Tony:  Medial - positive Lateral - negative  Varus: negative Valgus: negative    Other   Erythema: absent  Sensation: normal  Pulse: present  Swelling: mild  Effusion: no effusion present    Comments:  Pain and limitations with range of motion.  Localized tenderness at the medial joint line.  Mild, generalized swelling noted.  No definitive effusion or suprapatellar fullness noted.  No erythema.  No warmth.  No signs of infection noted.      Left Knee Exam     Tenderness   The patient is experiencing no tenderness.     Range of Motion   Extension: 0   Flexion: 120     Tests   Varus: negative Valgus: negative    Other   Erythema: absent  Scars: present (well-healed surgical scar to anterior knee)  Sensation: normal  Pulse: present  Swelling: none  Effusion: no effusion present                Large Joint Arthrocentesis: R knee  Date/Time: 1/20/2021 10:15 AM  Consent given by: patient  Timeout: Immediately prior to procedure a time out was called to verify the correct patient, procedure, equipment, support staff and site/side marked as required   Supporting Documentation  Indications: pain, joint swelling and diagnostic evaluation    Procedure Details  Location: knee - R knee  Preparation: Patient was prepped and draped in the usual sterile fashion  Needle size: 22 G  Approach: anterolateral  Medications administered: 2 mL lidocaine 1 %; 40 mg triamcinolone acetonide 40 MG/ML  Patient tolerance: patient tolerated the procedure well with no immediate complications            ASSESSMENT:    Diagnoses and all orders for this visit:    Primary osteoarthritis of right knee  -     Large Joint Arthrocentesis: R knee    Chronic pain of right knee  -     Large Joint Arthrocentesis: R knee    PLAN    Patient complains of increased right knee pain recently with no known injury.  Patient previously underwent right knee arthroscopy with debridement of medial meniscus per Dr. Junior on 7/13/2020 with improved pain following surgery.  She had a normal postoperative recovery without complications.  Patient complains of increased pain in the medial aspect of her knee with rotation, similar to the pain she was experiencing previously with a medial meniscus tear.  Patient wants to repeat an injection today to help with her pain.  She has not had an injection in the right knee since 6/19/2020.  Recommend an intra-articular injection of steroid to the right knee today for management of pain/inflammation/swelling.  We discussed the possibility of a new/recurrent meniscal tear.  Recommend rest and activity modification with limited weightbearing activity for now.  Recommend modified weightbearing off the right knee as needed with use of a cane.  We discussed that she can gradually progress her weightbearing and activity as pain allows.  Continue with conditioning and strengthening exercises of the right knee/leg that she previously learned in physical therapy.  Recommend elevation and ice therapy as needed to minimize pain/swelling/inflammation.  Recommend to continue with Meloxicam daily for consistent dosing of oral NSAIDs.  Patient can also take Tylenol as needed  for additional pain control.  Follow-up in 4 weeks for recheck as needed for any new, worsening or persistent symptoms.  Consider repeat MRI of the right knee if her pain persists or worsens.    Return in about 4 weeks (around 2/17/2021), or if symptoms worsen or fail to improve, for Recheck.      This document has been electronically signed by MELISSA Ochoa on January 20, 2021 13:50 CST      MELISSA Ochoa

## 2021-02-23 ENCOUNTER — TRANSCRIBE ORDERS (OUTPATIENT)
Dept: PHYSICAL THERAPY | Facility: HOSPITAL | Age: 56
End: 2021-02-23

## 2021-02-23 DIAGNOSIS — M25.512 LEFT SHOULDER PAIN, UNSPECIFIED CHRONICITY: Primary | ICD-10-CM

## 2021-03-05 ENCOUNTER — HOSPITAL ENCOUNTER (OUTPATIENT)
Dept: PHYSICAL THERAPY | Facility: HOSPITAL | Age: 56
Setting detail: THERAPIES SERIES
Discharge: HOME | End: 2021-03-05

## 2021-03-05 DIAGNOSIS — M25.512 LEFT SHOULDER PAIN, UNSPECIFIED CHRONICITY: Primary | ICD-10-CM

## 2021-03-05 PROCEDURE — 97162 PT EVAL MOD COMPLEX 30 MIN: CPT | Performed by: PHYSICAL THERAPIST

## 2021-03-05 NOTE — THERAPY EVALUATION
Outpatient Physical Therapy Ortho Initial Evaluation  [facility]     Patient Name: Priti Orr  : 1965  MRN: [id number]  Today's Date: 3/5/2021      Visit Date: 2021    Attendance:  (authorization required)  Subjective Improvement: n/a  Next MD Appt: 21 -- surgery  Recert Date: 3/26/21    Therapy Diagnosis: L shoulder pain due to humeral arthroplasty component loosening       Past Medical History:   Diagnosis Date   • Allergic rhinitis    • Anesthesia     nausea and vomiting with anesthesia requires pre medicaiton   • Anxiety    • Arthritis    • Asthma    • Benign neoplasm of meninges (CMS/HCC)     Post op       • Cobalamin deficiency    • Corneal abrasion    • Depression    • Encounter for gynecological examination (general) (routine) without abnormal findings    • Essential hypertension    • Fatigue    • Female stress incontinence    • Head injury    • History of delayed wound healing     Delayed healing of surgical wound      • Hyperlipidemia    • Low back pain    • Macular degeneration disease    • Nosebleed, symptom     Nosebleed/epistaxis symptom      • Osteoporosis    • Osteoporosis    • PONV (postoperative nausea and vomiting)    • Seizures (CMS/HCC)    • Sleep apnea     not wearing c-pap   • Wears glasses         Past Surgical History:   Procedure Laterality Date   • ARM LESION/CYST EXCISION  2014    Remove arm bone lesion (1)   • ARM SKIN LESION BIOPSY / EXCISION     • ARM WOUND REPAIR / CLOSURE  2014    Repair Superficial Wound TR-EXT 2.5 < CM 48435 (1)      • BRAIN SURGERY     • BREAST LUMPECTOMY     •  SECTION  1986     Section (1)     • ENDOSCOPY  2008    Colon endoscopy 69888 (1)     • EXCISION BREAST LESION W/ PREOP NEEDLE LOC  1981    Excision, breast lesion (1)     • HEMORRHOIDECTOMY     • HEMORRHOIDECTOMY  2008    Hemorrhoidectomy (2)      • HYSTERECTOMY     • INJECTION OF MEDICATION  2013     Celestone (betamethasone) (1)      • INJECTION OF MEDICATION  12/04/2013    Depo Medrol (Methylprednisone) (1)      • INJECTION OF MEDICATION  05/21/2013    Dexamethasone (2)      • INJECTION OF MEDICATION  01/29/2014    Kenalog (6)   • KNEE ARTHROSCOPY  03/05/2007    Knee arthroscopy, surgery (1)     • KNEE ARTHROSCOPY Right 7/13/2020    Procedure: KNEE ARTHROSCOPY with debridement medial meniscus;  Surgeon: Brooks Junior MD;  Location: [facility];  Service: Orthopedics;  Laterality: Right;   • LAPAROSCOPIC TUBAL LIGATION  04/16/1991    Tubal ligation (1)      • PAP SMEAR  01/18/2007    PAP SMEAR (1)      • SHOULDER SURGERY Left     x 4   • TOTAL ABDOMINAL HYSTERECTOMY WITH SALPINGO OOPHORECTOMY  1993    JOSE/BSO (1)      • TOTAL KNEE ARTHROPLASTY Left 11/19/2018    Procedure: TOTAL KNEE ARTHROPLASTY ATTUNE with adductor canal block - left;  Surgeon: Brooks Junior MD;  Location: [facility];  Service: Orthopedics     Allergies   Allergen Reactions   • Pseudoephedrine Shortness Of Breath and Swelling     LIPS SWELL AND TONGUE BECOMES THICK   • Morphine Other (See Comments)     States felt like itching from within, hallucinations, agitation       Visit Dx:     ICD-10-CM ICD-9-CM   1. Left shoulder pain, unspecified chronicity  M25.512 719.41         Patient History     Row Name 03/05/21 0800             History    Chief Complaint  Pain;Difficulty with daily activities  -SS      Type of Pain  Shoulder pain left  -SS      Brief Description of Current Complaint  Patient has been experiencing increasing L shoulder pain. Patient has history of L reverse shoulder arthroplasty 3-5 years ago. Nuclear bone scan showed increased uptake in the proximal L humeral shaft indicating loosening of the prosthesis. Patient is scheduled for revision of the reverse total shoulder on 4/8/21. She is sent for a pre-operative appointment. Increased shoulder pain with motion and dependent position. Pain is better if the arm  is supported. She has trouble typing and cooking due to her shoulder pain.  female with children.  -SS      Patient/Caregiver Goals Comment  get ready for surgery  -SS      Current Tobacco Use  no  -SS      Smoking Status  no  -SS      Patient's Rating of General Health  Very good  -SS      Hand Dominance  right-handed  -SS      Occupation/sports/leisure activities  [employer] -  of Operations. Hobbies: grandchild, power walk, read  -SS         Pain     Pain Location  Shoulder left  -SS      Pain at Present  4  -SS      Pain at Best  0 over past 1 month  -SS      Pain at Worst  8 over past 1 month  -SS      What Performance Factors Make the Current Problem(s) WORSE?  movement, use, unsupported  -SS      What Performance Factors Make the Current Problem(s) BETTER?  support arm  -SS      Is your sleep disturbed?  Yes wakes up frequently  -SS      Is medication used to assist with sleep?  No  -SS      Difficulties at work?  pain when typing  -SS      Difficulties with ADL's?  pain with movement and use  -SS      Difficulties with recreational activities?  pain with movement and use  -SS         Fall Risk Assessment    Any falls in the past year:  No  -SS      Does patient have a fear of falling  No  -SS         Daily Activities    Primary Language  English  -SS         Safety    Are you being hurt, hit, or frightened by anyone at home or in your life?  No  -SS      Are you being neglected by a caregiver  No  -SS      Have you had any of the following issues with  Anxiety  -SS        User Key  (r) = Recorded By, (t) = Taken By, (c) = Cosigned By    Initials Name Provider Type    SS Nathaniel Sharp PT DPT Physical Therapist          PT Ortho     Row Name 03/05/21 0900       Subjective Comments    Subjective Comments  see Therapy Patient History  -SS       Precautions and Contraindications    Precautions  history of L reverse shoulder arthroplasty  -SS       Subjective Pain    Able to rate  subjective pain?  yes  -SS    Pre-Treatment Pain Level  4  -SS    Post-Treatment Pain Level  4  -SS       Posture/Observations    Posture/Observations Comments  L shoulder elevated  -SS       Left Upper Ext    Lt Shoulder Abduction AROM  80 deg standing; 105 deg supine  -SS    Lt Shoulder Abduction PROM  113 deg  -SS    Lt Shoulder Extension AROM  50 deg  -SS    Lt Shoulder Flexion AROM  80 deg standing; 101 deg supine  -SS    Lt Shoulder Flexion PROM  144 deg  -SS    Lt Shoulder External Rotation AROM  50 deg  -SS    Lt Shoulder External Rotation PROM  70 deg  -SS    Lt Shoulder Internal Rotation AROM  66 deg  -SS    Lt Shoulder Internal Rotation PROM  65 deg  -SS      User Key  (r) = Recorded By, (t) = Taken By, (c) = Cosigned By    Initials Name Provider Type    Nathaniel Zhang, PT DPT Physical Therapist        Therapy Education  Education Details: 6-way shoulder isometrics, scapular retraction, supine shoulder flexion  Given: HEP  Program: New  How Provided: Verbal, Written  Provided to: Patient  Level of Understanding: Verbalized, Demonstrated     PT OP Goals     Row Name 03/05/21 0800          PT Short Term Goals    STG Date to Achieve  03/26/21  -     STG 1  Independent with pre-operative HEP.  -        Time Calculation    PT Goal Re-Cert Due Date  03/26/21  -       User Key  (r) = Recorded By, (t) = Taken By, (c) = Cosigned By    Initials Name Provider Type    Nathaniel Zhang, PT DPT Physical Therapist          PT Assessment/Plan     Row Name 03/05/21 0800          PT Assessment    Functional Limitations  Limitation in home management;Limitations in community activities;Limitations in functional capacity and performance;Performance in leisure activities;Performance in self-care ADL;Performance in work activities  -     Impairments  Range of motion;Pain;Muscle strength;Joint integrity  -     Assessment Comments  Patient was seen for a pre-op appointment with pending revision L  reverse total shoulder arthroplasty due to hardware loosening. Shoulder muscle weakness present.   -SS     Rehab Potential  Good pre-operatively  -SS     Patient/caregiver participated in establishment of treatment plan and goals  Yes  -SS     Patient would benefit from skilled therapy intervention  Yes  -SS        PT Plan    PT Plan Comments  PRN follow-up at this time. Patient to contact P.T. if questions or problems arise.  -SS       User Key  (r) = Recorded By, (t) = Taken By, (c) = Cosigned By    Initials Name Provider Type    SS Nathaniel Sharp, PT DPT Physical Therapist            Time Calculation:     Start Time: 0848  Stop Time: 0934  Time Calculation (min): 46 min     Therapy Charges for Today     Code Description Service Date Service Provider Modifiers Qty    19209404069 HC PT EVAL MOD COMPLEXITY 3 3/5/2021 Nathaniel Sharp, PT DPT GP 1                    Nathaniel Sharp, PT, DPT, CHT  3/5/2021

## 2021-03-12 ENCOUNTER — OFFICE VISIT (OUTPATIENT)
Dept: ORTHOPEDIC SURGERY | Facility: CLINIC | Age: 56
End: 2021-03-12

## 2021-03-12 VITALS — HEIGHT: 60 IN | WEIGHT: 220 LBS | BODY MASS INDEX: 43.19 KG/M2

## 2021-03-12 DIAGNOSIS — M25.561 CHRONIC PAIN OF RIGHT KNEE: ICD-10-CM

## 2021-03-12 DIAGNOSIS — M17.11 PRIMARY OSTEOARTHRITIS OF RIGHT KNEE: Primary | ICD-10-CM

## 2021-03-12 DIAGNOSIS — G89.29 CHRONIC PAIN OF RIGHT KNEE: ICD-10-CM

## 2021-03-12 PROCEDURE — 20610 DRAIN/INJ JOINT/BURSA W/O US: CPT | Performed by: NURSE PRACTITIONER

## 2021-03-12 PROCEDURE — 99213 OFFICE O/P EST LOW 20 MIN: CPT | Performed by: NURSE PRACTITIONER

## 2021-03-12 RX ADMIN — LIDOCAINE HYDROCHLORIDE 2 ML: 10 INJECTION, SOLUTION INFILTRATION; PERINEURAL at 14:22

## 2021-03-12 RX ADMIN — TRIAMCINOLONE ACETONIDE 40 MG: 40 INJECTION, SUSPENSION INTRA-ARTICULAR; INTRAMUSCULAR at 14:22

## 2021-03-12 NOTE — PROGRESS NOTES
"Priti Orr is a 55 y.o. female returns for     Chief Complaint   Patient presents with   • Right Knee - Follow-up, Pain       HISTORY OF PRESENT ILLNESS: Patient presents to office for follow-up of chronic/recurrent right knee pain.  Patient has progressing degenerative changes in the medial aspect of her right knee.  Patient previously underwent right knee arthroscopy with debridement of the medial meniscus per Dr. Junior on 7/13/2020 with improved pain following surgery.  She had a normal postoperative recovery.  Approximately 3 months ago, patient began to have increased right knee pain again with no known injury or incident.  Patient requests an evaluation for repeat injection today to help with her pain.  Last injection was given on 1/20/2021, which offered good pain improvement but only for a few weeks and then the pain began to return.  Patient states she has an upcoming surgery for revision of her left shoulder arthroplasty, which will be done in [place].  Patient continues to take Meloxicam daily, which she has taken for several years.  No new complaints or concerns noted since last office visit.  No falls or injuries reported.     CONCURRENT MEDICAL HISTORY:    The following portions of the patient's history were reviewed and updated as appropriate: allergies, current medications, past family history, past medical history, past social history, past surgical history and problem list.     ROS  No fevers or chills.  No chest pain or shortness of air.  No GI or  disturbances. Right knee pain.     PHYSICAL EXAMINATION:       Ht 152.4 cm (60\")   Wt 99.8 kg (220 lb)   LMP 02/21/1994 (Within Months)   BMI 42.97 kg/m²     Physical Exam  Vitals reviewed.   Constitutional:       General: She is not in acute distress.     Appearance: She is well-developed. She is obese. She is not ill-appearing.   HENT:      Head: Normocephalic.   Pulmonary:      Effort: Pulmonary effort is normal. No respiratory " distress.   Abdominal:      General: There is no distension.      Palpations: Abdomen is soft.   Musculoskeletal:         General: Swelling (Mild, right knee) and tenderness (Mild, right knee) present. No deformity or signs of injury.      Right knee: No effusion.      Instability Tests: Medial Tony test positive. Lateral Tony test negative.      Left knee: No effusion.   Skin:     General: Skin is warm and dry.      Capillary Refill: Capillary refill takes less than 2 seconds.      Findings: No erythema.   Neurological:      Mental Status: She is alert and oriented to person, place, and time.      GCS: GCS eye subscore is 4. GCS verbal subscore is 5. GCS motor subscore is 6.   Psychiatric:         Speech: Speech normal.         Behavior: Behavior normal.         Thought Content: Thought content normal.         Judgment: Judgment normal.         GAIT:     []  Normal  [x]  Antalgic (mild)    Assistive device: [x]  None  []  Walker     []  Crutches  []  Cane     []  Wheelchair  []  Stretcher    Right Knee Exam     Tenderness   The patient is experiencing tenderness in the medial joint line (Mild, diffuse).    Range of Motion   Right knee extension: 3.   Flexion: 110     Tests   Tony:  Medial - positive Lateral - negative  Varus: negative Valgus: negative    Other   Erythema: absent  Sensation: normal  Pulse: present  Swelling: mild  Effusion: no effusion present    Comments:  Pain and limitations with range of motion.  Localized tenderness at the medial joint line.  Mild, generalized swelling noted.  No definitive effusion or suprapatellar fullness noted.  No erythema.  No warmth.  No signs of infection noted.      Left Knee Exam     Tenderness   The patient is experiencing no tenderness.     Range of Motion   Extension: 0   Flexion: 120     Tests   Varus: negative Valgus: negative    Other   Erythema: absent  Scars: present (well-healed surgical scar to anterior knee)  Sensation: normal  Pulse:  present  Swelling: none  Effusion: no effusion present            Xr Knee 1 Or 2 View Right     Result Date: 5/31/2020  Narrative: Lateral view of the right knee reveals no evidence of acute fracture or dislocation.  No significant degenerative changes are noted at the patellofemoral joint.  The knee joint appears well-aligned.  Soft tissues appear unremarkable.  A small suprapatellar joint effusion is noted.  No acute bony radiologic abnormalities are noted at this time.  No significant changes are noted when compared with prior images from 2/3/2019.05/31/20 at 10:11 AM by MELISSA Ochoa      Xr Knee Bilateral Ap Standing     Result Date: 5/31/2020  Narrative: AP standing view of the bilateral knees reveals postsurgical changes in the left knee post total knee arthroplasty.  Prostheses appear well-positioned and well-aligned with no evidence of hardware failure or loosening noted.  There are degenerative changes noted in medial compartment of the right knee with mild to moderate loss of medial compartmental joint spacing.  The loss of joint spacing in the medial compartment of the right knee appears to have progressed slightly when compared with prior images from 3/7/2019.  There are small marginal osteophyte formations noted in the lateral compartment of the right knee.  There is slight varus positioning noted of the right knee. Soft tissues appear unremarkable.  No evidence of acute fracture or dislocation.  No acute bony radiologic abnormalities are noted at this time.05/31/20 at 10:13 AM by MELISSA Ochoa      MRI knee right without contrast 6/16/2020  [facility]  Result Impression       1.  Low-grade injury and/or bursitis around the upper medial collateral ligament.    2.  The midportion of the medial meniscus has an intrasubstance tear that extends to the superior articular surface with a small flap.    3.  Loss of cartilage in the medial compartment of the knee with early degenerative  change.    [id number]   Result Narrative   Indication:  Medial pain, right knee.    MRI, Right Knee without Gadolinium:  No marrow edema or sign of bony injury.  The cruciate ligaments are intact.  The lateral meniscus has a normal appearance.  The medial collateral ligament does not appear disrupted, but the upper portion is thickened   and has central fluid that would reflect some combination of bursitis and/or low-grade injury.  The medial compartment of the knee has cartilaginous thinning and early degenerative change and in the midportion of the medial meniscus there is a small   length of intrasubstance tear that extends through the superior articular surface near the annulus and the small portion of the superior articular surface is folded back.  There is a small flap.    The patella is normally seated and there is mild thinning and irregularity of the cartilage without signal change in the underlying bone.  There is a moderate joint effusion.   Other Result Information   Je, Rad Results In - 06/16/2020 11:11 AM CDT  Indication:  Medial pain, right knee.    MRI, Right Knee without Gadolinium:  No marrow edema or sign of bony injury.  The cruciate ligaments are intact.  The lateral meniscus has a normal appearance.  The medial collateral ligament does not appear disrupted, but the upper portion is thickened   and has central fluid that would reflect some combination of bursitis and/or low-grade injury.  The medial compartment of the knee has cartilaginous thinning and early degenerative change and in the midportion of the medial meniscus there is a small   length of intrasubstance tear that extends through the superior articular surface near the annulus and the small portion of the superior articular surface is folded back.  There is a small flap.    The patella is normally seated and there is mild thinning and irregularity of the cartilage without signal change in the underlying bone.  There is a moderate  joint effusion.    IMPRESSION:       1.  Low-grade injury and/or bursitis around the upper medial collateral ligament.    2.  The midportion of the medial meniscus has an intrasubstance tear that extends to the superior articular surface with a small flap.    3.  Loss of cartilage in the medial compartment of the knee with early degenerative change.           ASSESSMENT:    Diagnoses and all orders for this visit:    Primary osteoarthritis of right knee  -     Large Joint Arthrocentesis: R knee    Chronic pain of right knee  -     Large Joint Arthrocentesis: R knee      Large Joint Arthrocentesis: R knee  Date/Time: 3/12/2021 2:22 PM  Consent given by: patient  Timeout: Immediately prior to procedure a time out was called to verify the correct patient, procedure, equipment, support staff and site/side marked as required   Supporting Documentation  Indications: pain, joint swelling and diagnostic evaluation   Procedure Details  Location: knee - R knee  Preparation: Patient was prepped and draped in the usual sterile fashion  Needle size: 22 G  Approach: anteromedial  Medications administered: 40 mg triamcinolone acetonide 40 MG/ML; 2 mL lidocaine 1 %  Patient tolerance: patient tolerated the procedure well with no immediate complications      PLAN    Patient continues to complain of persistent right knee pain, which increased a few months ago with no known injury.  Patient initially had improved right knee pain following right knee arthroscopy with debridement of medial meniscus on 7/13/2020.  Patient wants to repeat an injection today to help with her pain.  Recommend a repeat intra-articular injection of steroid to the right knee today for management of joint pain/inflammation/swelling.  We again discussed the possibility of a new/recurrent meniscal tear.  We also discussed the possibility of progressing osteoarthritic changes in the medial aspect of her right knee.  Patient has previously underwent left total knee  replacement.  Recommend rest and activity modification with limited weightbearing activity for now.  Recommend modified weightbearing of the right knee with use of a cane.  We discussed that she can gradually progress her weightbearing and activity as pain allows.  Recommend to continue with conditioning and strengthening exercises of the right knee/leg that she previously learned in physical therapy.  Recommend elevation and ice therapy to the right knee as needed to minimize pain/swelling/inflammation.  Recommend to continue with Meloxicam daily for consistent dosing of oral NSAIDs.  Patient can also take Tylenol as needed for additional pain control.  Follow-up in 3 months for recheck as needed for any new, worsening or persistent symptoms.  Follow-up sooner as needed.    EMR Dragon/Transciption Disclaimer: Some of this note may be an electronic transcription/translation of spoken language to printed text.  The electronic translation of spoken language may permit erroneous, or at times, nonsensical words or phrases to be inadvertently transcribed. Although I have reviewed the note for such errors, some may still exist.     Return in about 3 months (around 6/12/2021), or if symptoms worsen or fail to improve, for Recheck.        This document has been electronically signed by MELISSA Ohcoa on March 13, 2021 21:52 CST      MELISSA Ochoa

## 2021-03-13 RX ORDER — TRIAMCINOLONE ACETONIDE 40 MG/ML
40 INJECTION, SUSPENSION INTRA-ARTICULAR; INTRAMUSCULAR
Status: COMPLETED | OUTPATIENT
Start: 2021-03-12 | End: 2021-03-12

## 2021-03-13 RX ORDER — LIDOCAINE HYDROCHLORIDE 10 MG/ML
2 INJECTION, SOLUTION INFILTRATION; PERINEURAL
Status: COMPLETED | OUTPATIENT
Start: 2021-03-12 | End: 2021-03-12

## 2021-04-01 ENCOUNTER — TRANSCRIBE ORDERS (OUTPATIENT)
Dept: PHYSICAL THERAPY | Facility: HOSPITAL | Age: 56
End: 2021-04-01

## 2021-04-01 DIAGNOSIS — M25.512 LEFT SHOULDER PAIN, UNSPECIFIED CHRONICITY: Primary | ICD-10-CM

## 2021-04-05 ENCOUNTER — HOSPITAL ENCOUNTER (OUTPATIENT)
Dept: PHYSICAL THERAPY | Facility: HOSPITAL | Age: 56
Setting detail: THERAPIES SERIES
Discharge: HOME | End: 2021-04-05

## 2021-04-05 DIAGNOSIS — Z96.612 S/P REVERSE TOTAL SHOULDER ARTHROPLASTY, LEFT: Primary | ICD-10-CM

## 2021-04-05 PROCEDURE — 97162 PT EVAL MOD COMPLEX 30 MIN: CPT

## 2021-04-05 NOTE — THERAPY EVALUATION
Outpatient Physical Therapy Ortho Initial Evaluation  [facility]     Patient Name: Priti Orr  : 1965  MRN: [id number]  Today's Date: 2021      Visit Date: 2021     Attendance:  (TBD approved)  Subjective Improvement: n/a  Next MD Visit: 2021  Recert Date: 2021    Therapy Diagnosis: S/P Revision of reverse TSA      Patient Active Problem List   Diagnosis   • Osteoporosis   • Chronic left shoulder pain   • Presence of left artificial shoulder joint   • Chronic pain of right knee   • Anxiety   • Arthritis   • Asthma   • Depression   • Enchondroma of humerus   • Headache, chronic migraine without aura, intractable   • Status post arthroscopic surgery of right knee   • Humeral fracture   • Hypercholesteremia   • Hyperlipidemia   • Essential hypertension   • Meningioma (CMS/HCC)   • Morbid obesity (CMS/[facility])   • NICK (obstructive sleep apnea)   • Vitamin D deficiency   • Seizure-like activity (CMS/[facility])   • S/p reverse total shoulder arthroplasty   • Primary osteoarthritis of left knee   • Morbid obesity with BMI of 40.0-44.9, adult (CMS/[facility])   • Status post left knee replacement   • Postmenopausal osteoporosis   • Effusion of right knee   • Primary osteoarthritis of right knee   • Right knee pain   • Tear of medial meniscus of right knee, current   • Sprain of medial collateral ligament of right knee   • Acute pain of left shoulder   • Loose orthopedic implant (CMS/[facility])        Past Medical History:   Diagnosis Date   • Allergic rhinitis    • Anesthesia     nausea and vomiting with anesthesia requires pre medicaiton   • Anxiety    • Arthritis    • Asthma    • Benign neoplasm of meninges (CMS/HCC)     Post op       • Cobalamin deficiency    • Corneal abrasion    • Depression    • Encounter for gynecological examination (general) (routine) without abnormal findings    • Essential hypertension    • Fatigue    • Female stress incontinence    • Head injury    • History of  delayed wound healing     Delayed healing of surgical wound      • Hyperlipidemia    • Low back pain    • Macular degeneration disease    • Nosebleed, symptom     Nosebleed/epistaxis symptom      • Osteoporosis    • Osteoporosis    • PONV (postoperative nausea and vomiting)    • Seizures (CMS/HCC)    • Sleep apnea     not wearing c-pap   • Wears glasses         Past Surgical History:   Procedure Laterality Date   • ARM LESION/CYST EXCISION  2014    Remove arm bone lesion (1)   • ARM SKIN LESION BIOPSY / EXCISION     • ARM WOUND REPAIR / CLOSURE  2014    Repair Superficial Wound TR-EXT 2.5 < CM 06240 (1)      • BRAIN SURGERY     • BREAST LUMPECTOMY     •  SECTION  1986     Section (1)     • ENDOSCOPY  2008    Colon endoscopy 28341 (1)     • EXCISION BREAST LESION W/ PREOP NEEDLE LOC  1981    Excision, breast lesion (1)     • HEMORRHOIDECTOMY     • HEMORRHOIDECTOMY  2008    Hemorrhoidectomy (2)      • HYSTERECTOMY     • INJECTION OF MEDICATION  2013    Celestone (betamethasone) (1)      • INJECTION OF MEDICATION  2013    Depo Medrol (Methylprednisone) (1)      • INJECTION OF MEDICATION  2013    Dexamethasone (2)      • INJECTION OF MEDICATION  2014    Kenalog (6)   • KNEE ARTHROSCOPY  2007    Knee arthroscopy, surgery (1)     • KNEE ARTHROSCOPY Right 2020    Procedure: KNEE ARTHROSCOPY with debridement medial meniscus;  Surgeon: Brooks Junior MD;  Location: Health system;  Service: Orthopedics;  Laterality: Right;   • LAPAROSCOPIC TUBAL LIGATION  1991    Tubal ligation (1)      • PAP SMEAR  2007    PAP SMEAR (1)      • SHOULDER SURGERY Left     x 4   • TOTAL ABDOMINAL HYSTERECTOMY WITH SALPINGO OOPHORECTOMY      JOSE/BSO (1)      • TOTAL KNEE ARTHROPLASTY Left 2018    Procedure: TOTAL KNEE ARTHROPLASTY ATTUNE with adductor canal block - left;  Surgeon: Brooks Junior MD;  Location: Health system;   "Service: Orthopedics       Current Outpatient Medications:   •  buPROPion HCl (WELLBUTRIN PO), Take 1 tablet by mouth Every Evening., Disp: , Rfl:   •  Cholecalciferol (GNP Vitamin D) 25 MCG (1000 UT) tablet, Take 1,000 Units by mouth Daily., Disp: , Rfl:   •  fluticasone (FLONASE) 50 MCG/ACT nasal spray, 2 sprays into the nostril(s) as directed by provider Daily for 7 days., Disp: 1 bottle, Rfl: 0  •  Glucos-Chondroit-Hyaluron-MSM (GLUCOSAMINE CHONDROITIN JOINT PO), Take 1 tablet by mouth Daily., Disp: , Rfl:   •  lisinopril-hydrochlorothiazide (PRINZIDE,ZESTORETIC) 20-12.5 MG per tablet, Take 1 tablet by mouth Daily., Disp: , Rfl:   •  loratadine (Claritin) 10 MG tablet, Take 10 mg by mouth Daily., Disp: , Rfl:   •  meloxicam (MOBIC) 15 MG tablet, TAKE ONE TABLET BY MOUTH DAILY, Disp: , Rfl:   •  rosuvastatin (CRESTOR) 5 MG tablet, Take 5 mg by mouth., Disp: , Rfl:   •  vitamin D (ERGOCALCIFEROL) 62524 units capsule capsule, Take 1 capsule by mouth 1 (One) Time Per Week., Disp: 5 capsule, Rfl: 11    Allergies   Allergen Reactions   • Pseudoephedrine Shortness Of Breath and Swelling     LIPS SWELL AND TONGUE BECOMES THICK   • Morphine Other (See Comments)     States felt like itching from within, hallucinations, agitation         Visit Dx:     ICD-10-CM ICD-9-CM   1. S/P reverse total shoulder arthroplasty, left  Z96.612 V43.61         Patient History     Row Name 04/05/21 0800             History    Chief Complaint  Pain;Difficulty with daily activities  -      Type of Pain  Shoulder pain Left  -      Date Current Problem(s) Began  -- 2014  -      Brief Description of Current Complaint  Pt stated that she fell on her shoulder in 2014. She stated that since then, she has had 5 surgeries on her shoulder with this last one being a revision of reverse total shoulder. Initial reverse TSA was in 2017. She stated that is \"over it\". She stated that prior to her last surgery, she was experiencing increased pain. " "Since surgery she reports decreased pain and pressure. Single female living alone. She stated that she has family nearby who helps out as needed.   -      Previous treatment for THIS PROBLEM  Surgery  -      Surgery Date:  03/30/21  -      Patient/Caregiver Goals  Relieve pain;Improve mobility;Other (comment) \"Fix my hair\"  -      Current Tobacco Use  no  -      Smoking Status  Never  -      Patient's Rating of General Health  Very good  -      Hand Dominance  right-handed  -      Occupation/sports/leisure activities  [employer] -  of [employer]. Hobbies: grandchild, power walk, read  -         Pain     Pain Location  Shoulder Left  -      Pain at Present  4  -      Pain at Best  3  -      Pain at Worst  8  -      Pain Frequency  Constant/continuous  -      Pain Description  Aching  -      What Performance Factors Make the Current Problem(s) WORSE?  Wearing sling  -      What Performance Factors Make the Current Problem(s) BETTER?  Elevating/supporting arm   -      Pain Comments  \"My hand is going to sleep a lot\".  -      Is your sleep disturbed?  Yes  -      Is medication used to assist with sleep?  No  -      Difficulties at work?  Currently not working. Pt stated typing would be difficult  -      Difficulties with ADL's?  Washing/fixing hair, dressing bathing, cooking, cleaning  -      Difficulties with recreational activities?  Unable  -         Fall Risk Assessment    Any falls in the past year:  No  -      Does patient have a fear of falling  No  -         Daily Activities    Primary Language  English  -         Safety    Are you being hurt, hit, or frightened by anyone at home or in your life?  No  -      Are you being neglected by a caregiver  No  -      Have you had any of the following issues with  Anxiety  -        User Key  (r) = Recorded By, (t) = Taken By, (c) = Cosigned By    Initials Name Provider Type     Hannah Starks, PT " Physical Therapist          PT Ortho     Row Name 04/05/21 0800       Subjective Comments    Subjective Comments  See therapy patient history.  -       Precautions and Contraindications    Contraindications  DOS: 3/30/21  -       Subjective Pain    Able to rate subjective pain?  yes  -    Pre-Treatment Pain Level  4  -    Post-Treatment Pain Level  4  -       Posture/Observations    Posture/Observations Comments  Pt arrived with sling and abd pillow correctly donned. Incision is covered with bandage that has two small circular blood spots. Pt is TTP in shoulder joint grossly and around incision. Increased tension in bilat upper traps. Guarding with PROM. Independent with donning/doffing sling.   -       General ROM    Head/Neck/Trunk  Comments  -    RT Lower Ext  Comment  -       Head/Neck/Trunk    Head/Neck/Trunk Comments  Cervical AROM WNL  -       Left Upper Ext    Lt Shoulder Abduction PROM  85 deg; mild pain  -    Lt Shoulder Flexion PROM  80 deg; increased pain  -    Lt Shoulder External Rotation PROM  30 deg with arm at neutral   -    Lt Shoulder Internal Rotation PROM  46 deg with arm in neutral  -       Right Lower Ext    RT Lower Extremity Comments  AROM WNL grossly  -       MMT (Manual Muscle Testing)    Rt Upper Ext  Rt Shoulder WFL;Rt Elbow/Forearm WFL  -    Lt Upper Ext  Comments  -       MMT Left Upper Ext    Lt Upper Extremity Comments   Not assessed this visit due to protocol  -       Sensation    Light Touch  Partial deficits in the LUE  -      User Key  (r) = Recorded By, (t) = Taken By, (c) = Cosigned By    Initials Name Provider Type    Hannah Del Rosario PT Physical Therapist                      Therapy Education  Education Details: HEP: pendulums, wrist and elbow AROM, hand 6-pack   Given: HEP  Program: New  How Provided: Verbal, Demonstration, Written  Provided to: Patient  Level of Understanding: Teach back education performed     PT OP Goals     Row Name  04/05/21 0800          PT Short Term Goals    STG 1  Pt will be independent with HEP for self-management of symptoms.  -     STG 1 Progress  New  -     STG 2  Pt's flex and abd PROM will be at least 100 deg.  -     STG 2 Progress  New  [facility]        Long Term Goals    LTG Date to Achieve  07/06/21  -     LTG 1  Pt will report a subjective improvement of at least 80% in symptoms as a measure to return to PLOF.  -     LTG 1 Progress  New  -     LTG 2  Pt's QuickDASH score will improve by at least 11 points.  -     LTG 2 Progress  New  -     LTG 3  Pt will be able to independently wash and fix her own hair.  -     LTG 3 Progress  New  -     LTG 4  Pt's shoulder flex and abd AROM will improve to at least 90 deg.  -     LTG 4 Progress  New  -     LTG 5  Pt's shoulder flex and abd AROM will improve to at least 140 deg.  -     LTG 5 Progress  New  -     LTG 6  Pt's left shoulder MMT will improve to at least 4/5 grossly.  -     LTG 6 Progress  New  -     LTG 7  Pt will be able to return to work full time.  -     LTG 7 Progress  New  -     LTG 8  Pt will be independent with all ADLS and IADLS as a measure of return to prior level of function.  -     LTG 8 Progress  New  [facility]        Time Calculation    PT Goal Re-Cert Due Date  04/26/21  -       User Key  (r) = Recorded By, (t) = Taken By, (c) = Cosigned By    Initials Name Provider Type     Hannah Starks, PT Physical Therapist          PT Assessment/Plan     Row Name 04/05/21 0800          PT Assessment    Functional Limitations  Limitation in home management;Limitations in community activities;Limitations in functional capacity and performance;Performance in leisure activities;Performance in self-care ADL;Performance in work activities  -     Impairments  Range of motion;Pain;Muscle strength;Joint integrity;Sensation  -     Assessment Comments  Ms. Orr is a 56yo female who presents to skilled physical therapy s/p revision of  left reverse total shoulder that took place on 3/30/2021. Due to her protocol, she is extremely limited in her ability to complete all ADLs and IADL and is unable to work. She demonstrates decreased shoulder ROM, decreased shoulder strength, decreased  strength, full elbow/wrist/hand ROM, TTP, pain, and edema. She would benefit from skilled physical therapy to address these deficits in order for her to return to her prior level of function.   -     Please refer to paper survey for additional self-reported information  Yes  -     Rehab Potential  Fair Hx of 5 shoulder surgeries  -     Patient/caregiver participated in establishment of treatment plan and goals  Yes  -     Patient would benefit from skilled therapy intervention  Yes  -        PT Plan    PT Frequency  2x/week  -     Predicted Duration of Therapy Intervention (PT)  12 weeks  -     Planned CPT's?  PT EVAL MOD COMPLELITY: 67406;PT RE-EVAL: 03567;PT THER PROC EA 15 MIN: 88162;PT THER ACT EA 15 MIN: 52107;PT MANUAL THERAPY EA 15 MIN: 48756;PT NEUROMUSC RE-EDUCATION EA 15 MIN: 21871;PT GAIT TRAINING EA 15 MIN: 70988;PT SELF CARE/HOME MGMT/TRAIN EA 15: 46053;PT ELECTRICAL STIM UNATTEND: ;PT ELECTRICAL STIM ATTD EA 15 MIN: 64966;PT HOT/COLD PACK WC NONMCARE: 33499;PT THER SUPP EA 15 MIN  -     PT Plan Comments  Slow progression of PROM/AAROM/AROM and strength (follow protocol in chart). Stretching,  strength, wrist, hand, elbow ROM, modalities and manual needed.  -       User Key  (r) = Recorded By, (t) = Taken By, (c) = Cosigned By    Initials Name Provider Type     Hannah Starks, PT Physical Therapist            OP Exercises     Row Name 04/05/21 0800             Subjective Comments    Subjective Comments  See therapy patient history.  -         Subjective Pain    Able to rate subjective pain?  yes  -      Pre-Treatment Pain Level  4  -      Post-Treatment Pain Level  4  -         Exercise 1    Exercise Name 1  PROM   -         Exercise 2    Exercise Name 2  Teach HEP  -        User Key  (r) = Recorded By, (t) = Taken By, (c) = Cosigned By    Initials Name Provider Type     Hannah Starks PT Physical Therapist                        Outcome Measure Options: Quick DASH  Quick DASH  Open a tight or new jar.: Unable  Do heavy household chores (e.g., wash walls, wash floors): Unable  Carry a shopping bag or briefcase: Unable  Wash your back: Unable  Use a knife to cut food: Unable  Recreational activities in which you take some force or impact through your arm, should or hand (e.g. golf, hammering, tennis, etc.): Unable  During the past week, to what extent has your arm, shoulder, or hand problem interfered with your normal social activites with family, friends, neighbors or groups?: Extremely  During the past week, were you limited in your work or other regular daily activities as a result of your arm, shoulder or hand problem?: Unable  Arm, Shoulder, or hand pain: Severe  Tingling (pins and needles) in your arm, shoulder, or hand: Moderate  During the past week, how much difficulty have you had sleeping because of the pain in your arm, shoulder or hand?: Mild Difficulty  Number of Questions Answered: 11  Quick DASH Score: 86.36         Time Calculation:     Start Time: 0804     Therapy Charges for Today     Code Description Service Date Service Provider Modifiers Qty    [id number]  PT EVAL MOD COMPLEXITY 3 4/5/2021 Hannah Starks, PT GP 1          PT G-Codes  Outcome Measure Options: Quick DASH  Quick DASH Score: 86.36         Hannah Starks PT  4/5/2021

## 2021-04-08 ENCOUNTER — APPOINTMENT (OUTPATIENT)
Dept: PHYSICAL THERAPY | Facility: HOSPITAL | Age: 56
End: 2021-04-08

## 2021-04-12 ENCOUNTER — HOSPITAL ENCOUNTER (OUTPATIENT)
Dept: PHYSICAL THERAPY | Facility: HOSPITAL | Age: 56
Setting detail: THERAPIES SERIES
Discharge: HOME | End: 2021-04-12

## 2021-04-12 DIAGNOSIS — Z96.612 S/P REVERSE TOTAL SHOULDER ARTHROPLASTY, LEFT: Primary | ICD-10-CM

## 2021-04-12 DIAGNOSIS — M25.512 LEFT SHOULDER PAIN, UNSPECIFIED CHRONICITY: ICD-10-CM

## 2021-04-12 PROCEDURE — 97110 THERAPEUTIC EXERCISES: CPT | Performed by: PHYSICAL THERAPIST

## 2021-04-13 NOTE — THERAPY TREATMENT NOTE
Outpatient Physical Therapy Ortho Treatment Note  [facility]     Patient Name: Priti Orr  : 1965  MRN: [id number]  Today's Date: 2021      Visit Date: 2021    Attendance: 2/2 (eval + 9 visits through 21)  Subjective Improvement: 0%  Next MD Visit: 2021  Recert Date: 2021     Therapy Diagnosis: S/P Revision of reverse TSA 3/30/21  Visit Dx:    ICD-10-CM ICD-9-CM   1. S/P reverse total shoulder arthroplasty, left  Z96.612 V43.61   2. Left shoulder pain, unspecified chronicity  M25.512 719.41            Past Medical History:   Diagnosis Date   • Allergic rhinitis    • Anesthesia     nausea and vomiting with anesthesia requires pre medicaiton   • Anxiety    • Arthritis    • Asthma    • Benign neoplasm of meninges (CMS/HCC)     Post op       • Cobalamin deficiency    • Corneal abrasion    • Depression    • Encounter for gynecological examination (general) (routine) without abnormal findings    • Essential hypertension    • Fatigue    • Female stress incontinence    • Head injury    • History of delayed wound healing     Delayed healing of surgical wound      • Hyperlipidemia    • Low back pain    • Macular degeneration disease    • Nosebleed, symptom     Nosebleed/epistaxis symptom      • Osteoporosis    • Osteoporosis    • PONV (postoperative nausea and vomiting)    • Seizures (CMS/HCC)    • Sleep apnea     not wearing c-pap   • Wears glasses         Past Surgical History:   Procedure Laterality Date   • ARM LESION/CYST EXCISION  2014    Remove arm bone lesion (1)   • ARM SKIN LESION BIOPSY / EXCISION     • ARM WOUND REPAIR / CLOSURE  2014    Repair Superficial Wound TR-EXT 2.5 < CM 73818 (1)      • BRAIN SURGERY     • BREAST LUMPECTOMY     •  SECTION  1986     Section (1)     • ENDOSCOPY  2008    Colon endoscopy 85229 (1)     • EXCISION BREAST LESION W/ PREOP NEEDLE LOC  1981    Excision, breast lesion (1)    "  • HEMORRHOIDECTOMY     • HEMORRHOIDECTOMY  02/19/2008    Hemorrhoidectomy (2)      • HYSTERECTOMY     • INJECTION OF MEDICATION  06/06/2013    Celestone (betamethasone) (1)      • INJECTION OF MEDICATION  12/04/2013    Depo Medrol (Methylprednisone) (1)      • INJECTION OF MEDICATION  05/21/2013    Dexamethasone (2)      • INJECTION OF MEDICATION  01/29/2014    Kenalog (6)   • KNEE ARTHROSCOPY  03/05/2007    Knee arthroscopy, surgery (1)     • KNEE ARTHROSCOPY Right 7/13/2020    Procedure: KNEE ARTHROSCOPY with debridement medial meniscus;  Surgeon: Brooks Junior MD;  Location: [facility];  Service: Orthopedics;  Laterality: Right;   • LAPAROSCOPIC TUBAL LIGATION  04/16/1991    Tubal ligation (1)      • PAP SMEAR  01/18/2007    PAP SMEAR (1)      • SHOULDER SURGERY Left     x 4   • TOTAL ABDOMINAL HYSTERECTOMY WITH SALPINGO OOPHORECTOMY  1993    JOSE/BSO (1)      • TOTAL KNEE ARTHROPLASTY Left 11/19/2018    Procedure: TOTAL KNEE ARTHROPLASTY ATTUNE with adductor canal block - left;  Surgeon: Brooks Junior MD;  Location: [facility];  Service: Orthopedics       PT Ortho     Row Name 04/12/21 0900       Subjective Comments    Subjective Comments  Shoulder is stiff. \"It's all uncomfortable. I feel safer in the sling than I do without out, but it's all very, very annoying.\" Taking sling off 4x/day when sitting around. 0% subjective improvement. MD 4/16/21.  -SS       Precautions and Contraindications    Precautions  history of L reverse shoulder arthroplasty  -SS    Contraindications  DOS: 3/30/21  -       Subjective Pain    Able to rate subjective pain?  yes  -SS    Pre-Treatment Pain Level  3  -SS    Post-Treatment Pain Level  5  -SS    Subjective Pain Comment  declines ice  -SS       Posture/Observations    Posture/Observations Comments  Presents wearing sling and abduction pillow.   -SS       Left Upper Ext    Lt Shoulder Abduction PROM  90 deg; end-range pain  -SS    Lt Shoulder Flexion PROM  90 " "deg; end-range pain  -    Lt Shoulder External Rotation PROM  37 deg in scapular plane  -      User Key  (r) = Recorded By, (t) = Taken By, (c) = Cosigned By    Initials Name Provider Type    Nathaniel Zhang, PT DPT Physical Therapist            PT Assessment/Plan     Row Name 04/12/21 0900          PT Assessment    Functional Limitations  Limitation in home management;Limitations in community activities;Limitations in functional capacity and performance;Performance in leisure activities;Performance in self-care ADL;Performance in work activities  -     Impairments  Range of motion;Pain;Muscle strength;Joint integrity;Sensation  -     Assessment Comments  Tolerated treatment well. Limited by post-op status and protocol at this time.   -     Rehab Potential  Fair history of 5 shoulder surgeries  -     Patient/caregiver participated in establishment of treatment plan and goals  Yes  -SS     Patient would benefit from skilled therapy intervention  Yes  -SS        PT Plan    PT Frequency  2x/week  -     Predicted Duration of Therapy Intervention (PT)  12 weeks  -     PT Plan Comments  Continue protocol. Rehabilitation Progress Note next.   -       User Key  (r) = Recorded By, (t) = Taken By, (c) = Cosigned By    Initials Name Provider Type    Nathaniel Zhang, PT DPT Physical Therapist            OP Exercises     Row Name 04/12/21 0900             Subjective Comments    Subjective Comments  Shoulder is stiff. \"It's all uncomfortable. I feel safer in the sling than I do without out, but it's all very, very annoying.\" Taking sling off 4x/day when sitting around. 0% subjective improvement. MD 4/16/21.  -         Subjective Pain    Able to rate subjective pain?  yes  -      Pre-Treatment Pain Level  3  -SS      Post-Treatment Pain Level  5  -SS      Subjective Pain Comment  declines ice  -SS         Exercise 1    Exercise Name 1  Pendulums - Fwd, Lat, CW, CCW  -SS      Cueing 1  " Verbal;Demo  -SS      Sets 1  1  -SS      Reps 1  20 each  -SS         Exercise 2    Exercise Name 2  Scapular elevation  -SS      Cueing 2  Verbal;Tactile  -SS      Sets 2  1  -SS      Reps 2  15  -SS         Exercise 3    Exercise Name 3  Scapular retraction  -SS      Cueing 3  Verbal;Tactile  -SS      Sets 3  2  -SS      Reps 3  15  -SS         Exercise 4    Exercise Name 4  Elbow active flexion/extension  -SS      Cueing 4  Verbal  -SS      Sets 4  1  -SS      Reps 4  30  -SS         Exercise 5    Exercise Name 5  Elbow active-assisted extension  -SS      Cueing 5  Verbal;Tactile  -SS      Sets 5  1  -SS      Reps 5  30  -SS         Exercise 6    Exercise Name 6  PROM flexion to 90 deg  -SS      Cueing 6  Verbal;Tactile  -SS         Exercise 7    Exercise Name 7  PROM abduction to 90 deg  -SS      Cueing 7  Verbal;Tactile  -SS         Exercise 8    Exercise Name 8  Passive ER to <40 deg in scapular plane  -SS      Cueing 8  Verbal;Tactile  -SS      Sets 8  2  -SS      Reps 8  30  -SS        User Key  (r) = Recorded By, (t) = Taken By, (c) = Cosigned By    Initials Name Provider Type    Nathaniel Zhang, PT DPT Physical Therapist          PT OP Goals     Row Name 04/12/21 0900          PT Short Term Goals    STG 1  Pt will be independent with HEP for self-management of symptoms.  -     STG 1 Progress  Ongoing  -     STG 2  Pt's flex and abd PROM will be at least 100 deg.  -     STG 2 Progress  Ongoing  -        Long Term Goals    LTG Date to Achieve  07/06/21  -     LTG 1  Pt will report a subjective improvement of at least 80% in symptoms as a measure to return to PLOF.  -     LTG 1 Progress  Ongoing  -     LTG 2  Pt's QuickDASH score will improve by at least 11 points.  -     LTG 2 Progress  Ongoing  -     LTG 3  Pt will be able to independently wash and fix her own hair.  -     LTG 3 Progress  Ongoing  -     LTG 4  Pt's shoulder flex and abd AROM will improve to at least 90 deg.   -     LTG 4 Progress  Ongoing  -SS     LTG 5  Pt's shoulder flex and abd AROM will improve to at least 140 deg.  -     LTG 5 Progress  Ongoing  -SS     LTG 6  Pt's left shoulder MMT will improve to at least 4/5 grossly.  -     LTG 6 Progress  Ongoing  -SS     LTG 7  Pt will be able to return to work full time.  -     LTG 7 Progress  Ongoing  -SS     LTG 8  Pt will be independent with all ADLS and IADLS as a measure of return to prior level of function.  -     LTG 8 Progress  Ongoing  -        Time Calculation    PT Goal Re-Cert Due Date  04/26/21  -       User Key  (r) = Recorded By, (t) = Taken By, (c) = Cosigned By    Initials Name Provider Type     Nathaniel Sharp, PT DPT Physical Therapist          Therapy Education  Given: HEP  Program: Reinforced  How Provided: Verbal  Provided to: Patient  Level of Understanding: Verbalized              Time Calculation:   Start Time: 0931  Stop Time: 1014  Time Calculation (min): 43 min  Therapy Charges for Today     Code Description Service Date Service Provider Modifiers Qty    90268886947 HC PT THER PROC EA 15 MIN 4/12/2021 Nathaniel Sharp, PT DPT GP 3                    Nathaniel Sharp, PT, DPT, CHT  4/12/2021

## 2021-04-15 ENCOUNTER — HOSPITAL ENCOUNTER (OUTPATIENT)
Dept: PHYSICAL THERAPY | Facility: HOSPITAL | Age: 56
Setting detail: THERAPIES SERIES
Discharge: HOME | End: 2021-04-15

## 2021-04-15 DIAGNOSIS — Z96.612 S/P REVERSE TOTAL SHOULDER ARTHROPLASTY, LEFT: Primary | ICD-10-CM

## 2021-04-15 PROCEDURE — 97110 THERAPEUTIC EXERCISES: CPT | Performed by: PHYSICAL THERAPIST

## 2021-04-15 PROCEDURE — G0283 ELEC STIM OTHER THAN WOUND: HCPCS | Performed by: PHYSICAL THERAPIST

## 2021-04-15 NOTE — THERAPY TREATMENT NOTE
Outpatient Physical Therapy Ortho Treatment Note  [facility]     Patient Name: Priti Orr  : 1965  MRN: [id number]  Today's Date: 4/15/2021      Visit Date: 04/15/2021    Attendance: 3/3 (eval + 9 visits through 21)  Subjective Improvement: 5%  Next MD Visit: 2021  Recert Date: 2021     Therapy Diagnosis: S/P Revision of reverse TSA 3/30/21    Visit Dx:    ICD-10-CM ICD-9-CM   1. S/P reverse total shoulder arthroplasty, left  Z96.612 V43.61            Past Medical History:   Diagnosis Date   • Allergic rhinitis    • Anesthesia     nausea and vomiting with anesthesia requires pre medicaiton   • Anxiety    • Arthritis    • Asthma    • Benign neoplasm of meninges (CMS/HCC)     Post op       • Cobalamin deficiency    • Corneal abrasion    • Depression    • Encounter for gynecological examination (general) (routine) without abnormal findings    • Essential hypertension    • Fatigue    • Female stress incontinence    • Head injury    • History of delayed wound healing     Delayed healing of surgical wound      • Hyperlipidemia    • Low back pain    • Macular degeneration disease    • Nosebleed, symptom     Nosebleed/epistaxis symptom      • Osteoporosis    • Osteoporosis    • PONV (postoperative nausea and vomiting)    • Seizures (CMS/HCC)    • Sleep apnea     not wearing c-pap   • Wears glasses         Past Surgical History:   Procedure Laterality Date   • ARM LESION/CYST EXCISION  2014    Remove arm bone lesion (1)   • ARM SKIN LESION BIOPSY / EXCISION     • ARM WOUND REPAIR / CLOSURE  2014    Repair Superficial Wound TR-EXT 2.5 < CM 07371 (1)      • BRAIN SURGERY     • BREAST LUMPECTOMY     •  SECTION  1986     Section (1)     • ENDOSCOPY  2008    Colon endoscopy 62694 (1)     • EXCISION BREAST LESION W/ PREOP NEEDLE LOC  1981    Excision, breast lesion (1)     • HEMORRHOIDECTOMY     • HEMORRHOIDECTOMY  2008     "Hemorrhoidectomy (2)      • HYSTERECTOMY     • INJECTION OF MEDICATION  06/06/2013    Celestone (betamethasone) (1)      • INJECTION OF MEDICATION  12/04/2013    Depo Medrol (Methylprednisone) (1)      • INJECTION OF MEDICATION  05/21/2013    Dexamethasone (2)      • INJECTION OF MEDICATION  01/29/2014    Kenalog (6)   • KNEE ARTHROSCOPY  03/05/2007    Knee arthroscopy, surgery (1)     • KNEE ARTHROSCOPY Right 7/13/2020    Procedure: KNEE ARTHROSCOPY with debridement medial meniscus;  Surgeon: Brooks Junior MD;  Location: [facility];  Service: Orthopedics;  Laterality: Right;   • LAPAROSCOPIC TUBAL LIGATION  04/16/1991    Tubal ligation (1)      • PAP SMEAR  01/18/2007    PAP SMEAR (1)      • SHOULDER SURGERY Left     x 4   • TOTAL ABDOMINAL HYSTERECTOMY WITH SALPINGO OOPHORECTOMY  1993    JOSE/BSO (1)      • TOTAL KNEE ARTHROPLASTY Left 11/19/2018    Procedure: TOTAL KNEE ARTHROPLASTY ATTUNE with adductor canal block - left;  Surgeon: Brooks Junior MD;  Location: [facility];  Service: Orthopedics       PT Ortho     Row Name 04/15/21 1300       Subjective Comments    Subjective Comments  A little painful after her last therapy session. Shoulder is \"hurting today.\" Worked for 5 hours yesterday. 5% subjective improvement.   -SS       Precautions and Contraindications    Precautions  history of L reverse shoulder arthroplasty  -SS    Contraindications  DOS: 3/30/21  -SS       Subjective Pain    Able to rate subjective pain?  yes  -SS    Pre-Treatment Pain Level  5  -SS    Post-Treatment Pain Level  -- \"a little better\"  -SS       Posture/Observations    Posture/Observations Comments  Presents wearing sling and abduction pillow.   -SS       Left Upper Ext    Lt Shoulder Abduction PROM  90 deg  -SS    Lt Shoulder Flexion PROM  90 deg  -SS    Lt Shoulder External Rotation PROM  33 deg; end-range pain  -SS      User Key  (r) = Recorded By, (t) = Taken By, (c) = Cosigned By    Initials Name Provider Type    "  Nathaniel Sharp, PT DPT Physical Therapist            PT Assessment/Plan     Row Name 04/15/21 1300          PT Assessment    Functional Limitations  Limitation in home management;Limitations in community activities;Limitations in functional capacity and performance;Performance in leisure activities;Performance in self-care ADL;Performance in work activities  -     Impairments  Range of motion;Pain;Muscle strength;Joint integrity;Sensation  -     Assessment Comments  End-range pain with ER. Progressing well. At expectations set by protocol.  -     Rehab Potential  Fair barrier: history of 5 shoulder surgeries  -     Patient/caregiver participated in establishment of treatment plan and goals  Yes  -SS     Patient would benefit from skilled therapy intervention  Yes  -SS        PT Plan    PT Frequency  2x/week  -     Predicted Duration of Therapy Intervention (PT)  12 weeks  -     PT Plan Comments  Continue protocol. IFC and MHP or ice as needed for pain.  -       User Key  (r) = Recorded By, (t) = Taken By, (c) = Cosigned By    Initials Name Provider Type     Nathaniel Sharp, PT DPT Physical Therapist          Modalities     Row Name 04/15/21 1300             Moist Heat    MH Applied  Yes  -SS      Location  L shoulder concurrent with IFC estim  -SS      Rx Minutes  -- 20 mins  -SS      MH Prior to Rx  No  -SS      MH S/P Rx  Yes  -SS         ELECTRICAL STIMULATION    Attended/Unattended  Unattended  -      Stimulation Type  IFC  -SS      Location/Electrode Placement/Other  L shoulder concurrent with MHP  -       PT E-Stim Unattended (Manual) Minutes  20  -SS        User Key  (r) = Recorded By, (t) = Taken By, (c) = Cosigned By    Initials Name Provider Type     Nathaniel Sharp, PT DPT Physical Therapist        OP Exercises     Row Name 04/15/21 1300             Subjective Comments    Subjective Comments  A little painful after her last therapy session. Shoulder is  "\"hurting today.\" Worked for 5 hours yesterday. 5% subjective improvement.   -SS         Subjective Pain    Able to rate subjective pain?  yes  -SS      Pre-Treatment Pain Level  5  -SS      Post-Treatment Pain Level  -- \"a little better\"  -SS         Exercise 1    Exercise Name 1  PROM shoulder flexion  -SS      Cueing 1  Tactile  -SS      Sets 1  3  -SS      Reps 1  50  -SS         Exercise 2    Exercise Name 2  PROM shoulder abduction  -SS      Cueing 2  Tactile  -SS      Sets 2  3  -SS      Reps 2  50  -SS         Exercise 3    Exercise Name 3  PROM shoulder ER to < 40 deg in scapular  -SS      Cueing 3  Tactile  -SS      Sets 3  3  -SS      Reps 3  50  -SS         Exercise 4    Exercise Name 4  AROM elbow  -SS      Cueing 4  Verbal  -SS      Sets 4  1  -SS      Reps 4  50  -SS         Exercise 5    Exercise Name 5  AROM wrist  -SS      Cueing 5  Verbal  -SS      Sets 5  1  -SS      Reps 5  50  -SS         Exercise 6    Exercise Name 6  AROM hand  -SS      Cueing 6  Verbal  -SS      Sets 6  1  -SS      Reps 6  50  -SS        User Key  (r) = Recorded By, (t) = Taken By, (c) = Cosigned By    Initials Name Provider Type    SS Nathaniel Sharp, PT DPT Physical Therapist            PT OP Goals     Row Name 04/15/21 1300          PT Short Term Goals    STG 1  Pt will be independent with HEP for self-management of symptoms.  -     STG 1 Progress  Ongoing  -     STG 2  Pt's flex and abd PROM will be at least 100 deg.  -     STG 2 Progress  Ongoing  -        Long Term Goals    LTG Date to Achieve  07/06/21  -     LTG 1  Pt will report a subjective improvement of at least 80% in symptoms as a measure to return to PLOF.  -     LTG 1 Progress  Ongoing  -     LTG 2  Pt's QuickDASH score will improve by at least 11 points.  -     LTG 2 Progress  Ongoing  -     LTG 3  Pt will be able to independently wash and fix her own hair.  -     LTG 3 Progress  Ongoing  -     LTG 4  Pt's shoulder flex and abd " AROM will improve to at least 90 deg.  -     LTG 4 Progress  Ongoing  -SS     LTG 5  Pt's shoulder flex and abd AROM will improve to at least 140 deg.  -SS     LTG 5 Progress  Ongoing  -SS     LTG 6  Pt's left shoulder MMT will improve to at least 4/5 grossly.  -SS     LTG 6 Progress  Ongoing  -SS     LTG 7  Pt will be able to return to work full time.  -     LTG 7 Progress  Ongoing  -SS     LTG 8  Pt will be independent with all ADLS and IADLS as a measure of return to prior level of function.  -     LTG 8 Progress  Ongoing  -        Time Calculation    PT Goal Re-Cert Due Date  04/26/21  -       User Key  (r) = Recorded By, (t) = Taken By, (c) = Cosigned By    Initials Name Provider Type     Nathaniel Sharp, PT DPT Physical Therapist                         Time Calculation:   Start Time: 1300  Stop Time: 1358  Time Calculation (min): 58 min  Therapy Charges for Today     Code Description Service Date Service Provider Modifiers Qty    16525289146 HC PT THER PROC EA 15 MIN 4/15/2021 Nathaniel Sharp, PT DPT GP 2    29404302104 HC PT ELECTRICAL STIM UNATTENDED 4/15/2021 Nathaniel Sharp, PT DPT  1    30570955341 HC PT THER SUPP EA 15 MIN 4/15/2021 Nathaniel Sharp, PT DPT GP 1                    Nathaniel Sharp, PT, DPT, CHT  4/15/2021

## 2021-04-19 ENCOUNTER — HOSPITAL ENCOUNTER (OUTPATIENT)
Dept: PHYSICAL THERAPY | Facility: HOSPITAL | Age: 56
Setting detail: THERAPIES SERIES
Discharge: HOME | End: 2021-04-19

## 2021-04-19 DIAGNOSIS — Z96.612 S/P REVERSE TOTAL SHOULDER ARTHROPLASTY, LEFT: Primary | ICD-10-CM

## 2021-04-19 PROCEDURE — 97110 THERAPEUTIC EXERCISES: CPT | Performed by: PHYSICAL THERAPIST

## 2021-04-19 NOTE — THERAPY TREATMENT NOTE
Outpatient Physical Therapy Ortho Treatment Note  [facility]     Patient Name: Priti Orr  : 1965  MRN: [id number]  Today's Date: 2021      Visit Date: 2021    Attendance: / (eval + 9 visits through 21)  Subjective Improvement: 0%  Next MD Visit: 4 weeks  Recert Date: 2021     Therapy Diagnosis: S/P Revision of reverse TSA 3/30/21    Visit Dx:    ICD-10-CM ICD-9-CM   1. S/P reverse total shoulder arthroplasty, left  Z96.612 V43.61            Past Medical History:   Diagnosis Date   • Allergic rhinitis    • Anesthesia     nausea and vomiting with anesthesia requires pre medicaiton   • Anxiety    • Arthritis    • Asthma    • Benign neoplasm of meninges (CMS/HCC)     Post op       • Cobalamin deficiency    • Corneal abrasion    • Depression    • Encounter for gynecological examination (general) (routine) without abnormal findings    • Essential hypertension    • Fatigue    • Female stress incontinence    • Head injury    • History of delayed wound healing     Delayed healing of surgical wound      • Hyperlipidemia    • Low back pain    • Macular degeneration disease    • Nosebleed, symptom     Nosebleed/epistaxis symptom      • Osteoporosis    • Osteoporosis    • PONV (postoperative nausea and vomiting)    • Seizures (CMS/HCC)    • Sleep apnea     not wearing c-pap   • Wears glasses         Past Surgical History:   Procedure Laterality Date   • ARM LESION/CYST EXCISION  2014    Remove arm bone lesion (1)   • ARM SKIN LESION BIOPSY / EXCISION     • ARM WOUND REPAIR / CLOSURE  2014    Repair Superficial Wound TR-EXT 2.5 < CM 54294 (1)      • BRAIN SURGERY     • BREAST LUMPECTOMY     •  SECTION  1986     Section (1)     • ENDOSCOPY  2008    Colon endoscopy 66190 (1)     • EXCISION BREAST LESION W/ PREOP NEEDLE LOC  1981    Excision, breast lesion (1)     • HEMORRHOIDECTOMY     • HEMORRHOIDECTOMY  2008     "Hemorrhoidectomy (2)      • HYSTERECTOMY     • INJECTION OF MEDICATION  06/06/2013    Celestone (betamethasone) (1)      • INJECTION OF MEDICATION  12/04/2013    Depo Medrol (Methylprednisone) (1)      • INJECTION OF MEDICATION  05/21/2013    Dexamethasone (2)      • INJECTION OF MEDICATION  01/29/2014    Kenalog (6)   • KNEE ARTHROSCOPY  03/05/2007    Knee arthroscopy, surgery (1)     • KNEE ARTHROSCOPY Right 7/13/2020    Procedure: KNEE ARTHROSCOPY with debridement medial meniscus;  Surgeon: Brooks Junior MD;  Location: [facility];  Service: Orthopedics;  Laterality: Right;   • LAPAROSCOPIC TUBAL LIGATION  04/16/1991    Tubal ligation (1)      • PAP SMEAR  01/18/2007    PAP SMEAR (1)      • SHOULDER SURGERY Left     x 4   • TOTAL ABDOMINAL HYSTERECTOMY WITH SALPINGO OOPHORECTOMY  1993    JOSE/BSO (1)      • TOTAL KNEE ARTHROPLASTY Left 11/19/2018    Procedure: TOTAL KNEE ARTHROPLASTY ATTUNE with adductor canal block - left;  Surgeon: Brooks Junior MD;  Location: [facility];  Service: Orthopedics       PT Ortho     Row Name 04/19/21 0900       Subjective Comments    Subjective Comments  \"If I'm in the sling, I don't hurt. If I'm not in the sling, I hurt.\" Feels \"more safe\" wearing sling. Reports that Dr. Sy told her that he is unsure if the revision is going to hold. Reports that she was told that \"it's not stable.\" She cannot remember much due to being shocked.   -SS       Precautions and Contraindications    Precautions  history of L reverse shoulder arthroplasty  -SS    Contraindications  DOS: 3/30/21  -SS       Subjective Pain    Able to rate subjective pain?  yes  -SS    Pre-Treatment Pain Level  0  -SS    Post-Treatment Pain Level  2  -SS       Posture/Observations    Posture/Observations Comments  Presents wearing sling and abduction pillow.   -SS       Left Upper Ext    Lt Shoulder Abduction PROM  90 deg  -SS    Lt Shoulder Flexion PROM  90 deg  -SS      User Key  (r) = Recorded By, (t) " "= Taken By, (c) = Cosigned By    Initials Name Provider Type     Nathaniel Sharp, PT DPT Physical Therapist            PT Assessment/Plan     Row Name 04/19/21 0900          PT Assessment    Functional Limitations  Limitation in home management;Limitations in community activities;Limitations in functional capacity and performance;Performance in leisure activities;Performance in self-care ADL;Performance in work activities  -     Impairments  Range of motion;Pain;Muscle strength;Joint integrity;Sensation  -     Assessment Comments  Deferred ER this date to limit torque through humerus. Patient down due to bad news regarding post-op. Motion appropriate for stage of protocol.  -SS     Rehab Potential  Fair barrier: history of 5 surgeries  -     Patient/caregiver participated in establishment of treatment plan and goals  Yes  -SS     Patient would benefit from skilled therapy intervention  Yes  -SS        PT Plan    PT Frequency  2x/week  -     Predicted Duration of Therapy Intervention (PT)  12 weeks  -     PT Plan Comments  Advance protocol and teach scar massage next.  -       User Key  (r) = Recorded By, (t) = Taken By, (c) = Cosigned By    Initials Name Provider Type     Nathaniel Sharp, PT DPT Physical Therapist            OP Exercises     Row Name 04/19/21 0900             Subjective Comments    Subjective Comments  \"If I'm in the sling, I don't hurt. If I'm not in the sling, I hurt.\" Feels \"more safe\" wearing sling. Reports that Dr. Sy told her that he is unsure if the revision is going to hold. Reports that she was told that \"it's not stable.\" She cannot remember much due to being shocked.   -         Subjective Pain    Able to rate subjective pain?  yes  -      Pre-Treatment Pain Level  0  -      Post-Treatment Pain Level  2  -         Exercise 1    Exercise Name 1  Seated scapular retraction  -      Cueing 1  Verbal;Tactile  -      Sets 1  3  -      Reps 1  15  " -SS         Exercise 2    Exercise Name 2  PROM shoulder abduction  -SS      Cueing 2  Tactile  -SS      Sets 2  3  -SS      Reps 2  50  -SS         Exercise 3    Exercise Name 3  PROM shoulder flexion  -SS      Cueing 3  Tactile  -SS      Sets 3  3  -SS      Reps 3  50  -SS        User Key  (r) = Recorded By, (t) = Taken By, (c) = Cosigned By    Initials Name Provider Type    Nathaniel Zhang, PT DPT Physical Therapist            PT OP Goals     Row Name 04/19/21 0900          PT Short Term Goals    STG 1  Pt will be independent with HEP for self-management of symptoms.  -SS     STG 1 Progress  Ongoing  -SS     STG 2  Pt's flex and abd PROM will be at least 100 deg.  -SS     STG 2 Progress  Ongoing  -SS        Long Term Goals    LTG Date to Achieve  07/06/21  -     LTG 1  Pt will report a subjective improvement of at least 80% in symptoms as a measure to return to PLOF.  -     LTG 1 Progress  Ongoing  -SS     LTG 2  Pt's QuickDASH score will improve by at least 11 points.  -SS     LTG 2 Progress  Ongoing  -SS     LTG 3  Pt will be able to independently wash and fix her own hair.  -     LTG 3 Progress  Ongoing  -SS     LTG 4  Pt's shoulder flex and abd AROM will improve to at least 90 deg.  -     LTG 4 Progress  Ongoing  -SS     LTG 5  Pt's shoulder flex and abd AROM will improve to at least 140 deg.  -     LTG 5 Progress  Ongoing  -SS     LTG 6  Pt's left shoulder MMT will improve to at least 4/5 grossly.  -     LTG 6 Progress  Ongoing  -SS     LTG 7  Pt will be able to return to work full time.  -     LTG 7 Progress  Ongoing  -SS     LTG 8  Pt will be independent with all ADLS and IADLS as a measure of return to prior level of function.  -     LTG 8 Progress  Ongoing  -        Time Calculation    PT Goal Re-Cert Due Date  04/26/21  -       User Key  (r) = Recorded By, (t) = Taken By, (c) = Cosigned By    Initials Name Provider Type    Nathaniel Zhang, PT DPT Physical  Therapist          Therapy Education  Education Details: protection of shoulder, sling wear  Given: Pain management, Other (comment)  How Provided: Verbal  Provided to: Patient  Level of Understanding: Verbalized              Time Calculation:   Start Time: 0933  Stop Time: 1009  Time Calculation (min): 36 min  Therapy Charges for Today     Code Description Service Date Service Provider Modifiers Qty    12771917453 HC PT THER PROC EA 15 MIN 4/19/2021 Nathaniel Sharp, PT DPT GP 2                    Nathaniel Sharp, PT, DPT, CHT  4/19/2021

## 2021-04-21 ENCOUNTER — HOSPITAL ENCOUNTER (OUTPATIENT)
Dept: PHYSICAL THERAPY | Facility: HOSPITAL | Age: 56
Setting detail: THERAPIES SERIES
Discharge: HOME | End: 2021-04-21

## 2021-04-21 DIAGNOSIS — Z96.612 S/P REVERSE TOTAL SHOULDER ARTHROPLASTY, LEFT: Primary | ICD-10-CM

## 2021-04-21 PROCEDURE — 97110 THERAPEUTIC EXERCISES: CPT | Performed by: PHYSICAL THERAPIST

## 2021-04-21 NOTE — THERAPY TREATMENT NOTE
Outpatient Physical Therapy Ortho Treatment Note  [facility]     Patient Name: Priti Orr  : 1965  MRN: [id number]  Today's Date: 2021      Visit Date: 2021    Attendance: 5/5 (eval + 9 visits through 21)  Subjective Improvement: 0%  Next MD Visit: 4 weeks  Recert Date: 2021     Therapy Diagnosis: S/P Revision of reverse TSA 3/30/21     Visit Dx:    ICD-10-CM ICD-9-CM   1. S/P reverse total shoulder arthroplasty, left  Z96.612 V43.61            Past Medical History:   Diagnosis Date   • Allergic rhinitis    • Anesthesia     nausea and vomiting with anesthesia requires pre medicaiton   • Anxiety    • Arthritis    • Asthma    • Benign neoplasm of meninges (CMS/HCC)     Post op       • Cobalamin deficiency    • Corneal abrasion    • Depression    • Encounter for gynecological examination (general) (routine) without abnormal findings    • Essential hypertension    • Fatigue    • Female stress incontinence    • Head injury    • History of delayed wound healing     Delayed healing of surgical wound      • Hyperlipidemia    • Low back pain    • Macular degeneration disease    • Nosebleed, symptom     Nosebleed/epistaxis symptom      • Osteoporosis    • Osteoporosis    • PONV (postoperative nausea and vomiting)    • Seizures (CMS/HCC)    • Sleep apnea     not wearing c-pap   • Wears glasses         Past Surgical History:   Procedure Laterality Date   • ARM LESION/CYST EXCISION  2014    Remove arm bone lesion (1)   • ARM SKIN LESION BIOPSY / EXCISION     • ARM WOUND REPAIR / CLOSURE  2014    Repair Superficial Wound TR-EXT 2.5 < CM 14705 (1)      • BRAIN SURGERY     • BREAST LUMPECTOMY     •  SECTION  1986     Section (1)     • ENDOSCOPY  2008    Colon endoscopy 22691 (1)     • EXCISION BREAST LESION W/ PREOP NEEDLE LOC  1981    Excision, breast lesion (1)     • HEMORRHOIDECTOMY     • HEMORRHOIDECTOMY  2008     Hemorrhoidectomy (2)      • HYSTERECTOMY     • INJECTION OF MEDICATION  06/06/2013    Celestone (betamethasone) (1)      • INJECTION OF MEDICATION  12/04/2013    Depo Medrol (Methylprednisone) (1)      • INJECTION OF MEDICATION  05/21/2013    Dexamethasone (2)      • INJECTION OF MEDICATION  01/29/2014    Kenalog (6)   • KNEE ARTHROSCOPY  03/05/2007    Knee arthroscopy, surgery (1)     • KNEE ARTHROSCOPY Right 7/13/2020    Procedure: KNEE ARTHROSCOPY with debridement medial meniscus;  Surgeon: Brooks Junior MD;  Location: [facility];  Service: Orthopedics;  Laterality: Right;   • LAPAROSCOPIC TUBAL LIGATION  04/16/1991    Tubal ligation (1)      • PAP SMEAR  01/18/2007    PAP SMEAR (1)      • SHOULDER SURGERY Left     x 4   • TOTAL ABDOMINAL HYSTERECTOMY WITH SALPINGO OOPHORECTOMY  1993    JOSE/BSO (1)      • TOTAL KNEE ARTHROPLASTY Left 11/19/2018    Procedure: TOTAL KNEE ARTHROPLASTY ATTUNE with adductor canal block - left;  Surgeon: Brooks Junior MD;  Location: [facility];  Service: Orthopedics       PT Ortho     Row Name 04/21/21 0900       Precautions and Contraindications    Precautions  history of L reverse shoulder arthroplasty  -SS    Contraindications  DOS: 3/30/21  -       Subjective Pain    Able to rate subjective pain?  yes  -    Pre-Treatment Pain Level  3  -       Posture/Observations    Posture/Observations Comments  Presents wearing sling and abduction pillow.   -SS       Left Upper Ext    Lt Shoulder Abduction AROM  AAROM 110 deg in supine  -SS    Lt Shoulder Flexion AROM  AAROM 130 deg in supine  -SS    Lt Shoulder External Rotation AROM  AAROM 50 deg in scapular plane  -SS    Lt Shoulder Internal Rotation AROM  AAROM 60 deg in scapular plane  -SS      User Key  (r) = Recorded By, (t) = Taken By, (c) = Cosigned By    Initials Name Provider Type    SS Nathaniel Sharp, PT DPT Physical Therapist            PT Assessment/Plan     Row Name 04/21/21 0900          PT  "Assessment    Functional Limitations  Limitation in home management;Limitations in community activities;Limitations in functional capacity and performance;Performance in leisure activities;Performance in self-care ADL;Performance in work activities  -     Impairments  Range of motion;Pain;Muscle strength;Joint integrity;Sensation  -     Assessment Comments  Tolerated initiation of AAROM well this date. AAROM is at upper limit of protocol.   -SS     Rehab Potential  Fair barrier: history of 5 surgeries to L shoulder  -SS     Patient/caregiver participated in establishment of treatment plan and goals  Yes  -SS     Patient would benefit from skilled therapy intervention  Yes  -SS        PT Plan    PT Frequency  2x/week  -SS     Predicted Duration of Therapy Intervention (PT)  12 weeks  -     PT Plan Comments  Continue protocol. Next: initiate pulleys and attempt shoulder isometrics  -       User Key  (r) = Recorded By, (t) = Taken By, (c) = Cosigned By    Initials Name Provider Type    SS Nathaniel Sharp, PT DPT Physical Therapist            OP Exercises     Row Name 04/21/21 0900             Subjective Comments    Subjective Comments  Shoulder hurting a little more today. \"I hurt really bad yesterday.\" Dentist appointment yesterday with less support of her arm in the sling. Continues to feel better in the sling.   -SS         Subjective Pain    Able to rate subjective pain?  yes  -SS      Pre-Treatment Pain Level  3  -SS      Post-Treatment Pain Level  3  -SS         Exercise 1    Exercise Name 1  AAROM supine flexion </= 130 deg  -SS      Cueing 1  Verbal;Tactile  -SS      Sets 1  2  -SS      Reps 1  20  -SS         Exercise 2    Exercise Name 2  AAROM abduction to </= 110 deg  -SS      Cueing 2  Verbal;Tactile  -SS      Sets 2  2  -SS      Reps 2  20  -SS         Exercise 3    Exercise Name 3  AAROM ER in scapular plane </= 50 deg  -SS      Cueing 3  Verbal;Tactile  -SS      Sets 3  2  -SS      Reps 3 "  20  -SS         Exercise 4    Exercise Name 4  AAROM IR in scapular plane </= 60 deg  -      Cueing 4  Verbal;Tactile  -      Sets 4  2  -SS      Reps 4  20  -SS         Exercise 5    Exercise Name 5  Supine elbow flexion AROM with focus on arm control  -      Cueing 5  Verbal  -      Reps 5  50  -SS        User Key  (r) = Recorded By, (t) = Taken By, (c) = Cosigned By    Initials Name Provider Type     Nathaniel Sharp, PT DPT Physical Therapist            PT OP Goals     Row Name 04/21/21 0900          PT Short Term Goals    STG 1  Pt will be independent with HEP for self-management of symptoms.  -     STG 1 Progress  Ongoing  -     STG 2  Pt's flex and abd PROM will be at least 100 deg.  -     STG 2 Progress  Met  -        Long Term Goals    LTG Date to Achieve  07/06/21  -     LTG 1  Pt will report a subjective improvement of at least 80% in symptoms as a measure to return to PLOF.  -     LTG 1 Progress  Ongoing  -     LTG 2  Pt's QuickDASH score will improve by at least 11 points.  -     LTG 2 Progress  Ongoing  -     LTG 3  Pt will be able to independently wash and fix her own hair.  -     LTG 3 Progress  Ongoing  -     LTG 4  Pt's shoulder flex and abd AROM will improve to at least 90 deg.  -     LTG 4 Progress  Ongoing  -     LTG 5  Pt's shoulder flex and abd AROM will improve to at least 140 deg.  -     LTG 5 Progress  Ongoing  -     LTG 6  Pt's left shoulder MMT will improve to at least 4/5 grossly.  -     LTG 6 Progress  Ongoing  -     LTG 7  Pt will be able to return to work full time.  -     LTG 7 Progress  Ongoing  -     LTG 8  Pt will be independent with all ADLS and IADLS as a measure of return to prior level of function.  -     LTG 8 Progress  Ongoing  -        Time Calculation    PT Goal Re-Cert Due Date  04/26/21  -       User Key  (r) = Recorded By, (t) = Taken By, (c) = Cosigned By    Initials Name Provider Type    Nathaniel Zhang  Ra, PT DPT Physical Therapist          Time Calculation:   Start Time: 0933  Stop Time: 1003  Time Calculation (min): 30 min  Therapy Charges for Today     Code Description Service Date Service Provider Modifiers Qty    02575402807 HC PT THER PROC EA 15 MIN 4/21/2021 Nathaniel Sharp, PT DPT GP 2                Addendum to correct post-treatment pain rating.    Nathaniel Sharp, PT, DPT, CHT  4/21/2021

## 2021-04-26 ENCOUNTER — HOSPITAL ENCOUNTER (OUTPATIENT)
Dept: PHYSICAL THERAPY | Facility: HOSPITAL | Age: 56
Setting detail: THERAPIES SERIES
Discharge: HOME | End: 2021-04-26

## 2021-04-26 DIAGNOSIS — Z96.612 S/P REVERSE TOTAL SHOULDER ARTHROPLASTY, LEFT: Primary | ICD-10-CM

## 2021-04-26 PROCEDURE — 97110 THERAPEUTIC EXERCISES: CPT | Performed by: PHYSICAL THERAPIST

## 2021-04-26 PROCEDURE — G0283 ELEC STIM OTHER THAN WOUND: HCPCS | Performed by: PHYSICAL THERAPIST

## 2021-04-26 NOTE — THERAPY PROGRESS REPORT/RE-CERT
Outpatient Physical Therapy Ortho Progress Note  [facility]     Patient Name: Priti Orr  : 1965  MRN: [id number]  Today's Date: 2021      Visit Date: 2021    Attendance: / (eval + 9 visits through 21)  Subjective Improvement: 10%  Next MD Visit: 4 weeks  Recert Date:  2021     Therapy Diagnosis: S/P Revision of reverse TSA 3/30/21     Changes in Medications: none  Changes in MD Orders: continue P.T. ROM L shoulder  Number of Work Days Lost: <2 weeks       Past Medical History:   Diagnosis Date   • Allergic rhinitis    • Anesthesia     nausea and vomiting with anesthesia requires pre medicaiton   • Anxiety    • Arthritis    • Asthma    • Benign neoplasm of meninges (CMS/HCC)     Post op       • Cobalamin deficiency    • Corneal abrasion    • Depression    • Encounter for gynecological examination (general) (routine) without abnormal findings    • Essential hypertension    • Fatigue    • Female stress incontinence    • Head injury    • History of delayed wound healing     Delayed healing of surgical wound      • Hyperlipidemia    • Low back pain    • Macular degeneration disease    • Nosebleed, symptom     Nosebleed/epistaxis symptom      • Osteoporosis    • Osteoporosis    • PONV (postoperative nausea and vomiting)    • Seizures (CMS/HCC)    • Sleep apnea     not wearing c-pap   • Wears glasses         Past Surgical History:   Procedure Laterality Date   • ARM LESION/CYST EXCISION  2014    Remove arm bone lesion (1)   • ARM SKIN LESION BIOPSY / EXCISION     • ARM WOUND REPAIR / CLOSURE  2014    Repair Superficial Wound TR-EXT 2.5 < CM 40471 (1)      • BRAIN SURGERY     • BREAST LUMPECTOMY     •  SECTION  1986     Section (1)     • ENDOSCOPY  2008    Colon endoscopy 33099 (1)     • EXCISION BREAST LESION W/ PREOP NEEDLE LOC  1981    Excision, breast lesion (1)     • HEMORRHOIDECTOMY     • HEMORRHOIDECTOMY   "02/19/2008    Hemorrhoidectomy (2)      • HYSTERECTOMY     • INJECTION OF MEDICATION  06/06/2013    Celestone (betamethasone) (1)      • INJECTION OF MEDICATION  12/04/2013    Depo Medrol (Methylprednisone) (1)      • INJECTION OF MEDICATION  05/21/2013    Dexamethasone (2)      • INJECTION OF MEDICATION  01/29/2014    Kenalog (6)   • KNEE ARTHROSCOPY  03/05/2007    Knee arthroscopy, surgery (1)     • KNEE ARTHROSCOPY Right 7/13/2020    Procedure: KNEE ARTHROSCOPY with debridement medial meniscus;  Surgeon: Brooks Junior MD;  Location: [facility];  Service: Orthopedics;  Laterality: Right;   • LAPAROSCOPIC TUBAL LIGATION  04/16/1991    Tubal ligation (1)      • PAP SMEAR  01/18/2007    PAP SMEAR (1)      • SHOULDER SURGERY Left     x 4   • TOTAL ABDOMINAL HYSTERECTOMY WITH SALPINGO OOPHORECTOMY  1993    JOSE/BSO (1)      • TOTAL KNEE ARTHROPLASTY Left 11/19/2018    Procedure: TOTAL KNEE ARTHROPLASTY ATTUNE with adductor canal block - left;  Surgeon: Brooks Junior MD;  Location: [facility];  Service: Orthopedics       Visit Dx:     ICD-10-CM ICD-9-CM   1. S/P reverse total shoulder arthroplasty, left  Z96.612 V43.61             PT Ortho     Row Name 04/26/21 0900       Subjective Comments    Subjective Comments  Shoulder a little sore today. Thinks that she may have slept weird last night. Sleeping in sling. Uses sling \"all the time if I'm up and walking around\" but not when at home sitting around. Continues to have pain in the shoulder when it is unsupported. Sore after last therapy session. 10% subjective improvement. No medication changes.   -SS       Precautions and Contraindications    Precautions  history of L reverse shoulder arthroplasty  -SS    Contraindications  DOS: 3/30/21  -SS       Subjective Pain    Able to rate subjective pain?  yes  -SS    Pre-Treatment Pain Level  4  -SS    Post-Treatment Pain Level  2  -SS       Posture/Observations    Posture/Observations Comments  Presents " wearing sling and abduction pillow.   -SS       Left Upper Ext    Lt Shoulder Abduction AROM  AAROM 110 deg  -    Lt Shoulder Flexion AROM  AAROM 105 deg with wand; 130 deg AAROM with PT assist  -SS    Lt Shoulder External Rotation AROM  AAROM 50 deg in scapular plane  -SS    Lt Shoulder Internal Rotation AROM  AAROM 60 deg in scapular plane  -       MMT Left Upper Ext    Lt Upper Extremity Comments   deferred  -      User Key  (r) = Recorded By, (t) = Taken By, (c) = Cosigned By    Initials Name Provider Type    SS Nathaniel Sharp, PT DPT Physical Therapist            Therapy Education  Education Details: shoulder protection, therapy plan, progress  How Provided: Verbal  Provided to: Patient  Level of Understanding: Verbalized     PT OP Goals     Row Name 04/26/21 0900          PT Short Term Goals    STG Date to Achieve  05/17/21  -     STG 1  Pt will be independent with HEP for self-management of symptoms.  -     STG 1 Progress  Ongoing;Met  -     STG 2  Pt's flex and abd PROM will be at least 100 deg.  -     STG 2 Progress  Met  -     STG 3  Flexion AAROM to be 145 deg  -     STG 3 Progress  New  -     STG 4  Shoulder active flexion to be >/= 60 deg.  -     STG 4 Progress  New  -     STG 5  Shoulder abduction AROM to be >/= 60 deg.  -     STG 5 Progress  New  -        Long Term Goals    LTG Date to Achieve  07/06/21  -     LTG 1  Pt will report a subjective improvement of at least 80% in symptoms as a measure to return to PLOF.  -     LTG 1 Progress  Ongoing  -     LTG 2  Pt's QuickDASH score will improve by at least 11 points.  -     LTG 2 Progress  Ongoing  -     LTG 3  Pt will be able to independently wash and fix her own hair.  -     LTG 3 Progress  Ongoing  -     LTG 4  Pt's shoulder flex and abd AROM will improve to at least 90 deg.  -     LTG 4 Progress  Ongoing  -     LTG 5  Pt's shoulder flex and abd AROM will improve to at least 140 deg.  -     LTG  5 Progress  Ongoing  -     LTG 6  Pt's left shoulder MMT will improve to at least 4/5 grossly.  -     LTG 6 Progress  Ongoing  -     LTG 7  Pt will be able to return to work full time.  -     LTG 7 Progress  Ongoing  -     LTG 8  Pt will be independent with all ADLS and IADLS as a measure of return to prior level of function.  -     LTG 8 Progress  Ongoing  -        Time Calculation    PT Goal Re-Cert Due Date  05/17/21  -       User Key  (r) = Recorded By, (t) = Taken By, (c) = Cosigned By    Initials Name Provider Type     Nathaniel Sharp, PT DPT Physical Therapist          PT Assessment/Plan     Row Name 04/26/21 0900          PT Assessment    Functional Limitations  Limitation in home management;Limitations in community activities;Limitations in functional capacity and performance;Performance in leisure activities;Performance in self-care ADL;Performance in work activities  -     Impairments  Range of motion;Pain;Muscle strength;Joint integrity;Sensation  -     Assessment Comments  Continued pain when glenohumeral joint unsupported. Pain with using wand for AAROM. Motion is at protocol limits. Motor control issues with deltoid isometrics.  -     Rehab Potential  Fair barrier: history of 5 surgeries to L shoulder  -     Patient/caregiver participated in establishment of treatment plan and goals  Yes  -     Patient would benefit from skilled therapy intervention  Yes  -SS        PT Plan    Predicted Duration of Therapy Intervention (PT)  1/wk for 2-3 weeks then anticipating 2/wk x 9-12 weeks  -     PT Plan Comments  Decreasing to 1/wk at this junction as motion is as expected. Continue protocol. Deltoid isometrics as tolerated  -       User Key  (r) = Recorded By, (t) = Taken By, (c) = Cosigned By    Initials Name Provider Type     Nathaniel Sharp, PT DPT Physical Therapist          Modalities     Row Name 04/26/21 0900             Moist Heat    MH Applied  Yes  -SS   "    Location  L shoulder concurrent with IFC estim  -SS      PT Moist Heat Minutes  20  -SS      MH Prior to Rx  No  -SS      MH S/P Rx  Yes  -SS         ELECTRICAL STIMULATION    Attended/Unattended  Unattended  -SS      Stimulation Type  IFC  -      Location/Electrode Placement/Other  L shoulder concurrent with P  -SS      72304 PT E-Stim Unattended Minutes  20  -SS        User Key  (r) = Recorded By, (t) = Taken By, (c) = Cosigned By    Initials Name Provider Type     Nathaniel Sharp, PT DPT Physical Therapist        OP Exercises     Row Name 04/26/21 0900             Subjective Comments    Subjective Comments  Shoulder a little sore today. Thinks that she may have slept weird last night. Sleeping in sling. Uses sling \"all the time if I'm up and walking around\" but not when at home sitting around. Continues to have pain in the shoulder when it is unsupported. Sore after last therapy session. 10% subjective improvement. No medication changes.   -SS         Subjective Pain    Able to rate subjective pain?  yes  -SS      Pre-Treatment Pain Level  4  -SS      Post-Treatment Pain Level  2  -SS         Exercise 1    Exercise Name 1  Recheck  -SS         Exercise 2    Exercise Name 2  Wand AAROM shoulder press-up  -SS      Cueing 2  Verbal  -SS      Sets 2  1  -SS      Reps 2  5  -SS         Exercise 3    Exercise Name 3  AAROM shoulder abduction  -SS      Cueing 3  Verbal  -SS      Sets 3  1  -SS      Reps 3  30  -SS         Exercise 4    Exercise Name 4  Pulley flexion  -SS      Cueing 4  Verbal  -SS      Sets 4  1  -SS      Reps 4  20  -SS         Exercise 5    Exercise Name 5  Isometric shoulder flexion  -SS      Cueing 5  Verbal;Tactile  -SS      Sets 5  1  -SS      Reps 5  10  -SS      Time 5  3 sec hold  -SS         Exercise 6    Exercise Name 6  Isometric shoulder abduction   -SS      Cueing 6  Verbal;Tactile  -SS      Sets 6  1  -SS      Reps 6  10  -SS      Time 6  3 sec hold  -SS         Exercise " 7    Exercise Name 7  Isometric shoulder ER in neutral  -SS      Cueing 7  Verbal;Tactile  -SS      Sets 7  1  -SS      Reps 7  10  -SS      Time 7  3 sec hold  -SS         Exercise 8    Exercise Name 8  Isometric shoulder IR in neutral  -SS      Cueing 8  Verbal;Tactile  -SS      Sets 8  1  -SS      Reps 8  10  -SS      Time 8  3 sec hold  -SS        User Key  (r) = Recorded By, (t) = Taken By, (c) = Cosigned By    Initials Name Provider Type    SS Nathaniel Sharp, PT DPT Physical Therapist             Time Calculation:     Start Time: 0927  Stop Time: 1020  Time Calculation (min): 53 min  Time Calculation- PT  Start Time: 0927  Stop Time: 1020  Time Calculation (min): 53 min  PT Goal Re-Cert Due Date: 05/17/21  Untimed Charges  72770 PT E-Stim Unattended Minutes: 20  PT Moist Heat Minutes: 20  Total Minutes  Untimed Charges Total Minutes: 40   Total Minutes: 40     Therapy Charges for Today     Code Description Service Date Service Provider Modifiers Qty    85315573813 HC PT ELECTRICAL STIM UNATTENDED 4/26/2021 Nathaniel Sharp, PT DPT  1    44559098523 HC PT THER PROC EA 15 MIN 4/26/2021 Nathaniel Sharp, PT DPT GP 2    04048305027 HC PT THER SUPP EA 15 MIN 4/26/2021 Nathaniel Sharp, PT DPT GP 1                    Nathaniel Sharp, PT, DPT, CHT  4/26/2021

## 2021-04-28 ENCOUNTER — APPOINTMENT (OUTPATIENT)
Dept: PHYSICAL THERAPY | Facility: HOSPITAL | Age: 56
End: 2021-04-28

## 2021-05-03 ENCOUNTER — HOSPITAL ENCOUNTER (OUTPATIENT)
Dept: PHYSICAL THERAPY | Facility: HOSPITAL | Age: 56
Setting detail: THERAPIES SERIES
Discharge: HOME | End: 2021-05-03

## 2021-05-03 DIAGNOSIS — Z96.612 S/P REVERSE TOTAL SHOULDER ARTHROPLASTY, LEFT: Primary | ICD-10-CM

## 2021-05-03 PROCEDURE — 97110 THERAPEUTIC EXERCISES: CPT | Performed by: PHYSICAL THERAPIST

## 2021-05-03 NOTE — THERAPY TREATMENT NOTE
Outpatient Physical Therapy Ortho Treatment Note  [facility]     Patient Name: Priti Orr  : 1965  MRN: [id number]  Today's Date: 5/3/2021      Visit Date: 2021    Attendance:  (eval + 9 visits through 21)  Subjective Improvement: 10%  Next MD Visit: 21  Recert Date:  2021     Therapy Diagnosis: S/P Revision of reverse TSA 3/30/21     Visit Dx:    ICD-10-CM ICD-9-CM   1. S/P reverse total shoulder arthroplasty, left  Z96.612 V43.61            Past Medical History:   Diagnosis Date   • Allergic rhinitis    • Anesthesia     nausea and vomiting with anesthesia requires pre medicaiton   • Anxiety    • Arthritis    • Asthma    • Benign neoplasm of meninges (CMS/HCC)     Post op       • Cobalamin deficiency    • Corneal abrasion    • Depression    • Encounter for gynecological examination (general) (routine) without abnormal findings    • Essential hypertension    • Fatigue    • Female stress incontinence    • Head injury    • History of delayed wound healing     Delayed healing of surgical wound      • Hyperlipidemia    • Low back pain    • Macular degeneration disease    • Nosebleed, symptom     Nosebleed/epistaxis symptom      • Osteoporosis    • Osteoporosis    • PONV (postoperative nausea and vomiting)    • Seizures (CMS/HCC)    • Sleep apnea     not wearing c-pap   • Wears glasses         Past Surgical History:   Procedure Laterality Date   • ARM LESION/CYST EXCISION  2014    Remove arm bone lesion (1)   • ARM SKIN LESION BIOPSY / EXCISION     • ARM WOUND REPAIR / CLOSURE  2014    Repair Superficial Wound TR-EXT 2.5 < CM 82908 (1)      • BRAIN SURGERY     • BREAST LUMPECTOMY     •  SECTION  1986     Section (1)     • ENDOSCOPY  2008    Colon endoscopy 42649 (1)     • EXCISION BREAST LESION W/ PREOP NEEDLE LOC  1981    Excision, breast lesion (1)     • HEMORRHOIDECTOMY     • HEMORRHOIDECTOMY  2008     "Hemorrhoidectomy (2)      • HYSTERECTOMY     • INJECTION OF MEDICATION  06/06/2013    Celestone (betamethasone) (1)      • INJECTION OF MEDICATION  12/04/2013    Depo Medrol (Methylprednisone) (1)      • INJECTION OF MEDICATION  05/21/2013    Dexamethasone (2)      • INJECTION OF MEDICATION  01/29/2014    Kenalog (6)   • KNEE ARTHROSCOPY  03/05/2007    Knee arthroscopy, surgery (1)     • KNEE ARTHROSCOPY Right 7/13/2020    Procedure: KNEE ARTHROSCOPY with debridement medial meniscus;  Surgeon: Brooks Junior MD;  Location: [facility];  Service: Orthopedics;  Laterality: Right;   • LAPAROSCOPIC TUBAL LIGATION  04/16/1991    Tubal ligation (1)      • PAP SMEAR  01/18/2007    PAP SMEAR (1)      • SHOULDER SURGERY Left     x 4   • TOTAL ABDOMINAL HYSTERECTOMY WITH SALPINGO OOPHORECTOMY  1993    JOSE/BSO (1)      • TOTAL KNEE ARTHROPLASTY Left 11/19/2018    Procedure: TOTAL KNEE ARTHROPLASTY ATTUNE with adductor canal block - left;  Surgeon: Brooks Junior MD;  Location: [facility];  Service: Orthopedics       PT Ortho     Row Name 05/03/21 0900       Subjective Comments    Subjective Comments  Out of town next week on vacation. \"I had a bad week.\" Has been told to wear sling out in crowds only. Went out yesterday for 5 hours. Pain elevated to point she was vomiting last night. Pain worse when out of sling. Aching pain across the upper humerus.   -       Precautions and Contraindications    Precautions  history of L reverse shoulder arthroplasty  -    Contraindications  DOS: 3/30/21  -       Subjective Pain    Able to rate subjective pain?  yes  -SS    Pre-Treatment Pain Level  1  -SS    Post-Treatment Pain Level  2  -       Posture/Observations    Posture/Observations Comments  Presents without sling.  -SS       Left Upper Ext    Lt Shoulder Abduction AROM  AAROM 110 deg  -SS    Lt Shoulder Flexion AROM  AAROM 130 deg with wand and P.T. assist  -SS      User Key  (r) = Recorded By, (t) = Taken By, " "(c) = Cosigned By    Initials Name Provider Type     Nathaniel Sharp, PT DPT Physical Therapist            PT Assessment/Plan     Row Name 05/03/21 0900          PT Assessment    Functional Limitations  Limitation in home management;Limitations in community activities;Limitations in functional capacity and performance;Performance in leisure activities;Performance in self-care ADL;Performance in work activities  -     Impairments  Range of motion;Pain;Muscle strength;Joint integrity;Sensation  -     Assessment Comments  Range of motion is appropriate for protocol/stage of healing. Improved ability to complete wand AA flexion this date. Difficulty holding pillow against wall for isometric flexion. Patient out of town next week.  -     Rehab Potential  Fair barrier: history of 5 surgeries to L shoulder  -     Patient/caregiver participated in establishment of treatment plan and goals  Yes  -SS     Patient would benefit from skilled therapy intervention  Yes  -SS        PT Plan    Predicted Duration of Therapy Intervention (PT)  1/wk for 2-3 weeks then anticipating 2/wk x 9-12 weeks  -     PT Plan Comments  Continue protocol. Increase to AROM next therapy session.   -       User Key  (r) = Recorded By, (t) = Taken By, (c) = Cosigned By    Initials Name Provider Type     Nathaniel Sharp, PT DPT Physical Therapist            OP Exercises     Row Name 05/03/21 0900             Subjective Comments    Subjective Comments  Out of town next week on vacation. \"I had a bad week.\" Has been told to wear sling out in crowds only. Went out yesterday for 5 hours. Pain elevated to point she was vomiting last night. Pain worse when out of sling. Aching pain across the upper humerus.   -         Subjective Pain    Able to rate subjective pain?  yes  -      Pre-Treatment Pain Level  1  -SS      Post-Treatment Pain Level  2  -         Exercise 1    Exercise Name 1  AAROM shoulder flexion to 130 deg - PT " assist  -SS      Cueing 1  Verbal;Tactile  -SS      Sets 1  2  -SS      Reps 1  30  -SS         Exercise 2    Exercise Name 2  Wand AAROM shoulder flexion to 130 deg  -SS      Cueing 2  Verbal  -SS      Sets 2  1  -SS      Reps 2  10  -SS         Exercise 3    Exercise Name 3  AAROM shoulder abduction - PT assist  -SS      Cueing 3  Verbal;Tactile  -SS      Sets 3  1  -SS      Reps 3  50  -SS         Exercise 4    Exercise Name 4  Scapular retraction - initiation  -SS      Cueing 4  Verbal;Tactile  -SS      Sets 4  1  -SS      Reps 4  30  -SS         Exercise 5    Exercise Name 5  Manually resisted scapular retraction  -SS      Cueing 5  Verbal;Tactile  -SS      Sets 5  1  -SS      Reps 5  20  -SS         Exercise 6    Exercise Name 6  Isometric shoulder flexion  -SS      Cueing 6  Verbal  -SS      Sets 6  1  -SS      Reps 6  15  -SS      Time 6  3 sec hold  -SS         Exercise 7    Exercise Name 7  Isometric shoulder abduction  -SS      Cueing 7  Verbal  -SS      Sets 7  1  -SS      Reps 7  20  -SS      Time 7  3 sec hold  -SS         Exercise 8    Exercise Name 8  Wand biceps curls  -SS      Cueing 8  Verbal  -SS      Sets 8  1  -SS      Reps 8  30  -SS         Exercise 9    Exercise Name 9  Wand AAROM shoulder ER in sitting  -SS      Cueing 9  Verbal;Demo  -SS      Sets 9  1  -SS      Reps 9  15  -SS        User Key  (r) = Recorded By, (t) = Taken By, (c) = Cosigned By    Initials Name Provider Type    Nathaniel Zhang, PT DPT Physical Therapist            PT OP Goals     Row Name 05/03/21 0900          PT Short Term Goals    STG Date to Achieve  05/17/21  -     STG 1  Pt will be independent with HEP for self-management of symptoms.  -SS     STG 1 Progress  Ongoing;Met  -     STG 2  Pt's flex and abd PROM will be at least 100 deg.  -     STG 2 Progress  Met  -     STG 3  Flexion AAROM to be 145 deg  -     STG 3 Progress  Ongoing  -     STG 4  Shoulder active flexion to be >/= 60 deg.  -      STG 4 Progress  Ongoing  -SS     STG 5  Shoulder abduction AROM to be >/= 60 deg.  -     STG 5 Progress  Ongoing  -SS        Long Term Goals    LTG Date to Achieve  07/06/21  -SS     LTG 1  Pt will report a subjective improvement of at least 80% in symptoms as a measure to return to PLOF.  -SS     LTG 1 Progress  Ongoing  -SS     LTG 2  Pt's QuickDASH score will improve by at least 11 points.  -SS     LTG 2 Progress  Ongoing  -SS     LTG 3  Pt will be able to independently wash and fix her own hair.  -SS     LTG 3 Progress  Ongoing  -SS     LTG 4  Pt's shoulder flex and abd AROM will improve to at least 90 deg.  -SS     LTG 4 Progress  Ongoing  -SS     LTG 5  Pt's shoulder flex and abd AROM will improve to at least 140 deg.  -SS     LTG 5 Progress  Ongoing  -SS     LTG 6  Pt's left shoulder MMT will improve to at least 4/5 grossly.  -     LTG 6 Progress  Ongoing  -SS     LTG 7  Pt will be able to return to work full time.  -     LTG 7 Progress  Ongoing  -SS     LTG 8  Pt will be independent with all ADLS and IADLS as a measure of return to prior level of function.  -     LTG 8 Progress  Ongoing  -        Time Calculation    PT Goal Re-Cert Due Date  05/17/21  -       User Key  (r) = Recorded By, (t) = Taken By, (c) = Cosigned By    Initials Name Provider Type    Nathaniel Zhang, PT DPT Physical Therapist          Therapy Education  Education Details: isometric shoulder flexion and abduction, start AROM flexion no greater than shoulder height on 5/11/21  Given: HEP  Program: Modified  How Provided: Verbal, Written, Demonstration  Provided to: Patient  Level of Understanding: Verbalized, Demonstrated              Time Calculation:   Start Time: 0930  Stop Time: 1013  Time Calculation (min): 43 min  Time Calculation- PT  Start Time: 0930  Stop Time: 1013  Time Calculation (min): 43 min  PT Goal Re-Cert Due Date: 05/17/21  Therapy Charges for Today     Code Description Service Date Service  Provider Modifiers Qty    21049325549 HC PT THER PROC EA 15 MIN 5/3/2021 Nathaniel Sharp, PT DPT GP 3                    Nathaniel Sharp, PT, DPT, CHT  5/3/2021

## 2021-05-05 ENCOUNTER — APPOINTMENT (OUTPATIENT)
Dept: PHYSICAL THERAPY | Facility: HOSPITAL | Age: 56
End: 2021-05-05

## 2021-05-10 ENCOUNTER — APPOINTMENT (OUTPATIENT)
Dept: PHYSICAL THERAPY | Facility: HOSPITAL | Age: 56
End: 2021-05-10

## 2021-05-12 ENCOUNTER — APPOINTMENT (OUTPATIENT)
Dept: PHYSICAL THERAPY | Facility: HOSPITAL | Age: 56
End: 2021-05-12

## 2021-05-17 ENCOUNTER — OFFICE VISIT (OUTPATIENT)
Dept: FAMILY MEDICINE CLINIC | Facility: CLINIC | Age: 56
End: 2021-05-17

## 2021-05-17 ENCOUNTER — LAB (OUTPATIENT)
Dept: LAB | Facility: HOSPITAL | Age: 56
End: 2021-05-17

## 2021-05-17 ENCOUNTER — HOSPITAL ENCOUNTER (OUTPATIENT)
Dept: PHYSICAL THERAPY | Facility: HOSPITAL | Age: 56
Setting detail: THERAPIES SERIES
Discharge: HOME | End: 2021-05-17

## 2021-05-17 VITALS
TEMPERATURE: 97.5 F | DIASTOLIC BLOOD PRESSURE: 84 MMHG | HEIGHT: 60 IN | SYSTOLIC BLOOD PRESSURE: 130 MMHG | BODY MASS INDEX: 42.35 KG/M2 | WEIGHT: 215.7 LBS | HEART RATE: 96 BPM | OXYGEN SATURATION: 97 %

## 2021-05-17 DIAGNOSIS — Z00.00 ANNUAL PHYSICAL EXAM: ICD-10-CM

## 2021-05-17 DIAGNOSIS — J45.20 MILD INTERMITTENT ASTHMA WITHOUT COMPLICATION: ICD-10-CM

## 2021-05-17 DIAGNOSIS — M19.90 ARTHRITIS: ICD-10-CM

## 2021-05-17 DIAGNOSIS — E55.9 VITAMIN D DEFICIENCY: ICD-10-CM

## 2021-05-17 DIAGNOSIS — K21.9 GASTROESOPHAGEAL REFLUX DISEASE WITHOUT ESOPHAGITIS: ICD-10-CM

## 2021-05-17 DIAGNOSIS — Z76.89 ENCOUNTER TO ESTABLISH CARE: Primary | ICD-10-CM

## 2021-05-17 DIAGNOSIS — K90.49 FOOD INTOLERANCE: ICD-10-CM

## 2021-05-17 DIAGNOSIS — I10 ESSENTIAL HYPERTENSION: ICD-10-CM

## 2021-05-17 DIAGNOSIS — Z96.612 S/P REVERSE TOTAL SHOULDER ARTHROPLASTY, LEFT: Primary | ICD-10-CM

## 2021-05-17 DIAGNOSIS — E78.2 MIXED HYPERLIPIDEMIA: ICD-10-CM

## 2021-05-17 DIAGNOSIS — Z12.31 SCREENING MAMMOGRAM, ENCOUNTER FOR: ICD-10-CM

## 2021-05-17 DIAGNOSIS — R19.7 DIARRHEA, UNSPECIFIED TYPE: ICD-10-CM

## 2021-05-17 DIAGNOSIS — Z11.59 NEED FOR HEPATITIS C SCREENING TEST: ICD-10-CM

## 2021-05-17 DIAGNOSIS — F33.0 MILD EPISODE OF RECURRENT MAJOR DEPRESSIVE DISORDER (HCC): ICD-10-CM

## 2021-05-17 DIAGNOSIS — F41.9 ANXIETY: ICD-10-CM

## 2021-05-17 PROCEDURE — 86003 ALLG SPEC IGE CRUDE XTRC EA: CPT

## 2021-05-17 PROCEDURE — 86803 HEPATITIS C AB TEST: CPT

## 2021-05-17 PROCEDURE — 36415 COLL VENOUS BLD VENIPUNCTURE: CPT

## 2021-05-17 PROCEDURE — 80061 LIPID PANEL: CPT

## 2021-05-17 PROCEDURE — 84439 ASSAY OF FREE THYROXINE: CPT

## 2021-05-17 PROCEDURE — 85025 COMPLETE CBC W/AUTO DIFF WBC: CPT

## 2021-05-17 PROCEDURE — 86008 ALLG SPEC IGE RECOMB EA: CPT

## 2021-05-17 PROCEDURE — 82784 ASSAY IGA/IGD/IGG/IGM EACH: CPT

## 2021-05-17 PROCEDURE — 82306 VITAMIN D 25 HYDROXY: CPT

## 2021-05-17 PROCEDURE — 84443 ASSAY THYROID STIM HORMONE: CPT

## 2021-05-17 PROCEDURE — 99214 OFFICE O/P EST MOD 30 MIN: CPT | Performed by: NURSE PRACTITIONER

## 2021-05-17 PROCEDURE — 97110 THERAPEUTIC EXERCISES: CPT | Performed by: PHYSICAL THERAPIST

## 2021-05-17 PROCEDURE — 83516 IMMUNOASSAY NONANTIBODY: CPT

## 2021-05-17 PROCEDURE — 80053 COMPREHEN METABOLIC PANEL: CPT

## 2021-05-17 PROCEDURE — 86255 FLUORESCENT ANTIBODY SCREEN: CPT

## 2021-05-17 RX ORDER — SENNOSIDES 8.6 MG
650 CAPSULE ORAL EVERY 8 HOURS PRN
COMMUNITY
End: 2022-10-27

## 2021-05-17 RX ORDER — LISINOPRIL AND HYDROCHLOROTHIAZIDE 20; 12.5 MG/1; MG/1
1 TABLET ORAL DAILY
Qty: 90 TABLET | Refills: 3 | Status: SHIPPED | OUTPATIENT
Start: 2021-05-17 | End: 2023-04-04

## 2021-05-17 RX ORDER — BUPROPION HYDROCHLORIDE 300 MG/1
300 TABLET ORAL EVERY MORNING
Qty: 90 TABLET | Refills: 3 | Status: SHIPPED | OUTPATIENT
Start: 2021-05-17 | End: 2022-06-17

## 2021-05-17 RX ORDER — ROSUVASTATIN CALCIUM 5 MG/1
TABLET, COATED ORAL
COMMUNITY
Start: 2021-03-01 | End: 2021-05-17 | Stop reason: SDUPTHER

## 2021-05-17 RX ORDER — PANTOPRAZOLE SODIUM 40 MG/1
40 TABLET, DELAYED RELEASE ORAL DAILY
Qty: 90 TABLET | Refills: 0 | Status: SHIPPED | OUTPATIENT
Start: 2021-05-17 | End: 2022-04-18

## 2021-05-17 RX ORDER — ROSUVASTATIN CALCIUM 5 MG/1
5 TABLET, COATED ORAL EVERY EVENING
Qty: 90 TABLET | Refills: 3 | Status: SHIPPED | OUTPATIENT
Start: 2021-05-17

## 2021-05-17 RX ORDER — BUSPIRONE HYDROCHLORIDE 10 MG/1
10 TABLET ORAL 3 TIMES DAILY
Qty: 90 TABLET | Refills: 3 | Status: SHIPPED | OUTPATIENT
Start: 2021-05-17 | End: 2022-03-23

## 2021-05-17 RX ORDER — MELOXICAM 15 MG/1
15 TABLET ORAL DAILY
Qty: 90 TABLET | Refills: 3 | Status: SHIPPED | OUTPATIENT
Start: 2021-05-17 | End: 2022-04-18 | Stop reason: SDUPTHER

## 2021-05-17 RX ORDER — MONTELUKAST SODIUM 10 MG/1
10 TABLET ORAL NIGHTLY
Qty: 90 TABLET | Refills: 3 | Status: SHIPPED | OUTPATIENT
Start: 2021-05-17 | End: 2023-04-04

## 2021-05-17 RX ORDER — FLUTICASONE PROPIONATE 50 MCG
1 SPRAY, SUSPENSION (ML) NASAL DAILY
COMMUNITY
Start: 2021-03-10 | End: 2022-09-27

## 2021-05-17 RX ORDER — BUSPIRONE HYDROCHLORIDE 10 MG/1
10 TABLET ORAL
COMMUNITY
End: 2021-05-17 | Stop reason: SDUPTHER

## 2021-05-17 RX ORDER — LANOLIN ALCOHOL/MO/W.PET/CERES
CREAM (GRAM) TOPICAL
COMMUNITY
End: 2021-05-17 | Stop reason: SDUPTHER

## 2021-05-17 RX ORDER — LORATADINE 10 MG/1
10 TABLET ORAL
COMMUNITY
End: 2022-09-27

## 2021-05-17 RX ORDER — BUPROPION HYDROCHLORIDE 300 MG/1
TABLET ORAL
COMMUNITY
Start: 2021-04-20 | End: 2021-05-17 | Stop reason: SDUPTHER

## 2021-05-17 RX ORDER — LISINOPRIL AND HYDROCHLOROTHIAZIDE 20; 12.5 MG/1; MG/1
TABLET ORAL
COMMUNITY
Start: 2021-02-26 | End: 2021-05-17 | Stop reason: SDUPTHER

## 2021-05-17 RX ORDER — MONTELUKAST SODIUM 10 MG/1
TABLET ORAL
COMMUNITY
Start: 2021-03-10 | End: 2021-05-17 | Stop reason: SDUPTHER

## 2021-05-17 NOTE — THERAPY PROGRESS REPORT/RE-CERT
Outpatient Physical Therapy Ortho Progress Note  [facility]     Patient Name: Priti Orr  : 1965  MRN: [id number]  Today's Date: 2021      Visit Date: 2021      Attendance: / (eval + 9 visits through 21)  Subjective Improvement: 30%  Next MD Visit: 21  Recert Date:  2021     Therapy Diagnosis: S/P Revision of reverse TSA 3/30/21     Changes in Medications: none noted  Changes in MD Orders: none noted  Number of Work Days Lost: <2 weeks       Past Medical History:   Diagnosis Date   • Allergic rhinitis    • Anesthesia     nausea and vomiting with anesthesia requires pre medicaiton   • Anxiety    • Arthritis    • Asthma    • Benign neoplasm of meninges (CMS/HCC)     Post op       • Cobalamin deficiency    • Corneal abrasion    • Depression    • Encounter for gynecological examination (general) (routine) without abnormal findings    • Essential hypertension    • Fatigue    • Female stress incontinence    • Head injury    • History of delayed wound healing     Delayed healing of surgical wound      • Hyperlipidemia    • Low back pain    • Macular degeneration disease    • Nosebleed, symptom     Nosebleed/epistaxis symptom      • Osteoporosis    • Osteoporosis    • PONV (postoperative nausea and vomiting)    • Seizures (CMS/HCC)    • Sleep apnea     not wearing c-pap   • Wears glasses         Past Surgical History:   Procedure Laterality Date   • ARM LESION/CYST EXCISION  2014    Remove arm bone lesion (1)   • ARM SKIN LESION BIOPSY / EXCISION     • ARM WOUND REPAIR / CLOSURE  2014    Repair Superficial Wound TR-EXT 2.5 < CM 77328 (1)      • BRAIN SURGERY     • BREAST LUMPECTOMY     •  SECTION  1986     Section (1)     • ENDOSCOPY  2008    Colon endoscopy 05205 (1)     • EXCISION BREAST LESION W/ PREOP NEEDLE LOC  1981    Excision, breast lesion (1)     • HEMORRHOIDECTOMY     • HEMORRHOIDECTOMY  2008     "Hemorrhoidectomy (2)      • HYSTERECTOMY     • INJECTION OF MEDICATION  06/06/2013    Celestone (betamethasone) (1)      • INJECTION OF MEDICATION  12/04/2013    Depo Medrol (Methylprednisone) (1)      • INJECTION OF MEDICATION  05/21/2013    Dexamethasone (2)      • INJECTION OF MEDICATION  01/29/2014    Kenalog (6)   • KNEE ARTHROSCOPY  03/05/2007    Knee arthroscopy, surgery (1)     • KNEE ARTHROSCOPY Right 7/13/2020    Procedure: KNEE ARTHROSCOPY with debridement medial meniscus;  Surgeon: Brooks Junior MD;  Location: [facility];  Service: Orthopedics;  Laterality: Right;   • LAPAROSCOPIC TUBAL LIGATION  04/16/1991    Tubal ligation (1)      • PAP SMEAR  01/18/2007    PAP SMEAR (1)      • SHOULDER SURGERY Left     x 4   • TOTAL ABDOMINAL HYSTERECTOMY WITH SALPINGO OOPHORECTOMY  1993    JOSE/BSO (1)      • TOTAL KNEE ARTHROPLASTY Left 11/19/2018    Procedure: TOTAL KNEE ARTHROPLASTY ATTUNE with adductor canal block - left;  Surgeon: Brooks Junior MD;  Location: [facility];  Service: Orthopedics       Visit Dx:     ICD-10-CM ICD-9-CM   1. S/P reverse total shoulder arthroplasty, left  Z96.612 V43.61             PT Ortho     Row Name 05/17/21 1000       Subjective Comments    Subjective Comments  Drove about half of the trip on her vacation last week. Shoulder sore driving and riding from trying to find a comfortable position. Unable to lay prone. Actively moving shoulder. Pain if moves too far or \"wrong.\" MD 5/24/21. 30% subjective improvement. No medication changes.   -SS       Precautions and Contraindications    Precautions  history of L reverse shoulder arthroplasty  -SS    Contraindications  DOS: 3/30/21  -SS       Subjective Pain    Able to rate subjective pain?  yes  -SS    Pre-Treatment Pain Level  0  -SS    Post-Treatment Pain Level  0  -SS       Posture/Observations    Posture/Observations Comments  Presents without sling. No acute distress.   -SS       Left Upper Ext    Lt Shoulder " Abduction PROM  140 deg  -    Lt Shoulder Flexion AROM  AAROM 130 deg in supine; supine active 90 deg; 75 deg active flexion in standing with scapular substitution  -SS    Lt Shoulder Flexion PROM  143 deg  -SS    Lt Shoulder External Rotation PROM  65 deg with shoulder abducted 90 deg  -    Lt Shoulder Internal Rotation PROM  68 deg with shoulder abducted 60 deg  -      User Key  (r) = Recorded By, (t) = Taken By, (c) = Cosigned By    Initials Name Provider Type    SS Nathaniel Sharp, PT DPT Physical Therapist          Therapy Education  Education Details: T-band shoulder extension, adduction, IR, and rows  Given: HEP  Program: Progressed  How Provided: Verbal, Demonstration, Written  Provided to: Patient  Level of Understanding: Verbalized, Demonstrated     PT OP Goals     Row Name 05/17/21 1000          PT Short Term Goals    STG Date to Achieve  06/07/21  -     STG 1  Pt will be independent with HEP for self-management of symptoms.  -     STG 1 Progress  Ongoing;Met  -     STG 2  Pt's flex and abd PROM will be at least 100 deg.  -     STG 2 Progress  Met  -     STG 3  Flexion AAROM to be 145 deg  -     STG 3 Progress  Ongoing  -     STG 4  Shoulder active flexion to be >/= 60 deg.  -     STG 4 Progress  Met  -     STG 5  Shoulder abduction AROM to be >/= 60 deg.  -     STG 5 Progress  Ongoing  -        Long Term Goals    LTG Date to Achieve  07/06/21  -     LTG 1  Pt will report a subjective improvement of at least 80% in symptoms as a measure to return to PLOF.  -     LTG 1 Progress  Ongoing  -     LTG 2  Pt's QuickDASH score will improve by at least 11 points.  -     LTG 2 Progress  Ongoing  -     LTG 3  Pt will be able to independently wash and fix her own hair.  -     LTG 3 Progress  Ongoing  -     LTG 4  Pt's shoulder flex and abd AROM will improve to at least 90 deg.  -     LTG 4 Progress  Ongoing  -     LTG 5  Pt's shoulder flex and abd AROM will  improve to at least 140 deg.  -     LTG 5 Progress  Ongoing  -     LTG 6  Pt's left shoulder MMT will improve to at least 4/5 grossly.  -     LTG 6 Progress  Ongoing  -     LTG 7  Pt will be able to return to work full time.  -     LTG 7 Progress  Ongoing  -     LTG 8  Pt will be independent with all ADLS and IADLS as a measure of return to prior level of function.  -     LTG 8 Progress  Ongoing  -        Time Calculation    PT Goal Re-Cert Due Date  06/07/21  -       User Key  (r) = Recorded By, (t) = Taken By, (c) = Cosigned By    Initials Name Provider Type     Nathaniel Sharp, PT DPT Physical Therapist          PT Assessment/Plan     Row Name 05/17/21 1000          PT Assessment    Functional Limitations  Limitation in home management;Limitations in community activities;Limitations in functional capacity and performance;Performance in leisure activities;Performance in self-care ADL;Performance in work activities  -     Impairments  Range of motion;Pain;Muscle strength;Joint integrity;Sensation  -     Assessment Comments  Patient is progressing well at this time. Scapular substitution noted with shoulder elevation. Tolerates today's treatment without complaint.  -     Rehab Potential  Fair barrier: 5 previous L shoulder surgeries  -     Patient/caregiver participated in establishment of treatment plan and goals  Yes  -     Patient would benefit from skilled therapy intervention  Yes  -        PT Plan    PT Frequency  2x/week  -     Predicted Duration of Therapy Intervention (PT)  8-12 weeks  -     PT Plan Comments  Continue protocol. Progress as tolerated. Work on ER motion and motor control.  -       User Key  (r) = Recorded By, (t) = Taken By, (c) = Cosigned By    Initials Name Provider Type     Nathaniel Sharp, PT DPT Physical Therapist            OP Exercises     Row Name 05/17/21 1000             Subjective Comments    Subjective Comments  Drove about  "half of the trip on her vacation last week. Shoulder sore driving and riding from trying to find a comfortable position. Unable to lay prone. Actively moving shoulder. Pain if moves too far or \"wrong.\" MD 5/24/21. 30% subjective improvement. No medication changes.   -SS         Subjective Pain    Able to rate subjective pain?  yes  -SS      Pre-Treatment Pain Level  0  -SS      Post-Treatment Pain Level  0  -SS         Exercise 1    Exercise Name 1  recheck  -SS         Exercise 2    Exercise Name 2  Standing active flexion < 90 deg  -SS      Cueing 2  Verbal;Tactile  -SS      Sets 2  1  -SS      Reps 2  30  -SS      Additional Comments  limit to avoid scapular substitution  -SS         Exercise 3    Exercise Name 3  Supinated biceps curls  -SS      Cueing 3  Verbal;Demo  -SS      Sets 3  2  -SS      Reps 3  8  -SS      Additional Comments  2#  -SS         Exercise 4    Exercise Name 4  Ball rolls CW/CCW, F/B, R/L on table with table elevated to proximal thigh  -SS      Cueing 4  Verbal  -SS      Time 4  3 mins  -SS         Exercise 5    Exercise Name 5  T-band extension  -SS      Cueing 5  Verbal  -SS      Sets 5  1  -SS      Reps 5  10  -SS      Additional Comments  yellow  -SS         Exercise 6    Exercise Name 6  T-band adduction  -SS      Cueing 6  Verbal  -SS      Sets 6  1  -SS      Reps 6  10  -SS      Additional Comments  yellow  -SS         Exercise 7    Exercise Name 7  T-band IR  -SS      Cueing 7  Verbal  -SS      Sets 7  1  -SS      Reps 7  10  -SS      Additional Comments  yellow  -SS         Exercise 8    Exercise Name 8  AAROM ER in standing neutral  -SS      Cueing 8  Verbal;Tactile  -SS      Sets 8  1  -SS      Reps 8  50  -SS         Exercise 9    Exercise Name 9  T-band row  -SS        User Key  (r) = Recorded By, (t) = Taken By, (c) = Cosigned By    Initials Name Provider Type    Nathaniel Zhang, MARICRUZ DPT Physical Therapist           Time Calculation:     Start Time: 1018  Stop Time: " 1100  Time Calculation (min): 42 min     Therapy Charges for Today     Code Description Service Date Service Provider Modifiers Qty    24326047459 HC PT THER PROC EA 15 MIN 5/17/2021 Nathaniel Sharp, PT DPT GP 3                    Nathaniel Sharp, PT, DPT, CHT  5/17/2021

## 2021-05-17 NOTE — PROGRESS NOTES
Subjective   Priti Orr is a 55 y.o. female.     CC: Establish care-hypertension, hyperlipidemia, asthma, diarrhea, anxiety, depression, vitamin D deficiency, arthritis    Hypertension  This is a chronic problem. The current episode started more than 1 year ago. The problem is controlled. Associated symptoms include anxiety. Pertinent negatives include no blurred vision, chest pain, headaches, palpitations, shortness of breath or sweats. Risk factors for coronary artery disease include family history, dyslipidemia, obesity, post-menopausal state and sedentary lifestyle. Current antihypertension treatment includes ACE inhibitors and diuretics. The current treatment provides significant improvement. Compliance problems include exercise and diet.  There is no history of angina, kidney disease or CAD/MI.   Hyperlipidemia  This is a chronic problem. The current episode started more than 1 year ago. The problem is controlled. Recent lipid tests were reviewed and are variable. Exacerbating diseases include obesity. Factors aggravating her hyperlipidemia include fatty foods. Pertinent negatives include no chest pain, focal sensory loss, focal weakness, leg pain, myalgias or shortness of breath. Current antihyperlipidemic treatment includes statins. The current treatment provides significant improvement of lipids. Compliance problems include adherence to diet and adherence to exercise.  Risk factors for coronary artery disease include family history, dyslipidemia, obesity, hypertension, a sedentary lifestyle and post-menopausal.   Asthma  She complains of cough (dry cough when laying down at night and first thing in the morning and then resolves. Risk factors include frequent use of NSAIDS and obesity. ). There is no chest tightness, difficulty breathing, frequent throat clearing, hemoptysis, hoarse voice, shortness of breath, sputum production or wheezing. This is a chronic problem. The current episode started more  than 1 year ago. The problem occurs rarely. Pertinent negatives include no appetite change, chest pain, dyspnea on exertion, fever, headaches, myalgias, sore throat, sweats, trouble swallowing or weight loss. Her symptoms are aggravated by pollen and URI. Her symptoms are alleviated by beta-agonist and rest. She reports significant improvement on treatment. Her past medical history is significant for asthma.   Diarrhea   This is a new problem. The current episode started more than 1 month ago (at least 2 months). The problem has been unchanged. The stool consistency is described as watery. The patient states that diarrhea does not awaken her from sleep. Associated symptoms include coughing (dry cough when laying down at night and first thing in the morning and then resolves. Risk factors include frequent use of NSAIDS and obesity. ). Pertinent negatives include no abdominal pain, arthralgias, bloating, chills, fever, headaches, increased  flatus, myalgias, sweats, URI, vomiting or weight loss. Exacerbated by: eating, any type of food. There are no known risk factors. She has tried change of diet for the symptoms. The treatment provided no relief.   Anxiety  Presents for initial visit. Onset was 6 to 12 months ago. The problem has been gradually improving. Symptoms include excessive worry, nervous/anxious behavior (stable) and restlessness. Patient reports no chest pain, depressed mood (stable), dizziness, nausea, palpitations, shortness of breath or suicidal ideas. Symptoms occur rarely. The severity of symptoms is mild. Exacerbated by: covid concerns. The quality of sleep is fair. Nighttime awakenings: occasional.     Her past medical history is significant for anxiety/panic attacks, asthma and depression. Past treatments include non-SSRI antidepressants and non-benzodiazephine anxiolytics. The treatment provided significant relief. Compliance with prior treatments has been good.   Depression  Visit Type:  initial  Onset of symptoms: more than 1 year ago  Progression since onset: stable  Patient presents with the following symptoms: excessive worry, nervousness/anxiety (stable) and restlessness.  Patient is not experiencing: anhedonia, depressed mood (stable), feelings of hopelessness, feelings of worthlessness, palpitations, shortness of breath, suicidal ideas, suicidal planning, thoughts of death, weight gain and weight loss.  Frequency of symptoms: occasionally   Severity: mild   Exacerbated by: covid concerns.  Sleep quality: fair  Nighttime awakenings: occasional  Patient has a history of: anxiety/panic attacks, asthma and depression  No history of: suicide attempt  Treatment tried: non-SSRI antidepressants  Compliance with treatment: good  Improvement on treatment: significant      Arthritis  Presents for initial visit. The disease course has been stable. She complains of pain and stiffness. Affected locations include the left shoulder, left knee and right knee (generalized). Her pain is at a severity of 3/10. Associated symptoms include diarrhea (occurs every time she eats. no specific food noted. occurs with all food. liquids do not cause diarrhea. ). Pertinent negatives include no dysuria, fatigue, fever, rash or weight loss. Her past medical history is significant for osteoarthritis. There is no history of lupus or rheumatoid arthritis.   Past treatments include NSAIDs, OTC med, rest, surgery, chondroitin, acetaminophen and activity. The treatment provided mild relief. Factors aggravating her arthritis include activity. Compliance with prior treatments has been good.        The following portions of the patient's history were reviewed and updated as appropriate: allergies, current medications, past family history, past medical history, past social history, past surgical history and problem list.     Review of Systems   Constitutional: Negative for activity change, appetite change, chills, diaphoresis,  fatigue, fever, unexpected weight gain and unexpected weight loss.   HENT: Negative for congestion, hoarse voice, sore throat, trouble swallowing and voice change.    Eyes: Negative for blurred vision, double vision, photophobia, pain and visual disturbance.   Respiratory: Positive for cough (dry cough when laying down at night and first thing in the morning and then resolves. Risk factors include frequent use of NSAIDS and obesity. ). Negative for hemoptysis, sputum production, chest tightness, shortness of breath and wheezing.    Cardiovascular: Negative for chest pain, dyspnea on exertion, palpitations and leg swelling.   Gastrointestinal: Positive for diarrhea (occurs every time she eats. no specific food noted. occurs with all food. liquids do not cause diarrhea. ). Negative for abdominal distention, abdominal pain, anal bleeding, bloating, blood in stool, constipation, flatus, nausea, vomiting, GERD and indigestion.   Endocrine: Negative for cold intolerance, heat intolerance, polydipsia, polyphagia and polyuria.   Genitourinary: Negative for dysuria, frequency, hematuria and urgency.   Musculoskeletal: Positive for arthritis and stiffness. Negative for arthralgias and myalgias.   Skin: Negative for rash.   Allergic/Immunologic: Negative.    Neurological: Negative for dizziness, focal weakness, syncope, weakness, light-headedness and headache.   Hematological: Negative.    Psychiatric/Behavioral: Negative for suicidal ideas, depressed mood (stable) and stress. The patient is nervous/anxious (stable).        Objective   Physical Exam  Vitals and nursing note reviewed.   Constitutional:       General: She is not in acute distress.     Appearance: Normal appearance. She is well-developed. She is obese. She is not ill-appearing, toxic-appearing or diaphoretic.   HENT:      Head: Normocephalic and atraumatic.      Right Ear: External ear normal.      Left Ear: External ear normal.      Nose: Nose normal.   Eyes:       Conjunctiva/sclera: Conjunctivae normal.      Pupils: Pupils are equal, round, and reactive to light.   Neck:      Thyroid: No thyromegaly.      Trachea: No tracheal deviation.   Cardiovascular:      Rate and Rhythm: Normal rate and regular rhythm.      Heart sounds: Normal heart sounds. No murmur heard.   No friction rub. No gallop.    Pulmonary:      Effort: Pulmonary effort is normal. No respiratory distress.      Breath sounds: Normal breath sounds. No stridor. No wheezing, rhonchi or rales.   Abdominal:      General: Bowel sounds are normal. There is no distension.      Palpations: Abdomen is soft. There is no mass.      Tenderness: There is no abdominal tenderness. There is no guarding or rebound.      Hernia: No hernia is present.   Musculoskeletal:         General: No tenderness. Normal range of motion.      Cervical back: Normal range of motion and neck supple.   Lymphadenopathy:      Cervical: No cervical adenopathy.   Skin:     General: Skin is warm and dry.      Coloration: Skin is not pale.      Findings: No erythema or rash.   Neurological:      Mental Status: She is alert and oriented to person, place, and time.      Cranial Nerves: No cranial nerve deficit.      Coordination: Coordination normal.   Psychiatric:         Mood and Affect: Mood normal.         Behavior: Behavior normal.         Thought Content: Thought content normal.         Judgment: Judgment normal.           Assessment/Plan   Diagnoses and all orders for this visit:    1. Encounter to establish care (Primary)    2. Annual physical exam  -     Hepatitis C Antibody; Future  -     CBC & Differential; Future  -     Comprehensive Metabolic Panel; Future  -     Lipid Panel; Future  -     TSH; Future  -     T4, Free; Future  -     Vitamin D 25 Hydroxy; Future, will call with results.    3. Need for hepatitis C screening test   -Will call with results.    4. Essential hypertension  -     Controlled.  Refill, lisinopril-hydrochlorothiazide  (PRINZIDE,ZESTORETIC) 20-12.5 MG per tablet; Take 1 tablet by mouth Daily.  Dispense: 90 tablet; Refill: 3    5. Mixed hyperlipidemia  -     Lipid Panel; Future, will call with results.  Continue low-fat diet and Crestor  -    Refill, rosuvastatin (CRESTOR) 5 MG tablet; Take 1 tablet by mouth Every Evening.  Dispense: 90 tablet; Refill: 3    6. Vitamin D deficiency  -     Vitamin D 25 Hydroxy; Future, will call with results.  Continue oral vitamin D.    7. Anxiety  -     Controlled with no suicidal homicidal ideations.  -Refill, busPIRone (BUSPAR) 10 MG tablet; Take 1 tablet by mouth 3 (Three) Times a Day.  Dispense: 90 tablet; Refill: 3    8. Mild episode of recurrent major depressive disorder (CMS/HCC)  -    Controlled with no suicidal homicidal ideations.  -Refill, buPROPion XL (WELLBUTRIN XL) 300 MG 24 hr tablet; Take 1 tablet by mouth Every Morning.  Dispense: 90 tablet; Refill: 3    9. Food intolerance  -     Food Allergy Profile; Future  -     Celiac Disease Panel; Future  -     Alpha - Gal Panel; Future  -     Ambulatory Referral to Gastroenterology, follow-up with GI as scheduled.  Will call with lab results.    10. Diarrhea, unspecified type  -     Ambulatory Referral to Gastroenterology follow-up as scheduled.    11. Mild intermittent asthma without complication  -    Controlled.  Refill, montelukast (Singulair) 10 MG tablet; Take 1 tablet by mouth Every Night.  Dispense: 90 tablet; Refill: 3    12. Screening mammogram, encounter for  -     Mammo Screening Digital Tomosynthesis Bilateral With CAD; Future, will call with results.    13. Arthritis  -     Stable.  Refill, meloxicam (MOBIC) 15 MG tablet; Take 1 tablet by mouth Daily.  Dispense: 90 tablet; Refill: 3    14. Gastroesophageal reflux disease without esophagitis  -    Reports dry cough only when she lays down at night and first thing in the morning and then cough resolves.  Patient does take Tylenol and meloxicam daily for chronic arthritis issues.   Lungs clear on exam.  We will start a trial of Protonix 40 mg daily as cough may be reflux related.  We will continue to monitor.  - pantoprazole (Protonix) 40 MG EC tablet; Take 1 tablet by mouth Daily.  Dispense: 90 tablet; Refill: 0    15.  Follow-up in 6 months or sooner for any acute needs.  Plan of care may change pending lab and radiology results.            This document has been electronically signed by MELISSA Carrillo on May 17, 2021 14:28 CDT

## 2021-05-18 LAB
25(OH)D3 SERPL-MCNC: 69.5 NG/ML (ref 30–100)
ALBUMIN SERPL-MCNC: 4.7 G/DL (ref 3.5–5.2)
ALBUMIN/GLOB SERPL: 1.7 G/DL
ALP SERPL-CCNC: 77 U/L (ref 39–117)
ALT SERPL W P-5'-P-CCNC: 13 U/L (ref 1–33)
ANION GAP SERPL CALCULATED.3IONS-SCNC: 15.9 MMOL/L (ref 5–15)
AST SERPL-CCNC: 13 U/L (ref 1–32)
BASOPHILS # BLD AUTO: 0.04 10*3/MM3 (ref 0–0.2)
BASOPHILS NFR BLD AUTO: 0.5 % (ref 0–1.5)
BILIRUB SERPL-MCNC: 0.3 MG/DL (ref 0–1.2)
BUN SERPL-MCNC: 16 MG/DL (ref 6–20)
BUN/CREAT SERPL: 18.2 (ref 7–25)
CALCIUM SPEC-SCNC: 10.1 MG/DL (ref 8.6–10.5)
CHLORIDE SERPL-SCNC: 100 MMOL/L (ref 98–107)
CHOLEST SERPL-MCNC: 204 MG/DL (ref 0–200)
CO2 SERPL-SCNC: 23.1 MMOL/L (ref 22–29)
CREAT SERPL-MCNC: 0.88 MG/DL (ref 0.57–1)
DEPRECATED RDW RBC AUTO: 43.6 FL (ref 37–54)
EOSINOPHIL # BLD AUTO: 0.06 10*3/MM3 (ref 0–0.4)
EOSINOPHIL NFR BLD AUTO: 0.8 % (ref 0.3–6.2)
ERYTHROCYTE [DISTWIDTH] IN BLOOD BY AUTOMATED COUNT: 13.7 % (ref 12.3–15.4)
GFR SERPL CREATININE-BSD FRML MDRD: 67 ML/MIN/1.73
GLOBULIN UR ELPH-MCNC: 2.7 GM/DL
GLUCOSE SERPL-MCNC: 88 MG/DL (ref 65–99)
HCT VFR BLD AUTO: 41.6 % (ref 34–46.6)
HCV AB SER DONR QL: NORMAL
HDLC SERPL-MCNC: 89 MG/DL (ref 40–60)
HGB BLD-MCNC: 13.7 G/DL (ref 12–15.9)
IMM GRANULOCYTES # BLD AUTO: 0.02 10*3/MM3 (ref 0–0.05)
IMM GRANULOCYTES NFR BLD AUTO: 0.3 % (ref 0–0.5)
LDLC SERPL CALC-MCNC: 96 MG/DL (ref 0–100)
LDLC/HDLC SERPL: 1.04 {RATIO}
LYMPHOCYTES # BLD AUTO: 1.74 10*3/MM3 (ref 0.7–3.1)
LYMPHOCYTES NFR BLD AUTO: 22.7 % (ref 19.6–45.3)
MCH RBC QN AUTO: 29.3 PG (ref 26.6–33)
MCHC RBC AUTO-ENTMCNC: 32.9 G/DL (ref 31.5–35.7)
MCV RBC AUTO: 88.9 FL (ref 79–97)
MONOCYTES # BLD AUTO: 0.57 10*3/MM3 (ref 0.1–0.9)
MONOCYTES NFR BLD AUTO: 7.4 % (ref 5–12)
NEUTROPHILS NFR BLD AUTO: 5.25 10*3/MM3 (ref 1.7–7)
NEUTROPHILS NFR BLD AUTO: 68.3 % (ref 42.7–76)
NRBC BLD AUTO-RTO: 0 /100 WBC (ref 0–0.2)
PLATELET # BLD AUTO: 422 10*3/MM3 (ref 140–450)
PMV BLD AUTO: 9.8 FL (ref 6–12)
POTASSIUM SERPL-SCNC: 3.9 MMOL/L (ref 3.5–5.2)
PROT SERPL-MCNC: 7.4 G/DL (ref 6–8.5)
RBC # BLD AUTO: 4.68 10*6/MM3 (ref 3.77–5.28)
SODIUM SERPL-SCNC: 139 MMOL/L (ref 136–145)
T4 FREE SERPL-MCNC: 1.11 NG/DL (ref 0.93–1.7)
TRIGL SERPL-MCNC: 113 MG/DL (ref 0–150)
TSH SERPL DL<=0.05 MIU/L-ACNC: 1.28 UIU/ML (ref 0.27–4.2)
VLDLC SERPL-MCNC: 19 MG/DL (ref 5–40)
WBC # BLD AUTO: 7.68 10*3/MM3 (ref 3.4–10.8)

## 2021-05-18 NOTE — PROGRESS NOTES
Total cholesterol slightly elevated.  Continue Crestor and low-fat diet.  Other labs stable.  Food allergy panels are pending.

## 2021-05-19 ENCOUNTER — APPOINTMENT (OUTPATIENT)
Dept: PHYSICAL THERAPY | Facility: HOSPITAL | Age: 56
End: 2021-05-19

## 2021-05-19 ENCOUNTER — OFFICE VISIT (OUTPATIENT)
Dept: GASTROENTEROLOGY | Facility: CLINIC | Age: 56
End: 2021-05-19

## 2021-05-19 VITALS
WEIGHT: 218.2 LBS | DIASTOLIC BLOOD PRESSURE: 83 MMHG | HEIGHT: 60 IN | HEART RATE: 102 BPM | SYSTOLIC BLOOD PRESSURE: 127 MMHG | BODY MASS INDEX: 42.84 KG/M2

## 2021-05-19 DIAGNOSIS — R19.7 DIARRHEA, UNSPECIFIED TYPE: ICD-10-CM

## 2021-05-19 DIAGNOSIS — R10.84 GENERALIZED ABDOMINAL PAIN: ICD-10-CM

## 2021-05-19 DIAGNOSIS — K21.00 GASTROESOPHAGEAL REFLUX DISEASE WITH ESOPHAGITIS WITHOUT HEMORRHAGE: ICD-10-CM

## 2021-05-19 DIAGNOSIS — R19.8 ABNORMAL BOWEL HABITS: Primary | ICD-10-CM

## 2021-05-19 LAB
ENDOMYSIUM IGA SER QL: NEGATIVE
IGA SERPL-MCNC: 153 MG/DL (ref 87–352)
TTG IGA SER-ACNC: <2 U/ML (ref 0–3)

## 2021-05-19 PROCEDURE — 99213 OFFICE O/P EST LOW 20 MIN: CPT | Performed by: NURSE PRACTITIONER

## 2021-05-19 RX ORDER — DEXTROSE AND SODIUM CHLORIDE 5; .45 G/100ML; G/100ML
30 INJECTION, SOLUTION INTRAVENOUS CONTINUOUS PRN
Status: CANCELLED | OUTPATIENT
Start: 2021-06-22

## 2021-05-19 RX ORDER — SODIUM, POTASSIUM,MAG SULFATES 17.5-3.13G
1 SOLUTION, RECONSTITUTED, ORAL ORAL EVERY 12 HOURS
Qty: 177 ML | Refills: 0 | Status: SHIPPED | OUTPATIENT
Start: 2021-05-19 | End: 2022-06-14

## 2021-05-19 NOTE — PROGRESS NOTES
Chief Complaint   Patient presents with   • Diarrhea       Subjective    Priti Orr is a 55 y.o. female. she is here today for follow-up.  She 5-year-old female presents to discuss possible food intolerance. Reports she has always had issues with diarrhea after intake but over the last 2 months has progressively worsened typically she follows a very strict diet and symptoms are tolerable however anytime she has to travel or eat out or eat something off plan she will have severe constant diarrhea. She was seen by PCP and has had celiac panel, alpha gal panel and food allergy panel tested all results are not resulted in available yet. So far celiac panel is within normal limits. Reports anxiety has always made symptoms worse but overall reports her anxiety is pretty well controlled.    Diarrhea   This is a chronic problem. The current episode started more than 1 year ago (about 4 years ago ). The problem occurs 5 to 10 times per day (average about 5 per day ). The problem has been waxing and waning. The stool consistency is described as watery. The patient states that diarrhea does not awaken her from sleep. Associated symptoms include abdominal pain (cramping thats sore to touch ) and bloating. Pertinent negatives include no arthralgias, chills, coughing, fever, headaches, increased  flatus, myalgias, sweats, URI, vomiting or weight loss. The symptoms are aggravated by dairy products (fried foods ). She has tried increased fluids for the symptoms. The treatment provided mild relief.     Plan; schedule patient for EGD and colonoscopy due to abdominal pain chronic reflux abnormal bowel habits with worsening diarrhea. Will obtain biopsies to rule out gluten intolerance or microscopic colitis       The following portions of the patient's history were reviewed and updated as appropriate:   Past Medical History:   Diagnosis Date   • Allergic rhinitis    • Anesthesia     nausea and vomiting with anesthesia requires  pre medicaiton   • Anxiety    • Arthritis    • Asthma    • Benign neoplasm of meninges (CMS/HCC)     Post op       • Cobalamin deficiency    • Corneal abrasion    • Depression    • Encounter for gynecological examination (general) (routine) without abnormal findings    • Essential hypertension    • Fatigue    • Female stress incontinence    • Head injury    • History of delayed wound healing     Delayed healing of surgical wound      • Hyperlipidemia    • Low back pain    • Macular degeneration disease    • Nosebleed, symptom     Nosebleed/epistaxis symptom      • Osteoporosis    • Osteoporosis    • PONV (postoperative nausea and vomiting)    • Seizures (CMS/HCC)    • Sleep apnea     not wearing c-pap   • Wears glasses      Past Surgical History:   Procedure Laterality Date   • ARM LESION/CYST EXCISION  2014    Remove arm bone lesion (1)   • ARM SKIN LESION BIOPSY / EXCISION     • ARM WOUND REPAIR / CLOSURE  2014    Repair Superficial Wound TR-EXT 2.5 < CM 19812 (1)      • BRAIN SURGERY     • BREAST LUMPECTOMY     •  SECTION  1986     Section (1)     • ENDOSCOPY  2008    Colon endoscopy 09038 (1)     • EXCISION BREAST LESION W/ PREOP NEEDLE LOC  1981    Excision, breast lesion (1)     • HEMORRHOIDECTOMY     • HEMORRHOIDECTOMY  2008    Hemorrhoidectomy (2)      • HYSTERECTOMY     • INJECTION OF MEDICATION  2013    Celestone (betamethasone) (1)      • INJECTION OF MEDICATION  2013    Depo Medrol (Methylprednisone) (1)      • INJECTION OF MEDICATION  2013    Dexamethasone (2)      • INJECTION OF MEDICATION  2014    Kenalog (6)   • KNEE ARTHROSCOPY  2007    Knee arthroscopy, surgery (1)     • KNEE ARTHROSCOPY Right 2020    Procedure: KNEE ARTHROSCOPY with debridement medial meniscus;  Surgeon: Brooks Junior MD;  Location: [facility];  Service: Orthopedics;  Laterality: Right;   • LAPAROSCOPIC TUBAL LIGATION   04/16/1991    Tubal ligation (1)      • PAP SMEAR  01/18/2007    PAP SMEAR (1)      • SHOULDER SURGERY Left     x 4   • TOTAL ABDOMINAL HYSTERECTOMY WITH SALPINGO OOPHORECTOMY  1993    JOSE/BSO (1)      • TOTAL KNEE ARTHROPLASTY Left 11/19/2018    Procedure: TOTAL KNEE ARTHROPLASTY ATTUNE with adductor canal block - left;  Surgeon: Brooks Junior MD;  Location: [facility];  Service: Orthopedics     Family History   Problem Relation Age of Onset   • Alzheimer's disease Other    • Breast cancer Other         other   • Cancer Other         other - GYN   • Colon cancer Other         Colorectal Cancer   • Depression Other    • Diabetes Other    • Hyperlipidemia Other    • Hypertension Other    • Stroke Other    • Deep vein thrombosis Other    • Heart disease Other    • Ovarian cancer Other    • Colon cancer Other    • Hypertension Mother    • Hypertension Father    • Heart disease Father    • Heart attack Father    • No Known Problems Brother    • No Known Problems Daughter    • Cancer Maternal Grandmother    • Breast cancer Maternal Grandmother    • ALS Maternal Grandmother    • Heart attack Paternal Grandmother    • Heart attack Paternal Grandfather    • Lung cancer Paternal Grandfather    • Colon cancer Paternal Grandfather      OB History    No obstetric history on file.       Prior to Admission medications    Medication Sig Start Date End Date Taking? Authorizing Provider   acetaminophen (TYLENOL) 650 MG 8 hr tablet Take 650 mg by mouth.   Yes Provider, MD Yoseph   buPROPion XL (WELLBUTRIN XL) 300 MG 24 hr tablet Take 1 tablet by mouth Every Morning. 5/17/21  Yes Lee Arias APRN   busPIRone (BUSPAR) 10 MG tablet Take 1 tablet by mouth 3 (Three) Times a Day. 5/17/21  Yes Lee Arias APRN   Cholecalciferol (GNP Vitamin D) 25 MCG (1000 UT) tablet Take 1,000 Units by mouth Daily.   Yes Emergency, Nurse Shanika, RN   fluticasone (FLONASE) 50 MCG/ACT nasal spray 1 spray into the nostril(s) as  directed by provider Daily. 3/10/21  Yes ProviderYoseph MD   fluticasone-salmeterol (ADVAIR HFA) 115-21 MCG/ACT inhaler Inhale 1 puff.   Yes Provider, MD Yoseph   Glucos-Chondroit-Hyaluron-MSM (GLUCOSAMINE CHONDROITIN JOINT PO) Take 1 tablet by mouth Daily.   Yes Provider, MD Yoseph   lisinopril-hydrochlorothiazide (PRINZIDE,ZESTORETIC) 20-12.5 MG per tablet Take 1 tablet by mouth Daily. 5/17/21  Yes Lee Arias APRN   loratadine (CLARITIN) 10 MG tablet Take 10 mg by mouth.   Yes Provider, MD Yoseph   meloxicam (MOBIC) 15 MG tablet Take 1 tablet by mouth Daily. 5/17/21  Yes Lee rAias APRN   montelukast (Singulair) 10 MG tablet Take 1 tablet by mouth Every Night. 5/17/21  Yes Lee Arias APRN   pantoprazole (Protonix) 40 MG EC tablet Take 1 tablet by mouth Daily. 5/17/21  Yes Lee Arias APRN   rosuvastatin (CRESTOR) 5 MG tablet Take 1 tablet by mouth Every Evening. 5/17/21  Yes Lee Arias APRN   vitamin D (ERGOCALCIFEROL) 36636 units capsule capsule Take 1 capsule by mouth 1 (One) Time Per Week. 3/25/19  Yes Kennedi Dow APRN   fluticasone (FLONASE) 50 MCG/ACT nasal spray 2 sprays into the nostril(s) as directed by provider Daily for 7 days. 12/4/20 12/11/20  Georges Barlow MD     Allergies   Allergen Reactions   • Morphine Other (See Comments)     States felt like itching from within, hallucinations, agitation     • Pseudoephedrine Shortness Of Breath and Swelling     LIPS SWELL AND TONGUE BECOMES THICK     Social History     Socioeconomic History   • Marital status:      Spouse name: Not on file   • Number of children: Not on file   • Years of education: Not on file   • Highest education level: Not on file   Tobacco Use   • Smoking status: Never Smoker   • Smokeless tobacco: Never Used   Vaping Use   • Vaping Use: Never used   Substance and Sexual Activity   • Alcohol use: Never   • Drug use: No   • Sexual activity: Defer       Review of  "Systems  Review of Systems   Constitutional: Negative for chills, fever and weight loss.   Respiratory: Negative for cough.    Gastrointestinal: Positive for abdominal pain (cramping thats sore to touch ), bloating and diarrhea. Negative for flatus and vomiting.   Musculoskeletal: Negative for arthralgias and myalgias.   Neurological: Negative for headaches.        /83 (BP Location: Left arm)   Pulse 102   Ht 152.4 cm (60\")   Wt 99 kg (218 lb 3.2 oz)   LMP 02/21/1994 (Within Months)   BMI 42.61 kg/m²     Objective    Physical Exam  Constitutional:       General: She is not in acute distress.     Appearance: Normal appearance. She is well-developed.   HENT:      Head: Normocephalic and atraumatic.   Neck:      Thyroid: No thyromegaly.   Pulmonary:      Effort: Pulmonary effort is normal.   Abdominal:      General: Bowel sounds are normal. There is no distension.      Palpations: Abdomen is soft. Abdomen is not rigid.      Tenderness: There is generalized abdominal tenderness. There is no guarding.      Hernia: No hernia is present.   Musculoskeletal:      Cervical back: Normal range of motion and neck supple.   Skin:     General: Skin is warm and dry.      Coloration: Skin is not pale.      Findings: No rash.   Neurological:      Mental Status: She is alert and oriented to person, place, and time.   Psychiatric:         Speech: Speech normal.         Behavior: Behavior is cooperative.       Lab on 05/17/2021   Component Date Value Ref Range Status   • Hepatitis C Ab 05/17/2021 Non-Reactive  Non-Reactive Final   • Glucose 05/17/2021 88  65 - 99 mg/dL Final   • BUN 05/17/2021 16  6 - 20 mg/dL Final   • Creatinine 05/17/2021 0.88  0.57 - 1.00 mg/dL Final   • Sodium 05/17/2021 139  136 - 145 mmol/L Final   • Potassium 05/17/2021 3.9  3.5 - 5.2 mmol/L Final   • Chloride 05/17/2021 100  98 - 107 mmol/L Final   • CO2 05/17/2021 23.1  22.0 - 29.0 mmol/L Final   • Calcium 05/17/2021 10.1  8.6 - 10.5 mg/dL Final   • " Total Protein 05/17/2021 7.4  6.0 - 8.5 g/dL Final   • Albumin 05/17/2021 4.70  3.50 - 5.20 g/dL Final   • ALT (SGPT) 05/17/2021 13  1 - 33 U/L Final   • AST (SGOT) 05/17/2021 13  1 - 32 U/L Final   • Alkaline Phosphatase 05/17/2021 77  39 - 117 U/L Final   • Total Bilirubin 05/17/2021 0.3  0.0 - 1.2 mg/dL Final   • eGFR Non  Amer 05/17/2021 67  >60 mL/min/1.73 Final   • Globulin 05/17/2021 2.7  gm/dL Final   • A/G Ratio 05/17/2021 1.7  g/dL Final   • BUN/Creatinine Ratio 05/17/2021 18.2  7.0 - 25.0 Final   • Anion Gap 05/17/2021 15.9* 5.0 - 15.0 mmol/L Final   • Total Cholesterol 05/17/2021 204* 0 - 200 mg/dL Final   • Triglycerides 05/17/2021 113  0 - 150 mg/dL Final   • HDL Cholesterol 05/17/2021 89* 40 - 60 mg/dL Final   • LDL Cholesterol  05/17/2021 96  0 - 100 mg/dL Final   • VLDL Cholesterol 05/17/2021 19  5 - 40 mg/dL Final   • LDL/HDL Ratio 05/17/2021 1.04   Final   • TSH 05/17/2021 1.280  0.270 - 4.200 uIU/mL Final   • Free T4 05/17/2021 1.11  0.93 - 1.70 ng/dL Final   • 25 Hydroxy, Vitamin D 05/17/2021 69.5  30.0 - 100.0 ng/ml Final   • Endomysial IgA 05/17/2021 Negative  Negative Final   • Tissue Transglutaminase IgA 05/17/2021 <2  0 - 3 U/mL Final                                  Negative        0 -  3                                Weak Positive   4 - 10                                Positive           >10   Tissue Transglutaminase (tTG) has been identified   as the endomysial antigen.  Studies have demonstr-   ated that endomysial IgA antibodies have over 99%   specificity for gluten sensitive enteropathy.   • IgA 05/17/2021 153  87 - 352 mg/dL Final   • WBC 05/17/2021 7.68  3.40 - 10.80 10*3/mm3 Final   • RBC 05/17/2021 4.68  3.77 - 5.28 10*6/mm3 Final   • Hemoglobin 05/17/2021 13.7  12.0 - 15.9 g/dL Final   • Hematocrit 05/17/2021 41.6  34.0 - 46.6 % Final   • MCV 05/17/2021 88.9  79.0 - 97.0 fL Final   • MCH 05/17/2021 29.3  26.6 - 33.0 pg Final   • MCHC 05/17/2021 32.9  31.5 - 35.7 g/dL  Final   • RDW 05/17/2021 13.7  12.3 - 15.4 % Final   • RDW-SD 05/17/2021 43.6  37.0 - 54.0 fl Final   • MPV 05/17/2021 9.8  6.0 - 12.0 fL Final   • Platelets 05/17/2021 422  140 - 450 10*3/mm3 Final   • Neutrophil % 05/17/2021 68.3  42.7 - 76.0 % Final   • Lymphocyte % 05/17/2021 22.7  19.6 - 45.3 % Final   • Monocyte % 05/17/2021 7.4  5.0 - 12.0 % Final   • Eosinophil % 05/17/2021 0.8  0.3 - 6.2 % Final   • Basophil % 05/17/2021 0.5  0.0 - 1.5 % Final   • Immature Grans % 05/17/2021 0.3  0.0 - 0.5 % Final   • Neutrophils, Absolute 05/17/2021 5.25  1.70 - 7.00 10*3/mm3 Final   • Lymphocytes, Absolute 05/17/2021 1.74  0.70 - 3.10 10*3/mm3 Final   • Monocytes, Absolute 05/17/2021 0.57  0.10 - 0.90 10*3/mm3 Final   • Eosinophils, Absolute 05/17/2021 0.06  0.00 - 0.40 10*3/mm3 Final   • Basophils, Absolute 05/17/2021 0.04  0.00 - 0.20 10*3/mm3 Final   • Immature Grans, Absolute 05/17/2021 0.02  0.00 - 0.05 10*3/mm3 Final   • nRBC 05/17/2021 0.0  0.0 - 0.2 /100 WBC Final     Assessment/Plan      1. Abnormal bowel habits    2. Diarrhea, unspecified type    3. Generalized abdominal pain    4. Gastroesophageal reflux disease with esophagitis without hemorrhage    .       Orders placed during this encounter include:  Orders Placed This Encounter   Procedures   • Follow Anesthesia Guidelines / Standing Orders     Standing Status:   Future   • Obtain Informed Consent     Standing Status:   Future     Order Specific Question:   Informed Consent Given For     Answer:   ESOPHAGOGASTRODUODENOSCOPY and Colonoscopy       ESOPHAGOGASTRODUODENOSCOPY (N/A), COLONOSCOPY (N/A)    Review and/or summary of lab tests, radiology, procedures, medications. Review and summary of old records and obtaining of history. The risks and benefits of my recommendations, as well as other treatment options were discussed with the patient today. Questions were answered.    New Medications Ordered This Visit   Medications   • sodium-potassium-magnesium  sulfates (Suprep Bowel Prep Kit) 17.5-3.13-1.6 GM/177ML solution oral solution     Sig: Take 1 bottle by mouth Every 12 (Twelve) Hours.     Dispense:  177 mL     Refill:  0       Follow-up: Return in about 4 weeks (around 6/16/2021) for Recheck, After test.          This document has been electronically signed by MELISSA Chung on May 20, 2021 11:59 CDT           I spent 16 minutes caring for Priti on this date of service. This time includes time spent by me in the following activities:preparing for the visit, reviewing tests, obtaining and/or reviewing a separately obtained history, performing a medically appropriate examination and/or evaluation , counseling and educating the patient/family/caregiver, ordering medications, tests, or procedures, referring and communicating with other health care professionals , documenting information in the medical record and care coordination    Results for orders placed or performed in visit on 05/17/21   Celiac Disease Panel    Specimen: Blood   Result Value Ref Range    Endomysial IgA Negative Negative    Tissue Transglutaminase IgA <2 0 - 3 U/mL    IgA 153 87 - 352 mg/dL   CBC Auto Differential    Specimen: Blood   Result Value Ref Range    WBC 7.68 3.40 - 10.80 10*3/mm3    RBC 4.68 3.77 - 5.28 10*6/mm3    Hemoglobin 13.7 12.0 - 15.9 g/dL    Hematocrit 41.6 34.0 - 46.6 %    MCV 88.9 79.0 - 97.0 fL    MCH 29.3 26.6 - 33.0 pg    MCHC 32.9 31.5 - 35.7 g/dL    RDW 13.7 12.3 - 15.4 %    RDW-SD 43.6 37.0 - 54.0 fl    MPV 9.8 6.0 - 12.0 fL    Platelets 422 140 - 450 10*3/mm3    Neutrophil % 68.3 42.7 - 76.0 %    Lymphocyte % 22.7 19.6 - 45.3 %    Monocyte % 7.4 5.0 - 12.0 %    Eosinophil % 0.8 0.3 - 6.2 %    Basophil % 0.5 0.0 - 1.5 %    Immature Grans % 0.3 0.0 - 0.5 %    Neutrophils, Absolute 5.25 1.70 - 7.00 10*3/mm3    Lymphocytes, Absolute 1.74 0.70 - 3.10 10*3/mm3    Monocytes, Absolute 0.57 0.10 - 0.90 10*3/mm3    Eosinophils, Absolute 0.06 0.00 - 0.40 10*3/mm3     Basophils, Absolute 0.04 0.00 - 0.20 10*3/mm3    Immature Grans, Absolute 0.02 0.00 - 0.05 10*3/mm3    nRBC 0.0 0.0 - 0.2 /100 WBC   Hepatitis C Antibody    Specimen: Blood   Result Value Ref Range    Hepatitis C Ab Non-Reactive Non-Reactive   Vitamin D 25 Hydroxy    Specimen: Blood   Result Value Ref Range    25 Hydroxy, Vitamin D 69.5 30.0 - 100.0 ng/ml   TSH    Specimen: Blood   Result Value Ref Range    TSH 1.280 0.270 - 4.200 uIU/mL   T4, Free    Specimen: Blood   Result Value Ref Range    Free T4 1.11 0.93 - 1.70 ng/dL   Lipid Panel    Specimen: Blood   Result Value Ref Range    Total Cholesterol 204 (H) 0 - 200 mg/dL    Triglycerides 113 0 - 150 mg/dL    HDL Cholesterol 89 (H) 40 - 60 mg/dL    LDL Cholesterol  96 0 - 100 mg/dL    VLDL Cholesterol 19 5 - 40 mg/dL    LDL/HDL Ratio 1.04    Comprehensive Metabolic Panel    Specimen: Blood   Result Value Ref Range    Glucose 88 65 - 99 mg/dL    BUN 16 6 - 20 mg/dL    Creatinine 0.88 0.57 - 1.00 mg/dL    Sodium 139 136 - 145 mmol/L    Potassium 3.9 3.5 - 5.2 mmol/L    Chloride 100 98 - 107 mmol/L    CO2 23.1 22.0 - 29.0 mmol/L    Calcium 10.1 8.6 - 10.5 mg/dL    Total Protein 7.4 6.0 - 8.5 g/dL    Albumin 4.70 3.50 - 5.20 g/dL    ALT (SGPT) 13 1 - 33 U/L    AST (SGOT) 13 1 - 32 U/L    Alkaline Phosphatase 77 39 - 117 U/L    Total Bilirubin 0.3 0.0 - 1.2 mg/dL    eGFR Non African Amer 67 >60 mL/min/1.73    Globulin 2.7 gm/dL    A/G Ratio 1.7 g/dL    BUN/Creatinine Ratio 18.2 7.0 - 25.0    Anion Gap 15.9 (H) 5.0 - 15.0 mmol/L   Results for orders placed or performed during the hospital encounter of 12/04/20   COVID-19,[facility] ROUTINE, NP/OP SWAB IN TRANSPORT MEDIA OR ESWAB 72 HR TAT - Swab, Anterior nasal    Specimen: Anterior nasal; Swab   Result Value Ref Range    SARS-CoV-2, JAMES Not Detected Not Detected   Results for orders placed or performed in visit on 11/13/20   CBC Auto Differential    Specimen: Blood   Result Value Ref Range    WBC 6.00 3.40 - 10.80  10*3/mm3    RBC 4.64 3.77 - 5.28 10*6/mm3    Hemoglobin 13.4 12.0 - 15.9 g/dL    Hematocrit 39.4 34.0 - 46.6 %    MCV 84.9 79.0 - 97.0 fL    MCH 28.9 26.6 - 33.0 pg    MCHC 34.0 31.5 - 35.7 g/dL    RDW 13.9 12.3 - 15.4 %    RDW-SD 42.1 37.0 - 54.0 fl    MPV 10.3 6.0 - 12.0 fL    Platelets 365 140 - 450 10*3/mm3    Neutrophil % 64.6 42.7 - 76.0 %    Lymphocyte % 26.2 19.6 - 45.3 %    Monocyte % 7.0 5.0 - 12.0 %    Eosinophil % 1.5 0.3 - 6.2 %    Basophil % 0.5 0.0 - 1.5 %    Immature Grans % 0.2 0.0 - 0.5 %    Neutrophils, Absolute 3.88 1.70 - 7.00 10*3/mm3    Lymphocytes, Absolute 1.57 0.70 - 3.10 10*3/mm3    Monocytes, Absolute 0.42 0.10 - 0.90 10*3/mm3    Eosinophils, Absolute 0.09 0.00 - 0.40 10*3/mm3    Basophils, Absolute 0.03 0.00 - 0.20 10*3/mm3    Immature Grans, Absolute 0.01 0.00 - 0.05 10*3/mm3    nRBC 0.0 0.0 - 0.2 /100 WBC   Sedimentation Rate    Specimen: Blood   Result Value Ref Range    Sed Rate 15 0 - 30 mm/hr   C-reactive Protein    Specimen: Blood   Result Value Ref Range    C-Reactive Protein 0.72 (H) 0.00 - 0.50 mg/dL   Results for orders placed or performed during the hospital encounter of 07/13/20   COVID [facility] Priority - Swab, Nasopharynx    Specimen: Nasopharynx; Swab   Result Value Ref Range    COVID [facility] PRIORITY Comment    COVID-19,[facility] ROUTINE, NP/OP SWAB IN TRANSPORT MEDIA OR ESWAB 72 HR TAT - Swab, Nasopharynx    Specimen: Nasopharynx; Swab   Result Value Ref Range    SARS-CoV-2, JAMES Not Detected Not Detected   Basic Metabolic Panel    Specimen: Blood   Result Value Ref Range    Glucose 97 65 - 99 mg/dL    BUN 30 (H) 6 - 20 mg/dL    Creatinine 1.13 (H) 0.57 - 1.00 mg/dL    Sodium 137 136 - 145 mmol/L    Potassium 3.8 3.5 - 5.2 mmol/L    Chloride 100 98 - 107 mmol/L    CO2 24.0 22.0 - 29.0 mmol/L    Calcium 9.5 8.6 - 10.5 mg/dL    eGFR Non African Amer 51 (L) >60 mL/min/1.73    BUN/Creatinine Ratio 26.5 (H) 7.0 - 25.0    Anion Gap 13.0 5.0 - 15.0 mmol/L   Results for orders  placed or performed in visit on 12/20/18   Protime-INR    Specimen: Blood   Result Value Ref Range    Protime 23.1 (H) 11.1 - 15.3 Seconds    INR 2.15 (H) 0.80 - 1.20   Results for orders placed or performed in visit on 12/13/18   Protime-INR, Fingerstick    Specimen: Capillary Blood   Result Value Ref Range    INR 1.80 (H) 0.80 - 1.20   Results for orders placed or performed in visit on 12/06/18   Protime-INR, Fingerstick    Specimen: Capillary Blood   Result Value Ref Range    INR 1.60 (H) 0.80 - 1.20     *Note: Due to a large number of results and/or encounters for the requested time period, some results have not been displayed. A complete set of results can be found in Results Review.

## 2021-05-19 NOTE — PATIENT INSTRUCTIONS
Diarrhea, Adult  Diarrhea is frequent loose and watery bowel movements. Diarrhea can make you feel weak and cause you to become dehydrated. Dehydration can make you tired and thirsty, cause you to have a dry mouth, and decrease how often you urinate.  Diarrhea typically lasts 2-3 days. However, it can last longer if it is a sign of something more serious. It is important to treat your diarrhea as told by your health care provider.  Follow these instructions at home:  Eating and drinking         Follow these recommendations as told by your health care provider:  · Take an oral rehydration solution (ORS). This is an over-the-counter medicine that helps return your body to its normal balance of nutrients and water. It is found at pharmacies and retail stores.  · Drink plenty of fluids, such as water, ice chips, diluted fruit juice, and low-calorie sports drinks. You can drink milk also, if desired.  · Avoid drinking fluids that contain a lot of sugar or caffeine, such as energy drinks, sports drinks, and soda.  · Eat bland, easy-to-digest foods in small amounts as you are able. These foods include bananas, applesauce, rice, lean meats, toast, and crackers.  · Avoid alcohol.  · Avoid spicy or fatty foods.    Medicines  · Take over-the-counter and prescription medicines only as told by your health care provider.  · If you were prescribed an antibiotic medicine, take it as told by your health care provider. Do not stop using the antibiotic even if you start to feel better.  General instructions    · Wash your hands often using soap and water. If soap and water are not available, use a hand . Others in the household should wash their hands as well. Hands should be washed:  ? After using the toilet or changing a diaper.  ? Before preparing, cooking, or serving food.  ? While caring for a sick person or while visiting someone in a hospital.  · Drink enough fluid to keep your urine pale yellow.  · Rest at home while  you recover.  · Watch your condition for any changes.  · Take a warm bath to relieve any burning or pain from frequent diarrhea episodes.  · Keep all follow-up visits as told by your health care provider. This is important.  Contact a health care provider if:  · You have a fever.  · Your diarrhea gets worse.  · You have new symptoms.  · You cannot keep fluids down.  · You feel light-headed or dizzy.  · You have a headache.  · You have muscle cramps.  Get help right away if:  · You have chest pain.  · You feel extremely weak or you faint.  · You have bloody or black stools or stools that look like tar.  · You have severe pain, cramping, or bloating in your abdomen.  · You have trouble breathing or you are breathing very quickly.  · Your heart is beating very quickly.  · Your skin feels cold and clammy.  · You feel confused.  · You have signs of dehydration, such as:  ? Dark urine, very little urine, or no urine.  ? Cracked lips.  ? Dry mouth.  ? Sunken eyes.  ? Sleepiness.  ? Weakness.  Summary  · Diarrhea is frequent loose and watery bowel movements. Diarrhea can make you feel weak and cause you to become dehydrated.  · Drink enough fluids to keep your urine pale yellow.  · Make sure that you wash your hands after using the toilet. If soap and water are not available, use hand .  · Contact a health care provider if your diarrhea gets worse or you have new symptoms.  · Get help right away if you have signs of dehydration.  This information is not intended to replace advice given to you by your health care provider. Make sure you discuss any questions you have with your health care provider.  Document Revised: 05/05/2020 Document Reviewed: 05/24/2019  [employer] Patient Education © 2021 [employer]

## 2021-05-20 ENCOUNTER — TELEPHONE (OUTPATIENT)
Dept: GASTROENTEROLOGY | Facility: CLINIC | Age: 56
End: 2021-05-20

## 2021-05-20 NOTE — TELEPHONE ENCOUNTER
Called [facility] and gave a verbal for Golytely bowel prep. Sent instructions to patient via email       ----- Message from MELISSA Power sent at 5/20/2021  2:09 PM CDT -----  Ok I sent clen pic. If not covered please call pharmacy and see what they have that's covered. She uses [facility] and they answer phones.   ----- Message -----  From: Jennifer Singh MA  Sent: 5/20/2021  11:37 AM CDT  To: MELISSA Power    Suprep not covered

## 2021-05-21 ENCOUNTER — HOSPITAL ENCOUNTER (OUTPATIENT)
Dept: PHYSICAL THERAPY | Facility: HOSPITAL | Age: 56
Setting detail: THERAPIES SERIES
Discharge: HOME | End: 2021-05-21

## 2021-05-21 DIAGNOSIS — Z96.612 S/P REVERSE TOTAL SHOULDER ARTHROPLASTY, LEFT: Primary | ICD-10-CM

## 2021-05-21 PROCEDURE — 97110 THERAPEUTIC EXERCISES: CPT | Performed by: PHYSICAL THERAPIST

## 2021-05-22 LAB
CLAM IGE QN: <0.1 KU/L
CODFISH IGE QN: <0.1 KU/L
CONV CLASS DESCRIPTION: NORMAL
CORN IGE QN: <0.1 KU/L
COW MILK IGE QN: <0.1 KU/L
EGG WHITE IGE QN: <0.1 KU/L
PEANUT IGE QN: <0.1 KU/L
SCALLOP IGE QN: <0.1 KU/L
SESAME SEED IGE QN: <0.1 KU/L
SHRIMP IGE QN: <0.1 KU/L
SOYBEAN IGE QN: <0.1 KU/L
WALNUT IGE QN: <0.1 KU/L
WHEAT IGE QN: <0.1 KU/L

## 2021-05-22 NOTE — THERAPY TREATMENT NOTE
Outpatient Physical Therapy Ortho Treatment Note  [facility]     Patient Name: Priti Orr  : 1965  MRN: [id number]  Today's Date: 2021      Visit Date: 2021    Attendance:  (eval + 9 visits through 21)  Subjective Improvement: 45-50%  Next MD Visit: 21  Recert Date:  2021     Therapy Diagnosis: S/P Revision of reverse TSA 3/30/21     Visit Dx:    ICD-10-CM ICD-9-CM   1. S/P reverse total shoulder arthroplasty, left  Z96.612 V43.61            Past Medical History:   Diagnosis Date   • Allergic rhinitis    • Anesthesia     nausea and vomiting with anesthesia requires pre medicaiton   • Anxiety    • Arthritis    • Asthma    • Benign neoplasm of meninges (CMS/HCC)     Post op       • Cobalamin deficiency    • Corneal abrasion    • Depression    • Encounter for gynecological examination (general) (routine) without abnormal findings    • Essential hypertension    • Fatigue    • Female stress incontinence    • Head injury    • History of delayed wound healing     Delayed healing of surgical wound      • Hyperlipidemia    • Low back pain    • Macular degeneration disease    • Nosebleed, symptom     Nosebleed/epistaxis symptom      • Osteoporosis    • Osteoporosis    • PONV (postoperative nausea and vomiting)    • Seizures (CMS/HCC)    • Sleep apnea     not wearing c-pap   • Wears glasses         Past Surgical History:   Procedure Laterality Date   • ARM LESION/CYST EXCISION  2014    Remove arm bone lesion (1)   • ARM SKIN LESION BIOPSY / EXCISION     • ARM WOUND REPAIR / CLOSURE  2014    Repair Superficial Wound TR-EXT 2.5 < CM 31741 (1)      • BRAIN SURGERY     • BREAST LUMPECTOMY     •  SECTION  1986     Section (1)     • ENDOSCOPY  2008    Colon endoscopy 44094 (1)     • EXCISION BREAST LESION W/ PREOP NEEDLE LOC  1981    Excision, breast lesion (1)     • HEMORRHOIDECTOMY     • HEMORRHOIDECTOMY  2008     Hemorrhoidectomy (2)      • HYSTERECTOMY     • INJECTION OF MEDICATION  06/06/2013    Celestone (betamethasone) (1)      • INJECTION OF MEDICATION  12/04/2013    Depo Medrol (Methylprednisone) (1)      • INJECTION OF MEDICATION  05/21/2013    Dexamethasone (2)      • INJECTION OF MEDICATION  01/29/2014    Kenalog (6)   • KNEE ARTHROSCOPY  03/05/2007    Knee arthroscopy, surgery (1)     • KNEE ARTHROSCOPY Right 7/13/2020    Procedure: KNEE ARTHROSCOPY with debridement medial meniscus;  Surgeon: Brooks Junior MD;  Location: [facility];  Service: Orthopedics;  Laterality: Right;   • LAPAROSCOPIC TUBAL LIGATION  04/16/1991    Tubal ligation (1)      • PAP SMEAR  01/18/2007    PAP SMEAR (1)      • SHOULDER SURGERY Left     x 4   • TOTAL ABDOMINAL HYSTERECTOMY WITH SALPINGO OOPHORECTOMY  1993    JOSE/BSO (1)      • TOTAL KNEE ARTHROPLASTY Left 11/19/2018    Procedure: TOTAL KNEE ARTHROPLASTY ATTUNE with adductor canal block - left;  Surgeon: Brooks Junior MD;  Location: [facility];  Service: Orthopedics       PT Ortho     Row Name 05/21/21 0800       Precautions and Contraindications    Precautions  history of L reverse shoulder arthroplasty  -SS    Contraindications  DOS: 3/30/21  -       Subjective Pain    Able to rate subjective pain?  yes  -SS    Pre-Treatment Pain Level  3  -SS    Post-Treatment Pain Level  3  -SS    Subjective Pain Comment  declines ice  -SS       Posture/Observations    Posture/Observations Comments  No sling.  -SS       Left Upper Ext    Lt Shoulder Abduction PROM  140 deg  -SS    Lt Shoulder Flexion AROM  AAROM 140 deg in supine; supine active flexion 120 deg  -SS    Lt Shoulder Flexion PROM  145 deg  -SS    Lt Shoulder External Rotation PROM  65 deg with shoulder abducted 90 deg  -SS    Lt Shoulder Internal Rotation PROM  70 deg with shoulder abducted 60 deg  -SS      User Key  (r) = Recorded By, (t) = Taken By, (c) = Cosigned By    Initials Name Provider Type    RUBÉN Sharp  Nathaniel Knapp, PT DPT Physical Therapist            PT Assessment/Plan     Row Name 05/21/21 0800          PT Assessment    Functional Limitations  Limitation in home management;Limitations in community activities;Limitations in functional capacity and performance;Performance in leisure activities;Performance in self-care ADL;Performance in work activities  -     Impairments  Range of motion;Pain;Muscle strength;Joint integrity;Sensation  -     Assessment Comments  Rehabilitation Progress Note sent with patient. Good effort. Pronated biceps curls difficult for patient. Improved ability to complete supinated biceps curls. ROM as expected at this time.   -     Rehab Potential  Fair barrier: history of 5 previous L shoulder surgeries  -     Patient/caregiver participated in establishment of treatment plan and goals  Yes  -SS     Patient would benefit from skilled therapy intervention  Yes  -SS        PT Plan    PT Frequency  2x/week  -     Predicted Duration of Therapy Intervention (PT)  8-10 weeks  -     PT Plan Comments  Protocol in chart. Progress as indicated and tolerated. Next: standing AA flexion and scaption to </= 90 deg.  -       User Key  (r) = Recorded By, (t) = Taken By, (c) = Cosigned By    Initials Name Provider Type     Nathaniel Sharp, PT DPT Physical Therapist            OP Exercises     Row Name 05/21/21 0800             Subjective Comments    Subjective Comments  Hurting worse the past 2 days. Unsure of why more painful. Pain woke her up. Cleaned garage yesterday. Used 2 hands while sweeping. Did not do any lifting or carrying with L. Trying to use L arm a bit more for typing. 45-50% subjective improvement.  -         Subjective Pain    Able to rate subjective pain?  yes  -      Pre-Treatment Pain Level  3  -SS      Post-Treatment Pain Level  3  -      Subjective Pain Comment  declines ice  -         Exercise 1    Exercise Name 1  Pro2, Seat 6, UE, fwd  -      Cueing 1   Verbal  -SS      Time 1  8 mins  -SS      Additional Comments  Level 2  -SS         Exercise 2    Exercise Name 2  AAROM ER in shoulder neutral  -SS      Cueing 2  Verbal;Tactile  -SS      Reps 2  100  -SS         Exercise 3    Exercise Name 3  AAROM pronated elbow curls  -SS      Cueing 3  Verbal;Tactile  -SS      Sets 3  2  -SS      Reps 3  30  -SS         Exercise 4    Exercise Name 4  Supine active shoulder flexion to 90 deg  -SS      Cueing 4  Verbal  -SS      Sets 4  2  -SS      Reps 4  5  -SS         Exercise 5    Exercise Name 5  Supinated biceps curls  -SS      Cueing 5  Verbal  -SS      Sets 5  2  -SS      Reps 5  15  -SS      Additional Comments  2#  -SS         Exercise 6    Exercise Name 6  Biceps curls with forearm neutral  -SS      Cueing 6  Verbal  -SS      Sets 6  3  -SS      Reps 6  10  -SS      Additional Comments  1#  -SS        User Key  (r) = Recorded By, (t) = Taken By, (c) = Cosigned By    Initials Name Provider Type    Nathaniel Zhang, PT DPT Physical Therapist              PT OP Goals     Row Name 05/21/21 0800          PT Short Term Goals    STG Date to Achieve  06/07/21  -     STG 1  Pt will be independent with HEP for self-management of symptoms.  -     STG 1 Progress  Ongoing;Met  -     STG 2  Pt's flex and abd PROM will be at least 100 deg.  -     STG 2 Progress  Met  -     STG 3  Flexion AAROM to be 145 deg  -     STG 3 Progress  Ongoing  -     STG 4  Shoulder active flexion to be >/= 60 deg.  -     STG 4 Progress  Met  -     STG 5  Shoulder abduction AROM to be >/= 60 deg.  -     STG 5 Progress  Ongoing  -        Long Term Goals    LTG Date to Achieve  07/06/21  -     LTG 1  Pt will report a subjective improvement of at least 80% in symptoms as a measure to return to PLOF.  -     LTG 1 Progress  Ongoing  -     LTG 2  Pt's QuickDASH score will improve by at least 11 points.  -     LTG 2 Progress  Ongoing  -     LTG 3  Pt will be able to  independently wash and fix her own hair.  -     LTG 3 Progress  Ongoing  -SS     LTG 4  Pt's shoulder flex and abd AROM will improve to at least 90 deg.  -SS     LTG 4 Progress  Ongoing  -SS     LTG 5  Pt's shoulder flex and abd AROM will improve to at least 140 deg.  -SS     LTG 5 Progress  Ongoing  -SS     LTG 6  Pt's left shoulder MMT will improve to at least 4/5 grossly.  -SS     LTG 6 Progress  Ongoing  -SS     LTG 7  Pt will be able to return to work full time.  -     LTG 7 Progress  Ongoing  -SS     LTG 8  Pt will be independent with all ADLS and IADLS as a measure of return to prior level of function.  -     LTG 8 Progress  Ongoing  -        Time Calculation    PT Goal Re-Cert Due Date  06/07/21  -       User Key  (r) = Recorded By, (t) = Taken By, (c) = Cosigned By    Initials Name Provider Type    Nathaniel Zhang, PT DPT Physical Therapist          Therapy Education  Education Details: review progress  How Provided: Verbal  Provided to: Patient  Level of Understanding: Verbalized              Time Calculation:   Start Time: 0800  Stop Time: 0847  Time Calculation (min): 47 min  Therapy Charges for Today     Code Description Service Date Service Provider Modifiers Qty    65331016069 HC PT THER PROC EA 15 MIN 5/21/2021 Nathaniel Sharp, PT DPT GP 3                    Nathaniel Sharp PT, DPT, CHT  5/21/2021

## 2021-05-24 ENCOUNTER — APPOINTMENT (OUTPATIENT)
Dept: PHYSICAL THERAPY | Facility: HOSPITAL | Age: 56
End: 2021-05-24

## 2021-05-24 LAB
ALPHA-GAL IGE QN: <0.1 KU/L
BEEF IGE QN: <0.1 KU/L
DEPRECATED BEEF IGE RAST QL: 0
DEPRECATED LAMB IGE RAST QL: 0
DEPRECATED PORK IGE RAST QL: 0
LAMB IGE QN: <0.1 KU/L
PORK IGE QN: <0.1 KU/L

## 2021-05-25 ENCOUNTER — HOSPITAL ENCOUNTER (OUTPATIENT)
Dept: PHYSICAL THERAPY | Facility: HOSPITAL | Age: 56
Setting detail: THERAPIES SERIES
Discharge: HOME | End: 2021-05-25

## 2021-05-25 DIAGNOSIS — Z96.612 S/P REVERSE TOTAL SHOULDER ARTHROPLASTY, LEFT: Primary | ICD-10-CM

## 2021-05-25 PROCEDURE — 97110 THERAPEUTIC EXERCISES: CPT | Performed by: PHYSICAL THERAPIST

## 2021-05-26 NOTE — THERAPY TREATMENT NOTE
Outpatient Physical Therapy Ortho Treatment Note  [facility]     Patient Name: Priti Orr  : 1965  MRN: [id number]  Today's Date: 2021      Visit Date: 2021    Attendance: 10/10 (eval + 9 visits through 21)  Subjective Improvement: 45-50%  Next MD Visit: 4-6 weeks  Recert Date:  2021     Therapy Diagnosis: S/P Revision of reverse TSA 3/30/21     Visit Dx:    ICD-10-CM ICD-9-CM   1. S/P reverse total shoulder arthroplasty, left  Z96.612 V43.61            Past Medical History:   Diagnosis Date   • Allergic rhinitis    • Anesthesia     nausea and vomiting with anesthesia requires pre medicaiton   • Anxiety    • Arthritis    • Asthma    • Benign neoplasm of meninges (CMS/HCC)     Post op       • Cobalamin deficiency    • Corneal abrasion    • Depression    • Encounter for gynecological examination (general) (routine) without abnormal findings    • Essential hypertension    • Fatigue    • Female stress incontinence    • Head injury    • History of delayed wound healing     Delayed healing of surgical wound      • Hyperlipidemia    • Low back pain    • Macular degeneration disease    • Nosebleed, symptom     Nosebleed/epistaxis symptom      • Osteoporosis    • Osteoporosis    • PONV (postoperative nausea and vomiting)    • Seizures (CMS/HCC)    • Sleep apnea     not wearing c-pap   • Wears glasses         Past Surgical History:   Procedure Laterality Date   • ARM LESION/CYST EXCISION  2014    Remove arm bone lesion (1)   • ARM SKIN LESION BIOPSY / EXCISION     • ARM WOUND REPAIR / CLOSURE  2014    Repair Superficial Wound TR-EXT 2.5 < CM 30665 (1)      • BRAIN SURGERY     • BREAST LUMPECTOMY     •  SECTION  1986     Section (1)     • ENDOSCOPY  2008    Colon endoscopy 83315 (1)     • EXCISION BREAST LESION W/ PREOP NEEDLE LOC  1981    Excision, breast lesion (1)     • HEMORRHOIDECTOMY     • HEMORRHOIDECTOMY  2008     Hemorrhoidectomy (2)      • HYSTERECTOMY     • INJECTION OF MEDICATION  06/06/2013    Celestone (betamethasone) (1)      • INJECTION OF MEDICATION  12/04/2013    Depo Medrol (Methylprednisone) (1)      • INJECTION OF MEDICATION  05/21/2013    Dexamethasone (2)      • INJECTION OF MEDICATION  01/29/2014    Kenalog (6)   • KNEE ARTHROSCOPY  03/05/2007    Knee arthroscopy, surgery (1)     • KNEE ARTHROSCOPY Right 7/13/2020    Procedure: KNEE ARTHROSCOPY with debridement medial meniscus;  Surgeon: Brooks Junior MD;  Location: [facility];  Service: Orthopedics;  Laterality: Right;   • LAPAROSCOPIC TUBAL LIGATION  04/16/1991    Tubal ligation (1)      • PAP SMEAR  01/18/2007    PAP SMEAR (1)      • SHOULDER SURGERY Left     x 4   • TOTAL ABDOMINAL HYSTERECTOMY WITH SALPINGO OOPHORECTOMY  1993    JOSE/BSO (1)      • TOTAL KNEE ARTHROPLASTY Left 11/19/2018    Procedure: TOTAL KNEE ARTHROPLASTY ATTUNE with adductor canal block - left;  Surgeon: Brooks Junior MD;  Location: [facility];  Service: Orthopedics       PT Ortho     Row Name 05/25/21 0900       Subjective Comments    Subjective Comments  Saw PA at MD office yesterday. Wasn't given much information by the PA. Shoulder is feeling okay today. Sore for the rest of the day after last therapy session. 45-50% subjective improvement.   -SS       Precautions and Contraindications    Precautions  history of L reverse shoulder arthroplasty  -SS    Contraindications  DOS: 3/30/21  -SS       Subjective Pain    Able to rate subjective pain?  yes  -SS    Pre-Treatment Pain Level  2  -SS    Post-Treatment Pain Level  3  -SS       Posture/Observations    Posture/Observations Comments  No sling. No acute distress.  -SS       Left Upper Ext    Lt Shoulder Flexion AROM  75 deg standing active  -SS      User Key  (r) = Recorded By, (t) = Taken By, (c) = Cosigned By    Initials Name Provider Type    SS Nathaniel Sharp, PT DPT Physical Therapist            PT  Assessment/Plan     Row Name 05/25/21 0900          PT Assessment    Functional Limitations  Limitation in home management;Limitations in community activities;Limitations in functional capacity and performance;Performance in leisure activities;Performance in self-care ADL;Performance in work activities  -     Impairments  Range of motion;Pain;Muscle strength;Joint integrity;Sensation  -     Assessment Comments  Palpable and visually noticeable shift in glenohumeral joint with AA shoulder ER. Improving elbow flexion strength. Continued difficulty actively elevating the left shoulder. ROM as expected at this time.   -     Rehab Potential  Fair barrier: 5 previous surgeries to L shoulder  -     Patient/caregiver participated in establishment of treatment plan and goals  Yes  -SS     Patient would benefit from skilled therapy intervention  Yes  -SS        PT Plan    PT Frequency  2x/week  -     Predicted Duration of Therapy Intervention (PT)  8-10 weeks  -     PT Plan Comments  Continue protocol. Advance to next phase on 6/1/21. Progressive ROM and strengthening.   -       User Key  (r) = Recorded By, (t) = Taken By, (c) = Cosigned By    Initials Name Provider Type     Nathaniel Sharp, PT DPT Physical Therapist            OP Exercises     Row Name 05/25/21 0900             Subjective Comments    Subjective Comments  Saw PA at MD office yesterday. Wasn't given much information by the PA. Shoulder is feeling okay today. Sore for the rest of the day after last therapy session. 45-50% subjective improvement.   -SS         Subjective Pain    Able to rate subjective pain?  yes  -SS      Pre-Treatment Pain Level  2  -SS      Post-Treatment Pain Level  3  -         Exercise 1    Exercise Name 1  Pro2, Seat 7, UE fwd  -      Cueing 1  Verbal  -      Time 1  10 mins  -      Additional Comments  Level 1  -         Exercise 2    Exercise Name 2  Standing AA flexion to </= 90 deg  -      Cueing 2   Verbal;Tactile  -SS      Reps 2  x5, 3x10  -SS         Exercise 3    Exercise Name 3  Standing AA scaption to </= 90 deg  -SS      Cueing 3  Verbal;Tactile  -SS      Sets 3  4  -SS      Reps 3  10  -SS         Exercise 4    Exercise Name 4  Supinated biceps curls  -SS      Cueing 4  Verbal;Demo  -SS      Sets 4  3  -SS      Reps 4  10  -SS      Additional Comments  2#  -SS         Exercise 5    Exercise Name 5  Forearm neutral biceps curls  -SS      Cueing 5  Verbal;Demo  -SS      Sets 5  2  -SS      Reps 5  10  -SS      Additional Comments  2#  -SS         Exercise 6    Exercise Name 6  Pronated biceps curls  -SS      Cueing 6  Verbal;Demo  -SS      Sets 6  2  -SS      Reps 6  10  -SS      Additional Comments  0#  -SS         Exercise 7    Exercise Name 7  Shoulder ER in neutral with light AAROM  -SS      Cueing 7  Verbal;Tactile  -SS      Sets 7  1  -SS      Reps 7  10  -SS         Exercise 8    Exercise Name 8  Slow reversal shoulder extension to neutral  -SS      Cueing 8  Verbal;Tactile  -SS      Sets 8  2  -SS      Reps 8  10  -SS         Exercise 9    Exercise Name 9  Scapular retraction  -SS      Cueing 9  Verbal;Tactile  -SS      Sets 9  3  -SS      Reps 9  20  -SS        User Key  (r) = Recorded By, (t) = Taken By, (c) = Cosigned By    Initials Name Provider Type    Nathaniel Zhang, PT DPT Physical Therapist            PT OP Goals     Row Name 05/25/21 0900          PT Short Term Goals    STG Date to Achieve  06/07/21  -     STG 1  Pt will be independent with HEP for self-management of symptoms.  -     STG 1 Progress  Ongoing;Met  -     STG 2  Pt's flex and abd PROM will be at least 100 deg.  -     STG 2 Progress  Met  -     STG 3  Flexion AAROM to be 145 deg  -     STG 3 Progress  Ongoing  -     STG 4  Shoulder active flexion to be >/= 60 deg.  -     STG 4 Progress  Met  -     STG 5  Shoulder abduction AROM to be >/= 60 deg.  -     STG 5 Progress  Ongoing  -        Long  Term Goals    LTG Date to Achieve  07/06/21  -     LTG 1  Pt will report a subjective improvement of at least 80% in symptoms as a measure to return to PLOF.  -     LTG 1 Progress  Ongoing  -SS     LTG 2  Pt's QuickDASH score will improve by at least 11 points.  -     LTG 2 Progress  Ongoing  -SS     LTG 3  Pt will be able to independently wash and fix her own hair.  -SS     LTG 3 Progress  Ongoing  -SS     LTG 4  Pt's shoulder flex and abd AROM will improve to at least 90 deg.  -SS     LTG 4 Progress  Ongoing  -SS     LTG 5  Pt's shoulder flex and abd AROM will improve to at least 140 deg.  -     LTG 5 Progress  Ongoing  -SS     LTG 6  Pt's left shoulder MMT will improve to at least 4/5 grossly.  -     LTG 6 Progress  Ongoing  -SS     LTG 7  Pt will be able to return to work full time.  -     LTG 7 Progress  Ongoing  -SS     LTG 8  Pt will be independent with all ADLS and IADLS as a measure of return to prior level of function.  -     LTG 8 Progress  Ongoing  -        Time Calculation    PT Goal Re-Cert Due Date  06/07/21  -       User Key  (r) = Recorded By, (t) = Taken By, (c) = Cosigned By    Initials Name Provider Type     Nathaniel Sharp, PT DPT Physical Therapist                         Time Calculation:   Start Time: 0931  Stop Time: 1013  Time Calculation (min): 42 min  Therapy Charges for Today     Code Description Service Date Service Provider Modifiers Qty    81922835248 HC PT THER PROC EA 15 MIN 5/25/2021 Nathaniel Sharp, PT DPT GP 3                    Nathaniel Sharp, PT, DPT, CHT  5/25/2021

## 2021-06-04 ENCOUNTER — HOSPITAL ENCOUNTER (OUTPATIENT)
Dept: PHYSICAL THERAPY | Facility: HOSPITAL | Age: 56
Setting detail: THERAPIES SERIES
Discharge: HOME | End: 2021-06-04

## 2021-06-04 DIAGNOSIS — Z96.612 S/P REVERSE TOTAL SHOULDER ARTHROPLASTY, LEFT: Primary | ICD-10-CM

## 2021-06-04 PROCEDURE — 97110 THERAPEUTIC EXERCISES: CPT | Performed by: PHYSICAL THERAPIST

## 2021-06-07 ENCOUNTER — APPOINTMENT (OUTPATIENT)
Dept: PHYSICAL THERAPY | Facility: HOSPITAL | Age: 56
End: 2021-06-07

## 2021-06-09 ENCOUNTER — HOSPITAL ENCOUNTER (OUTPATIENT)
Dept: PHYSICAL THERAPY | Facility: HOSPITAL | Age: 56
Setting detail: THERAPIES SERIES
Discharge: HOME | End: 2021-06-09

## 2021-06-09 DIAGNOSIS — Z96.612 S/P REVERSE TOTAL SHOULDER ARTHROPLASTY, LEFT: Primary | ICD-10-CM

## 2021-06-09 PROCEDURE — 97110 THERAPEUTIC EXERCISES: CPT | Performed by: PHYSICAL THERAPIST

## 2021-06-09 NOTE — THERAPY PROGRESS REPORT/RE-CERT
Outpatient Physical Therapy Ortho Progress Note  [facility]     Patient Name: Priti Orr  : 1965  MRN: [id number]  Today's Date: 2021      Visit Date: 2021    Attendance:  (15 visits through 21)  Subjective Improvement: 50%  Next MD Visit:  July  Recert Date:  2021     Therapy Diagnosis: S/P Revision of reverse TSA 3/30/21     Changes in Medications: none noted  Changes in MD Orders: none noted  Number of Work Days Lost: <2 weeks        Past Medical History:   Diagnosis Date   • Allergic rhinitis    • Anesthesia     nausea and vomiting with anesthesia requires pre medicaiton   • Anxiety    • Arthritis    • Asthma    • Benign neoplasm of meninges (CMS/HCC)     Post op       • Cobalamin deficiency    • Corneal abrasion    • Depression    • Encounter for gynecological examination (general) (routine) without abnormal findings    • Essential hypertension    • Fatigue    • Female stress incontinence    • Head injury    • History of delayed wound healing     Delayed healing of surgical wound      • Hyperlipidemia    • Low back pain    • Macular degeneration disease    • Nosebleed, symptom     Nosebleed/epistaxis symptom      • Osteoporosis    • Osteoporosis    • PONV (postoperative nausea and vomiting)    • Seizures (CMS/HCC)    • Sleep apnea     not wearing c-pap   • Wears glasses         Past Surgical History:   Procedure Laterality Date   • ARM LESION/CYST EXCISION  2014    Remove arm bone lesion (1)   • ARM SKIN LESION BIOPSY / EXCISION     • ARM WOUND REPAIR / CLOSURE  2014    Repair Superficial Wound TR-EXT 2.5 < CM 80510 (1)      • BRAIN SURGERY     • BREAST LUMPECTOMY     •  SECTION  1986     Section (1)     • ENDOSCOPY  2008    Colon endoscopy 55083 (1)     • EXCISION BREAST LESION W/ PREOP NEEDLE LOC  1981    Excision, breast lesion (1)     • HEMORRHOIDECTOMY     • HEMORRHOIDECTOMY  2008     Hemorrhoidectomy (2)      • HYSTERECTOMY     • INJECTION OF MEDICATION  06/06/2013    Celestone (betamethasone) (1)      • INJECTION OF MEDICATION  12/04/2013    Depo Medrol (Methylprednisone) (1)      • INJECTION OF MEDICATION  05/21/2013    Dexamethasone (2)      • INJECTION OF MEDICATION  01/29/2014    Kenalog (6)   • KNEE ARTHROSCOPY  03/05/2007    Knee arthroscopy, surgery (1)     • KNEE ARTHROSCOPY Right 7/13/2020    Procedure: KNEE ARTHROSCOPY with debridement medial meniscus;  Surgeon: Brooks Junior MD;  Location: [facility];  Service: Orthopedics;  Laterality: Right;   • LAPAROSCOPIC TUBAL LIGATION  04/16/1991    Tubal ligation (1)      • PAP SMEAR  01/18/2007    PAP SMEAR (1)      • SHOULDER SURGERY Left     x 4   • TOTAL ABDOMINAL HYSTERECTOMY WITH SALPINGO OOPHORECTOMY  1993    JOSE/BSO (1)      • TOTAL KNEE ARTHROPLASTY Left 11/19/2018    Procedure: TOTAL KNEE ARTHROPLASTY ATTUNE with adductor canal block - left;  Surgeon: Brooks Junior MD;  Location: [facility];  Service: Orthopedics       Visit Dx:     ICD-10-CM ICD-9-CM   1. S/P reverse total shoulder arthroplasty, left  Z96.612 V43.61             PT Ortho     Row Name 06/09/21 1000       Subjective Comments    Subjective Comments  Continued pain in the shoulder. Use of the arm increases her pain. Stabbing pain in a certain movement that she hasn't quite figured out yet. Some improvement in the use of her L UE. Painful when tried to use a rolling pin. 50% subjective improvement.   -       Precautions and Contraindications    Precautions  history of L reverse shoulder arthroplasty  -    Contraindications  DOS: 3/30/21  -       Subjective Pain    Able to rate subjective pain?  yes  -SS    Pre-Treatment Pain Level  6  -SS    Post-Treatment Pain Level  6  -SS    Subjective Pain Comment  declines ice  -SS       Posture/Observations    Posture/Observations Comments  No sling. Minimal guarding L shoulder during gait.  -SS       Left  Upper Ext    Lt Shoulder Abduction AROM  78 deg  -    Lt Shoulder Extension AROM  40 deg  -SS    Lt Shoulder Flexion AROM  75 deg standing active; 110 deg standing AAROM; 120 deg supine active; 142 deg supine AAROM  -    Lt Shoulder External Rotation AROM  58 deg in supine 90/90; 37 deg in supine with shoulder abducted 45 deg  -SS    Lt Shoulder Internal Rotation AROM  66 deg in supine 90/90  -    Lt Upper Extremity Comments   sharp pain at end-range  -      User Key  (r) = Recorded By, (t) = Taken By, (c) = Cosigned By    Initials Name Provider Type    SS Nathaniel Sharp, PT DPT Physical Therapist          Therapy Education  Education Details: use L UE for light activity as tolerated  How Provided: Verbal  Provided to: Patient  Level of Understanding: Verbalized     PT OP Goals     Row Name 06/09/21 1000          PT Short Term Goals    STG Date to Achieve  06/07/21  -     STG 1  Pt will be independent with HEP for self-management of symptoms.  -     STG 1 Progress  Ongoing;Met  -     STG 2  Pt's flex and abd PROM will be at least 100 deg.  -     STG 2 Progress  Met  -     STG 3  Flexion AAROM to be 145 deg  -     STG 3 Progress  Not Met;Ongoing  -     STG 4  Shoulder active flexion to be >/= 60 deg.  -     STG 4 Progress  Met  -     STG 5  Shoulder abduction AROM to be >/= 60 deg.  -     STG 5 Progress  Met  -        Long Term Goals    LTG Date to Achieve  07/06/21  -     LTG 1  Pt will report a subjective improvement of at least 80% in symptoms as a measure to return to PLOF.  -     LTG 1 Progress  Ongoing  -     LTG 2  Pt's QuickDASH score will improve by at least 11 points.  -     LTG 2 Progress  Ongoing  -     LTG 3  Pt will be able to independently wash and fix her own hair.  -     LTG 3 Progress  Ongoing  -     LTG 4  Pt's shoulder flex and abd AROM will improve to at least 90 deg.  -     LTG 4 Progress  Ongoing  -     LTG 5  Pt's shoulder flex and abd  AROM will improve to at least 140 deg.  -     LTG 5 Progress  Ongoing  -     LTG 6  Pt's left shoulder MMT will improve to at least 4/5 grossly.  -     LTG 6 Progress  Ongoing  -SS     LTG 7  Pt will be able to return to work full time.  -     LTG 7 Progress  Ongoing  -SS     LTG 8  Pt will be independent with all ADLS and IADLS as a measure of return to prior level of function.  -     LTG 8 Progress  Ongoing  -        Time Calculation    PT Goal Re-Cert Due Date  06/30/21  -       User Key  (r) = Recorded By, (t) = Taken By, (c) = Cosigned By    Initials Name Provider Type    Nathaniel Zhang, PT DPT Physical Therapist          PT Assessment/Plan     Row Name 06/09/21 1000          PT Assessment    Functional Limitations  Limitation in home management;Limitations in community activities;Limitations in functional capacity and performance;Performance in leisure activities;Performance in self-care ADL;Performance in work activities  -     Impairments  Range of motion;Pain;Muscle strength;Joint integrity;Sensation  -     Assessment Comments  Developed increased L shoulder pain after 9 mins of Pro2. Increased shoulder pain at end-ranges. Motion improving. Unsure why increased pain in the L shoulder.  -     Rehab Potential  Fair barrier: 5 surgeries to L shoulder  -     Patient/caregiver participated in establishment of treatment plan and goals  Yes  -     Patient would benefit from skilled therapy intervention  Yes  -        PT Plan    PT Frequency  1x/week  -     Predicted Duration of Therapy Intervention (PT)  8-10 weeks  -     PT Plan Comments  Continue protocol. Advance as tolerated.   -       User Key  (r) = Recorded By, (t) = Taken By, (c) = Cosigned By    Initials Name Provider Type    Nathaniel Zhang, PT DPT Physical Therapist            OP Exercises     Row Name 06/09/21 1000             Subjective Comments    Subjective Comments  Continued pain in the  shoulder. Use of the arm increases her pain. Stabbing pain in a certain movement that she hasn't quite figured out yet. Some improvement in the use of her L UE. Painful when tried to use a rolling pin. 50% subjective improvement.   -SS         Subjective Pain    Able to rate subjective pain?  yes  -SS      Pre-Treatment Pain Level  6  -SS      Post-Treatment Pain Level  6  -SS      Subjective Pain Comment  declines ice  -SS         Exercise 1    Exercise Name 1  Pro2, Seat 7 UE/LE, fwd  -SS      Cueing 1  Verbal  -SS      Time 1  9.5 mins  -SS      Additional Comments  Level 2  -SS         Exercise 2    Exercise Name 2  recheck  -SS         Exercise 3    Exercise Name 3  Supine arm hold at 90 deg  -SS      Cueing 3  Verbal;Tactile  -SS      Sets 3  1  -SS      Reps 3  3  -SS      Time 3  30 sec hold  -SS         Exercise 4    Exercise Name 4  Supine active shoulder flexion/extension between 45 and approximately 120 deg  -SS      Cueing 4  Verbal;Tactile  -SS      Sets 4  1  -SS      Reps 4  20  -SS      Additional Comments  PT guidance  -SS         Exercise 5    Exercise Name 5  Seated ball roll abd/add   -SS      Cueing 5  Verbal  -SS      Time 5  1 min  -SS         Exercise 6    Exercise Name 6  Ball roll flex/ext on treatment plinth  -SS      Cueing 6  Verbal;Demo  -SS      Time 6  1 min  -SS        User Key  (r) = Recorded By, (t) = Taken By, (c) = Cosigned By    Initials Name Provider Type    SS Nathaniel Sharp, PT DPT Physical Therapist                  Time Calculation:     Start Time: 1022  Stop Time: 1058  Time Calculation (min): 36 min     Therapy Charges for Today     Code Description Service Date Service Provider Modifiers Qty    83902755968 HC PT THER PROC EA 15 MIN 6/9/2021 Nathaniel Sharp, PT DPT GP 2                    Nathaniel Sharp, PT, DPT, CHT  6/9/2021

## 2021-06-18 ENCOUNTER — HOSPITAL ENCOUNTER (OUTPATIENT)
Dept: PHYSICAL THERAPY | Facility: HOSPITAL | Age: 56
Setting detail: THERAPIES SERIES
Discharge: HOME | End: 2021-06-18

## 2021-06-18 DIAGNOSIS — Z96.612 S/P REVERSE TOTAL SHOULDER ARTHROPLASTY, LEFT: Primary | ICD-10-CM

## 2021-06-18 PROCEDURE — 97530 THERAPEUTIC ACTIVITIES: CPT | Performed by: PHYSICAL THERAPIST

## 2021-06-18 NOTE — THERAPY TREATMENT NOTE
Outpatient Physical Therapy Ortho Treatment Note  [facility]     Patient Name: Priti Orr  : 1965  MRN: [id number]  Today's Date: 2021      Visit Date: 2021    Attendance:  (15 visits through 21)  Subjective Improvement: 50%  Next MD Visit:  July  Recert Date:  2021     Therapy Diagnosis: S/P Revision of reverse TSA 3/30/21     Visit Dx:    ICD-10-CM ICD-9-CM   1. S/P reverse total shoulder arthroplasty, left  Z96.612 V43.61            Past Medical History:   Diagnosis Date   • Allergic rhinitis    • Anesthesia     nausea and vomiting with anesthesia requires pre medicaiton   • Anxiety    • Arthritis    • Asthma    • Benign neoplasm of meninges (CMS/HCC)     Post op       • Cobalamin deficiency    • Corneal abrasion    • Depression    • Encounter for gynecological examination (general) (routine) without abnormal findings    • Essential hypertension    • Fatigue    • Female stress incontinence    • Head injury    • History of delayed wound healing     Delayed healing of surgical wound      • Hyperlipidemia    • Low back pain    • Macular degeneration disease    • Nosebleed, symptom     Nosebleed/epistaxis symptom      • Osteoporosis    • Osteoporosis    • PONV (postoperative nausea and vomiting)    • Seizures (CMS/HCC)    • Sleep apnea     not wearing c-pap   • Wears glasses         Past Surgical History:   Procedure Laterality Date   • ARM LESION/CYST EXCISION  2014    Remove arm bone lesion (1)   • ARM SKIN LESION BIOPSY / EXCISION     • ARM WOUND REPAIR / CLOSURE  2014    Repair Superficial Wound TR-EXT 2.5 < CM 77787 (1)      • BRAIN SURGERY     • BREAST LUMPECTOMY     •  SECTION  1986     Section (1)     • ENDOSCOPY  2008    Colon endoscopy 44499 (1)     • EXCISION BREAST LESION W/ PREOP NEEDLE LOC  1981    Excision, breast lesion (1)     • HEMORRHOIDECTOMY     • HEMORRHOIDECTOMY  2008     Hemorrhoidectomy (2)      • HYSTERECTOMY     • INJECTION OF MEDICATION  06/06/2013    Celestone (betamethasone) (1)      • INJECTION OF MEDICATION  12/04/2013    Depo Medrol (Methylprednisone) (1)      • INJECTION OF MEDICATION  05/21/2013    Dexamethasone (2)      • INJECTION OF MEDICATION  01/29/2014    Kenalog (6)   • KNEE ARTHROSCOPY  03/05/2007    Knee arthroscopy, surgery (1)     • KNEE ARTHROSCOPY Right 7/13/2020    Procedure: KNEE ARTHROSCOPY with debridement medial meniscus;  Surgeon: Brooks Junior MD;  Location: [facility];  Service: Orthopedics;  Laterality: Right;   • LAPAROSCOPIC TUBAL LIGATION  04/16/1991    Tubal ligation (1)      • PAP SMEAR  01/18/2007    PAP SMEAR (1)      • SHOULDER SURGERY Left     x 4   • TOTAL ABDOMINAL HYSTERECTOMY WITH SALPINGO OOPHORECTOMY  1993    JOSE/BSO (1)      • TOTAL KNEE ARTHROPLASTY Left 11/19/2018    Procedure: TOTAL KNEE ARTHROPLASTY ATTUNE with adductor canal block - left;  Surgeon: Brooks Junior MD;  Location: [facility];  Service: Orthopedics       PT Ortho     Row Name 06/18/21 0800       Left Upper Ext    Lt Shoulder Abduction AROM  128 deg in supine; 75 deg in sitting  -SS    Lt Shoulder Abduction PROM  145 deg  -SS    Lt Shoulder Flexion AROM  150 deg in supine; 80 deg in sitting  -SS    Lt Shoulder Flexion PROM  149 deg  -SS    Lt Shoulder External Rotation AROM  15 deg in sitting neutral; 33 deg AAROM in seated neutral  -      User Key  (r) = Recorded By, (t) = Taken By, (c) = Cosigned By    Initials Name Provider Type    Nathaniel Zhang, PT DPT Physical Therapist            PT Assessment/Plan     Row Name 06/18/21 0800          PT Assessment    Functional Limitations  Limitation in home management;Limitations in community activities;Limitations in functional capacity and performance;Performance in leisure activities;Performance in self-care ADL;Performance in work activities  -     Impairments  Range of motion;Pain;Muscle  strength;Joint integrity;Sensation  -     Assessment Comments  Good effort. End-range pain with AROM. 1 instance of popping noted in shoulder with AA ER. Supine shoulder AROM has improved.   -     Rehab Potential  Fair barrier: 5 surgeries to L shoulder.  -SS     Patient/caregiver participated in establishment of treatment plan and goals  Yes  -SS     Patient would benefit from skilled therapy intervention  Yes  -SS        PT Plan    PT Frequency  1x/week  -SS     Predicted Duration of Therapy Intervention (PT)  8-10 weeks  -SS     PT Plan Comments  Continue protocol.   -       User Key  (r) = Recorded By, (t) = Taken By, (c) = Cosigned By    Initials Name Provider Type    SS Nathaniel Sharp, PT DPT Physical Therapist            OP Exercises     Row Name 06/18/21 0800             Subjective Comments    Subjective Comments  L shoulder not as painful but still hurts. Sharp pain if twists it, but isn't consistent what motion aggravates it. 50% subjective improvement.   -SS         Subjective Pain    Able to rate subjective pain?  yes  -SS      Pre-Treatment Pain Level  3  -SS      Post-Treatment Pain Level  3  -SS         Exercise 1    Exercise Name 1  Pro2, Seat 7 UE/LE, fwd  -SS      Cueing 1  Verbal  -SS      Additional Comments  Level 2  -SS         Exercise 2    Exercise Name 2  PROM flexion, abduction  -SS      Cueing 2  Verbal;Tactile  -SS      Sets 2  2  -SS      Reps 2  30  -SS         Exercise 3    Exercise Name 3  Supine shoulder flexion from 30 to 90 deg  -SS      Cueing 3  Verbal;Tactile  -SS      Sets 3  3  -SS      Reps 3  10  -SS         Exercise 4    Exercise Name 4  Supine serratus punches  -SS      Cueing 4  Verbal;Tactile  -SS      Sets 4  3  -SS      Reps 4  10  -SS         Exercise 5    Exercise Name 5  Seated neutral shoulder ER  -SS      Cueing 5  Verbal  -SS      Sets 5  3  -SS      Reps 5  20  -SS         Exercise 6    Exercise Name 6  Seated isometric shoulder flexion  -SS       Cueing 6  Verbal;Tactile  -SS      Sets 6  2  -SS      Reps 6  10  -SS      Time 6  5 sec hold  -SS      Additional Comments  P.T. resist  -SS         Exercise 7    Exercise Name 7  Seated isometric shoulder extension  -SS      Cueing 7  Verbal;Tactile  -SS      Sets 7  1  -SS      Reps 7  10  -SS      Time 7  5 sec hold  -SS      Additional Comments  P.T. resist  -SS         Exercise 8    Exercise Name 8  Seated isometric shoulder abduction  -SS      Cueing 8  Verbal;Tactile  -SS      Sets 8  1  -SS      Reps 8  10  -SS      Time 8  5 sec hold  -SS      Additional Comments  P.T. resist  -SS         Exercise 9    Exercise Name 9  Seated isometric shoulder ER  -SS      Cueing 9  Verbal;Demo  -SS      Sets 9  1  -SS      Reps 9  10  -SS      Time 9  5 sec hold  -SS      Additional Comments  P.T. resist  -SS        User Key  (r) = Recorded By, (t) = Taken By, (c) = Cosigned By    Initials Name Provider Type    SS Nathaniel Sharp, PT DPT Physical Therapist          PT OP Goals     Row Name 06/18/21 0800          PT Short Term Goals    STG Date to Achieve  06/07/21  -     STG 1  Pt will be independent with HEP for self-management of symptoms.  -     STG 1 Progress  Ongoing;Met  -     STG 2  Pt's flex and abd PROM will be at least 100 deg.  -     STG 2 Progress  Met  -     STG 3  Flexion AAROM to be 145 deg  -     STG 3 Progress  Not Met;Ongoing  -     STG 4  Shoulder active flexion to be >/= 60 deg.  -     STG 4 Progress  Met  -     STG 5  Shoulder abduction AROM to be >/= 60 deg.  -     STG 5 Progress  Met  -        Long Term Goals    LTG Date to Achieve  07/06/21  -     LTG 1  Pt will report a subjective improvement of at least 80% in symptoms as a measure to return to PLOF.  -     LTG 1 Progress  Ongoing  -     LTG 2  Pt's QuickDASH score will improve by at least 11 points.  -     LTG 2 Progress  Ongoing  -     LTG 3  Pt will be able to independently wash and fix her own hair.   -     LTG 3 Progress  Ongoing  -SS     LTG 4  Pt's shoulder flex and abd AROM will improve to at least 90 deg.  -SS     LTG 4 Progress  Ongoing  -SS     LTG 5  Pt's shoulder flex and abd AROM will improve to at least 140 deg.  -SS     LTG 5 Progress  Ongoing  -SS     LTG 6  Pt's left shoulder MMT will improve to at least 4/5 grossly.  -SS     LTG 6 Progress  Ongoing  -SS     LTG 7  Pt will be able to return to work full time.  -     LTG 7 Progress  Ongoing  -SS     LTG 8  Pt will be independent with all ADLS and IADLS as a measure of return to prior level of function.  -     LTG 8 Progress  Ongoing  -        Time Calculation    PT Goal Re-Cert Due Date  06/30/21  -       User Key  (r) = Recorded By, (t) = Taken By, (c) = Cosigned By    Initials Name Provider Type     Nathaniel Sharp, PT DPT Physical Therapist          Time Calculation:   Start Time: 0842  Stop Time: 0933  Time Calculation (min): 51 min  Therapy Charges for Today     Code Description Service Date Service Provider Modifiers Qty    84961747677 HC PT THERAPEUTIC ACT EA 15 MIN 6/18/2021 Nathaniel Sharp, PT DPT GP 3                    Nathaniel Sharp, PT, DPT, CHT  6/18/2021

## 2021-06-20 ENCOUNTER — LAB (OUTPATIENT)
Dept: LAB | Facility: HOSPITAL | Age: 56
End: 2021-06-20

## 2021-06-20 DIAGNOSIS — Z01.818 PREOP TESTING: ICD-10-CM

## 2021-06-20 LAB — SARS-COV-2 N GENE RESP QL NAA+PROBE: NOT DETECTED

## 2021-06-20 PROCEDURE — C9803 HOPD COVID-19 SPEC COLLECT: HCPCS

## 2021-06-20 PROCEDURE — 87635 SARS-COV-2 COVID-19 AMP PRB: CPT

## 2021-06-25 ENCOUNTER — APPOINTMENT (OUTPATIENT)
Dept: PHYSICAL THERAPY | Facility: HOSPITAL | Age: 56
End: 2021-06-25

## 2021-07-02 ENCOUNTER — HOSPITAL ENCOUNTER (OUTPATIENT)
Dept: PHYSICAL THERAPY | Facility: HOSPITAL | Age: 56
Discharge: HOME | End: 2021-07-02
Admitting: NURSE PRACTITIONER

## 2021-07-02 DIAGNOSIS — Z96.612 S/P REVERSE TOTAL SHOULDER ARTHROPLASTY, LEFT: Primary | ICD-10-CM

## 2021-07-02 DIAGNOSIS — M25.512 LEFT SHOULDER PAIN, UNSPECIFIED CHRONICITY: ICD-10-CM

## 2021-07-02 PROCEDURE — 97110 THERAPEUTIC EXERCISES: CPT | Performed by: PHYSICAL THERAPIST

## 2021-07-02 NOTE — THERAPY PROGRESS REPORT/RE-CERT
Outpatient Physical Therapy Ortho Progress Note  [facility]     Patient Name: Priti Orr  : 1965  MRN: [id number]  Today's Date: 2021      Visit Date: 2021    Attendance: 14/15 (15 visits through 21)  Subjective Improvement: 60%  Next MD Visit:  21  Recert Date:       Therapy Diagnosis: S/P Revision of reverse TSA 3/30/21     Changes in Medications: none   Changes in MD Orders: none  Number of Work Days Lost: <2 weeks    Past Medical History:   Diagnosis Date   • Allergic rhinitis    • Anesthesia     nausea and vomiting with anesthesia requires pre medicaiton   • Anxiety    • Arthritis    • Asthma    • Benign neoplasm of meninges (CMS/HCC)     Post op       • Cobalamin deficiency    • Corneal abrasion    • Depression    • Encounter for gynecological examination (general) (routine) without abnormal findings    • Essential hypertension    • Fatigue    • Female stress incontinence    • Head injury    • History of delayed wound healing     Delayed healing of surgical wound      • Hyperlipidemia    • Low back pain    • Macular degeneration disease    • Nosebleed, symptom     Nosebleed/epistaxis symptom      • Osteoporosis    • Osteoporosis    • PONV (postoperative nausea and vomiting)    • Seizures (CMS/HCC)    • Sleep apnea     not wearing c-pap   • Wears glasses         Past Surgical History:   Procedure Laterality Date   • ARM LESION/CYST EXCISION  2014    Remove arm bone lesion (1)   • ARM SKIN LESION BIOPSY / EXCISION     • ARM WOUND REPAIR / CLOSURE  2014    Repair Superficial Wound TR-EXT 2.5 < CM 09909 (1)      • BRAIN SURGERY     • BREAST LUMPECTOMY     •  SECTION  1986     Section (1)     • ENDOSCOPY  2008    Colon endoscopy 05306 (1)     • EXCISION BREAST LESION W/ PREOP NEEDLE LOC  1981    Excision, breast lesion (1)     • HEMORRHOIDECTOMY     • HEMORRHOIDECTOMY  2008    Hemorrhoidectomy (2)   "    • HYSTERECTOMY     • INJECTION OF MEDICATION  06/06/2013    Celestone (betamethasone) (1)      • INJECTION OF MEDICATION  12/04/2013    Depo Medrol (Methylprednisone) (1)      • INJECTION OF MEDICATION  05/21/2013    Dexamethasone (2)      • INJECTION OF MEDICATION  01/29/2014    Kenalog (6)   • KNEE ARTHROSCOPY  03/05/2007    Knee arthroscopy, surgery (1)     • KNEE ARTHROSCOPY Right 7/13/2020    Procedure: KNEE ARTHROSCOPY with debridement medial meniscus;  Surgeon: Brooks Junior MD;  Location: [facility];  Service: Orthopedics;  Laterality: Right;   • LAPAROSCOPIC TUBAL LIGATION  04/16/1991    Tubal ligation (1)      • PAP SMEAR  01/18/2007    PAP SMEAR (1)      • SHOULDER SURGERY Left     x 4   • TOTAL ABDOMINAL HYSTERECTOMY WITH SALPINGO OOPHORECTOMY  1993    JOSE/BSO (1)      • TOTAL KNEE ARTHROPLASTY Left 11/19/2018    Procedure: TOTAL KNEE ARTHROPLASTY ATTUNE with adductor canal block - left;  Surgeon: Brooks Junior MD;  Location: [facility];  Service: Orthopedics       Visit Dx:     ICD-10-CM ICD-9-CM   1. S/P reverse total shoulder arthroplasty, left  Z96.612 V43.61   2. Left shoulder pain, unspecified chronicity  M25.512 719.41             PT Ortho     Row Name 07/02/21 0900       Subjective Comments    Subjective Comments  \"Okay today. Yesterday was okay.\" Increased pain this week. Reports pain increases anytime that she attempts to do more. 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 Pain anterior and lateral left shoulder. Shoulder popping more. Increased pain with shoulder pops. Decreased pain if doesn't move or use L UE. 60% subjective improvement. MD 7/23/21.   -SS       Precautions and Contraindications    Precautions  history of L reverse shoulder arthroplasty  -    Contraindications  DOS: 3/30/21  -SS       Subjective Pain    Able to rate subjective pain?  yes  -SS    Pre-Treatment Pain Level  1  -SS    Post-Treatment Pain Level  " 2  -       Posture/Observations    Posture/Observations Comments  No sling. Rounded shoulder posture.   -SS       Left Upper Ext    Lt Shoulder Abduction AROM  73 deg sitting; pain  -SS    Lt Shoulder Abduction PROM  124 deg; pain  -SS    Lt Shoulder Flexion AROM  85 deg sitting, 137 deg supine; 130 deg seated AA  -SS    Lt Shoulder Flexion PROM  146 deg pain  -SS    Lt Shoulder External Rotation AROM  24 deg in seated neutral; 45 deg in supine with shoulder abducted 20-30 deg  -      User Key  (r) = Recorded By, (t) = Taken By, (c) = Cosigned By    Initials Name Provider Type    SS Nathaniel Sharp, PT DPT Physical Therapist            Therapy Education  Education Details: avoid L shoulder abduction  How Provided: Verbal  Provided to: Patient  Level of Understanding: Verbalized     PT OP Goals     Row Name 07/02/21 0900          PT Short Term Goals    STG Date to Achieve  07/23/21  -     STG 1  Pt will be independent with HEP for self-management of symptoms.  -     STG 1 Progress  Ongoing;Met  -     STG 2  Pt's flex and abd PROM will be at least 100 deg.  -     STG 2 Progress  Met  -     STG 3  Flexion AAROM to be 145 deg  -     STG 3 Progress  Not Met;Ongoing  -     STG 4  Shoulder active flexion to be >/= 60 deg.  -     STG 4 Progress  Met  -     STG 5  Shoulder abduction AROM to be >/= 60 deg.  -     STG 5 Progress  Met  -        Long Term Goals    LTG Date to Achieve  08/27/21  -     LTG 1  Pt will report a subjective improvement of at least 80% in symptoms as a measure to return to PLOF.  -     LTG 1 Progress  Ongoing  -     LTG 2  Pt's QuickDASH score will improve by at least 11 points.  -     LTG 2 Progress  Ongoing  -     LTG 3  Pt will be able to independently wash and fix her own hair.  -     LTG 3 Progress  Ongoing  -     LTG 4  Pt's shoulder flex and abd AROM will improve to at least 90 deg.  -     LTG 4 Progress  Ongoing  -     LTG 5  Pt's shoulder flex  and abd AROM will improve to at least 140 deg.  -     LTG 5 Progress  Ongoing  -     LTG 6  Pt's left shoulder MMT will improve to at least 4/5 grossly.  -     LTG 6 Progress  Ongoing  -     LTG 7  Pt will be able to return to work full time.  -     LTG 7 Progress  Met  -     LTG 8  Pt will be independent with all ADLS and IADLS as a measure of return to prior level of function.  -     LTG 8 Progress  Ongoing  -        Time Calculation    PT Goal Re-Cert Due Date  07/23/21  -       User Key  (r) = Recorded By, (t) = Taken By, (c) = Cosigned By    Initials Name Provider Type    Nathaniel Zhang, PT DPT Physical Therapist          PT Assessment/Plan     Row Name 07/02/21 0800          PT Assessment    Functional Limitations  Limitation in home management;Limitations in community activities;Limitations in functional capacity and performance;Performance in leisure activities;Performance in self-care ADL;Performance in work activities  -     Impairments  Range of motion;Pain;Muscle strength;Joint integrity;Sensation  -     Assessment Comments  Pain and popping with abduction. Intermittent popping in shoulder during therapy. Unable to determine if popping is between components or at component-bone interface. Very slow progression. Pain when attempts to use. I recommended patient avoid abduction as much as possible.  -     Rehab Potential  Fair barrier: 5 surgeries to L shoulder  -     Patient/caregiver participated in establishment of treatment plan and goals  Yes  -     Patient would benefit from skilled therapy intervention  Yes  -        PT Plan    PT Frequency  1x/week  -     Predicted Duration of Therapy Intervention (PT)  8 weeks  -     PT Plan Comments  QuickDASH next. Progress motion and strength as tolerated.   -       User Key  (r) = Recorded By, (t) = Taken By, (c) = Cosigned By    Initials Name Provider Type    Nathaniel Zhang, PT DPT Physical Therapist     "        OP Exercises     Row Name 07/02/21 0900             Subjective Comments    Subjective Comments  \"Okay today. Yesterday was okay.\" Increased pain this week. Reports pain increases anytime that she attempts to do more. 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 Pain anterior and lateral left shoulder. Shoulder popping more. Increased pain with shoulder pops. Decreased pain if doesn't move or use L UE. 60% subjective improvement. MD 7/23/21.   -SS         Subjective Pain    Able to rate subjective pain?  yes  -SS      Pre-Treatment Pain Level  1  -SS      Post-Treatment Pain Level  2  -SS         Exercise 1    Exercise Name 1  recheck  -SS         Exercise 2    Exercise Name 2  Manual pec stretch  -SS      Cueing 2  Tactile  -SS      Sets 2  5  -SS      Time 2  20 sec  -SS         Exercise 3    Exercise Name 3  Scapular retraction  -SS      Cueing 3  Verbal;Tactile  -SS      Sets 3  1  -SS      Reps 3  50  -SS         Exercise 4    Exercise Name 4  Isometric shoulder extension  -SS      Cueing 4  Verbal;Tactile  -SS      Sets 4  1  -SS      Reps 4  20  -SS      Time 4  5 sec hold  -SS      Additional Comments  P.T. resist  -SS         Exercise 5    Exercise Name 5  Isometric shoulder abduction  -SS      Cueing 5  Verbal;Tactile  -SS      Sets 5  1  -SS      Reps 5  10  -SS      Time 5  5 sec hold  -SS      Additional Comments  P.T. resist  -SS         Exercise 6    Exercise Name 6  Isometric shoulder ER in neutral  -SS      Cueing 6  Verbal;Tactile  -SS      Sets 6  1  -SS      Reps 6  15  -SS      Time 6  5 sec hold  -SS      Additional Comments  P.T. resist  -SS         Exercise 7    Exercise Name 7  Isometric shoulder flexion  -SS      Cueing 7  Verbal;Tactile  -SS      Sets 7  1  -SS      Reps 7  10  -SS      Time 7  5 sec hold  -SS      Additional Comments  P.T. resist  -SS         Exercise 8    Exercise Name 8  Place and hold shoulder flexion  -SS      Cueing " 8  Verbal;Tactile  -SS      Sets 8  1  -SS      Reps 8  15  -SS      Time 8  3 sec hold  -SS        User Key  (r) = Recorded By, (t) = Taken By, (c) = Cosigned By    Initials Name Provider Type    Nathaniel Zhang, PT DPT Physical Therapist             Time Calculation:     Start Time: 0850  Stop Time: 0928  Time Calculation (min): 38 min     Therapy Charges for Today     Code Description Service Date Service Provider Modifiers Qty    96336330428 HC PT THER PROC EA 15 MIN 7/2/2021 Nathaniel Sharp, PT DPT GP 3                    Nathaniel Sharp, PT, DPT, CHT  7/2/2021

## 2021-07-08 ENCOUNTER — HOSPITAL ENCOUNTER (OUTPATIENT)
Dept: PHYSICAL THERAPY | Facility: HOSPITAL | Age: 56
Setting detail: THERAPIES SERIES
Discharge: HOME | End: 2021-07-08

## 2021-07-08 DIAGNOSIS — M25.512 LEFT SHOULDER PAIN, UNSPECIFIED CHRONICITY: ICD-10-CM

## 2021-07-08 DIAGNOSIS — Z96.612 S/P REVERSE TOTAL SHOULDER ARTHROPLASTY, LEFT: Primary | ICD-10-CM

## 2021-07-08 PROCEDURE — 97110 THERAPEUTIC EXERCISES: CPT | Performed by: PHYSICAL THERAPIST

## 2021-07-08 NOTE — THERAPY TREATMENT NOTE
Outpatient Physical Therapy Ortho Treatment Note  [facility]     Patient Name: Priti Orr  : 1965  MRN: [id number]  Today's Date: 2021      Visit Date: 2021    Attendance: 15/15 (15 visits through 21)  Subjective Improvement: 60%  Next MD Visit:  21  Recert Date:       Therapy Diagnosis: S/P Revision of reverse TSA 3/30/21       Visit Dx:    ICD-10-CM ICD-9-CM   1. S/P reverse total shoulder arthroplasty, left  Z96.612 V43.61   2. Left shoulder pain, unspecified chronicity  M25.512 719.41            Past Medical History:   Diagnosis Date   • Allergic rhinitis    • Anesthesia     nausea and vomiting with anesthesia requires pre medicaiton   • Anxiety    • Arthritis    • Asthma    • Benign neoplasm of meninges (CMS/HCC)     Post op       • Cobalamin deficiency    • Corneal abrasion    • Depression    • Encounter for gynecological examination (general) (routine) without abnormal findings    • Essential hypertension    • Fatigue    • Female stress incontinence    • Head injury    • History of delayed wound healing     Delayed healing of surgical wound      • Hyperlipidemia    • Low back pain    • Macular degeneration disease    • Nosebleed, symptom     Nosebleed/epistaxis symptom      • Osteoporosis    • Osteoporosis    • PONV (postoperative nausea and vomiting)    • Seizures (CMS/HCC)    • Sleep apnea     not wearing c-pap   • Wears glasses         Past Surgical History:   Procedure Laterality Date   • ARM LESION/CYST EXCISION  2014    Remove arm bone lesion (1)   • ARM SKIN LESION BIOPSY / EXCISION     • ARM WOUND REPAIR / CLOSURE  2014    Repair Superficial Wound TR-EXT 2.5 < CM 83881 (1)      • BRAIN SURGERY     • BREAST LUMPECTOMY     •  SECTION  1986     Section (1)     • ENDOSCOPY  2008    Colon endoscopy 88554 (1)     • EXCISION BREAST LESION W/ PREOP NEEDLE LOC  1981    Excision, breast lesion (1)      • HEMORRHOIDECTOMY     • HEMORRHOIDECTOMY  02/19/2008    Hemorrhoidectomy (2)      • HYSTERECTOMY     • INJECTION OF MEDICATION  06/06/2013    Celestone (betamethasone) (1)      • INJECTION OF MEDICATION  12/04/2013    Depo Medrol (Methylprednisone) (1)      • INJECTION OF MEDICATION  05/21/2013    Dexamethasone (2)      • INJECTION OF MEDICATION  01/29/2014    Kenalog (6)   • KNEE ARTHROSCOPY  03/05/2007    Knee arthroscopy, surgery (1)     • KNEE ARTHROSCOPY Right 7/13/2020    Procedure: KNEE ARTHROSCOPY with debridement medial meniscus;  Surgeon: Brooks Junior MD;  Location: [facility];  Service: Orthopedics;  Laterality: Right;   • LAPAROSCOPIC TUBAL LIGATION  04/16/1991    Tubal ligation (1)      • PAP SMEAR  01/18/2007    PAP SMEAR (1)      • SHOULDER SURGERY Left     x 4   • TOTAL ABDOMINAL HYSTERECTOMY WITH SALPINGO OOPHORECTOMY  1993    JOSE/BSO (1)      • TOTAL KNEE ARTHROPLASTY Left 11/19/2018    Procedure: TOTAL KNEE ARTHROPLASTY ATTUNE with adductor canal block - left;  Surgeon: Brooks Junior MD;  Location: [facility];  Service: Orthopedics       PT Ortho     Row Name 07/08/21 0900       Subjective Comments    Subjective Comments  Increased L shoulder pain. 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 She is not reaching with the arm as much. Shoulder still pops. Sees MD 7/23/21.   -       Precautions and Contraindications    Precautions  history of L reverse shoulder arthroplasty  -    Contraindications  DOS: 3/30/21  -       Subjective Pain    Able to rate subjective pain?  yes  -SS    Pre-Treatment Pain Level  4  -SS    Post-Treatment Pain Level  4  -       Posture/Observations    Posture/Observations Comments  Rounded shoulder posture.  -SS       Left Upper Ext    Lt Shoulder Flexion AROM  85 deg seated  -      User Key  (r) = Recorded By, (t) = Taken By, (c) = Cosigned By    Initials Name Provider Type    SS Nathaniel Sharp, PT DPT Physical Therapist                       PT Assessment/Plan     Row Name 07/08/21 0900          PT Assessment    Functional Limitations  Limitation in home management;Limitations in community activities;Limitations in functional capacity and performance;Performance in leisure activities;Performance in self-care ADL;Performance in work activities  -     Impairments  Range of motion;Pain;Muscle strength;Joint integrity;Sensation  -     Assessment Comments  L shoulder felt like it shifted several times during Pro2. Patient continues to have pain with ER motion or much use of the L UE. Continued limitation in ROM.  -     Rehab Potential  Fair barrier: multiple surgeries L shoulder  -SS     Patient/caregiver participated in establishment of treatment plan and goals  Yes  -SS     Patient would benefit from skilled therapy intervention  Yes  -SS        PT Plan    PT Frequency  1x/week  -SS     Predicted Duration of Therapy Intervention (PT)  8 weeks  -     PT Plan Comments  AA and AROM, gentle progressive shoulder muscle strengthening, scapular muscle strengthening  -       User Key  (r) = Recorded By, (t) = Taken By, (c) = Cosigned By    Initials Name Provider Type    SS Nathaniel Sharp, PT DPT Physical Therapist            OP Exercises     Row Name 07/08/21 0900             Subjective Comments    Subjective Comments  Increased L shoulder pain. 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 She is not reaching with the arm as much. Shoulder still pops. Sees MD 7/23/21.   -         Subjective Pain    Able to rate subjective pain?  yes  -SS      Pre-Treatment Pain Level  4  -SS      Post-Treatment Pain Level  4  -SS         Exercise 1    Exercise Name 1  Pro2, Seat 7->5 UE/LE, fwd  -      Cueing 1  Verbal  -SS      Time 1  10 mins  -      Additional Comments  Level 2.5  -SS         Exercise 2    Exercise Name 2  Isometric shoulder abduction  -      Cueing 2  Verbal;Tactile  -SS      Sets 2  1  -SS      Reps 2  20   -SS      Time 2  3 sec hold  -SS         Exercise 3    Exercise Name 3  Isometric shoulder flexion  -SS      Cueing 3  Verbal;Tactile  -SS      Sets 3  1  -SS      Reps 3  20  -SS      Time 3  3 sec hold  -SS         Exercise 4    Exercise Name 4  Seated L UE PNF D1 rhythmic initiation  -SS      Cueing 4  Verbal;Tactile  -SS      Sets 4  1  -SS      Reps 4  30  -SS         Exercise 5    Exercise Name 5  Seated t-band shoulder extension  -SS      Cueing 5  Verbal  -SS      Sets 5  2  -SS      Reps 5  15  -SS      Additional Comments  yellow  -SS         Exercise 6    Exercise Name 6  Isometric B shoulder ER with t-band  -SS      Cueing 6  Verbal  -SS      Sets 6  1  -SS      Reps 6  10  -SS      Additional Comments  yellow  -SS         Exercise 7    Exercise Name 7  T-band seated scapular retraction  -SS      Cueing 7  Verbal  -SS      Sets 7  2  -SS      Reps 7  15  -SS      Additional Comments  red  -SS         Exercise 8    Exercise Name 8  AA shoulder flexion  -SS      Cueing 8  Verbal;Tactile  -SS      Sets 8  3  -SS      Reps 8  20  -SS         Exercise 9    Exercise Name 9  AA shoulder ER  -SS      Cueing 9  Verbal;Tactile  -SS      Sets 9  3  -SS      Reps 9  20  -SS        User Key  (r) = Recorded By, (t) = Taken By, (c) = Cosigned By    Initials Name Provider Type    Nathaniel Zhang, PT DPT Physical Therapist            PT OP Goals     Row Name 07/08/21 0900          PT Short Term Goals    STG Date to Achieve  07/23/21  -     STG 1  Pt will be independent with HEP for self-management of symptoms.  -     STG 1 Progress  Ongoing;Met  -     STG 2  Pt's flex and abd PROM will be at least 100 deg.  -     STG 2 Progress  Met  -     STG 3  Flexion AAROM to be 145 deg  -     STG 3 Progress  Not Met;Ongoing  -     STG 4  Shoulder active flexion to be >/= 60 deg.  -     STG 4 Progress  Met  -     STG 5  Shoulder abduction AROM to be >/= 60 deg.  -     STG 5 Progress  Met  -        Long  Term Goals    LTG Date to Achieve  08/27/21  -     LTG 1  Pt will report a subjective improvement of at least 80% in symptoms as a measure to return to PLOF.  -     LTG 1 Progress  Ongoing  -     LTG 2  Pt's QuickDASH score will improve by at least 11 points.  -SS     LTG 2 Progress  Ongoing  -     LTG 3  Pt will be able to independently wash and fix her own hair.  -SS     LTG 3 Progress  Ongoing  -SS     LTG 4  Pt's shoulder flex and abd AROM will improve to at least 90 deg.  -SS     LTG 4 Progress  Ongoing  -SS     LTG 5  Pt's shoulder flex and abd AROM will improve to at least 140 deg.  -     LTG 5 Progress  Ongoing  -     LTG 6  Pt's left shoulder MMT will improve to at least 4/5 grossly.  -     LTG 6 Progress  Ongoing  -     LTG 7  Pt will be able to return to work full time.  -     LTG 7 Progress  Met  -     LTG 8  Pt will be independent with all ADLS and IADLS as a measure of return to prior level of function.  -     LTG 8 Progress  Ongoing  -        Time Calculation    PT Goal Re-Cert Due Date  07/23/21  -       User Key  (r) = Recorded By, (t) = Taken By, (c) = Cosigned By    Initials Name Provider Type    Nathaniel Zhang, PT DPT Physical Therapist               Outcome Measure Options: Quick DASH  Quick DASH  Open a tight or new jar.: Unable  Do heavy household chores (e.g., wash walls, wash floors): Moderate Difficulty  Carry a shopping bag or briefcase: Severe Difficulty  Wash your back: Unable  Use a knife to cut food: Moderate Difficulty  Recreational activities in which you take some force or impact through your arm, should or hand (e.g. golf, hammering, tennis, etc.): Unable  During the past week, to what extent has your arm, shoulder, or hand problem interfered with your normal social activities with family, friends, neighbors or groups?: Extremely  During the past week, were you limited in your work or other regular daily activities as a result of your arm,  shoulder or hand problem?: Very limited  Arm, Shoulder, or hand pain: Moderate  Tingling (pins and needles) in your arm, shoulder, or hand: None  During the past week, how much difficulty have you had sleeping because of the pain in your arm, shoulder or hand?: Moderate Difficultly  Number of Questions Answered: 11  Quick DASH Score: 68.18         Time Calculation:   Start Time: 0927  Stop Time: 1014  Time Calculation (min): 47 min  Therapy Charges for Today     Code Description Service Date Service Provider Modifiers Qty    87315474092 HC PT THER PROC EA 15 MIN 7/8/2021 Nathaniel Sharp, PT DPT GP 3                   Nathaniel Sharp, PT, DPT, CHT  7/8/2021

## 2021-07-26 RX ORDER — ERGOCALCIFEROL 1.25 MG/1
CAPSULE ORAL
Qty: 12 CAPSULE | Refills: 3 | Status: SHIPPED | OUTPATIENT
Start: 2021-07-26 | End: 2022-09-27

## 2021-08-03 ENCOUNTER — HOSPITAL ENCOUNTER (OUTPATIENT)
Dept: PHYSICAL THERAPY | Facility: HOSPITAL | Age: 56
Setting detail: THERAPIES SERIES
Discharge: HOME | End: 2021-08-03

## 2021-08-03 DIAGNOSIS — Z96.612 S/P REVERSE TOTAL SHOULDER ARTHROPLASTY, LEFT: Primary | ICD-10-CM

## 2021-08-03 DIAGNOSIS — M25.512 LEFT SHOULDER PAIN, UNSPECIFIED CHRONICITY: ICD-10-CM

## 2021-08-03 PROCEDURE — 97110 THERAPEUTIC EXERCISES: CPT | Performed by: PHYSICAL THERAPIST

## 2021-08-03 NOTE — THERAPY PROGRESS REPORT/RE-CERT
Outpatient Physical Therapy Ortho Progress Note  [facility]     Patient Name: Priti Orr  : 1965  MRN: [id number]  Today's Date: 8/3/2021      Visit Date: 2021    Attendance:  (15 visits through 21)  Subjective Improvement: 50%  Next MD Visit: October?  Recert Date:  2021     Therapy Diagnosis: S/P Revision of reverse TSA 3/30/21     Changes in Medications: none   Changes in MD Orders: none  Number of Work Days Lost: <2 weeks        Past Medical History:   Diagnosis Date   • Allergic rhinitis    • Anesthesia     nausea and vomiting with anesthesia requires pre medicaiton   • Anxiety    • Arthritis    • Asthma    • Benign neoplasm of meninges (CMS/HCC)     Post op       • Cobalamin deficiency    • Corneal abrasion    • Depression    • Encounter for gynecological examination (general) (routine) without abnormal findings    • Essential hypertension    • Fatigue    • Female stress incontinence    • Head injury    • History of delayed wound healing     Delayed healing of surgical wound      • Hyperlipidemia    • Low back pain    • Macular degeneration disease    • Nosebleed, symptom     Nosebleed/epistaxis symptom      • Osteoporosis    • Osteoporosis    • PONV (postoperative nausea and vomiting)    • Seizures (CMS/HCC)    • Sleep apnea     not wearing c-pap   • Wears glasses         Past Surgical History:   Procedure Laterality Date   • ARM LESION/CYST EXCISION  2014    Remove arm bone lesion (1)   • ARM SKIN LESION BIOPSY / EXCISION     • ARM WOUND REPAIR / CLOSURE  2014    Repair Superficial Wound TR-EXT 2.5 < CM 04198 (1)      • BRAIN SURGERY     • BREAST LUMPECTOMY     •  SECTION  1986     Section (1)     • ENDOSCOPY  2008    Colon endoscopy 98922 (1)     • EXCISION BREAST LESION W/ PREOP NEEDLE LOC  1981    Excision, breast lesion (1)     • HEMORRHOIDECTOMY     • HEMORRHOIDECTOMY  2008    Hemorrhoidectomy  "(2)      • HYSTERECTOMY     • INJECTION OF MEDICATION  06/06/2013    Celestone (betamethasone) (1)      • INJECTION OF MEDICATION  12/04/2013    Depo Medrol (Methylprednisone) (1)      • INJECTION OF MEDICATION  05/21/2013    Dexamethasone (2)      • INJECTION OF MEDICATION  01/29/2014    Kenalog (6)   • KNEE ARTHROSCOPY  03/05/2007    Knee arthroscopy, surgery (1)     • KNEE ARTHROSCOPY Right 7/13/2020    Procedure: KNEE ARTHROSCOPY with debridement medial meniscus;  Surgeon: Brooks Junior MD;  Location: [facility];  Service: Orthopedics;  Laterality: Right;   • LAPAROSCOPIC TUBAL LIGATION  04/16/1991    Tubal ligation (1)      • PAP SMEAR  01/18/2007    PAP SMEAR (1)      • SHOULDER SURGERY Left     x 4   • TOTAL ABDOMINAL HYSTERECTOMY WITH SALPINGO OOPHORECTOMY  1993    JOSE/BSO (1)      • TOTAL KNEE ARTHROPLASTY Left 11/19/2018    Procedure: TOTAL KNEE ARTHROPLASTY ATTUNE with adductor canal block - left;  Surgeon: Brooks Junior MD;  Location: [facility];  Service: Orthopedics       Visit Dx:     ICD-10-CM ICD-9-CM   1. S/P reverse total shoulder arthroplasty, left  Z96.612 V43.61   2. Left shoulder pain, unspecified chronicity  M25.512 719.41             PT Ortho     Row Name 08/03/21 1300       Subjective Comments    Subjective Comments  \"The last week, it's been doing better.\" Thinks that she has figured out what the threshold is for her activity before pain increases. Try to break things up into manageable chunks. 50% subjective improvement. No medication changes.   -SS       Precautions and Contraindications    Precautions  history of L reverse shoulder arthroplasty  -SS    Contraindications  DOS: 3/30/21  -SS       Subjective Pain    Able to rate subjective pain?  yes  -SS    Pre-Treatment Pain Level  1  -SS    Post-Treatment Pain Level  3  -SS       Left Upper Ext    Lt Shoulder Abduction AROM  80 deg; pain  -SS    Lt Shoulder Abduction PROM  100 deg; pain  -SS    Lt Shoulder Extension " AROM  50 deg  -SS    Lt Shoulder Flexion AROM  80 deg  -SS    Lt Shoulder Flexion PROM  130 deg  -SS    Lt Shoulder External Rotation AROM  20 deg in seated neutral  -SS       MMT Left Upper Ext    Lt Shoulder Flexion MMT, Gross Movement  (2/5) poor  -SS    Lt Shoulder Extension MMT, Gross Movement  (5/5) normal  -SS    Lt Shoulder ABduction MMT, Gross Movement  (2/5) poor  -SS    Lt Shoulder Internal Rotation MMT, Gross Movement  (2/5) poor  -SS    Lt Shoulder External Rotation MMT, Gross Movement  (2/5) poor  -SS      User Key  (r) = Recorded By, (t) = Taken By, (c) = Cosigned By    Initials Name Provider Type    SS Nathaniel Sharp, PT DPT Physical Therapist        Therapy Education  Education Details: expectations; DB shoulder flexion to shoulder height  Given: HEP  Program: New  How Provided: Verbal, Demonstration  Provided to: Patient  Level of Understanding: Verbalized, Demonstrated     PT OP Goals     Row Name 08/03/21 1300          PT Short Term Goals    STG Date to Achieve  -- further deferred  -     STG 1  Pt will be independent with HEP for self-management of symptoms.  -     STG 1 Progress  Met  -     STG 2  Pt's flex and abd PROM will be at least 100 deg.  -     STG 2 Progress  Met  -     STG 3  Flexion AAROM to be 145 deg  -     STG 3 Progress  Not Met  -     STG 4  Shoulder active flexion to be >/= 60 deg.  -     STG 4 Progress  Met  -     STG 5  Shoulder abduction AROM to be >/= 60 deg.  -     STG 5 Progress  Met  -        Long Term Goals    LTG Date to Achieve  08/27/21  -     LTG 1  Pt will report a subjective improvement of at least 80% in symptoms as a measure to return to PLOF.  -     LTG 1 Progress  Ongoing  -     LTG 2  Pt's QuickDASH score will improve by at least 11 points.  -     LTG 2 Progress  Ongoing  -     LTG 3  Pt will be able to independently wash and fix her own hair.  -     LTG 3 Progress  Ongoing  -     LTG 4  Pt's shoulder flex and abd  "AROM will improve to at least 90 deg.  -     LTG 4 Progress  Ongoing  -     LTG 5  Pt's shoulder flex and abd PROM will improve to at least 140 deg.  -     LTG 5 Progress  Goal Revised  -     LTG 5 Progress Comments  revised from \"AROM\" to \"PROM\" as per initial intent  -     LTG 6  Pt's left shoulder MMT will improve to at least 4/5 grossly.  -     LTG 6 Progress  Ongoing  -     LTG 7  Pt will be able to return to work full time.  -     LTG 7 Progress  Met  -     LTG 8  Pt will be independent with all ADLS and IADLS as a measure of return to prior level of function.  -     LTG 8 Progress  Ongoing  -        Time Calculation    PT Goal Re-Cert Due Date  08/24/21  -       User Key  (r) = Recorded By, (t) = Taken By, (c) = Cosigned By    Initials Name Provider Type     Nathaniel Sharp, PT DPT Physical Therapist          PT Assessment/Plan     Row Name 08/03/21 1300          PT Assessment    Functional Limitations  Limitation in home management;Limitations in community activities;Limitations in functional capacity and performance;Performance in leisure activities;Performance in self-care ADL;Performance in work activities  -     Impairments  Range of motion;Pain;Muscle strength;Joint integrity;Sensation  -     Assessment Comments  18 weeks post-op. Continued weakness and restricted motion. Patient's shoulder abduction and extension AROM have improved. She is continued to be limited into flexion and external rotation. Encouraged slow, progressive strengthening.  -     Rehab Potential  Fair barrier: multiple surgeries L shoulder  -     Patient/caregiver participated in establishment of treatment plan and goals  Yes  -     Patient would benefit from skilled therapy intervention  Yes  -        PT Plan    PT Frequency  1x/week  -     Predicted Duration of Therapy Intervention (PT)  4 weeks with further to be determined  -     PT Plan Comments  ROM, progressive strengthening, " "heat/ice as needed for pain  -SS       User Key  (r) = Recorded By, (t) = Taken By, (c) = Cosigned By    Initials Name Provider Type    SS Nathaniel Sharp, PT DPT Physical Therapist            OP Exercises     Row Name 08/03/21 1300             Subjective Comments    Subjective Comments  \"The last week, it's been doing better.\" Thinks that she has figured out what the threshold is for her activity before pain increases. Try to break things up into manageable chunks. 50% subjective improvement. No medication changes.   -SS         Subjective Pain    Able to rate subjective pain?  yes  -SS      Pre-Treatment Pain Level  1  -SS      Post-Treatment Pain Level  3  -SS         Exercise 1    Exercise Name 1  recheck  -SS         Exercise 2    Exercise Name 2  Pro2, Seat 5, UE, F/R  -SS      Cueing 2  Verbal  -SS      Time 2  8 mins  -SS      Additional Comments  Level 3  -SS         Exercise 3    Exercise Name 3  Isometric shoulder abduction  -SS      Cueing 3  Verbal;Tactile  -SS      Sets 3  1  -SS      Reps 3  20  -SS      Time 3  5 sec hold  -SS      Additional Comments  manual resistance  -SS         Exercise 4    Exercise Name 4  Isometric shoulder flexion  -SS      Cueing 4  Verbal;Tactile  -SS      Sets 4  1  -SS      Reps 4  20  -SS      Time 4  5 sec hold  -SS      Additional Comments  manual resistance  -SS         Exercise 5    Exercise Name 5  Manually resisted shoulder ER  -SS      Cueing 5  Verbal;Tactile  -SS      Sets 5  1  -SS      Reps 5  20  -SS         Exercise 6    Exercise Name 6  Supine DB shoulder flexion  -SS      Cueing 6  Verbal  -SS      Sets 6  1  -SS      Reps 6  8  -SS      Additional Comments  1#  -SS         Exercise 7    Exercise Name 7  Supine shoulder flexion  -SS      Cueing 7  Verbal  -SS      Sets 7  1  -SS      Reps 7  15  -SS        User Key  (r) = Recorded By, (t) = Taken By, (c) = Cosigned By    Initials Name Provider Type    SS Nathaniel Sharp, PT DPT Physical " Therapist                  Time Calculation:     Start Time: 1300  Stop Time: 1346  Time Calculation (min): 46 min     Therapy Charges for Today     Code Description Service Date Service Provider Modifiers Qty    02727654381 HC PT THER PROC EA 15 MIN 8/3/2021 Nathaniel Sharp, PT DPT GP 3                    Nathaniel Sharp, PT, DPT, CHT  8/3/2021

## 2021-08-10 ENCOUNTER — HOSPITAL ENCOUNTER (OUTPATIENT)
Dept: PHYSICAL THERAPY | Facility: HOSPITAL | Age: 56
Setting detail: THERAPIES SERIES
Discharge: HOME | End: 2021-08-10

## 2021-08-10 DIAGNOSIS — M25.512 LEFT SHOULDER PAIN, UNSPECIFIED CHRONICITY: ICD-10-CM

## 2021-08-10 DIAGNOSIS — Z96.612 S/P REVERSE TOTAL SHOULDER ARTHROPLASTY, LEFT: Primary | ICD-10-CM

## 2021-08-10 PROCEDURE — 97110 THERAPEUTIC EXERCISES: CPT | Performed by: PHYSICAL THERAPIST

## 2021-08-10 NOTE — THERAPY TREATMENT NOTE
Outpatient Physical Therapy Ortho Treatment Note  [facility]     Patient Name: Priti Orr  : 1965  MRN: [id number]  Today's Date: 8/10/2021      Visit Date: 08/10/2021    Attendance:  ( through 21)  Subjective Improvement: 50%  Next MD Visit: October?  Recert Date:  2021     Therapy Diagnosis: S/P Revision of reverse TSA 3/30/21     Visit Dx:    ICD-10-CM ICD-9-CM   1. S/P reverse total shoulder arthroplasty, left  Z96.612 V43.61   2. Left shoulder pain, unspecified chronicity  M25.512 719.41            Past Medical History:   Diagnosis Date   • Allergic rhinitis    • Anesthesia     nausea and vomiting with anesthesia requires pre medicaiton   • Anxiety    • Arthritis    • Asthma    • Benign neoplasm of meninges (CMS/HCC)     Post op       • Cobalamin deficiency    • Corneal abrasion    • Depression    • Encounter for gynecological examination (general) (routine) without abnormal findings    • Essential hypertension    • Fatigue    • Female stress incontinence    • Head injury    • History of delayed wound healing     Delayed healing of surgical wound      • Hyperlipidemia    • Low back pain    • Macular degeneration disease    • Nosebleed, symptom     Nosebleed/epistaxis symptom      • Osteoporosis    • Osteoporosis    • PONV (postoperative nausea and vomiting)    • Seizures (CMS/HCC)    • Sleep apnea     not wearing c-pap   • Wears glasses         Past Surgical History:   Procedure Laterality Date   • ARM LESION/CYST EXCISION  2014    Remove arm bone lesion (1)   • ARM SKIN LESION BIOPSY / EXCISION     • ARM WOUND REPAIR / CLOSURE  2014    Repair Superficial Wound TR-EXT 2.5 < CM 76509 (1)      • BRAIN SURGERY     • BREAST LUMPECTOMY     •  SECTION  1986     Section (1)     • ENDOSCOPY  2008    Colon endoscopy 38402 (1)     • EXCISION BREAST LESION W/ PREOP NEEDLE LOC  1981    Excision, breast lesion (1)     •  "HEMORRHOIDECTOMY     • HEMORRHOIDECTOMY  02/19/2008    Hemorrhoidectomy (2)      • HYSTERECTOMY     • INJECTION OF MEDICATION  06/06/2013    Celestone (betamethasone) (1)      • INJECTION OF MEDICATION  12/04/2013    Depo Medrol (Methylprednisone) (1)      • INJECTION OF MEDICATION  05/21/2013    Dexamethasone (2)      • INJECTION OF MEDICATION  01/29/2014    Kenalog (6)   • KNEE ARTHROSCOPY  03/05/2007    Knee arthroscopy, surgery (1)     • KNEE ARTHROSCOPY Right 7/13/2020    Procedure: KNEE ARTHROSCOPY with debridement medial meniscus;  Surgeon: Brooks Junior MD;  Location: [facility];  Service: Orthopedics;  Laterality: Right;   • LAPAROSCOPIC TUBAL LIGATION  04/16/1991    Tubal ligation (1)      • PAP SMEAR  01/18/2007    PAP SMEAR (1)      • SHOULDER SURGERY Left     x 4   • TOTAL ABDOMINAL HYSTERECTOMY WITH SALPINGO OOPHORECTOMY  1993    JOSE/BSO (1)      • TOTAL KNEE ARTHROPLASTY Left 11/19/2018    Procedure: TOTAL KNEE ARTHROPLASTY ATTUNE with adductor canal block - left;  Surgeon: Brooks Junior MD;  Location: [facility];  Service: Orthopedics       PT Ortho        Row Name 08/10/21 1400       Subjective Comments    Subjective Comments  Back in the office more. Shoulder is \"actually not great, but it's okay.\" Shoulder more aggravated from moving her office from home back to [employer] building.   -SS       Precautions and Contraindications    Precautions  history of L reverse shoulder arthroplasty  -SS    Contraindications  DOS: 3/30/21  -SS       Subjective Pain    Able to rate subjective pain?  yes  -SS    Pre-Treatment Pain Level  4  -SS    Post-Treatment Pain Level  4  -SS       Left Upper Ext    Lt Shoulder Abduction AROM  83 deg  -SS    Lt Shoulder Extension AROM  60 deg  -SS    Lt Shoulder Flexion AROM  82 deg  -SS      User Key  (r) = Recorded By, (t) = Taken By, (c) = Cosigned By    Initials Name Provider Type    SS Nathaniel Sharp, PT DPT Physical Therapist            PT " "Assessment/Plan     Row Name 08/10/21 1500          PT Assessment    Functional Limitations  Limitation in home management;Limitations in community activities;Limitations in functional capacity and performance;Performance in leisure activities;Performance in self-care ADL;Performance in work activities  -     Impairments  Range of motion;Pain;Muscle strength;Joint integrity;Sensation  -     Assessment Comments  Improved force generation this date. Slight improvement in flexion and abduction AROM. Good effort.  -SS     Rehab Potential  Fair barrier: multiple surgeries L shoulder  -     Patient/caregiver participated in establishment of treatment plan and goals  Yes  -SS     Patient would benefit from skilled therapy intervention  Yes  -SS        PT Plan    PT Frequency  1x/week  -SS     Predicted Duration of Therapy Intervention (PT)  4 weeks with further to be determined  -     PT Plan Comments  Continue POC. Increase as tolerated  -       User Key  (r) = Recorded By, (t) = Taken By, (c) = Cosigned By    Initials Name Provider Type    Nathaniel Zhang, PT DPT Physical Therapist          Modalities     Row Name 08/10/21 1300             Moist Heat    MH Applied  Yes  -SS      Location  axillary and superior shoulder  -      PT Moist Heat Minutes  10  -SS      MH Prior to Rx  Yes  -SS      MH S/P Rx  No  -SS        User Key  (r) = Recorded By, (t) = Taken By, (c) = Cosigned By    Initials Name Provider Type    Nathaniel Zhang, PT DPT Physical Therapist        OP Exercises     Row Name 08/10/21 1400             Subjective Comments    Subjective Comments  Back in the office more. Shoulder is \"actually not great, but it's okay.\" Shoulder more aggravated from moving her office from home back to [employer] building.   -SS         Subjective Pain    Able to rate subjective pain?  yes  -SS      Pre-Treatment Pain Level  4  -SS      Post-Treatment Pain Level  4  -SS         Exercise 1    Exercise Name " 1  MHP -- see Modalities  -SS         Exercise 2    Exercise Name 2  Isometric shoulder abduction  -SS      Cueing 2  Verbal;Tactile  -SS      Sets 2  1  -SS      Reps 2  10  -SS      Additional Comments  manual resistance  -SS         Exercise 3    Exercise Name 3  Isometric shoulder extension  -SS      Cueing 3  Verbal;Tactile  -SS      Sets 3  1  -SS      Reps 3  10  -SS      Time 3     -SS      Additional Comments  manual resistance  -SS         Exercise 4    Exercise Name 4  Isometric shoulder flexion  -SS      Cueing 4  Verbal;Tactile  -SS      Sets 4  1  -SS      Reps 4  10  -SS      Additional Comments  manual resistance  -SS         Exercise 5    Exercise Name 5  Manually resisted shoulder ER  -SS      Cueing 5  Verbal;Tactile  -SS      Sets 5  3  -SS      Reps 5  15  -SS         Exercise 6    Exercise Name 6  Supine shoulder press-up  -SS      Cueing 6  Verbal  -SS      Reps 6  2x10, 1x13  -SS         Exercise 7    Exercise Name 7  Supine shoulder flexion  -SS      Cueing 7  Verbal;Tactile  -SS      Sets 7  3  -SS      Reps 7  5 with 1# DB and 5 without DB  -SS        User Key  (r) = Recorded By, (t) = Taken By, (c) = Cosigned By    Initials Name Provider Type    SS Nathaniel Sharp, PT DPT Physical Therapist            PT OP Goals     Row Name 08/10/21 1400          PT Short Term Goals    STG Date to Achieve  -- further deferred  -SS        Long Term Goals    LTG Date to Achieve  08/27/21  -     LTG 1  Pt will report a subjective improvement of at least 80% in symptoms as a measure to return to PLOF.  -     LTG 1 Progress  Ongoing  -     LTG 2  Pt's QuickDASH score will improve by at least 11 points.  -     LTG 2 Progress  Ongoing  -     LTG 3  Pt will be able to independently wash and fix her own hair.  -     LTG 3 Progress  Ongoing  -     LTG 4  Pt's shoulder flex and abd AROM will improve to at least 90 deg.  -     LTG 4 Progress  Ongoing  -     LTG 5  Pt's shoulder flex and abd  PROM will improve to at least 140 deg.  -     LTG 5 Progress  Goal Revised  -     LTG 6  Pt's left shoulder MMT will improve to at least 4/5 grossly.  -     LTG 6 Progress  Ongoing  -     LTG 7  Pt will be able to return to work full time.  -     LTG 7 Progress  Met  -     LTG 8  Pt will be independent with all ADLS and IADLS as a measure of return to prior level of function.  -     LTG 8 Progress  Ongoing  -        Time Calculation    PT Goal Re-Cert Due Date  08/24/21  -       User Key  (r) = Recorded By, (t) = Taken By, (c) = Cosigned By    Initials Name Provider Type     Nathaniel Sharp, PT DPT Physical Therapist                         Time Calculation:   Start Time: 1430  Stop Time: 1508  Time Calculation (min): 38 min    Therapy Charges for Today     Code Description Service Date Service Provider Modifiers Qty    77315049153 HC PT THER PROC EA 15 MIN 8/10/2021 Nathaniel Sharp, PT DPT GP 2    92800036332 HC PT THER SUPP EA 15 MIN 8/10/2021 Nathaniel Sharp, PT DPT GP 1                    Nathaniel Sharp, PT, DPT, CHT  8/10/2021

## 2021-08-17 ENCOUNTER — HOSPITAL ENCOUNTER (OUTPATIENT)
Dept: PHYSICAL THERAPY | Facility: HOSPITAL | Age: 56
Setting detail: THERAPIES SERIES
Discharge: HOME | End: 2021-08-17

## 2021-08-17 DIAGNOSIS — M25.512 LEFT SHOULDER PAIN, UNSPECIFIED CHRONICITY: ICD-10-CM

## 2021-08-17 DIAGNOSIS — Z96.612 S/P REVERSE TOTAL SHOULDER ARTHROPLASTY, LEFT: Primary | ICD-10-CM

## 2021-08-17 PROCEDURE — 97110 THERAPEUTIC EXERCISES: CPT | Performed by: PHYSICAL THERAPIST

## 2021-08-17 NOTE — THERAPY DISCHARGE NOTE
Outpatient Physical Therapy Ortho Progress Note/Discharge Summary  [facility]     Patient Name: Priti Orr  : 1965  MRN: [id number]  Today's Date: 2021      Visit Date: 2021    Attendance:  ( through 21)  Subjective Improvement: 65%  Next MD Visit: October?     Therapy Diagnosis: S/P Revision of reverse TSA 3/30/21     Changes in Medications: none noted  Changes in MD Orders: none noted  Number of Work Days Lost: <2 weeks    Visit Dx:    ICD-10-CM ICD-9-CM   1. S/P reverse total shoulder arthroplasty, left  Z96.612 V43.61   2. Left shoulder pain, unspecified chronicity  M25.512 719.41            Past Medical History:   Diagnosis Date   • Allergic rhinitis    • Anesthesia     nausea and vomiting with anesthesia requires pre medicaiton   • Anxiety    • Arthritis    • Asthma    • Benign neoplasm of meninges (CMS/HCC)     Post op       • Cobalamin deficiency    • Corneal abrasion    • Depression    • Encounter for gynecological examination (general) (routine) without abnormal findings    • Essential hypertension    • Fatigue    • Female stress incontinence    • Head injury    • History of delayed wound healing     Delayed healing of surgical wound      • Hyperlipidemia    • Low back pain    • Macular degeneration disease    • Nosebleed, symptom     Nosebleed/epistaxis symptom      • Osteoporosis    • Osteoporosis    • PONV (postoperative nausea and vomiting)    • Seizures (CMS/HCC)    • Sleep apnea     not wearing c-pap   • Wears glasses         Past Surgical History:   Procedure Laterality Date   • ARM LESION/CYST EXCISION  2014    Remove arm bone lesion (1)   • ARM SKIN LESION BIOPSY / EXCISION     • ARM WOUND REPAIR / CLOSURE  2014    Repair Superficial Wound TR-EXT 2.5 < CM 58291 (1)      • BRAIN SURGERY     • BREAST LUMPECTOMY     •  SECTION  1986     Section (1)     • ENDOSCOPY  2008    Colon endoscopy 07657 (1)   "   • EXCISION BREAST LESION W/ PREOP NEEDLE LOC  07/01/1981    Excision, breast lesion (1)     • HEMORRHOIDECTOMY     • HEMORRHOIDECTOMY  02/19/2008    Hemorrhoidectomy (2)      • HYSTERECTOMY     • INJECTION OF MEDICATION  06/06/2013    Celestone (betamethasone) (1)      • INJECTION OF MEDICATION  12/04/2013    Depo Medrol (Methylprednisone) (1)      • INJECTION OF MEDICATION  05/21/2013    Dexamethasone (2)      • INJECTION OF MEDICATION  01/29/2014    Kenalog (6)   • KNEE ARTHROSCOPY  03/05/2007    Knee arthroscopy, surgery (1)     • KNEE ARTHROSCOPY Right 7/13/2020    Procedure: KNEE ARTHROSCOPY with debridement medial meniscus;  Surgeon: Brooks Junior MD;  Location: [facility];  Service: Orthopedics;  Laterality: Right;   • LAPAROSCOPIC TUBAL LIGATION  04/16/1991    Tubal ligation (1)      • PAP SMEAR  01/18/2007    PAP SMEAR (1)      • SHOULDER SURGERY Left     x 4   • TOTAL ABDOMINAL HYSTERECTOMY WITH SALPINGO OOPHORECTOMY  1993    JOSE/BSO (1)      • TOTAL KNEE ARTHROPLASTY Left 11/19/2018    Procedure: TOTAL KNEE ARTHROPLASTY ATTUNE with adductor canal block - left;  Surgeon: Brooks Junior MD;  Location: [facility];  Service: Orthopedics       PT Ortho     Row Name 08/17/21 1000       Subjective Comments    Subjective Comments  Pain is activity dependent. Cleaned house end of last week. \"I could barely lift my arm\" afterward. Shoulder hurts with filing and repetitive movement. Definite limitations in what she is able to do. Shoulder has not popped in the past week. Decreased popping of shoulder at home. Not painful when shoulder pops now. Less pain overall compared to pre-operatively. Unable to fix her own hair, but able to wash it. 65% subjective improvement.   -SS       Precautions and Contraindications    Precautions  history of L reverse shoulder arthroplasty  -SS    Contraindications  DOS: 3/30/21  -SS       Subjective Pain    Able to rate subjective pain?  yes  -SS    Pre-Treatment " "Pain Level  0  -SS    Post-Treatment Pain Level  0  -SS       Left Upper Ext    Lt Shoulder Abduction AROM  80 deg; \"a little uncomfortable\"  -SS    Lt Shoulder Abduction PROM  160 deg  -SS    Lt Shoulder Extension AROM  60 deg  -SS    Lt Shoulder Flexion AROM  90 deg  -SS    Lt Shoulder Flexion PROM  150 deg  -SS    Lt Shoulder External Rotation AROM  20 deg in seated position with arm at side; 70 deg in supine 90/90; 10 deg in seated 90/90  -SS    Lt Shoulder External Rotation PROM  60 deg in supine 90/90  -SS       Right Lower Ext    RT Lower Extremity Comments  deferred  -      User Key  (r) = Recorded By, (t) = Taken By, (c) = Cosigned By    Initials Name Provider Type     Nathaniel Sharp, PT DPT Physical Therapist              PT Assessment/Plan     Row Name 08/17/21 1100          PT Assessment    Functional Limitations  Limitation in home management;Limitations in community activities;Limitations in functional capacity and performance;Performance in leisure activities;Performance in self-care ADL;Performance in work activities  -     Impairments  Range of motion;Muscle strength;Sensation;Pain;Joint mobility;Joint integrity  -     Assessment Comments  Patient essentially plateaued at this time. She is doing fairly well. Anticipate continued improvement, but the shoulder will never be normal.  -     Rehab Potential  Fair barrier: multiple surgeries L knee  -     Patient/caregiver participated in establishment of treatment plan and goals  Yes  -     Patient would benefit from skilled therapy intervention  No  -        PT Plan    PT Plan Comments  D/C P.T.  -       User Key  (r) = Recorded By, (t) = Taken By, (c) = Cosigned By    Initials Name Provider Type    Nathaniel Zhang, PT DPT Physical Therapist              OP Exercises     Row Name 08/17/21 1000             Subjective Comments    Subjective Comments  Pain is activity dependent. Cleaned house end of last week. \"I could " "barely lift my arm\" afterward. Shoulder hurts with filing and repetitive movement. Definite limitations in what she is able to do. Shoulder has not popped in the past week. Decreased popping of shoulder at home. Not painful when shoulder pops now. Less pain overall compared to pre-operatively. Unable to fix her own hair, but able to wash it. 65% subjective improvement.   -SS         Subjective Pain    Able to rate subjective pain?  yes  -SS      Pre-Treatment Pain Level  0  -SS      Post-Treatment Pain Level  0  -SS         Exercise 1    Exercise Name 1  check ROM and   -SS         Exercise 2    Exercise Name 2  AAROM seated 90/90 ER  -SS      Cueing 2  Verbal;Tactile  -SS      Sets 2  3  -SS      Reps 2  15  -SS         Exercise 3    Exercise Name 3  Seated neutral ER  -SS      Cueing 3  Verbal;Tactile  -SS      Sets 3  1  -SS      Reps 3  50  -SS      Additional Comments  light assist  -SS         Exercise 4    Exercise Name 4  Isometric ER in seated neutral  -SS      Cueing 4  Verbal;Tactile  -SS      Sets 4  2  -SS      Reps 4  20  -SS      Time 4  3 sec hold  -SS         Exercise 5    Exercise Name 5  Flexion holds at 90 deg  -SS      Cueing 5  Verbal;Tactile  -SS      Sets 5  2  -SS      Reps 5  8  -SS      Time 5  3 sec hold  -SS         Exercise 6    Exercise Name 6  AAROM shoulder abduction  -SS      Cueing 6  Verbal;Tactile  -SS      Sets 6  1  -SS      Reps 6  20  -SS         Exercise 7    Exercise Name 7  Abduction holds at 80-90 deg  -SS      Cueing 7  Verbal;Tactile  -SS      Sets 7  2  -SS      Reps 7  5  -SS      Time 7  3 sec hold  -SS        User Key  (r) = Recorded By, (t) = Taken By, (c) = Cosigned By    Initials Name Provider Type    Nathaniel Zhang, PT DPT Physical Therapist            PT OP Goals     Row Name 08/17/21 1000          PT Short Term Goals    STG Date to Achieve  -- further deferred  -SS        Long Term Goals    LTG Date to Achieve  08/27/21  -SS     LTG 1  Pt will " report a subjective improvement of at least 80% in symptoms as a measure to return to PLOF.  -     LTG 1 Progress  Met  -     LTG 2  Pt's QuickDASH score will improve by at least 11 points.  -SS     LTG 2 Progress  Met  -SS     LTG 3  Pt will be able to independently wash and fix her own hair.  -SS     LTG 3 Progress  Partially Met  -SS     LTG 3 Progress Comments  unable to fix her own hair  -SS     LTG 4  Pt's shoulder flex and abd AROM will improve to at least 90 deg.  -SS     LTG 4 Progress  Partially Met  -SS     LTG 4 Progress Comments  achieved for flexion  -SS     LTG 5  Pt's shoulder flex and abd PROM will improve to at least 140 deg.  -SS     LTG 5 Progress  Met  -     LTG 6  Pt's left shoulder MMT will improve to at least 4/5 grossly.  -SS     LTG 6 Progress  Not Met  -SS     LTG 7  Pt will be able to return to work full time.  -     LTG 7 Progress  Met  -     LTG 8  Pt will be independent with all ADLS and IADLS as a measure of return to prior level of function.  -     LTG 8 Progress  Met  -        Time Calculation    PT Goal Re-Cert Due Date  08/24/21  -       User Key  (r) = Recorded By, (t) = Taken By, (c) = Cosigned By    Initials Name Provider Type    Nathaniel Zhang, PT DPT Physical Therapist               Outcome Measure Options: Quick DASH  Quick DASH  Open a tight or new jar.: Severe Difficulty  Do heavy household chores (e.g., wash walls, wash floors): Moderate Difficulty  Carry a shopping bag or briefcase: Mild Difficulty  Wash your back: Unable  Use a knife to cut food: Mild Difficulty  Recreational activities in which you take some force or impact through your arm, should or hand (e.g. golf, hammering, tennis, etc.): Unable  During the past week, to what extent has your arm, shoulder, or hand problem interfered with your normal social activities with family, friends, neighbors or groups?: Moderately  During the past week, were you limited in your work or other  regular daily activities as a result of your arm, shoulder or hand problem?: Moderately Limited  Arm, Shoulder, or hand pain: Moderate  Tingling (pins and needles) in your arm, shoulder, or hand: None  During the past week, how much difficulty have you had sleeping because of the pain in your arm, shoulder or hand?: No difficulty  Number of Questions Answered: 11  Quick DASH Score: 47.73         Time Calculation:   Start Time: 1058  Stop Time: 1142  Time Calculation (min): 44 min  Therapy Charges for Today     Code Description Service Date Service Provider Modifiers Qty    81293266329 HC PT THER PROC EA 15 MIN 8/17/2021 Nathaniel Sharp, PT DPT GP 3               OP PT Discharge Summary  Date of Discharge: 08/17/21  Reason for Discharge: Maximum functional potential achieved  Outcomes Achieved: Patient able to partially achieve established goals (Achieved 4 of 5 STGs and 5 of 8 LTGs.)  Discharge Destination: Home with home program      Nathaniel Sharp, PT, DPT, CHT  8/17/2021

## 2021-08-19 ENCOUNTER — APPOINTMENT (OUTPATIENT)
Dept: PHYSICAL THERAPY | Facility: HOSPITAL | Age: 56
End: 2021-08-19

## 2021-08-24 ENCOUNTER — APPOINTMENT (OUTPATIENT)
Dept: PHYSICAL THERAPY | Facility: HOSPITAL | Age: 56
End: 2021-08-24

## 2021-11-11 ENCOUNTER — HOSPITAL ENCOUNTER (EMERGENCY)
Facility: HOSPITAL | Age: 56
Discharge: HOME | End: 2021-11-11
Attending: FAMILY MEDICINE | Admitting: FAMILY MEDICINE

## 2021-11-11 ENCOUNTER — APPOINTMENT (OUTPATIENT)
Dept: GENERAL RADIOLOGY | Facility: HOSPITAL | Age: 56
End: 2021-11-11

## 2021-11-11 VITALS
OXYGEN SATURATION: 98 % | HEIGHT: 60 IN | DIASTOLIC BLOOD PRESSURE: 87 MMHG | RESPIRATION RATE: 18 BRPM | SYSTOLIC BLOOD PRESSURE: 154 MMHG | HEART RATE: 93 BPM | WEIGHT: 220 LBS | BODY MASS INDEX: 43.19 KG/M2 | TEMPERATURE: 97.5 F

## 2021-11-11 DIAGNOSIS — M25.512 ACUTE PAIN OF LEFT SHOULDER: Primary | ICD-10-CM

## 2021-11-11 PROCEDURE — 99283 EMERGENCY DEPT VISIT LOW MDM: CPT

## 2021-11-11 PROCEDURE — 73030 X-RAY EXAM OF SHOULDER: CPT

## 2021-11-11 RX ORDER — HYDROCODONE BITARTRATE AND ACETAMINOPHEN 5; 325 MG/1; MG/1
1 TABLET ORAL EVERY 6 HOURS PRN
Qty: 12 TABLET | Refills: 0 | Status: SHIPPED | OUTPATIENT
Start: 2021-11-11 | End: 2022-04-18

## 2021-11-11 RX ORDER — HYDROCODONE BITARTRATE AND ACETAMINOPHEN 5; 325 MG/1; MG/1
1 TABLET ORAL ONCE
Status: COMPLETED | OUTPATIENT
Start: 2021-11-11 | End: 2021-11-11

## 2021-11-11 RX ADMIN — HYDROCODONE BITARTRATE AND ACETAMINOPHEN 1 TABLET: 5; 325 TABLET ORAL at 16:17

## 2021-11-11 NOTE — DISCHARGE INSTRUCTIONS
Continue to use your sling and applying ICE to the area. Continue your Mobic. Fill your prescription for pain medication and take as directed. Do not take additional Tylenol with the Norco as it is included in the Norco. Follow up with your orthopedic specialist if your pain continues. Return to the ER as needed.

## 2021-11-11 NOTE — ED NOTES
Patient c/o left shoulder pain. States she has had 5-6 shoulder surgeries on it. Left arm is in sling on assessment. States about an hour ago she felt her left shoulder pop while leaning over and opening mail. States her orthopedic doctor is in [place], Dr chand referred her there.       Lisa Ray RN  11/11/21 4973

## 2021-11-12 NOTE — ED PROVIDER NOTES
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 She reports 5-6 surgeries in the past with last surgery in April 2021 by Dr. Glenn Sy (orthopedic oncologist). She states she has chronic limited use of left arm and decreased ROM except at wrist region. She reports earlier today she was opening mail when she felt something pop. She reports severe pain since. Treatments include ice and Mobic.           Review of Systems   Musculoskeletal:        Left shoulder pain       Past Medical History:   Diagnosis Date   • Allergic rhinitis    • Anesthesia     nausea and vomiting with anesthesia requires pre medicaiton   • Anxiety    • Arthritis    • Asthma    • Benign neoplasm of meninges (HCC)     Post op 2011      • Cobalamin deficiency    • Corneal abrasion    • Depression    • Encounter for gynecological examination (general) (routine) without abnormal findings    • Essential hypertension    • Fatigue    • Female stress incontinence    • Head injury 2011   • History of delayed wound healing     Delayed healing of surgical wound      • Hyperlipidemia    • Low back pain    • Macular degeneration disease    • Nosebleed, symptom     Nosebleed/epistaxis symptom      • Osteoporosis    • Osteoporosis    • PONV (postoperative nausea and vomiting)    • Seizures (HCC)    • Sleep apnea     not wearing c-pap   • Wears glasses        Allergies   Allergen Reactions   • Morphine Other (See Comments)     States felt like itching from within, hallucinations, agitation     • Pseudoephedrine Shortness Of Breath and Swelling     LIPS SWELL AND TONGUE BECOMES THICK       Past Surgical History:   Procedure Laterality Date   • ARM LESION/CYST EXCISION  12/11/2014    Remove arm bone lesion (1)   • ARM SKIN LESION BIOPSY / EXCISION     • ARM WOUND REPAIR / CLOSURE  07/02/2014    Repair Superficial Wound TR-EXT 2.5 < CM 56174 (1)      • BRAIN SURGERY  2011   • BREAST LUMPECTOMY     •   SECTION  1986     Section (1)     • ENDOSCOPY  2008    Colon endoscopy 47555 (1)     • EXCISION BREAST LESION W/ PREOP NEEDLE LOC  1981    Excision, breast lesion (1)     • HEMORRHOIDECTOMY     • HEMORRHOIDECTOMY  2008    Hemorrhoidectomy (2)      • HYSTERECTOMY     • INJECTION OF MEDICATION  2013    Celestone (betamethasone) (1)      • INJECTION OF MEDICATION  2013    Depo Medrol (Methylprednisone) (1)      • INJECTION OF MEDICATION  2013    Dexamethasone (2)      • INJECTION OF MEDICATION  2014    Kenalog (6)   • KNEE ARTHROSCOPY  2007    Knee arthroscopy, surgery (1)     • KNEE ARTHROSCOPY Right 2020    Procedure: KNEE ARTHROSCOPY with debridement medial meniscus;  Surgeon: Brooks Junior MD;  Location: [facility];  Service: Orthopedics;  Laterality: Right;   • LAPAROSCOPIC TUBAL LIGATION  1991    Tubal ligation (1)      • PAP SMEAR  2007    PAP SMEAR (1)      • SHOULDER SURGERY Left     x 4   • TOTAL ABDOMINAL HYSTERECTOMY WITH SALPINGO OOPHORECTOMY      JOSE/BSO (1)      • TOTAL KNEE ARTHROPLASTY Left 2018    Procedure: TOTAL KNEE ARTHROPLASTY ATTUNE with adductor canal block - left;  Surgeon: Brooks Junior MD;  Location: [facility];  Service: Orthopedics       Family History   Problem Relation Age of Onset   • Alzheimer's disease Other    • Breast cancer Other         other   • Cancer Other         other - GYN   • Colon cancer Other         Colorectal Cancer   • Depression Other    • Diabetes Other    • Hyperlipidemia Other    • Hypertension Other    • Stroke Other    • Deep vein thrombosis Other    • Heart disease Other    • Ovarian cancer Other    • Colon cancer Other    • Hypertension Mother    • Hypertension Father    • Heart disease Father    • Heart attack Father    • No Known Problems Brother    • No Known Problems Daughter    • Cancer Maternal Grandmother    • Breast cancer Maternal  "Grandmother    • ALS Maternal Grandmother    • Heart attack Paternal Grandmother    • Heart attack Paternal Grandfather    • Lung cancer Paternal Grandfather    • Colon cancer Paternal Grandfather        Social History     Socioeconomic History   • Marital status:    Tobacco Use   • Smoking status: Never Smoker   • Smokeless tobacco: Never Used   Vaping Use   • Vaping Use: Never used   Substance and Sexual Activity   • Alcohol use: Never   • Drug use: No   • Sexual activity: Defer           Objective    /87 (BP Location: Right arm, Patient Position: Sitting)   Pulse 93   Temp 97.5 °F (36.4 °C) (Infrared)   Resp 18   Ht 152.4 cm (60\")   Wt 99.8 kg (220 lb)   LMP 02/21/1994 (Within Months)   SpO2 98%   BMI 42.97 kg/m²     Physical Exam  Vitals and nursing note reviewed.   Constitutional:       General: She is not in acute distress.     Appearance: She is well-developed. She is obese. She is not ill-appearing.   Cardiovascular:      Rate and Rhythm: Normal rate.   Pulmonary:      Effort: Pulmonary effort is normal.   Musculoskeletal:         General: Tenderness present.      Cervical back: Normal range of motion and neck supple.      Comments: Patient with no ROM of left shoulder and minimal ROM of left elbow - at baseline for patient. No obvious deformity. Tenderness with palpation.    Skin:     General: Skin is warm and dry.      Capillary Refill: Capillary refill takes less than 2 seconds.   Neurological:      Mental Status: She is alert and oriented to person, place, and time.      Coordination: Coordination normal.   Psychiatric:         Behavior: Behavior normal.         Thought Content: Thought content normal.         Judgment: Judgment normal.         Procedures  XR Shoulder 2+ View Left    Result Date: 11/11/2021  Narrative: Three view left shoulder HISTORY: Pain. AP films with the humerus in internal and external rotation and scapular Y view were obtained. COMPARISON: August 3, 2017 and " November 12, 2020. FINDINGS: Stable reverse total left shoulder prosthesis. No acute fracture or dislocation. No other osseous or articular abnormality.     Impression: CONCLUSION: Stable reverse total left shoulder prosthesis. No acute fracture or dislocation. [id number] Electronically signed by:  Nathaniel Rizo MD  11/11/2021 4:13 PM [facility] Workstation: [id number]             ED Course  ED Course as of 11/11/21 2124   Thu Nov 11, 2021   [phone] [facility] report reviewed.  [SH]      ED Course User Index  [SH] Nelida Gutierrez APRN                                           [facility]    Final diagnoses:   Acute pain of left shoulder       ED Disposition  ED Disposition     ED Disposition Condition Comment    Discharge Stable           Glenn Sy MD  [address] [id number] [phone]    Schedule an appointment as soon as possible for a visit   ER follow up         Medication List      New Prescriptions    HYDROcodone-acetaminophen 5-325 MG per tablet  Commonly known as: NORCO  Take 1 tablet by mouth Every 6 (Six) Hours As Needed for Moderate Pain .           Where to Get Your Medications      These medications were sent to [facility] - [address] - [address] - [phone] [facility] [phone] FX  [address], [facility] [address]    Phone: [phone]   · HYDROcodone-acetaminophen 5-325 MG per tablet          Nelida Gutierrez APRN  11/11/21 2124

## 2022-03-23 DIAGNOSIS — F41.9 ANXIETY: ICD-10-CM

## 2022-03-23 RX ORDER — BUSPIRONE HYDROCHLORIDE 10 MG/1
10 TABLET ORAL 3 TIMES DAILY
Qty: 90 TABLET | Refills: 3 | Status: SHIPPED | OUTPATIENT
Start: 2022-03-23 | End: 2022-09-27

## 2022-04-18 ENCOUNTER — OFFICE VISIT (OUTPATIENT)
Dept: FAMILY MEDICINE CLINIC | Facility: CLINIC | Age: 57
End: 2022-04-18

## 2022-04-18 VITALS
HEART RATE: 54 BPM | OXYGEN SATURATION: 97 % | HEIGHT: 60 IN | RESPIRATION RATE: 18 BRPM | DIASTOLIC BLOOD PRESSURE: 88 MMHG | WEIGHT: 218.8 LBS | BODY MASS INDEX: 42.96 KG/M2 | SYSTOLIC BLOOD PRESSURE: 120 MMHG | TEMPERATURE: 97.1 F

## 2022-04-18 DIAGNOSIS — I10 ESSENTIAL HYPERTENSION: Primary | ICD-10-CM

## 2022-04-18 DIAGNOSIS — J45.20 MILD INTERMITTENT ASTHMA WITHOUT COMPLICATION: ICD-10-CM

## 2022-04-18 DIAGNOSIS — F41.9 ANXIETY: ICD-10-CM

## 2022-04-18 DIAGNOSIS — F33.0 MILD EPISODE OF RECURRENT MAJOR DEPRESSIVE DISORDER: ICD-10-CM

## 2022-04-18 DIAGNOSIS — M19.90 ARTHRITIS: ICD-10-CM

## 2022-04-18 DIAGNOSIS — Z23 NEED FOR PNEUMOCOCCAL VACCINATION: ICD-10-CM

## 2022-04-18 PROBLEM — N39.46 MIXED INCONTINENCE: Status: ACTIVE | Noted: 2022-04-18

## 2022-04-18 PROCEDURE — 99214 OFFICE O/P EST MOD 30 MIN: CPT | Performed by: NURSE PRACTITIONER

## 2022-04-18 RX ORDER — MELOXICAM 15 MG/1
15 TABLET ORAL DAILY
Qty: 90 TABLET | Refills: 3 | Status: SHIPPED | OUTPATIENT
Start: 2022-04-18 | End: 2022-10-06 | Stop reason: HOSPADM

## 2022-04-18 RX ORDER — DULOXETIN HYDROCHLORIDE 20 MG/1
20 CAPSULE, DELAYED RELEASE ORAL DAILY
Qty: 30 CAPSULE | Refills: 3 | Status: SHIPPED | OUTPATIENT
Start: 2022-04-18 | End: 2022-06-17

## 2022-04-18 NOTE — PROGRESS NOTES
Subjective   Priti Orr is a 56 y.o. female.     CC: Hypertension, anxiety, depression, asthma, arthritis    Hypertension  This is a chronic problem. The current episode started more than 1 year ago. The problem is controlled. Associated symptoms include anxiety. Pertinent negatives include no chest pain, headaches, palpitations or shortness of breath. Risk factors for coronary artery disease include family history, dyslipidemia, obesity, sedentary lifestyle, post-menopausal state and stress. Current antihypertension treatment includes ACE inhibitors and diuretics. The current treatment provides significant improvement. Compliance problems include exercise and diet.  There is no history of angina, kidney disease or CAD/MI.   Anxiety  Presents for follow-up visit. Symptoms include excessive worry, irritability, nervous/anxious behavior and restlessness. Patient reports no chest pain, depressed mood, dizziness, nausea, palpitations, panic, shortness of breath or suicidal ideas. Symptoms occur most days. The severity of symptoms is moderate. The quality of sleep is fair. Nighttime awakenings: occasional.     Her past medical history is significant for asthma and depression. Compliance with medications is %.   Depression  Visit Type: follow-up  Patient presents with the following symptoms: excessive worry, irritability, nervousness/anxiety and restlessness.  Patient is not experiencing: anhedonia, depressed mood, feelings of hopelessness, feelings of worthlessness, palpitations, panic, shortness of breath, suicidal ideas, weight gain and weight loss.  Frequency of symptoms: occasionally   Severity: mild   Sleep quality: fair  Nighttime awakenings: occasional  Patient has a history of: asthma  Compliance with medications:  %        Asthma  There is no cough, shortness of breath or wheezing. This is a chronic problem. The current episode started more than 1 year ago. The problem occurs intermittently.  The problem has been waxing and waning. Pertinent negatives include no appetite change, chest pain, fever, headaches, myalgias, sore throat, trouble swallowing or weight loss. Her symptoms are aggravated by URI and strenuous activity. Her symptoms are alleviated by rest and beta-agonist. She reports significant improvement on treatment. Her past medical history is significant for asthma.   Arthritis  Presents for follow-up visit. She complains of pain and stiffness. The symptoms have been stable. Affected location: generalized. Her pain is at a severity of 4/10. Pertinent negatives include no diarrhea, dysuria, fatigue, fever, rash or weight loss. (Generalized   ) Compliance with total regimen is %. Compliance with medications is %.        The following portions of the patient's history were reviewed and updated as appropriate: allergies, current medications, past family history, past medical history, past social history, past surgical history and problem list.    Review of Systems   Constitutional: Positive for irritability. Negative for activity change, appetite change, chills, fatigue, fever, unexpected weight gain and unexpected weight loss.   HENT: Negative for congestion, sore throat, trouble swallowing and voice change.    Eyes: Negative.    Respiratory: Negative for cough, chest tightness, shortness of breath and wheezing.    Cardiovascular: Negative for chest pain, palpitations and leg swelling.   Gastrointestinal: Negative for abdominal pain, constipation, diarrhea, nausea and vomiting.   Endocrine: Negative.    Genitourinary: Negative for dysuria.   Musculoskeletal: Positive for arthralgias (generalized arthritis), arthritis and stiffness. Negative for myalgias.   Skin: Negative for rash.   Allergic/Immunologic: Negative.    Neurological: Negative.  Negative for dizziness.   Hematological: Negative.    Psychiatric/Behavioral: Positive for agitation and stress. Negative for suicidal ideas and  depressed mood. The patient is nervous/anxious.        Objective   Physical Exam  Vitals and nursing note reviewed.   Constitutional:       General: She is not in acute distress.     Appearance: Normal appearance. She is well-developed. She is obese. She is not ill-appearing, toxic-appearing or diaphoretic.   HENT:      Head: Normocephalic and atraumatic.   Eyes:      Conjunctiva/sclera: Conjunctivae normal.   Cardiovascular:      Rate and Rhythm: Normal rate and regular rhythm.      Heart sounds: Normal heart sounds.   Pulmonary:      Effort: Pulmonary effort is normal. No respiratory distress.      Breath sounds: Normal breath sounds. No stridor. No wheezing, rhonchi or rales.   Musculoskeletal:         General: No tenderness. Normal range of motion.      Cervical back: Normal range of motion.   Skin:     General: Skin is warm and dry.      Coloration: Skin is not pale.      Findings: No erythema or rash.   Neurological:      Mental Status: She is alert and oriented to person, place, and time.   Psychiatric:         Attention and Perception: Attention and perception normal.         Mood and Affect: Mood is anxious.         Speech: Speech normal.         Behavior: Behavior normal. Behavior is cooperative.         Thought Content: Thought content normal. Thought content is not paranoid or delusional. Thought content does not include homicidal or suicidal ideation. Thought content does not include homicidal or suicidal plan.         Cognition and Memory: Cognition and memory normal.         Judgment: Judgment normal.           Assessment/Plan   Diagnoses and all orders for this visit:    1. Essential hypertension (Primary)   -Controlled.  Continue Zestoretic as prescribed.  We will continue to monitor.    2. Anxiety  -     DULoxetine (CYMBALTA) 20 MG capsule; Take 1 capsule by mouth Daily.  Dispense: 30 capsule; Refill: 3   -Patient does report mild anxiety/irritability symptoms.  Reports depression controlled.  No  suicidal homicidal ideations.  Discussed different treatment options with patient.  Considering patient also has chronic pain issues she has agreed to a trial of Cymbalta 20 mg p.o. daily.  Patient to discontinue Cymbalta if symptoms worsen.  Patient present to the ER for suicidal homicidal ideations.  Patient verbalized understanding of instruction.  We will continue to monitor.  Continue Wellbutrin as prescribed and BuSpar as needed.    3. Mild episode of recurrent major depressive disorder (HCC)   -Stable with no suicidal homicidal ideations.  We will continue to monitor.  Plan of care stated above #2.    4. Mild intermittent asthma without complication   -Controlled.  Continue Advair as prescribed and albuterol as needed.  We will continue to monitor.    5. Need for pneumococcal vaccination  -     pneumococcal polysaccharide 23-valent (PNEUMOVAX-23) vaccine 0.5 mL tolerated well with no adverse reaction.    6. Arthritis  -     DULoxetine (CYMBALTA) 20 MG capsule; Take 1 capsule by mouth Daily.  Dispense: 30 capsule; Refill: 3  -     meloxicam (MOBIC) 15 MG tablet; Take 1 tablet by mouth Daily.  Dispense: 90 tablet; Refill: 3   -Meloxicam refilled.  We will add Cymbalta as noted above #2.  We will closely monitor.    7.  Follow-up in 2 months to reassess anxiety and arthritis symptoms along with annual visit.  Follow-up sooner for any acute needs.            This document has been electronically signed by MELISSA Carrillo on April 18, 2022 12:41 CDT

## 2022-04-22 ENCOUNTER — TELEPHONE (OUTPATIENT)
Dept: FAMILY MEDICINE CLINIC | Facility: CLINIC | Age: 57
End: 2022-04-22

## 2022-04-22 NOTE — TELEPHONE ENCOUNTER
Ms. Orr called to check on the status of her online prescription that were discussed when she was in office on 4-18-22. Please call back @ [phone]

## 2022-04-27 ENCOUNTER — TELEPHONE (OUTPATIENT)
Dept: FAMILY MEDICINE CLINIC | Facility: CLINIC | Age: 57
End: 2022-04-27

## 2022-05-10 ENCOUNTER — OFFICE VISIT (OUTPATIENT)
Dept: ORTHOPEDIC SURGERY | Facility: CLINIC | Age: 57
End: 2022-05-10

## 2022-05-10 VITALS — HEIGHT: 60 IN | BODY MASS INDEX: 42.41 KG/M2 | WEIGHT: 216 LBS

## 2022-05-10 DIAGNOSIS — G89.29 CHRONIC PAIN OF RIGHT KNEE: ICD-10-CM

## 2022-05-10 DIAGNOSIS — M17.11 PRIMARY OSTEOARTHRITIS OF RIGHT KNEE: Primary | ICD-10-CM

## 2022-05-10 DIAGNOSIS — M25.561 CHRONIC PAIN OF RIGHT KNEE: ICD-10-CM

## 2022-05-10 DIAGNOSIS — M25.461 EFFUSION OF RIGHT KNEE: ICD-10-CM

## 2022-05-10 PROCEDURE — 20610 DRAIN/INJ JOINT/BURSA W/O US: CPT | Performed by: NURSE PRACTITIONER

## 2022-05-10 PROCEDURE — 99213 OFFICE O/P EST LOW 20 MIN: CPT | Performed by: NURSE PRACTITIONER

## 2022-05-10 RX ADMIN — TRIAMCINOLONE ACETONIDE 40 MG: 40 INJECTION, SUSPENSION INTRA-ARTICULAR; INTRAMUSCULAR at 16:06

## 2022-05-10 RX ADMIN — BUPIVACAINE HYDROCHLORIDE 2 ML: 5 INJECTION, SOLUTION PERINEURAL at 16:06

## 2022-05-10 NOTE — PROGRESS NOTES
Priti Orr is a 56 y.o. female returns for     Chief Complaint   Patient presents with   • Right Knee - Follow-up, Pain, Edema     HISTORY OF PRESENT ILLNESS: Patient presents to office for follow-up of chronic right knee pain.  Patient has known osteoarthritic/degenerative changes in the medial aspect of her right knee from prior x-ray imaging.  Patient has experienced ongoing and progressively worsening right knee pain for several years but her pain has progressively worsened over the past 1-1/2 years.  Patient also previously underwent right knee arthroscopy with debridement of the medial meniscus per Dr. Junior on 7/13/2020 and did well postoperatively with no known complications.  She had some improved right knee pain following surgery but her pain began to gradually worsen again in December 2020 for unknown reasons.  Since that time, the patient has been treated conservatively with intra-articular injections of steroid and prescription oral NSAIDs.  Last injection was given on 3/12/2021, which she states offered good pain improvement for several months.  Patient continues to take Meloxicam daily as a regular medication as prescribed by her PCP.  Patient reports her right knee pain has been worse in recent weeks.  She has not sustained any falls or injuries.  Patient localizes the pain to the medial aspect of her right knee and reports she has increased pain with longer periods of weightbearing activity.  Patient also complains of intermittent right knee swelling that is significant at times and extends to the lower leg.  X-rays are repeated in office today.  Pain scale currently is 0/10 (while at rest).     CONCURRENT MEDICAL HISTORY:    The following portions of the patient's history were reviewed and updated as appropriate: allergies, current medications, past family history, past medical history, past social history, past surgical history and problem list.     ROS  No fevers or chills.  No chest  "pain or shortness of air.  No GI or  disturbances. Right knee pain.     PHYSICAL EXAMINATION:       Ht 152.4 cm (60\")   Wt 98 kg (216 lb)   LMP 02/21/1994 (Within Months)   BMI 42.18 kg/m²     Physical Exam  Vitals reviewed.   Constitutional:       General: She is not in acute distress.     Appearance: She is well-developed. She is not ill-appearing.   HENT:      Head: Normocephalic.   Pulmonary:      Effort: Pulmonary effort is normal. No respiratory distress.   Abdominal:      General: There is no distension.      Palpations: Abdomen is soft.   Musculoskeletal:         General: Swelling (Mild, right knee) and tenderness (Mild, right knee) present. No deformity or signs of injury.      Right knee: Effusion (Mild) present.   Skin:     General: Skin is warm and dry.      Capillary Refill: Capillary refill takes less than 2 seconds.      Findings: No erythema.   Neurological:      Mental Status: She is alert and oriented to person, place, and time.      GCS: GCS eye subscore is 4. GCS verbal subscore is 5. GCS motor subscore is 6.   Psychiatric:         Speech: Speech normal.         Behavior: Behavior normal.         Thought Content: Thought content normal.         Judgment: Judgment normal.         GAIT:     []  Normal  [x]  Antalgic    Assistive device: [x]  None  []  Walker     []  Crutches  []  Cane     []  Wheelchair  []  Stretcher    Right Knee Exam     Tenderness   The patient is experiencing tenderness in the medial joint line (Mild, diffuse).    Range of Motion   Extension: 0   Flexion: 110     Tests   Varus: negative Valgus: negative    Other   Erythema: absent  Sensation: normal  Pulse: present  Swelling: mild  Effusion: effusion (Mild) present            XR Knee 1 or 2 View Right    Result Date: 5/11/2022  Narrative: EXAMINATION:  XR KNEE 1 OR 2 VW RIGHT (accession [id number]), XR KNEE BILATERAL AP STANDING (accession [id number]) CLINICAL HISTORY:  56 years Female,pain, M17.11 Unilateral primary " osteoarthritis, right knee COMPARISON:  5/29/2020 plain films FINDINGS:  Degenerative arthritis involving the right knee with loss of medial compartment joint space, progressed since 5/29/2020. Associated subchondral sclerosis/cystic change about the right medial compartment with increasing marginal hypertrophic osteophytes. Additionally, hypertrophic osteophytes about the right lateral compartment also increased in size over the interval. Small right knee effusion or synovitis. Prior left total knee arthroplasty.     Impression: 1. Progressive degenerative arthritis involving the right knee since 5/29/2020 with increasing loss of medial compartment joint space. 2. Small right knee effusion or synovitis. 3. Prior left total knee arthroplasty. Electronically signed by:  Ryan Garcia MD  5/11/2022 6:12 PM CDT Workstation: [id number]    XR Knee Bilateral AP Standing    Result Date: 5/11/2022  Narrative: EXAMINATION:  XR KNEE 1 OR 2 VW RIGHT (accession [id number]), XR KNEE BILATERAL AP STANDING (accession [id number]) CLINICAL HISTORY:  56 years Female,pain, M17.11 Unilateral primary osteoarthritis, right knee COMPARISON:  5/29/2020 plain films FINDINGS:  Degenerative arthritis involving the right knee with loss of medial compartment joint space, progressed since 5/29/2020. Associated subchondral sclerosis/cystic change about the right medial compartment with increasing marginal hypertrophic osteophytes. Additionally, hypertrophic osteophytes about the right lateral compartment also increased in size over the interval. Small right knee effusion or synovitis. Prior left total knee arthroplasty.     Impression: 1. Progressive degenerative arthritis involving the right knee since 5/29/2020 with increasing loss of medial compartment joint space. 2. Small right knee effusion or synovitis. 3. Prior left total knee arthroplasty. Electronically signed by:  Ryan Garcia MD  5/11/2022 6:12 PM CDT Workstation:  [id number]        ASSESSMENT:    Diagnoses and all orders for this visit:    Primary osteoarthritis of right knee  -     Large Joint Arthrocentesis: R knee    Chronic pain of right knee  -     Large Joint Arthrocentesis: R knee    Effusion of right knee  -     Large Joint Arthrocentesis: R knee    PLAN    Large Joint Arthrocentesis: R knee  Date/Time: 5/10/2022 4:06 PM  Consent given by: patient  Timeout: Immediately prior to procedure a time out was called to verify the correct patient, procedure, equipment, support staff and site/side marked as required   Supporting Documentation  Indications: pain, joint swelling and diagnostic evaluation   Procedure Details  Location: knee - R knee  Preparation: Patient was prepped and draped in the usual sterile fashion  Needle size: 22 G  Approach: anterolateral  Medications administered: 40 mg triamcinolone acetonide 40 MG/ML; 2 mL bupivacaine 0.5 %  Patient tolerance: patient tolerated the procedure well with no immediate complications        AP standing x-ray of the bilateral knees with a lateral x-ray of the right knee performed in office today and reviewed with no acute findings noted.  Patient has progressing degenerative changes and further loss of joint spacing in the medial aspect of the right knee when compared with her prior images from May 2020.  The patient has bone-on-bone articulation in the medial aspect of her right knee at this time and we have reviewed the x-ray images together.  We discussed that definitive treatment would be knee arthroplasty.  Patient has a history of prior left knee replacement and did well postoperatively and is very pleased with her outcome.  The patient complains of progressively worsening right knee pain and swelling and she localizes the pain along the medial aspect of her knee.  She has increased pain with longer periods of weightbearing activity.  Patient is here today because she wanted to proceed with a repeat injection in her  knee in an effort to improve her pain.  She has not had an injection in the right knee since March 2021.  We discussed continued conservative management versus surgical consult for right knee arthroplasty.  Patient indicates that she does want to proceed with right knee arthroplasty but we discussed that she cannot have steroid injection in the right knee within 90 days of her knee replacement and the patient opted to proceed today with a repeat injection.  The patient does state that she plans to make a follow-up appointment with Dr. Junior in the next couple of months to start the process and surgical discussion for right knee replacement.  For today, recommend proceeding with a repeat intra-articular injection of steroid to the right knee for management of joint pain/inflammation/swelling.    Recommend the following:    -Rest and activity modification as tolerated and based on pain.  -Modified weightbearing of the affected extremity with use of a cane or walker as needed.  -Gradual progression of weightbearing and activity as pain and swelling allow.  -Conditioning and strengthening exercises of the bilateral knees/legs.  -Elevation and ice therapy to the affected knee to minimize pain/swelling/inflammation.   -Continue Meloxicam daily as needed for control of joint pain/inflammation as prescribed by her PCP.  Patient can also take Tylenol as needed for additional pain control.    Patient can follow-up as needed for any new, worsening or persistent symptoms in regards to her chronic right knee pain.  Otherwise, patient needs to follow-up with Dr. Junior to proceed with surgical discussion and planning for eventual right knee arthroplasty.    Time spent of a minimum of 20 minutes including the face to face evaluation, reviewing of medical history and prior medial records, reviewing of diagnostic studies, documentation, patient education and coordination of care.     EMR Dragon/EcoSynthiption Disclaimer: Some of this  note may be an electronic transcription/translation of spoken language to printed text using the Dragon Dictation System.     Return for follow up with Dr. Junior for surgical consult.        This document has been electronically signed by MELISSA Ochoa on May 11, 2022 18:44 CDT      MELISSA Ochoa

## 2022-05-11 RX ORDER — BUPIVACAINE HYDROCHLORIDE 5 MG/ML
2 INJECTION, SOLUTION PERINEURAL
Status: COMPLETED | OUTPATIENT
Start: 2022-05-10 | End: 2022-05-10

## 2022-05-11 RX ORDER — TRIAMCINOLONE ACETONIDE 40 MG/ML
40 INJECTION, SUSPENSION INTRA-ARTICULAR; INTRAMUSCULAR
Status: COMPLETED | OUTPATIENT
Start: 2022-05-10 | End: 2022-05-10

## 2022-06-14 ENCOUNTER — OFFICE VISIT (OUTPATIENT)
Dept: ORTHOPEDIC SURGERY | Facility: CLINIC | Age: 57
End: 2022-06-14

## 2022-06-14 VITALS — HEIGHT: 60 IN | WEIGHT: 216.7 LBS | BODY MASS INDEX: 42.54 KG/M2

## 2022-06-14 DIAGNOSIS — I10 ESSENTIAL HYPERTENSION: ICD-10-CM

## 2022-06-14 DIAGNOSIS — E66.01 MORBID OBESITY WITH BMI OF 40.0-44.9, ADULT: ICD-10-CM

## 2022-06-14 DIAGNOSIS — M17.11 PRIMARY OSTEOARTHRITIS OF RIGHT KNEE: Primary | ICD-10-CM

## 2022-06-14 DIAGNOSIS — G89.29 CHRONIC PAIN OF RIGHT KNEE: ICD-10-CM

## 2022-06-14 DIAGNOSIS — M25.561 CHRONIC PAIN OF RIGHT KNEE: ICD-10-CM

## 2022-06-14 PROCEDURE — 99214 OFFICE O/P EST MOD 30 MIN: CPT | Performed by: ORTHOPAEDIC SURGERY

## 2022-06-14 NOTE — PROGRESS NOTES
Priti Orr is a 56 y.o. female returns for     Chief Complaint   Patient presents with   • Right Knee - Follow-up, Pain       HISTORY OF PRESENT ILLNESS:  She presents with pain in her right knee.  She has had pain for several years that has progressively gotten worse.  Pain has been significantly worse over the last 12 to 15 months.  She has pain with activities of daily living.  Mostly she has dull achy pain but she occasionally has sharp stabbing pains.  She has had multiple steroid injections as well as viscosupplementation which were not very helpful.  She occasionally uses a cane.  No fevers or chills.  No numbness or tingling.  She is unhappy with her quality of life and wishes to discuss definitive treatment options.     CONCURRENT MEDICAL HISTORY:    Past Medical History:   Diagnosis Date   • Allergic rhinitis    • Anesthesia     nausea and vomiting with anesthesia requires pre medicaiton   • Anxiety    • Arthritis    • Asthma    • Benign neoplasm of meninges (HCC)     Post op 2011      • Cobalamin deficiency    • Corneal abrasion    • Depression    • Encounter for gynecological examination (general) (routine) without abnormal findings    • Essential hypertension    • Fatigue    • Female stress incontinence    • Head injury 2011   • History of delayed wound healing     Delayed healing of surgical wound      • Hyperlipidemia    • Low back pain    • Macular degeneration disease    • Nosebleed, symptom     Nosebleed/epistaxis symptom      • Osteoporosis    • Osteoporosis    • PONV (postoperative nausea and vomiting)    • Seizures (HCC)    • Sleep apnea     not wearing c-pap   • Wears glasses        Allergies   Allergen Reactions   • Morphine Other (See Comments)     States felt like itching from within, hallucinations, agitation     • Pseudoephedrine Shortness Of Breath and Swelling     LIPS SWELL AND TONGUE BECOMES THICK       Current Outpatient Medications on File Prior to Visit   Medication Sig   •  acetaminophen (TYLENOL) 650 MG 8 hr tablet Take 650 mg by mouth.   • albuterol sulfate HFA (Ventolin HFA) 108 (90 Base) MCG/ACT inhaler Inhale 2 puffs Every 4 (Four) Hours As Needed for Shortness of Air.   • amoxicillin (AMOXIL) 500 MG capsule Take 1 capsule by mouth 2 (Two) Times a Day.   • buPROPion XL (WELLBUTRIN XL) 300 MG 24 hr tablet Take 1 tablet by mouth Every Morning.   • busPIRone (BUSPAR) 10 MG tablet TAKE 1 TABLET BY MOUTH 3 (THREE) TIMES A DAY.   • Cholecalciferol (GNP Vitamin D) 25 MCG (1000 UT) tablet Take 1,000 Units by mouth Daily.   • DULoxetine (CYMBALTA) 20 MG capsule Take 1 capsule by mouth Daily.   • fluticasone (FLONASE) 50 MCG/ACT nasal spray 1 spray into the nostril(s) as directed by provider Daily.   • fluticasone-salmeterol (ADVAIR HFA) 115-21 MCG/ACT inhaler Inhale 1 puff.   • Glucos-Chondroit-Hyaluron-MSM (GLUCOSAMINE CHONDROITIN JOINT PO) Take 1 tablet by mouth Daily.   • lisinopril-hydrochlorothiazide (PRINZIDE,ZESTORETIC) 20-12.5 MG per tablet Take 1 tablet by mouth Daily.   • loratadine (CLARITIN) 10 MG tablet Take 10 mg by mouth.   • meloxicam (MOBIC) 15 MG tablet Take 1 tablet by mouth Daily.   • montelukast (Singulair) 10 MG tablet Take 1 tablet by mouth Every Night.   • rosuvastatin (CRESTOR) 5 MG tablet Take 1 tablet by mouth Every Evening.   • vitamin D (ERGOCALCIFEROL) 1.25 MG (38181 UT) capsule capsule TAKE ONE CAPSULE BY MOUTH ONCE A WEEK   • fluticasone (FLONASE) 50 MCG/ACT nasal spray 2 sprays into the nostril(s) as directed by provider Daily for 7 days.     No current facility-administered medications on file prior to visit.       Past Surgical History:   Procedure Laterality Date   • ARM LESION/CYST EXCISION  2014    Remove arm bone lesion (1)   • ARM SKIN LESION BIOPSY / EXCISION     • ARM WOUND REPAIR / CLOSURE  2014    Repair Superficial Wound TR-EXT 2.5 < CM 83526 (1)      • BRAIN SURGERY     • BREAST LUMPECTOMY     •  SECTION  1986      Section (1)     • ENDOSCOPY  2008    Colon endoscopy 45631 (1)     • EXCISION BREAST LESION W/ PREOP NEEDLE LOC  1981    Excision, breast lesion (1)     • HEMORRHOIDECTOMY     • HEMORRHOIDECTOMY  2008    Hemorrhoidectomy (2)      • HYSTERECTOMY     • INJECTION OF MEDICATION  2013    Celestone (betamethasone) (1)      • INJECTION OF MEDICATION  2013    Depo Medrol (Methylprednisone) (1)      • INJECTION OF MEDICATION  2013    Dexamethasone (2)      • INJECTION OF MEDICATION  2014    Kenalog (6)   • KNEE ARTHROSCOPY  2007    Knee arthroscopy, surgery (1)     • KNEE ARTHROSCOPY Right 2020    Procedure: KNEE ARTHROSCOPY with debridement medial meniscus;  Surgeon: Brooks Junior MD;  Location: [facility];  Service: Orthopedics;  Laterality: Right;   • LAPAROSCOPIC TUBAL LIGATION  1991    Tubal ligation (1)      • PAP SMEAR  2007    PAP SMEAR (1)      • SHOULDER SURGERY Left     x 4   • TOTAL ABDOMINAL HYSTERECTOMY WITH SALPINGO OOPHORECTOMY      JOSE/BSO (1)      • TOTAL KNEE ARTHROPLASTY Left 2018    Procedure: TOTAL KNEE ARTHROPLASTY ATTUNE with adductor canal block - left;  Surgeon: Brooks Junior MD;  Location: [facility];  Service: Orthopedics       Family History   Problem Relation Age of Onset   • Alzheimer's disease Other    • Breast cancer Other         other   • Cancer Other         other - GYN   • Colon cancer Other         Colorectal Cancer   • Depression Other    • Diabetes Other    • Hyperlipidemia Other    • Hypertension Other    • Stroke Other    • Deep vein thrombosis Other    • Heart disease Other    • Ovarian cancer Other    • Colon cancer Other    • Hypertension Mother    • Hypertension Father    • Heart disease Father    • Heart attack Father    • No Known Problems Brother    • No Known Problems Daughter    • Cancer Maternal Grandmother    • Breast cancer Maternal Grandmother    • ALS Maternal  "Grandmother    • Heart attack Paternal Grandmother    • Heart attack Paternal Grandfather    • Lung cancer Paternal Grandfather    • Colon cancer Paternal Grandfather        Social History     Socioeconomic History   • Marital status:    Tobacco Use   • Smoking status: Never Smoker   • Smokeless tobacco: Never Used   Vaping Use   • Vaping Use: Never used   Substance and Sexual Activity   • Alcohol use: Never   • Drug use: No   • Sexual activity: Defer           ROS  No fevers or chills.  No chest pain or shortness of air.  No GI or  disturbances.  Other than right knee pain, all other systems reviewed as negative.    PHYSICAL EXAMINATION:       Ht 152.4 cm (60\")   Wt 98.3 kg (216 lb 11.2 oz)   LMP 02/21/1994 (Within Months)   BMI 42.32 kg/m²     Physical Exam  Vitals reviewed.   Constitutional:       General: She is not in acute distress.     Appearance: Normal appearance. She is well-developed.   Cardiovascular:      Rate and Rhythm: Normal rate and regular rhythm.      Heart sounds: Normal heart sounds.   Pulmonary:      Effort: Pulmonary effort is normal.      Breath sounds: Normal breath sounds.   Abdominal:      General: Bowel sounds are normal.      Palpations: Abdomen is soft.   Neurological:      Mental Status: She is alert and oriented to person, place, and time.   Psychiatric:         Behavior: Behavior normal.         Thought Content: Thought content normal.         Judgment: Judgment normal.         GAIT:     []  Normal  [x]  Antalgic    Assistive device: [x]  None  []  Walker     []  Crutches  []  Cane     []  Wheelchair  []  Stretcher    Right Knee Exam     Muscle Strength   The patient has normal right knee strength.    Tenderness   Right knee tenderness location: diffuse.    Range of Motion   Extension: -5   Flexion: 110     Tests   Varus: negative Valgus: negative  Drawer:  Anterior - negative    Posterior - negative    Other   Sensation: normal  Pulse: present  Swelling: " mild    Comments:  Crepitation on motion.  Mild to moderate pain through arc of motion              EXAMINATION:  XR KNEE 1 OR 2 VW RIGHT (accession [id number]), XR  KNEE BILATERAL AP STANDING (accession [id number])     CLINICAL HISTORY:  56 years Female,pain, M17.11 Unilateral  primary osteoarthritis, right knee     COMPARISON:  5/29/2020 plain films     FINDINGS:    Degenerative arthritis involving the right knee with loss of  medial compartment joint space, progressed since 5/29/2020.  Associated subchondral sclerosis/cystic change about the right  medial compartment with increasing marginal hypertrophic  osteophytes. Additionally, hypertrophic osteophytes about the  right lateral compartment also increased in size over the  interval.     Small right knee effusion or synovitis.     Prior left total knee arthroplasty.     IMPRESSION:  1. Progressive degenerative arthritis involving the right knee  since 5/29/2020 with increasing loss of medial compartment joint  space.  2. Small right knee effusion or synovitis.  3. Prior left total knee arthroplasty.     Electronically signed by:  Ryan Garcia MD  5/11/2022 6:12 PM CDT  Workstation: [id number]                   ASSESSMENT:    Diagnoses and all orders for this visit:    Primary osteoarthritis of right knee  -     Case Request; Standing  -     MRSA Screen, PCR (Inpatient) - Swab, Nares; Future  -     Type and screen; Future  -     Case Request    Chronic pain of right knee  -     Case Request; Standing  -     MRSA Screen, PCR (Inpatient) - Swab, Nares; Future  -     Type and screen; Future  -     Case Request    Essential hypertension  -     Case Request; Standing  -     MRSA Screen, PCR (Inpatient) - Swab, Nares; Future  -     Type and screen; Future  -     Case Request    Morbid obesity with BMI of 40.0-44.9, adult (HCC)  -     Case Request; Standing  -     MRSA Screen, PCR (Inpatient) - Swab, Nares; Future  -     Type and screen; Future  -     Case  Request    Other orders  -     Follow Anesthesia Guidelines / Standing Orders; Future  -     Provide instructions to patient regarding NPO status  -     Chlorhexidine Skin Prep - Educate and Review With Patient; Future  -     Provide Patient With ERAS Hydration Instructions  -     Complete A PROMIS And HOOS Or KOOS Survey If Having Hip Or Knee Replacement          PLAN    Patient has long history of osteoarthritis change in the right knee.  She has failed nonoperative treatment options.  She continues to have pain with activities of daily living and is unhappy with her quality of life.  She wishes to discuss definitive treatment options.    The patient voiced understanding of the risks, benefits, and alternative forms of treatment that were discussed and the patient consents to proceed with surgery.  All risks, benefits and alternatives were discussed.  Risks include, but not exclusive to anesthetic complications, including death, MI, CVA, infection, bleeding DVT, fracture, residual pain and need for future surgery.    This discussion was held with the patient by Brooks Junior MD and all questions were answered.    Plan right total knee arthroplasty with adductor canal block    She will continue with weight management and trying to improve body mass index as a patient control risk factor.  She has previously done the joint replacement class and already has a joint placement booklet.  Continue general strength and conditioning exercises.    Return in about 8 weeks (around 8/9/2022) for recheck.    Brooks Junior MD

## 2022-06-15 RX ORDER — ACETAMINOPHEN 500 MG
1000 TABLET ORAL ONCE
Status: CANCELLED | OUTPATIENT
Start: 2022-10-05 | End: 2022-06-15

## 2022-06-15 RX ORDER — MELOXICAM 15 MG/1
15 TABLET ORAL ONCE
Status: CANCELLED | OUTPATIENT
Start: 2022-10-05 | End: 2022-06-15

## 2022-06-15 RX ORDER — PREGABALIN 75 MG/1
75 CAPSULE ORAL ONCE
Status: CANCELLED | OUTPATIENT
Start: 2022-10-05 | End: 2022-06-15

## 2022-06-15 RX ORDER — BUPIVACAINE HCL/0.9 % NACL/PF 0.1 %
2 PLASTIC BAG, INJECTION (ML) EPIDURAL ONCE
Status: CANCELLED | OUTPATIENT
Start: 2022-10-05 | End: 2022-06-15

## 2022-06-17 ENCOUNTER — OFFICE VISIT (OUTPATIENT)
Dept: FAMILY MEDICINE CLINIC | Facility: CLINIC | Age: 57
End: 2022-06-17

## 2022-06-17 VITALS
BODY MASS INDEX: 42.35 KG/M2 | OXYGEN SATURATION: 96 % | HEART RATE: 102 BPM | DIASTOLIC BLOOD PRESSURE: 82 MMHG | WEIGHT: 215.7 LBS | HEIGHT: 60 IN | TEMPERATURE: 96.9 F | RESPIRATION RATE: 18 BRPM | SYSTOLIC BLOOD PRESSURE: 120 MMHG

## 2022-06-17 DIAGNOSIS — F41.9 ANXIETY: ICD-10-CM

## 2022-06-17 DIAGNOSIS — Z00.00 ANNUAL PHYSICAL EXAM: ICD-10-CM

## 2022-06-17 DIAGNOSIS — E55.9 VITAMIN D DEFICIENCY: ICD-10-CM

## 2022-06-17 DIAGNOSIS — E78.2 MIXED HYPERLIPIDEMIA: ICD-10-CM

## 2022-06-17 DIAGNOSIS — Z13.820 SCREENING FOR OSTEOPOROSIS: ICD-10-CM

## 2022-06-17 DIAGNOSIS — I10 ESSENTIAL HYPERTENSION: Primary | ICD-10-CM

## 2022-06-17 DIAGNOSIS — M19.90 ARTHRITIS: ICD-10-CM

## 2022-06-17 DIAGNOSIS — F33.0 MILD EPISODE OF RECURRENT MAJOR DEPRESSIVE DISORDER: ICD-10-CM

## 2022-06-17 PROCEDURE — 99214 OFFICE O/P EST MOD 30 MIN: CPT | Performed by: NURSE PRACTITIONER

## 2022-06-17 RX ORDER — BUPROPION HYDROCHLORIDE 150 MG/1
150 TABLET ORAL EVERY MORNING
Qty: 90 TABLET | Refills: 3 | Status: SHIPPED | OUTPATIENT
Start: 2022-06-17

## 2022-06-17 RX ORDER — DULOXETIN HYDROCHLORIDE 30 MG/1
30 CAPSULE, DELAYED RELEASE ORAL DAILY
Qty: 90 CAPSULE | Refills: 3 | Status: SHIPPED | OUTPATIENT
Start: 2022-06-17

## 2022-06-17 NOTE — PROGRESS NOTES
Subjective   Priti Orr is a 56 y.o. female.     CC: Annual follow-up-hypertension, hyperlipidemia, vitamin D deficiency, anxiety, depression, arthritis    Hypertension  This is a chronic problem. The current episode started more than 1 year ago. The problem is controlled. Associated symptoms include anxiety. Pertinent negatives include no chest pain, headaches, palpitations or shortness of breath. Risk factors for coronary artery disease include family history, dyslipidemia, obesity, post-menopausal state and sedentary lifestyle. Current antihypertension treatment includes ACE inhibitors and diuretics. Compliance problems include exercise and diet.  There is no history of angina, kidney disease or CAD/MI.   Hyperlipidemia  This is a chronic problem. The current episode started more than 1 year ago. Recent lipid tests were reviewed and are variable. Exacerbating diseases include obesity. Factors aggravating her hyperlipidemia include fatty foods and thiazides. Pertinent negatives include no chest pain, focal sensory loss, focal weakness, leg pain, myalgias or shortness of breath. Current antihyperlipidemic treatment includes statins. The current treatment provides significant improvement of lipids. Compliance problems include adherence to exercise and adherence to diet.  Risk factors for coronary artery disease include family history, dyslipidemia, hypertension, obesity, a sedentary lifestyle and post-menopausal.   Anxiety  Presents for follow-up visit. Patient reports no chest pain, depressed mood, excessive worry, insomnia, nausea, palpitations, panic, restlessness, shortness of breath or suicidal ideas. Symptoms occur rarely. The severity of symptoms is mild. The quality of sleep is fair. Nighttime awakenings: occasional.     Her past medical history is significant for depression. Compliance with medications is %.   Depression  Visit Type: follow-up  Patient is not experiencing: anhedonia,  depressed mood, excessive worry, feelings of worthlessness, insomnia, palpitations, panic, restlessness, shortness of breath, suicidal ideas, thoughts of death, weight gain and weight loss.  Frequency of symptoms: rarely   Severity: mild   Sleep quality: fair  Nighttime awakenings: occasional  Compliance with medications:  %        Arthritis  Presents for follow-up visit. She complains of pain and stiffness. The symptoms have been improving. Affected location: generalized. Pertinent negatives include no diarrhea, dysuria, fatigue, fever, rash or weight loss. Compliance with total regimen is %. Compliance with medications is %.        The following portions of the patient's history were reviewed and updated as appropriate: allergies, current medications, past family history, past medical history, past social history, past surgical history and problem list.    Review of Systems   Constitutional: Negative for activity change, appetite change, chills, fatigue, fever, unexpected weight gain and unexpected weight loss.   HENT: Negative for congestion, sore throat, trouble swallowing and voice change.    Eyes: Negative.    Respiratory: Negative for cough, chest tightness, shortness of breath and wheezing.    Cardiovascular: Negative for chest pain, palpitations and leg swelling.   Gastrointestinal: Negative for abdominal pain, constipation, diarrhea, nausea and vomiting.   Endocrine: Negative.  Negative for cold intolerance, heat intolerance, polydipsia, polyphagia and polyuria.   Genitourinary: Negative for dysuria.   Musculoskeletal: Positive for arthralgias (improved pain control with cymbalta), arthritis and stiffness. Negative for myalgias.   Skin: Negative for rash.   Allergic/Immunologic: Negative.    Neurological: Negative for focal weakness.   Hematological: Negative.    Psychiatric/Behavioral: Negative for suicidal ideas and depressed mood. The patient does not have insomnia.        Objective    Physical Exam  Vitals and nursing note reviewed.   Constitutional:       General: She is not in acute distress.     Appearance: Normal appearance. She is well-developed. She is obese. She is not ill-appearing, toxic-appearing or diaphoretic.   HENT:      Head: Normocephalic and atraumatic.   Eyes:      Conjunctiva/sclera: Conjunctivae normal.   Cardiovascular:      Rate and Rhythm: Normal rate and regular rhythm.      Heart sounds: Normal heart sounds.   Pulmonary:      Effort: Pulmonary effort is normal. No respiratory distress.      Breath sounds: Normal breath sounds. No stridor. No wheezing, rhonchi or rales.   Musculoskeletal:         General: Tenderness (right knee) present. Normal range of motion.      Cervical back: Normal range of motion.   Skin:     General: Skin is warm and dry.      Coloration: Skin is not pale.      Findings: No erythema or rash.   Neurological:      Mental Status: She is alert and oriented to person, place, and time.   Psychiatric:         Mood and Affect: Mood normal.         Behavior: Behavior normal.         Thought Content: Thought content normal.         Judgment: Judgment normal.           Assessment & Plan   Diagnoses and all orders for this visit:    1. Essential hypertension (Primary)  -     CBC & Differential; Future  -     Comprehensive Metabolic Panel; Future, will call with results.  Controlled.  Continue Zestoretic as prescribed.  Continue to monitor.    2. Anxiety  -     DULoxetine (CYMBALTA) 30 MG capsule; Take 1 capsule by mouth Daily.  Dispense: 90 capsule; Refill: 3   -Mild improvement of anxiety and depression with no suicidal homicidal ideations.  Tolerating Cymbalta well.  Will increase dosage to 30 mg daily.  We will continue to monitor.    3. Mild episode of recurrent major depressive disorder (HCC)  -     Vitamin D 25 Hydroxy; Future  -     buPROPion XL (WELLBUTRIN XL) 150 MG 24 hr tablet; Take 1 tablet by mouth Every Morning.  Dispense: 90 tablet; Refill:  3   -Symptoms improving with no suicidal homicidal ideations.  Continue Wellbutrin and Cymbalta as prescribed.  We will call vitamin D level.  Continue vitamin D as prescribed.  We will continue to monitor.    4. Arthritis  -     DULoxetine (CYMBALTA) 30 MG capsule; Take 1 capsule by mouth Daily.  Dispense: 90 capsule; Refill: 3   -Patient has noticed a mild reduction in pain with Cymbalta.  Will increase dosage to 30 mg daily.  We will continue to monitor.    5. Annual physical exam  -     CBC & Differential; Future  -     Comprehensive Metabolic Panel; Future  -     Hemoglobin A1c; Future  -     Lipid Panel; Future  -     TSH; Future  -     T4, Free; Future  -     Vitamin D 25 Hydroxy; Future, will call with results.  Patient due for colonoscopy however she states she has had a Cologuard within the last 2 years.  No documentation available in chart.  We will contact her last PCPs office to see if these records can be obtained.    6. Mixed hyperlipidemia  -     Lipid Panel; Future, will call with results.  Continue low-fat diet and Crestor as prescribed.  We will continue to monitor.    7. Vitamin D deficiency  -     Vitamin D 25 Hydroxy; Future, will call with results.  Continue vitamin D as prescribed.  Continue to monitor.    8. Screening for osteoporosis  -     DEXA Bone Density Axial, will call with results.    9.  Follow-up in 6 months or sooner for any acute needs.            This document has been electronically signed by MELISSA Carrillo on June 17, 2022 13:45 CDT

## 2022-06-29 ENCOUNTER — LAB (OUTPATIENT)
Dept: LAB | Facility: HOSPITAL | Age: 57
End: 2022-06-29

## 2022-06-29 DIAGNOSIS — E78.2 MIXED HYPERLIPIDEMIA: ICD-10-CM

## 2022-06-29 DIAGNOSIS — I10 ESSENTIAL HYPERTENSION: ICD-10-CM

## 2022-06-29 DIAGNOSIS — F33.0 MILD EPISODE OF RECURRENT MAJOR DEPRESSIVE DISORDER: ICD-10-CM

## 2022-06-29 DIAGNOSIS — Z00.00 ANNUAL PHYSICAL EXAM: ICD-10-CM

## 2022-06-29 DIAGNOSIS — E55.9 VITAMIN D DEFICIENCY: ICD-10-CM

## 2022-06-29 PROCEDURE — 82306 VITAMIN D 25 HYDROXY: CPT

## 2022-06-29 PROCEDURE — 83036 HEMOGLOBIN GLYCOSYLATED A1C: CPT

## 2022-06-29 PROCEDURE — 80061 LIPID PANEL: CPT

## 2022-06-29 PROCEDURE — 36415 COLL VENOUS BLD VENIPUNCTURE: CPT

## 2022-06-29 PROCEDURE — 84439 ASSAY OF FREE THYROXINE: CPT

## 2022-06-29 PROCEDURE — 80050 GENERAL HEALTH PANEL: CPT

## 2022-06-30 LAB
25(OH)D3 SERPL-MCNC: 40.2 NG/ML (ref 30–100)
ALBUMIN SERPL-MCNC: 4.4 G/DL (ref 3.5–5.2)
ALBUMIN/GLOB SERPL: 2.3 G/DL
ALP SERPL-CCNC: 66 U/L (ref 39–117)
ALT SERPL W P-5'-P-CCNC: 11 U/L (ref 1–33)
ANION GAP SERPL CALCULATED.3IONS-SCNC: 12 MMOL/L (ref 5–15)
AST SERPL-CCNC: 10 U/L (ref 1–32)
BASOPHILS # BLD AUTO: 0.04 10*3/MM3 (ref 0–0.2)
BASOPHILS NFR BLD AUTO: 0.7 % (ref 0–1.5)
BILIRUB SERPL-MCNC: 0.3 MG/DL (ref 0–1.2)
BUN SERPL-MCNC: 18 MG/DL (ref 6–20)
BUN/CREAT SERPL: 19.8 (ref 7–25)
CALCIUM SPEC-SCNC: 9.6 MG/DL (ref 8.6–10.5)
CHLORIDE SERPL-SCNC: 102 MMOL/L (ref 98–107)
CHOLEST SERPL-MCNC: 188 MG/DL (ref 0–200)
CO2 SERPL-SCNC: 27 MMOL/L (ref 22–29)
CREAT SERPL-MCNC: 0.91 MG/DL (ref 0.57–1)
DEPRECATED RDW RBC AUTO: 45.6 FL (ref 37–54)
EGFRCR SERPLBLD CKD-EPI 2021: 75.1 ML/MIN/1.73
EOSINOPHIL # BLD AUTO: 0.1 10*3/MM3 (ref 0–0.4)
EOSINOPHIL NFR BLD AUTO: 1.8 % (ref 0.3–6.2)
ERYTHROCYTE [DISTWIDTH] IN BLOOD BY AUTOMATED COUNT: 13.8 % (ref 12.3–15.4)
GLOBULIN UR ELPH-MCNC: 1.9 GM/DL
GLUCOSE SERPL-MCNC: 81 MG/DL (ref 65–99)
HBA1C MFR BLD: 5.4 % (ref 4.8–5.6)
HCT VFR BLD AUTO: 40.6 % (ref 34–46.6)
HDLC SERPL-MCNC: 85 MG/DL (ref 40–60)
HGB BLD-MCNC: 13.5 G/DL (ref 12–15.9)
IMM GRANULOCYTES # BLD AUTO: 0.01 10*3/MM3 (ref 0–0.05)
IMM GRANULOCYTES NFR BLD AUTO: 0.2 % (ref 0–0.5)
LDLC SERPL CALC-MCNC: 89 MG/DL (ref 0–100)
LDLC/HDLC SERPL: 1.03 {RATIO}
LYMPHOCYTES # BLD AUTO: 1.37 10*3/MM3 (ref 0.7–3.1)
LYMPHOCYTES NFR BLD AUTO: 24.5 % (ref 19.6–45.3)
MCH RBC QN AUTO: 30.3 PG (ref 26.6–33)
MCHC RBC AUTO-ENTMCNC: 33.3 G/DL (ref 31.5–35.7)
MCV RBC AUTO: 91.2 FL (ref 79–97)
MONOCYTES # BLD AUTO: 0.48 10*3/MM3 (ref 0.1–0.9)
MONOCYTES NFR BLD AUTO: 8.6 % (ref 5–12)
NEUTROPHILS NFR BLD AUTO: 3.6 10*3/MM3 (ref 1.7–7)
NEUTROPHILS NFR BLD AUTO: 64.2 % (ref 42.7–76)
NRBC BLD AUTO-RTO: 0 /100 WBC (ref 0–0.2)
PLATELET # BLD AUTO: 306 10*3/MM3 (ref 140–450)
PMV BLD AUTO: 10.2 FL (ref 6–12)
POTASSIUM SERPL-SCNC: 4.6 MMOL/L (ref 3.5–5.2)
PROT SERPL-MCNC: 6.3 G/DL (ref 6–8.5)
RBC # BLD AUTO: 4.45 10*6/MM3 (ref 3.77–5.28)
SODIUM SERPL-SCNC: 141 MMOL/L (ref 136–145)
T4 FREE SERPL-MCNC: 1.11 NG/DL (ref 0.93–1.7)
TRIGL SERPL-MCNC: 77 MG/DL (ref 0–150)
TSH SERPL DL<=0.05 MIU/L-ACNC: 1.5 UIU/ML (ref 0.27–4.2)
VLDLC SERPL-MCNC: 14 MG/DL (ref 5–40)
WBC NRBC COR # BLD: 5.6 10*3/MM3 (ref 3.4–10.8)

## 2022-07-19 DIAGNOSIS — M81.6 LOCALIZED OSTEOPOROSIS WITHOUT CURRENT PATHOLOGICAL FRACTURE: Primary | ICD-10-CM

## 2022-08-01 ENCOUNTER — OFFICE VISIT (OUTPATIENT)
Dept: ORTHOPEDIC SURGERY | Facility: CLINIC | Age: 57
End: 2022-08-01

## 2022-08-01 VITALS — BODY MASS INDEX: 40.25 KG/M2 | WEIGHT: 205 LBS | HEIGHT: 60 IN

## 2022-08-01 DIAGNOSIS — E28.319 EARLY MENOPAUSE OCCURRING IN PATIENT AGE YOUNGER THAN 45 YEARS: ICD-10-CM

## 2022-08-01 DIAGNOSIS — Z78.0 POSTMENOPAUSAL: ICD-10-CM

## 2022-08-01 DIAGNOSIS — M81.0 AGE-RELATED OSTEOPOROSIS WITHOUT CURRENT PATHOLOGICAL FRACTURE: Primary | ICD-10-CM

## 2022-08-01 DIAGNOSIS — Z86.39 HISTORY OF VITAMIN D DEFICIENCY: ICD-10-CM

## 2022-08-01 PROCEDURE — 99214 OFFICE O/P EST MOD 30 MIN: CPT | Performed by: NURSE PRACTITIONER

## 2022-08-01 NOTE — PROGRESS NOTES
Priti Orr is a 56 y.o. female returns for     Chief Complaint   Patient presents with   • Osteoporosis     Bone Health      HISTORY OF PRESENT ILLNESS: 56-year-old  female patient [facility]-up of osteoporosis.  The patient initially established care with the [facility] on 3/25/2019 and Prolia was reordered for the patient at that time.  She had 1 dose of Prolia given on 4/24/2019 and then her insurance stopped covering it so she has not had any further injections.  Prior to this, she was on Prolia for a few years up until August 2017.  She previously tolerated the Prolia injections well with no known adverse effects.  Patient has also taken multiple previous oral bisphosphonates in the past including Fosamax, Actonel and Boniva, which she states were ineffective for treating her osteoporosis.  The patient has not had any recent treatments for osteoporosis.  The patient has had a recent repeat DEXA scan as ordered by her PCP, performed on 7/19/2022.  She has had some gradual decline in her bone density since her last study.    Previously identified risk factors for osteoporosis include her age, race, gender, postmenopausal status, early menopause due to full hysterectomy at the age of 27 with no hormone replacement therapy and a history of vitamin D deficiency.  The patient has a history of a previous left proximal humerus fracture that occurred in February 2014 as well as a history of previous rib fractures.  She has not had any recent fractures no fractures since her last office visit.  The patient is scheduled to undergo right knee replacement in October 2022.  She is also scheduled to undergo bariatric surgery in 10 days.  Patient continues to take calcium supplementation daily.  She continues to take high-dose vitamin D supplementation of 50,000 units once weekly.  Patient had a recent vitamin D level checked as ordered by her PCP on 6/29/2022, which was  "normal at 40.2.     CONCURRENT MEDICAL HISTORY:    The following portions of the patient's history were reviewed and updated as appropriate: allergies, current medications, past family history, past medical history, past social history, past surgical history and problem list.     ROS  No fevers or chills.  No chest pain or shortness of air.  No GI or  disturbances.    PHYSICAL EXAMINATION:       Ht 152.4 cm (60\")   Wt 93 kg (205 lb)   LMP 02/21/1994 (Within Months)   BMI 40.04 kg/m²     Physical Exam  Vitals reviewed.   Constitutional:       General: She is not in acute distress.     Appearance: She is well-developed. She is not ill-appearing.   HENT:      Head: Normocephalic.   Pulmonary:      Effort: Pulmonary effort is normal. No respiratory distress.   Abdominal:      General: There is no distension.      Palpations: Abdomen is soft.   Skin:     General: Skin is warm and dry.      Capillary Refill: Capillary refill takes less than 2 seconds.      Findings: No erythema.   Neurological:      Mental Status: She is alert and oriented to person, place, and time.      GCS: GCS eye subscore is 4. GCS verbal subscore is 5. GCS motor subscore is 6.   Psychiatric:         Speech: Speech normal.         Behavior: Behavior normal.         Thought Content: Thought content normal.         Judgment: Judgment normal.         GAIT:     [x]  Normal  []  Antalgic    Assistive device: [x]  None  []  Walker     []  Crutches  []  Cane     []  Wheelchair  []  Stretcher    Back Exam     Comments:  No kyphosis or deformity noted.  No focal neurological deficits noted.            DEXA Bone Density Axial    Result Date: 7/19/2022  Narrative: DEXA scan HISTORY: Osteoporosis screening postmenopausal Scans were obtained at the L1-L4 levels and of each hip. FINDINGS: Average bone mineral density for the lumbar spine is 0.829 g/sq cm. The T score of -2.0 corresponds to a WHO of classification of osteopenia. (Note is made that the L3 T " score of -2.7 and the L4 T score of -2.9 correspond to osteoporosis. Fracture risk high for the L3 and L4 vertebral bodies.) Change of -3.9% from baseline and change of -6.0% from 9/30/2019. The bone mineral density for the left femoral neck is 0.651 g/sq cm. The T score of -1.8 corresponds to a WHO classification of osteopenia. Total left hip T score 0.6 corresponds to normal bone density. The bone mineral density for the right femoral neck is 0.720 g/sq cm. The T score of -1.2 corresponds to a WHO classification of osteopenia. Total right hip T score of 1.0 corresponds to normal bone density. Change of -4.6% in mean total hip bone density from baseline and change of 3.0% from 9/30/2019.     Impression: CONCLUSION: Osteopenia. Fracture risk increased. (Note is made that the L3 T score of -2.7 and the L4 T score of -2.9 correspond to osteoporosis. Fracture risk high for the L3 and L4 vertebral bodies.) Average lumbar spine bone density has diminished while mean total hip bone density has increased from prior study. [id number] Electronically signed by:  Nathaniel Rizo MD  7/19/2022 10:26 AM CDT Workstation: [id number]    Vitamin D 25 Hydroxy  Order: [id number]  Specimen Information: Blood         2 Result Notes    Component   Ref Range & Units 1 mo ago 1 yr ago   25 Hydroxy, Vitamin D   30.0 - 100.0 ng/ml 40.2  69.5    Resulting Agency  [facility]             Narrative  Performed by: [facility]  Reference Range for Total Vitamin D 25(OH)     Deficiency <20.0 ng/mL   Insufficiency 21-29 ng/mL   Sufficiency  ng/mL   Toxicity >100 ng/ml     Results may be falsely increased if patient taking Biotin.       Specimen Collected: 06/29/22 08:37 Last Resulted: 06/30/22 01:29               ASSESSMENT:    Diagnoses and all orders for this visit:    Age-related osteoporosis without current pathological fracture    Postmenopausal    Early menopause occurring in patient age younger than 45 years    History of vitamin D  deficiency    PLAN    Bone density study results from 7/19/2022 are reviewed and discussed with patient.  We discussed that she has an overall T score in the lumbar spine of -2.0, indicative of osteopenia.  However, she has some outlier scores of individual vertebrae that indicate huang osteoporosis with a T score of -2.7 at the L3 vertebral body and a T score of -2.9 at the L4 vertebral body.  Overall, we discussed that she has experienced a negative change in her bone density of the lumbar spine by 6.0% from her last study in 2019.  Additionally, she has a T score of -1.8 in the left femoral neck and a T score of -1.2 in the right femoral neck, both are indicative of osteopenia with a positive change of 3.0% from her study in 2019.     This patient was diagnosed with osteoporosis several years ago.  She has been treated in the past with multiple previous oral bisphosphonates (Fosamax, Actonel, Boniva) but these were ineffective for this patient.  She was then changed to Prolia several years ago. She previously tolerated Prolia well and had improvement in her bone density.  She had several Prolia injections up until August 2017 and then she missed some injections and got off schedule due to various life events.  She was evaluated in the [facility] clinic initially on 3/25/2019 and started back on Prolia but she only received 1 additional Prolia injection on 4/24/2019 and then her insurance changed and stopped paying for the Prolia.  We discussed that she is high risk for fracture due to osteoporosis.  We discussed that osteoporosis is a chronic, progressive and silent disease that typically requires long-term treatment.  I have recommended that we start her back on Prolia injections for treatment of osteoporosis in an effort to prevent worsening osteoporosis, prevent further bone loss and reduce her risk for fracture.  Also, the patient tells me that she is scheduled to undergo bariatric surgery in 10 days and we  discussed that this also increases her risk for osteoporosis.  Also, I would not recommend returning to oral bisphosphonates since the patient is having bariatric surgery in the very near future.  Patient is in agreement with starting Prolia again.  She tolerated it well in the past with no known adverse effects.  We discussed risk versus benefits of Prolia.  We discussed potential adverse side effects of Prolia, including the very rare side effect of osteonecrosis of the jaw.  The patient has no current dental issues and no upcoming dental procedure scheduled.    We discussed the importance of proper nutrition and adequate intake of calcium and vitamin D for optimal bone health and prevention of worsening osteoporosis. Recommend to continue with her calcium supplementation daily.  We discussed the importance of taking calcium supplementation not only for support of good bone health but to prevent low blood calcium while taking Prolia.  Patient also has a history of vitamin D deficiency and takes a high-dose vitamin D supplement once weekly of 50,000 units, which I would recommend that she continue with.  She had a recent vitamin D level checked as ordered by her PCP, which was normal at 40.2 as of 6/29/2022.  We also discussed the importance of regular exercise, especially weightbearing exercise such as walking, for optimal bone health and prevention of worsening osteoporosis.  Patient is a non-smoker with no history of smoking so smoking cessation is not addressed or applicable.    Recommend a repeat bone density study in 1 year and follow-[facility] with [facility].    Time spent of a minimum of 30 minutes including the face to face evaluation, reviewing of medical history and prior medial records, reviewing of diagnostic studies, prescription drug management, documentation, patient education and coordination of care.     EMR Dragon/Transciption Disclaimer: Some of this note may be an electronic  transcription/translation of spoken language to printed text using the Dragon Dictation System.     Return in about 1 year (around 8/1/2023) for Recheck.        This document has been electronically signed by MELISSA Ochoa on August 1, 2022 08:52 CDT      MELISSA Ochoa

## 2022-09-19 ENCOUNTER — TELEPHONE (OUTPATIENT)
Dept: ORTHOPEDIC SURGERY | Facility: CLINIC | Age: 57
End: 2022-09-19

## 2022-09-19 RX ORDER — DENOSUMAB 60 MG/ML
60 INJECTION SUBCUTANEOUS ONCE
Qty: 180 ML | Refills: 1 | OUTPATIENT
Start: 2022-09-19 | End: 2022-09-27

## 2022-09-19 NOTE — TELEPHONE ENCOUNTER
Letter dated 9/16/2022, prolia approved 9/16/2022-9/15/2023, [facility] will be delivered on 9/26/2022 once medication is received appt can be make with nancy for injection.

## 2022-09-27 ENCOUNTER — OFFICE VISIT (OUTPATIENT)
Dept: ORTHOPEDIC SURGERY | Facility: CLINIC | Age: 57
End: 2022-09-27

## 2022-09-27 ENCOUNTER — PRE-ADMISSION TESTING (OUTPATIENT)
Dept: PREADMISSION TESTING | Facility: HOSPITAL | Age: 57
End: 2022-09-27

## 2022-09-27 VITALS
HEART RATE: 99 BPM | SYSTOLIC BLOOD PRESSURE: 118 MMHG | OXYGEN SATURATION: 97 % | DIASTOLIC BLOOD PRESSURE: 74 MMHG | RESPIRATION RATE: 18 BRPM

## 2022-09-27 VITALS — HEIGHT: 60 IN | BODY MASS INDEX: 36.52 KG/M2 | WEIGHT: 186 LBS

## 2022-09-27 DIAGNOSIS — G89.29 CHRONIC PAIN OF RIGHT KNEE: ICD-10-CM

## 2022-09-27 DIAGNOSIS — M25.561 CHRONIC PAIN OF RIGHT KNEE: ICD-10-CM

## 2022-09-27 DIAGNOSIS — Z96.612 STATUS POST REVERSE TOTAL REPLACEMENT OF LEFT SHOULDER: ICD-10-CM

## 2022-09-27 DIAGNOSIS — E66.01 MORBID OBESITY WITH BMI OF 40.0-44.9, ADULT: ICD-10-CM

## 2022-09-27 DIAGNOSIS — Z96.612 PRESENCE OF LEFT ARTIFICIAL SHOULDER JOINT: ICD-10-CM

## 2022-09-27 DIAGNOSIS — I10 ESSENTIAL HYPERTENSION: ICD-10-CM

## 2022-09-27 DIAGNOSIS — M17.11 PRIMARY OSTEOARTHRITIS OF RIGHT KNEE: Primary | ICD-10-CM

## 2022-09-27 DIAGNOSIS — M17.11 PRIMARY OSTEOARTHRITIS OF RIGHT KNEE: ICD-10-CM

## 2022-09-27 LAB
ABO GROUP BLD: NORMAL
ANION GAP SERPL CALCULATED.3IONS-SCNC: 10 MMOL/L (ref 5–15)
BLD GP AB SCN SERPL QL: NEGATIVE
BUN SERPL-MCNC: 20 MG/DL (ref 6–20)
BUN/CREAT SERPL: 26.3 (ref 7–25)
CALCIUM SPEC-SCNC: 9.2 MG/DL (ref 8.6–10.5)
CHLORIDE SERPL-SCNC: 105 MMOL/L (ref 98–107)
CO2 SERPL-SCNC: 25 MMOL/L (ref 22–29)
CREAT SERPL-MCNC: 0.76 MG/DL (ref 0.57–1)
DEPRECATED RDW RBC AUTO: 49.8 FL (ref 37–54)
EGFRCR SERPLBLD CKD-EPI 2021: 93.3 ML/MIN/1.73
ERYTHROCYTE [DISTWIDTH] IN BLOOD BY AUTOMATED COUNT: 14.9 % (ref 12.3–15.4)
GLUCOSE SERPL-MCNC: 105 MG/DL (ref 65–99)
HCT VFR BLD AUTO: 39.5 % (ref 34–46.6)
HGB BLD-MCNC: 12.9 G/DL (ref 12–15.9)
Lab: NORMAL
MCH RBC QN AUTO: 29.7 PG (ref 26.6–33)
MCHC RBC AUTO-ENTMCNC: 32.7 G/DL (ref 31.5–35.7)
MCV RBC AUTO: 90.8 FL (ref 79–97)
MRSA DNA SPEC QL NAA+PROBE: NEGATIVE
PLATELET # BLD AUTO: 288 10*3/MM3 (ref 140–450)
PMV BLD AUTO: 10.4 FL (ref 6–12)
POTASSIUM SERPL-SCNC: 3.3 MMOL/L (ref 3.5–5.2)
RBC # BLD AUTO: 4.35 10*6/MM3 (ref 3.77–5.28)
RH BLD: POSITIVE
SODIUM SERPL-SCNC: 140 MMOL/L (ref 136–145)
T&S EXPIRATION DATE: NORMAL
WBC NRBC COR # BLD: 6.86 10*3/MM3 (ref 3.4–10.8)

## 2022-09-27 PROCEDURE — 86901 BLOOD TYPING SEROLOGIC RH(D): CPT

## 2022-09-27 PROCEDURE — 80048 BASIC METABOLIC PNL TOTAL CA: CPT

## 2022-09-27 PROCEDURE — 86900 BLOOD TYPING SEROLOGIC ABO: CPT

## 2022-09-27 PROCEDURE — 85027 COMPLETE CBC AUTOMATED: CPT

## 2022-09-27 PROCEDURE — 36415 COLL VENOUS BLD VENIPUNCTURE: CPT

## 2022-09-27 PROCEDURE — 86850 RBC ANTIBODY SCREEN: CPT

## 2022-09-27 PROCEDURE — 87641 MR-STAPH DNA AMP PROBE: CPT

## 2022-09-27 PROCEDURE — 93010 ELECTROCARDIOGRAM REPORT: CPT | Performed by: INTERNAL MEDICINE

## 2022-09-27 PROCEDURE — 93005 ELECTROCARDIOGRAM TRACING: CPT

## 2022-09-27 PROCEDURE — 99024 POSTOP FOLLOW-UP VISIT: CPT | Performed by: ORTHOPAEDIC SURGERY

## 2022-09-27 RX ORDER — DENOSUMAB 60 MG/ML
60 INJECTION SUBCUTANEOUS
COMMUNITY

## 2022-09-27 RX ORDER — SODIUM CHLORIDE, SODIUM GLUCONATE, SODIUM ACETATE, POTASSIUM CHLORIDE AND MAGNESIUM CHLORIDE 526; 502; 368; 37; 30 MG/100ML; MG/100ML; MG/100ML; MG/100ML; MG/100ML
1000 INJECTION, SOLUTION INTRAVENOUS CONTINUOUS PRN
Status: CANCELLED | OUTPATIENT
Start: 2022-10-05

## 2022-09-27 RX ORDER — AMOXICILLIN 500 MG/1
500 CAPSULE ORAL AS NEEDED
COMMUNITY
End: 2022-11-14

## 2022-09-27 ASSESSMENT — KOOS JR
KOOS JR SCORE: 18
KOOS JR SCORE: 42.281

## 2022-09-27 NOTE — PROGRESS NOTES
Priti Orr is a 56 y.o. female returns for     Chief Complaint   Patient presents with   • Right Knee - Pre-op Exam       HISTORY OF PRESENT ILLNESS:  She presents with pain in her right knee.  She has had pain for several years that has progressively gotten worse.  Pain has been significantly worse over the last 12 to 15 months.  She has pain with activities of daily living.  Mostly she has dull achy pain but she occasionally has sharp stabbing pains.  She has had multiple steroid injections as well as viscosupplementation which were not very helpful.  She occasionally uses a cane.  No fevers or chills.  No numbness or tingling.  She is unhappy with her quality of life and wishes to discuss definitive treatment options.     CONCURRENT MEDICAL HISTORY:    Past Medical History:   Diagnosis Date   • Allergic    • Allergic rhinitis    • Anesthesia     nausea and vomiting with anesthesia requires pre medicaiton   • Anxiety    • Arthritis    • Asthma    • Benign neoplasm of meninges (HCC)     Post op 2011      • Cobalamin deficiency    • Corneal abrasion    • Depression    • Encounter for gynecological examination (general) (routine) without abnormal findings    • Essential hypertension    • Fatigue    • Female stress incontinence    • Head injury 2011   • History of delayed wound healing     Delayed healing of surgical wound      • Hyperlipidemia    • Low back pain    • Macular degeneration disease    • Nosebleed, symptom     Nosebleed/epistaxis symptom      • Obesity    • Osteopenia    • Osteoporosis    • Osteoporosis    • PONV (postoperative nausea and vomiting)    • Seizures (HCC)    • Sleep apnea     not wearing c-pap   • Visual impairment    • Wears glasses        Allergies   Allergen Reactions   • Morphine Other (See Comments)     States felt like itching from within, hallucinations, agitation     • Pseudoephedrine Shortness Of Breath and Swelling     LIPS SWELL AND TONGUE BECOMES THICK       Current  Outpatient Medications on File Prior to Visit   Medication Sig   • acetaminophen (TYLENOL) 650 MG 8 hr tablet Take 650 mg by mouth Every 8 (Eight) Hours As Needed.   • albuterol sulfate HFA (Ventolin HFA) 108 (90 Base) MCG/ACT inhaler Inhale 2 puffs Every 4 (Four) Hours As Needed for Shortness of Air.   • buPROPion XL (WELLBUTRIN XL) 150 MG 24 hr tablet Take 1 tablet by mouth Every Morning.   • DULoxetine (CYMBALTA) 30 MG capsule Take 1 capsule by mouth Daily.   • fluticasone-salmeterol (ADVAIR HFA) 115-21 MCG/ACT inhaler Inhale 1 puff As Needed.   • lisinopril-hydrochlorothiazide (PRINZIDE,ZESTORETIC) 20-12.5 MG per tablet Take 1 tablet by mouth Daily.   • meloxicam (MOBIC) 15 MG tablet Take 1 tablet by mouth Daily.   • montelukast (Singulair) 10 MG tablet Take 1 tablet by mouth Every Night.   • rosuvastatin (CRESTOR) 5 MG tablet Take 1 tablet by mouth Every Evening.     No current facility-administered medications on file prior to visit.       Past Surgical History:   Procedure Laterality Date   • ARM LESION/CYST EXCISION  2014    Remove arm bone lesion (1)   • ARM WOUND REPAIR / CLOSURE  2014    Repair Superficial Wound TR-EXT 2.5 < CM 14087 (1)      • BRAIN SURGERY     •  SECTION  1986     Section (1)     • COLONOSCOPY     • ENDOSCOPY  2008    Colon endoscopy 34493 (1)     • EXCISION BREAST LESION W/ PREOP NEEDLE LOC  1981    Excision, breast lesion (1)     • HEMORRHOIDECTOMY  2008    Hemorrhoidectomy (2)      • INJECTION OF MEDICATION  2013    Celestone (betamethasone) (1)      • INJECTION OF MEDICATION  2013    Depo Medrol (Methylprednisone) (1)      • INJECTION OF MEDICATION  2013    Dexamethasone (2)      • INJECTION OF MEDICATION  2014    Kenalog (6)   • KNEE ARTHROSCOPY Right 2007    Knee arthroscopy, surgery (1)     • KNEE ARTHROSCOPY Right 2020    Procedure: KNEE ARTHROSCOPY with debridement medial meniscus;   Surgeon: Brooks Junior MD;  Location: [facility];  Service: Orthopedics;  Laterality: Right;   • LAPAROSCOPIC TUBAL LIGATION  04/16/1991    Tubal ligation (1)      • PAP SMEAR  01/18/2007    PAP SMEAR (1)      • SHOULDER SURGERY Left     x6, including total shoulder arthroplasty   • TOTAL ABDOMINAL HYSTERECTOMY WITH SALPINGO OOPHORECTOMY  1993    JOSE/BSO (1)      • TOTAL KNEE ARTHROPLASTY Left 11/19/2018    Procedure: TOTAL KNEE ARTHROPLASTY ATTUNE with adductor canal block - left;  Surgeon: Brooks Junior MD;  Location: [facility];  Service: Orthopedics       Family History   Problem Relation Age of Onset   • Alzheimer's disease Other    • Breast cancer Other         other   • Cancer Other         other - GYN   • Colon cancer Other         Colorectal Cancer   • Depression Other    • Diabetes Other    • Hyperlipidemia Other    • Hypertension Other    • Stroke Other    • Deep vein thrombosis Other    • Heart disease Other    • Ovarian cancer Other    • Colon cancer Other    • Hypertension Mother    • Hypertension Father    • Heart disease Father    • Heart attack Father    • No Known Problems Brother    • No Known Problems Daughter    • Cancer Maternal Grandmother    • Breast cancer Maternal Grandmother    • ALS Maternal Grandmother    • Heart attack Paternal Grandmother    • Heart attack Paternal Grandfather    • Lung cancer Paternal Grandfather    • Colon cancer Paternal Grandfather        Social History     Socioeconomic History   • Marital status:    Tobacco Use   • Smoking status: Never Smoker   • Smokeless tobacco: Never Used   Vaping Use   • Vaping Use: Never used   Substance and Sexual Activity   • Alcohol use: Yes     Comment: social   • Drug use: No   • Sexual activity: Defer     Partners: Male           ROS  No fevers or chills.  No chest pain or shortness of air.  No GI or  disturbances.  Other than right knee pain, all other systems reviewed as negative.    PHYSICAL  "EXAMINATION:       Ht 152.4 cm (60\")   Wt 84.4 kg (186 lb)   LMP 02/21/1994 (Within Months)   BMI 36.33 kg/m²     Physical Exam  Vitals reviewed.   Constitutional:       General: She is not in acute distress.     Appearance: Normal appearance. She is well-developed.   Cardiovascular:      Rate and Rhythm: Normal rate and regular rhythm.      Heart sounds: Normal heart sounds.   Pulmonary:      Effort: Pulmonary effort is normal.      Breath sounds: Normal breath sounds.   Abdominal:      General: Bowel sounds are normal.      Palpations: Abdomen is soft.   Neurological:      Mental Status: She is alert and oriented to person, place, and time.   Psychiatric:         Behavior: Behavior normal.         Thought Content: Thought content normal.         Judgment: Judgment normal.         GAIT:     []  Normal  [x]  Antalgic    Assistive device: [x]  None  []  Walker     []  Crutches  []  Cane     []  Wheelchair  []  Stretcher    Right Knee Exam     Muscle Strength   The patient has normal right knee strength.    Tenderness   Right knee tenderness location: diffuse.    Range of Motion   Extension: -5   Flexion: 110     Tests   Varus: negative Valgus: negative  Drawer:  Anterior - negative    Posterior - negative    Other   Sensation: normal  Pulse: present  Swelling: mild    Comments:  Crepitation on motion.  Mild to moderate pain through arc of motion              EXAMINATION:  XR KNEE 1 OR 2 VW RIGHT (accession [id number]), XR  KNEE BILATERAL AP STANDING (accession [id number])     CLINICAL HISTORY:  56 years Female,pain, M17.11 Unilateral  primary osteoarthritis, right knee     COMPARISON:  5/29/2020 plain films     FINDINGS:    Degenerative arthritis involving the right knee with loss of  medial compartment joint space, progressed since 5/29/2020.  Associated subchondral sclerosis/cystic change about the right  medial compartment with increasing marginal hypertrophic  osteophytes. Additionally, hypertrophic " osteophytes about the  right lateral compartment also increased in size over the  interval.     Small right knee effusion or synovitis.     Prior left total knee arthroplasty.     IMPRESSION:  1. Progressive degenerative arthritis involving the right knee  since 5/29/2020 with increasing loss of medial compartment joint  space.  2. Small right knee effusion or synovitis.  3. Prior left total knee arthroplasty.     Electronically signed by:  Ryan Garcia MD  5/11/2022 6:12 PM CDT  Workstation: [id number]                   ASSESSMENT:    Diagnoses and all orders for this visit:    Primary osteoarthritis of right knee    Chronic pain of right knee    Essential hypertension    Presence of left artificial shoulder joint    Status post reverse total replacement of left shoulder          PLAN    Patient has long history of osteoarthritis change in the right knee.  She has failed nonoperative treatment options.  She continues to have pain with activities of daily living and is unhappy with her quality of life.  She wishes to discuss definitive treatment options.    The patient voiced understanding of the risks, benefits, and alternative forms of treatment that were discussed and the patient consents to proceed with surgery.  All risks, benefits and alternatives were discussed.  Risks include, but not exclusive to anesthetic complications, including death, MI, CVA, infection, bleeding DVT, fracture, residual pain and need for future surgery.    This discussion was held with the patient by Brooks Junior MD and all questions were answered.    Plan right total knee arthroplasty with adductor canal block    She will continue with weight management and trying to improve body mass index as a patient control risk factor.  She has previously done the joint replacement class and already has a joint placement booklet.  Continue general strength and conditioning exercises.    Return for Post-operative eval.    Brooks Junior,  MD

## 2022-09-27 NOTE — DISCHARGE INSTRUCTIONS
[facility]  Pre-op Information and Guidelines    You will be called after 2 p.m. the day before your surgery (Friday for Monday surgery) and notified of your time for arrival and approximate surgery time.  If you have not received a call by 4P.M., please contact Same Day Surgery at [phone] of if outside [facility] call [phone].    Please Follow these Important Safety Guidelines:    The morning of your procedure, take only the medications listed below with   A sip of water:_____________________________________________       ______________________________________________    DO NOT eat or drink anything after 12:00 midnight the night before surgery  Specific instructions concerning drinking clear liquids will be discussed during  the pre-surgery instruction call the day before your surgery.    If you take a blood thinner (ex. Plavix, Coumadin, aspirin), ask your doctor when to stop it before surgery  STOP DATE: _________________    Only 2 visitors are allowed in patient rooms at a time  Your visitors will be asked to wait in the lobby until the admission process is complete with the exception of a parent with a child and patients in need of special assistance.    YOU CANNOT DRIVE YOURSELF HOME  You must be accompanied by someone who will be responsible for driving you home after surgery and for your care at home.    DO NOT chew gum, use breath mints, hard candy, or smoke the day of surgery  DO NOT drink alcohol for at least 24 hours before your surgery  DO NOT wear any jewelry and remove all body piercing before coming to the hospital  DO NOT wear make-up to the hospital  If you are having surgery on an extremity (arm/leg/foot) remove nail polish/artificial nails on the surgical side  Clothing, glasses, contacts, dentures, and hairpieces must be removed before surgery  Bathe the night before or the morning of your surgery and do not use powders/lotions on skin.    Nothing  solid/dairy after midnight the night before procedure. Can have clear liquids up until 3 hours prior to procedure. Drink 20 ounces (or until you feel full) of Gatorade and complete by 3 hours prior to procedure start time.

## 2022-09-27 NOTE — PAT
Chlorhexidine scrub given with instruction sheet. Instructions reviewed in PAT, understanding verbalized.    ERAS instructions given.

## 2022-10-04 LAB
QT INTERVAL: 388 MS
QTC INTERVAL: 442 MS

## 2022-10-05 ENCOUNTER — HOSPITAL ENCOUNTER (OUTPATIENT)
Facility: HOSPITAL | Age: 57
Discharge: HOME | End: 2022-10-06
Attending: ORTHOPAEDIC SURGERY | Admitting: ORTHOPAEDIC SURGERY

## 2022-10-05 ENCOUNTER — ANESTHESIA EVENT (OUTPATIENT)
Dept: PERIOP | Facility: HOSPITAL | Age: 57
End: 2022-10-05

## 2022-10-05 ENCOUNTER — ANESTHESIA (OUTPATIENT)
Dept: PERIOP | Facility: HOSPITAL | Age: 57
End: 2022-10-05

## 2022-10-05 ENCOUNTER — APPOINTMENT (OUTPATIENT)
Dept: GENERAL RADIOLOGY | Facility: HOSPITAL | Age: 57
End: 2022-10-05

## 2022-10-05 DIAGNOSIS — M17.11 PRIMARY OSTEOARTHRITIS OF RIGHT KNEE: Primary | ICD-10-CM

## 2022-10-05 DIAGNOSIS — G89.29 CHRONIC PAIN OF RIGHT KNEE: ICD-10-CM

## 2022-10-05 DIAGNOSIS — E66.01 MORBID OBESITY WITH BMI OF 40.0-44.9, ADULT: ICD-10-CM

## 2022-10-05 DIAGNOSIS — I10 ESSENTIAL HYPERTENSION: ICD-10-CM

## 2022-10-05 DIAGNOSIS — Z74.09 IMPAIRED FUNCTIONAL MOBILITY, BALANCE, GAIT, AND ENDURANCE: ICD-10-CM

## 2022-10-05 DIAGNOSIS — M25.561 CHRONIC PAIN OF RIGHT KNEE: ICD-10-CM

## 2022-10-05 DIAGNOSIS — Z78.9 IMPAIRED MOBILITY AND ADLS: ICD-10-CM

## 2022-10-05 DIAGNOSIS — Z74.09 IMPAIRED MOBILITY AND ADLS: ICD-10-CM

## 2022-10-05 LAB
ABO GROUP BLD: NORMAL
BLD GP AB SCN SERPL QL: NEGATIVE
HCT VFR BLD AUTO: 36.9 % (ref 34–46.6)
HGB BLD-MCNC: 11.9 G/DL (ref 12–15.9)
Lab: NORMAL
RH BLD: POSITIVE
T&S EXPIRATION DATE: NORMAL

## 2022-10-05 PROCEDURE — 0 BUPIVACAINE LIPOSOME 1.3 % SUSPENSION: Performed by: ORTHOPAEDIC SURGERY

## 2022-10-05 PROCEDURE — 86850 RBC ANTIBODY SCREEN: CPT | Performed by: ANESTHESIOLOGY

## 2022-10-05 PROCEDURE — 25010000002 HYDROMORPHONE 1 MG/ML SOLUTION: Performed by: ANESTHESIOLOGY

## 2022-10-05 PROCEDURE — 25010000002 CEFAZOLIN PER 500 MG: Performed by: ORTHOPAEDIC SURGERY

## 2022-10-05 PROCEDURE — 86900 BLOOD TYPING SEROLOGIC ABO: CPT | Performed by: ANESTHESIOLOGY

## 2022-10-05 PROCEDURE — C1713 ANCHOR/SCREW BN/BN,TIS/BN: HCPCS | Performed by: ORTHOPAEDIC SURGERY

## 2022-10-05 PROCEDURE — 25010000002 FENTANYL CITRATE (PF) 50 MCG/ML SOLUTION: Performed by: NURSE ANESTHETIST, CERTIFIED REGISTERED

## 2022-10-05 PROCEDURE — 97166 OT EVAL MOD COMPLEX 45 MIN: CPT

## 2022-10-05 PROCEDURE — 97162 PT EVAL MOD COMPLEX 30 MIN: CPT

## 2022-10-05 PROCEDURE — 88300 SURGICAL PATH GROSS: CPT

## 2022-10-05 PROCEDURE — 94799 UNLISTED PULMONARY SVC/PX: CPT

## 2022-10-05 PROCEDURE — 25010000002 PHENYLEPHRINE 10 MG/ML SOLUTION: Performed by: NURSE ANESTHETIST, CERTIFIED REGISTERED

## 2022-10-05 PROCEDURE — C1776 JOINT DEVICE (IMPLANTABLE): HCPCS | Performed by: ORTHOPAEDIC SURGERY

## 2022-10-05 PROCEDURE — 27447 TOTAL KNEE ARTHROPLASTY: CPT | Performed by: ORTHOPAEDIC SURGERY

## 2022-10-05 PROCEDURE — 73560 X-RAY EXAM OF KNEE 1 OR 2: CPT

## 2022-10-05 PROCEDURE — 25010000002 ONDANSETRON PER 1 MG: Performed by: ORTHOPAEDIC SURGERY

## 2022-10-05 PROCEDURE — 85018 HEMOGLOBIN: CPT | Performed by: ORTHOPAEDIC SURGERY

## 2022-10-05 PROCEDURE — 25010000002 MIDAZOLAM PER 1 MG: Performed by: NURSE ANESTHETIST, CERTIFIED REGISTERED

## 2022-10-05 PROCEDURE — 27447 TOTAL KNEE ARTHROPLASTY: CPT | Performed by: SPECIALIST/TECHNOLOGIST, OTHER

## 2022-10-05 PROCEDURE — 25010000002 ROPIVACAINE PER 1 MG: Performed by: ANESTHESIOLOGY

## 2022-10-05 PROCEDURE — 94760 N-INVAS EAR/PLS OXIMETRY 1: CPT

## 2022-10-05 PROCEDURE — 76942 ECHO GUIDE FOR BIOPSY: CPT | Performed by: ORTHOPAEDIC SURGERY

## 2022-10-05 PROCEDURE — 25010000002 PROPOFOL 10 MG/ML EMULSION: Performed by: NURSE ANESTHETIST, CERTIFIED REGISTERED

## 2022-10-05 PROCEDURE — 86901 BLOOD TYPING SEROLOGIC RH(D): CPT | Performed by: ANESTHESIOLOGY

## 2022-10-05 PROCEDURE — P9612 CATHETERIZE FOR URINE SPEC: HCPCS

## 2022-10-05 PROCEDURE — C9290 INJ, BUPIVACAINE LIPOSOME: HCPCS | Performed by: ORTHOPAEDIC SURGERY

## 2022-10-05 PROCEDURE — 85014 HEMATOCRIT: CPT | Performed by: ORTHOPAEDIC SURGERY

## 2022-10-05 DEVICE — ATTUNE KNEE SYSTEM FEMORAL POSTERIOR STABILIZED NARROW SIZE 4N RIGHT CEMENTED
Type: IMPLANTABLE DEVICE | Site: KNEE | Status: FUNCTIONAL
Brand: ATTUNE

## 2022-10-05 DEVICE — ATTUNE KNEE SYSTEM TIBIAL BASE ROTATING PLATFORM SIZE 3 CEMENTED
Type: IMPLANTABLE DEVICE | Site: KNEE | Status: FUNCTIONAL
Brand: ATTUNE

## 2022-10-05 DEVICE — SMARTSET HV HIGH VISCOSITY BONE CEMENT 40G
Type: IMPLANTABLE DEVICE | Site: KNEE | Status: FUNCTIONAL
Brand: SMARTSET

## 2022-10-05 DEVICE — TOTL KN ATTUNE DEPUY 9527038: Type: IMPLANTABLE DEVICE | Site: KNEE | Status: FUNCTIONAL

## 2022-10-05 DEVICE — ATTUNE KNEE SYSTEM TIBIAL INSERT ROTATING PLATFORM POSTERIOR STABILIZED 4 7MM AOX
Type: IMPLANTABLE DEVICE | Site: KNEE | Status: FUNCTIONAL
Brand: ATTUNE

## 2022-10-05 DEVICE — ATTUNE PINNING SYSTEM
Type: IMPLANTABLE DEVICE | Site: KNEE | Status: FUNCTIONAL
Brand: ATTUNE

## 2022-10-05 DEVICE — ATTUNE PATELLA MEDIALIZED DOME 32MM CEMENTED AOX
Type: IMPLANTABLE DEVICE | Site: KNEE | Status: FUNCTIONAL
Brand: ATTUNE

## 2022-10-05 RX ORDER — LISINOPRIL 20 MG/1
20 TABLET ORAL
Status: DISCONTINUED | OUTPATIENT
Start: 2022-10-05 | End: 2022-10-06 | Stop reason: HOSPADM

## 2022-10-05 RX ORDER — FENTANYL CITRATE 50 UG/ML
INJECTION, SOLUTION INTRAMUSCULAR; INTRAVENOUS AS NEEDED
Status: DISCONTINUED | OUTPATIENT
Start: 2022-10-05 | End: 2022-10-05 | Stop reason: SURG

## 2022-10-05 RX ORDER — OXYCODONE HYDROCHLORIDE 5 MG/1
5 TABLET ORAL EVERY 4 HOURS PRN
Status: DISCONTINUED | OUTPATIENT
Start: 2022-10-05 | End: 2022-10-06 | Stop reason: HOSPADM

## 2022-10-05 RX ORDER — ONDANSETRON 4 MG/1
4 TABLET, FILM COATED ORAL EVERY 6 HOURS PRN
Status: DISCONTINUED | OUTPATIENT
Start: 2022-10-05 | End: 2022-10-06 | Stop reason: HOSPADM

## 2022-10-05 RX ORDER — BUPIVACAINE HYDROCHLORIDE 2.5 MG/ML
INJECTION, SOLUTION EPIDURAL; INFILTRATION; INTRACAUDAL AS NEEDED
Status: DISCONTINUED | OUTPATIENT
Start: 2022-10-05 | End: 2022-10-05 | Stop reason: HOSPADM

## 2022-10-05 RX ORDER — PREGABALIN 75 MG/1
75 CAPSULE ORAL ONCE
Status: COMPLETED | OUTPATIENT
Start: 2022-10-05 | End: 2022-10-05

## 2022-10-05 RX ORDER — MEPERIDINE HYDROCHLORIDE 25 MG/ML
12.5 INJECTION INTRAMUSCULAR; INTRAVENOUS; SUBCUTANEOUS
Status: DISCONTINUED | OUTPATIENT
Start: 2022-10-05 | End: 2022-10-05 | Stop reason: HOSPADM

## 2022-10-05 RX ORDER — ACETAMINOPHEN 325 MG/1
650 TABLET ORAL ONCE AS NEEDED
Status: DISCONTINUED | OUTPATIENT
Start: 2022-10-05 | End: 2022-10-05 | Stop reason: HOSPADM

## 2022-10-05 RX ORDER — DULOXETIN HYDROCHLORIDE 30 MG/1
30 CAPSULE, DELAYED RELEASE ORAL DAILY
Status: DISCONTINUED | OUTPATIENT
Start: 2022-10-05 | End: 2022-10-06 | Stop reason: HOSPADM

## 2022-10-05 RX ORDER — BUPROPION HYDROCHLORIDE 150 MG/1
150 TABLET ORAL EVERY MORNING
Status: DISCONTINUED | OUTPATIENT
Start: 2022-10-06 | End: 2022-10-06 | Stop reason: HOSPADM

## 2022-10-05 RX ORDER — PROPOFOL 10 MG/ML
VIAL (ML) INTRAVENOUS AS NEEDED
Status: DISCONTINUED | OUTPATIENT
Start: 2022-10-05 | End: 2022-10-05 | Stop reason: SURG

## 2022-10-05 RX ORDER — 0.9 % SODIUM CHLORIDE 0.9 %
VIAL (ML) INJECTION AS NEEDED
Status: DISCONTINUED | OUTPATIENT
Start: 2022-10-05 | End: 2022-10-05 | Stop reason: HOSPADM

## 2022-10-05 RX ORDER — DIPHENHYDRAMINE HYDROCHLORIDE 50 MG/ML
12.5 INJECTION INTRAMUSCULAR; INTRAVENOUS
Status: DISCONTINUED | OUTPATIENT
Start: 2022-10-05 | End: 2022-10-05 | Stop reason: HOSPADM

## 2022-10-05 RX ORDER — MIDAZOLAM HYDROCHLORIDE 1 MG/ML
INJECTION INTRAMUSCULAR; INTRAVENOUS AS NEEDED
Status: DISCONTINUED | OUTPATIENT
Start: 2022-10-05 | End: 2022-10-05 | Stop reason: SURG

## 2022-10-05 RX ORDER — FAMOTIDINE 40 MG/1
40 TABLET, FILM COATED ORAL DAILY
Status: DISCONTINUED | OUTPATIENT
Start: 2022-10-05 | End: 2022-10-06 | Stop reason: HOSPADM

## 2022-10-05 RX ORDER — ROPIVACAINE HYDROCHLORIDE 5 MG/ML
INJECTION, SOLUTION EPIDURAL; INFILTRATION; PERINEURAL
Status: COMPLETED | OUTPATIENT
Start: 2022-10-05 | End: 2022-10-05

## 2022-10-05 RX ORDER — AMOXICILLIN 250 MG
2 CAPSULE ORAL NIGHTLY
Status: DISCONTINUED | OUTPATIENT
Start: 2022-10-05 | End: 2022-10-06 | Stop reason: HOSPADM

## 2022-10-05 RX ORDER — ONDANSETRON 2 MG/ML
4 INJECTION INTRAMUSCULAR; INTRAVENOUS EVERY 6 HOURS PRN
Status: DISCONTINUED | OUTPATIENT
Start: 2022-10-05 | End: 2022-10-06 | Stop reason: HOSPADM

## 2022-10-05 RX ORDER — BUPIVACAINE HYDROCHLORIDE 7.5 MG/ML
INJECTION, SOLUTION EPIDURAL; RETROBULBAR
Status: COMPLETED | OUTPATIENT
Start: 2022-10-05 | End: 2022-10-05

## 2022-10-05 RX ORDER — SODIUM CHLORIDE 0.9 % (FLUSH) 0.9 %
3-10 SYRINGE (ML) INJECTION AS NEEDED
Status: DISCONTINUED | OUTPATIENT
Start: 2022-10-05 | End: 2022-10-06 | Stop reason: HOSPADM

## 2022-10-05 RX ORDER — ALBUTEROL SULFATE 2.5 MG/3ML
2.5 SOLUTION RESPIRATORY (INHALATION) EVERY 4 HOURS PRN
Status: DISCONTINUED | OUTPATIENT
Start: 2022-10-05 | End: 2022-10-06 | Stop reason: HOSPADM

## 2022-10-05 RX ORDER — SODIUM CHLORIDE 9 MG/ML
100 INJECTION, SOLUTION INTRAVENOUS CONTINUOUS
Status: DISCONTINUED | OUTPATIENT
Start: 2022-10-05 | End: 2022-10-06 | Stop reason: HOSPADM

## 2022-10-05 RX ORDER — ROSUVASTATIN CALCIUM 5 MG/1
5 TABLET, COATED ORAL EVERY EVENING
Status: DISCONTINUED | OUTPATIENT
Start: 2022-10-05 | End: 2022-10-06 | Stop reason: HOSPADM

## 2022-10-05 RX ORDER — FERROUS SULFATE TAB EC 324 MG (65 MG FE EQUIVALENT) 324 (65 FE) MG
324 TABLET DELAYED RESPONSE ORAL
Status: DISCONTINUED | OUTPATIENT
Start: 2022-10-06 | End: 2022-10-06 | Stop reason: HOSPADM

## 2022-10-05 RX ORDER — ACETAMINOPHEN 650 MG/1
650 SUPPOSITORY RECTAL ONCE AS NEEDED
Status: DISCONTINUED | OUTPATIENT
Start: 2022-10-05 | End: 2022-10-05 | Stop reason: HOSPADM

## 2022-10-05 RX ORDER — EPHEDRINE SULFATE 50 MG/ML
INJECTION INTRAVENOUS AS NEEDED
Status: DISCONTINUED | OUTPATIENT
Start: 2022-10-05 | End: 2022-10-05 | Stop reason: SURG

## 2022-10-05 RX ORDER — NALOXONE HCL 0.4 MG/ML
0.4 VIAL (ML) INJECTION AS NEEDED
Status: DISCONTINUED | OUTPATIENT
Start: 2022-10-05 | End: 2022-10-05 | Stop reason: HOSPADM

## 2022-10-05 RX ORDER — SODIUM CHLORIDE, SODIUM GLUCONATE, SODIUM ACETATE, POTASSIUM CHLORIDE AND MAGNESIUM CHLORIDE 526; 502; 368; 37; 30 MG/100ML; MG/100ML; MG/100ML; MG/100ML; MG/100ML
1000 INJECTION, SOLUTION INTRAVENOUS CONTINUOUS PRN
Status: DISCONTINUED | OUTPATIENT
Start: 2022-10-05 | End: 2022-10-06 | Stop reason: HOSPADM

## 2022-10-05 RX ORDER — BUPIVACAINE HCL/0.9 % NACL/PF 0.1 %
2 PLASTIC BAG, INJECTION (ML) EPIDURAL ONCE
Status: COMPLETED | OUTPATIENT
Start: 2022-10-05 | End: 2022-10-05

## 2022-10-05 RX ORDER — ACETAMINOPHEN 500 MG
1000 TABLET ORAL 4 TIMES DAILY
Status: DISCONTINUED | OUTPATIENT
Start: 2022-10-05 | End: 2022-10-06 | Stop reason: HOSPADM

## 2022-10-05 RX ORDER — NALOXONE HCL 0.4 MG/ML
0.1 VIAL (ML) INJECTION
Status: DISCONTINUED | OUTPATIENT
Start: 2022-10-05 | End: 2022-10-06 | Stop reason: HOSPADM

## 2022-10-05 RX ORDER — SODIUM CHLORIDE 0.9 % (FLUSH) 0.9 %
3 SYRINGE (ML) INJECTION EVERY 12 HOURS SCHEDULED
Status: DISCONTINUED | OUTPATIENT
Start: 2022-10-05 | End: 2022-10-06 | Stop reason: HOSPADM

## 2022-10-05 RX ORDER — BUPIVACAINE HCL/0.9 % NACL/PF 0.1 %
2 PLASTIC BAG, INJECTION (ML) EPIDURAL EVERY 8 HOURS
Status: COMPLETED | OUTPATIENT
Start: 2022-10-05 | End: 2022-10-06

## 2022-10-05 RX ORDER — PROMETHAZINE HYDROCHLORIDE 25 MG/1
25 TABLET ORAL ONCE AS NEEDED
Status: DISCONTINUED | OUTPATIENT
Start: 2022-10-05 | End: 2022-10-05 | Stop reason: HOSPADM

## 2022-10-05 RX ORDER — ACETAMINOPHEN 500 MG
1000 TABLET ORAL ONCE
Status: COMPLETED | OUTPATIENT
Start: 2022-10-05 | End: 2022-10-05

## 2022-10-05 RX ORDER — HYDROCHLOROTHIAZIDE 12.5 MG/1
12.5 TABLET ORAL DAILY
Status: DISCONTINUED | OUTPATIENT
Start: 2022-10-05 | End: 2022-10-06 | Stop reason: HOSPADM

## 2022-10-05 RX ORDER — ONDANSETRON 2 MG/ML
4 INJECTION INTRAMUSCULAR; INTRAVENOUS ONCE AS NEEDED
Status: DISCONTINUED | OUTPATIENT
Start: 2022-10-05 | End: 2022-10-05 | Stop reason: HOSPADM

## 2022-10-05 RX ORDER — PHENYLEPHRINE HYDROCHLORIDE 10 MG/ML
INJECTION INTRAVENOUS AS NEEDED
Status: DISCONTINUED | OUTPATIENT
Start: 2022-10-05 | End: 2022-10-05 | Stop reason: SURG

## 2022-10-05 RX ORDER — FLUMAZENIL 0.1 MG/ML
0.2 INJECTION INTRAVENOUS AS NEEDED
Status: DISCONTINUED | OUTPATIENT
Start: 2022-10-05 | End: 2022-10-05 | Stop reason: HOSPADM

## 2022-10-05 RX ORDER — MELOXICAM 15 MG/1
15 TABLET ORAL ONCE
Status: COMPLETED | OUTPATIENT
Start: 2022-10-05 | End: 2022-10-05

## 2022-10-05 RX ORDER — MONTELUKAST SODIUM 10 MG/1
10 TABLET ORAL NIGHTLY
Status: DISCONTINUED | OUTPATIENT
Start: 2022-10-05 | End: 2022-10-06 | Stop reason: HOSPADM

## 2022-10-05 RX ORDER — PROMETHAZINE HYDROCHLORIDE 25 MG/1
25 SUPPOSITORY RECTAL ONCE AS NEEDED
Status: DISCONTINUED | OUTPATIENT
Start: 2022-10-05 | End: 2022-10-05 | Stop reason: HOSPADM

## 2022-10-05 RX ADMIN — ACETAMINOPHEN 1000 MG: 500 TABLET ORAL at 17:53

## 2022-10-05 RX ADMIN — HYDROMORPHONE HYDROCHLORIDE 0.5 MG: 1 INJECTION, SOLUTION INTRAMUSCULAR; INTRAVENOUS; SUBCUTANEOUS at 12:21

## 2022-10-05 RX ADMIN — CEFAZOLIN 2 G: 10 INJECTION, POWDER, FOR SOLUTION INTRAVENOUS; PARENTERAL at 17:54

## 2022-10-05 RX ADMIN — PROPOFOL 50 MG: 10 INJECTION, EMULSION INTRAVENOUS at 11:01

## 2022-10-05 RX ADMIN — PHENYLEPHRINE HYDROCHLORIDE 100 MCG: 10 INJECTION, SOLUTION INTRAVENOUS at 11:12

## 2022-10-05 RX ADMIN — HYDROMORPHONE HYDROCHLORIDE 0.5 MG: 1 INJECTION, SOLUTION INTRAMUSCULAR; INTRAVENOUS; SUBCUTANEOUS at 12:55

## 2022-10-05 RX ADMIN — EPHEDRINE SULFATE 10 MG: 50 INJECTION INTRAVENOUS at 10:11

## 2022-10-05 RX ADMIN — Medication 10 ML: at 20:18

## 2022-10-05 RX ADMIN — FAMOTIDINE 40 MG: 40 TABLET, FILM COATED ORAL at 15:35

## 2022-10-05 RX ADMIN — MIDAZOLAM HYDROCHLORIDE 2 MG: 1 INJECTION, SOLUTION INTRAMUSCULAR; INTRAVENOUS at 09:51

## 2022-10-05 RX ADMIN — SODIUM CHLORIDE, SODIUM GLUCONATE, SODIUM ACETATE, POTASSIUM CHLORIDE AND MAGNESIUM CHLORIDE 1000 ML: 526; 502; 368; 37; 30 INJECTION, SOLUTION INTRAVENOUS at 07:57

## 2022-10-05 RX ADMIN — BUPIVACAINE HYDROCHLORIDE 1.8 ML: 7.5 INJECTION, SOLUTION EPIDURAL; RETROBULBAR at 10:49

## 2022-10-05 RX ADMIN — PROPOFOL 150 MG: 10 INJECTION, EMULSION INTRAVENOUS at 10:38

## 2022-10-05 RX ADMIN — TRANEXAMIC ACID 1000 MG: 1 INJECTION, SOLUTION INTRAVENOUS at 11:40

## 2022-10-05 RX ADMIN — MELOXICAM 15 MG: 15 TABLET ORAL at 07:58

## 2022-10-05 RX ADMIN — EPHEDRINE SULFATE 10 MG: 50 INJECTION INTRAVENOUS at 10:59

## 2022-10-05 RX ADMIN — MIDAZOLAM HYDROCHLORIDE 2 MG: 1 INJECTION, SOLUTION INTRAMUSCULAR; INTRAVENOUS at 10:35

## 2022-10-05 RX ADMIN — OXYCODONE 5 MG: 5 TABLET ORAL at 20:19

## 2022-10-05 RX ADMIN — DULOXETINE HYDROCHLORIDE 30 MG: 30 CAPSULE, DELAYED RELEASE ORAL at 15:35

## 2022-10-05 RX ADMIN — EPHEDRINE SULFATE 10 MG: 50 INJECTION INTRAVENOUS at 11:35

## 2022-10-05 RX ADMIN — PROPOFOL 25 MCG/KG/MIN: 10 INJECTION, EMULSION INTRAVENOUS at 10:13

## 2022-10-05 RX ADMIN — FENTANYL CITRATE 25 MCG: 50 INJECTION INTRAMUSCULAR; INTRAVENOUS at 12:06

## 2022-10-05 RX ADMIN — HYDROMORPHONE HYDROCHLORIDE 0.5 MG: 1 INJECTION, SOLUTION INTRAMUSCULAR; INTRAVENOUS; SUBCUTANEOUS at 12:31

## 2022-10-05 RX ADMIN — PREGABALIN 75 MG: 75 CAPSULE ORAL at 07:58

## 2022-10-05 RX ADMIN — ROSUVASTATIN CALCIUM 5 MG: 5 TABLET, FILM COATED ORAL at 17:54

## 2022-10-05 RX ADMIN — ACETAMINOPHEN 1000 MG: 500 TABLET ORAL at 21:45

## 2022-10-05 RX ADMIN — ACETAMINOPHEN 1000 MG: 500 TABLET ORAL at 07:58

## 2022-10-05 RX ADMIN — MONTELUKAST 10 MG: 10 TABLET, FILM COATED ORAL at 20:18

## 2022-10-05 RX ADMIN — EPHEDRINE SULFATE 10 MG: 50 INJECTION INTRAVENOUS at 11:51

## 2022-10-05 RX ADMIN — Medication 3 ML: at 20:20

## 2022-10-05 RX ADMIN — DOCUSATE SODIUM 50 MG AND SENNOSIDES 8.6 MG 2 TABLET: 8.6; 5 TABLET, FILM COATED ORAL at 20:19

## 2022-10-05 RX ADMIN — TRANEXAMIC ACID 1000 MG: 1 INJECTION, SOLUTION INTRAVENOUS at 07:58

## 2022-10-05 RX ADMIN — Medication 2 G: at 10:03

## 2022-10-05 RX ADMIN — OXYCODONE 5 MG: 5 TABLET ORAL at 15:40

## 2022-10-05 RX ADMIN — FENTANYL CITRATE 25 MCG: 50 INJECTION INTRAMUSCULAR; INTRAVENOUS at 11:00

## 2022-10-05 RX ADMIN — PHENYLEPHRINE HYDROCHLORIDE 100 MCG: 10 INJECTION, SOLUTION INTRAVENOUS at 11:51

## 2022-10-05 RX ADMIN — ONDANSETRON 4 MG: 2 INJECTION INTRAMUSCULAR; INTRAVENOUS at 20:19

## 2022-10-05 RX ADMIN — PHENYLEPHRINE HYDROCHLORIDE 50 MCG: 10 INJECTION, SOLUTION INTRAVENOUS at 11:27

## 2022-10-05 RX ADMIN — SODIUM CHLORIDE 100 ML/HR: 9 INJECTION, SOLUTION INTRAVENOUS at 15:36

## 2022-10-05 RX ADMIN — ROPIVACAINE HYDROCHLORIDE 30 ML: 5 INJECTION, SOLUTION EPIDURAL; INFILTRATION; PERINEURAL at 09:02

## 2022-10-05 NOTE — ANESTHESIA POSTPROCEDURE EVALUATION
Patient: Priti Orr    Procedure Summary     Date: 10/05/22 Room / Location: [facility] OR  / [facility] OR    Anesthesia Start: 0953 Anesthesia Stop: 1213    Procedure: RIGHT TOTAL KNEE ARTHROPLASTY WITH ADDUCTOR CANAL BLOCK (Right ) Diagnosis:       Primary osteoarthritis of right knee      Chronic pain of right knee      Essential hypertension      Morbid obesity with BMI of 40.0-44.9, adult ([facility])      (Primary osteoarthritis of right knee [M17.11])      (Chronic pain of right knee [M25.561, G89.29])      (Essential hypertension [I10])      (Morbid obesity with BMI of 40.0-44.9, adult ([facility]) [E66.01, Z68.41])    Surgeons: Brooks Junior MD Provider: Orestes Woody MD    Anesthesia Type: general, regional, spinal ASA Status: 3          Anesthesia Type: general, regional, spinal    Vitals  No vitals data found for the desired time range.          Post Anesthesia Care and Evaluation    Patient location during evaluation: PACU  Patient participation: complete - patient participated  Level of consciousness: awake and alert  Pain score: 0  Pain management: adequate    Airway patency: patent  Anesthetic complications: No anesthetic complications  PONV Status: none  Cardiovascular status: acceptable and hemodynamically stable  Respiratory status: acceptable, face mask and spontaneous ventilation  Hydration status: acceptable    Comments: ---------------------------               10/05/22                      0750         ---------------------------   BP:           91/52         Pulse:         80           Resp:          18           Temp:   97.7 °F (36.5 °C)   SpO2:          94%         ---------------------------

## 2022-10-05 NOTE — ANESTHESIA PROCEDURE NOTES
Airway  Date/Time: 10/5/2022 10:38 AM  End Time:10/5/2022 10:38 AM    General Information and Staff    Patient location during procedure: OR    Indications and Patient Condition  Indications for airway management: airway protection    Preoxygenated: yes  MILS maintained throughout  Mask difficulty assessment: 0 - not attempted    Final Airway Details  Final airway type: supraglottic airway      Successful airway: I-gel  Size 4    Number of attempts at approach: 1  Assessment: lips, teeth, and gum same as pre-op and atraumatic intubation

## 2022-10-05 NOTE — PLAN OF CARE
Problem: Adult Inpatient Plan of Care  Goal: Plan of Care Review  Recent Flowsheet Documentation  Taken 10/5/2022 1406 by Juliana Duffy PT  Plan of Care Reviewed With:   patient   daughter  Outcome Evaluation: PT evaluation completed as co-eval with OT. Pt awake, alert, and agreeable to therapy. AROM R knee 3 degrees to 78 degrees. Pt transferred supine to sit to supine with modified independence and sit to stand to sit with FWW with min assistance x4 for mobility. Pt ambulated 10ft at bedside with FWW and 3ftx2 to/from BSC with min assistance. PT deferred ambulation to bathroom as pt still reporting significant numbness in LE's. Function limited by decreased strength, range, balance and tolerance for functional mobility and activities s/p R TKA. Pt is a good candidate for PT to regain lost function and decrease fall risk. Anticipate home with assist and outpatient PT.   Goal Outcome Evaluation:  Plan of Care Reviewed With: patient, daughter           Outcome Evaluation: PT evaluation completed as co-eval with OT. Pt awake, alert, and agreeable to therapy. AROM R knee 3 degrees to 78 degrees. Pt transferred supine to sit to supine with modified independence and sit to stand to sit with FWW with min assistance x4 for mobility. Pt ambulated 10ft at bedside with FWW and 3ftx2 to/from BSC with min assistance. PT deferred ambulation to bathroom as pt still reporting significant numbness in LE's. Function limited by decreased strength, range, balance and tolerance for functional mobility and activities s/p R TKA. Pt is a good candidate for PT to regain lost function and decrease fall risk. Anticipate home with assist and outpatient PT.

## 2022-10-05 NOTE — ANESTHESIA PREPROCEDURE EVALUATION
Anesthesia Evaluation     history of anesthetic complications: PONV  NPO Solid Status: > 8 hours  NPO Liquid Status: > 2 hours           Airway   Mallampati: II  TM distance: >3 FB  Neck ROM: full  No difficulty expected  Dental    (+) poor dentition        Pulmonary - normal exam    breath sounds clear to auscultation  (+) asthma,sleep apnea (mild and does not use a CPAP),   (-) not a smoker  Cardiovascular - normal exam  Exercise tolerance: good (4-7 METS)    ECG reviewed  Rhythm: regular  Rate: normal    (+) hypertension well controlled less than 2 medications, hyperlipidemia,   (-) valvular problems/murmurs, dysrhythmias, angina, cardiac stents, DVT    ROS comment: Normal sinus rhythm  Normal ECG  When compared with ECG of 13-JUL-2020 06:02,  No significant change was found    Neuro/Psych  (+) seizures (2010 before brain tumor surgery) well controlled, headaches (rare), psychiatric history Anxiety and Depression,    (-) numbness    ROS Comment: RLS right side  GI/Hepatic/Renal/Endo    (+) obesity,  GERD well controlled,    (-) hepatitis, liver disease, no renal disease, diabetes, no thyroid disorder    Musculoskeletal     (+) arthralgias,   Abdominal   (+) obese,    Substance History   (+) alcohol use (occ),   (-) drug use     OB/GYN          Other   arthritis,      (-) history of cancer  ROS/Med Hx Other: 1. Progressive degenerative arthritis involving the right knee since 5/29/2020 with increasing loss of medial compartment joint space.   2. Small right knee effusion or synovitis.   3. Prior left total knee arthroplasty.                   Anesthesia Plan    ASA 3     general, regional and spinal     (Adductor canal block and spinal discussed and patient agrees to proceed)  intravenous induction     Anesthetic plan, risks, benefits, and alternatives have been provided, discussed and informed consent has been obtained with: patient and child.    Use of blood products discussed with patient  Consented to blood  products.       CODE STATUS:

## 2022-10-05 NOTE — OP NOTE
TOTAL KNEE ARTHROPLASTY ATTUNE  Procedure Note    Name:    Priti Orr  YOB: 1965  Date of surgery:   10/5/2022    Pre-op Diagnosis:   Primary osteoarthritis of right knee [M17.11]  Chronic pain of right knee [M25.561, G89.29]  Essential hypertension [I10]  Morbid obesity with BMI of 40.0-44.9, adult ([facility]) [E66.01, Z68.41]    Post-op Diagnosis:    Post-Op Diagnosis Codes:     * Primary osteoarthritis of right knee [M17.11]     * Chronic pain of right knee [M25.561, G89.29]     * Essential hypertension [I10]     * Morbid obesity with BMI of 40.0-44.9, adult ([facility]) [E66.01, Z68.41]    Procedure:  Procedure(s):  RIGHT TOTAL KNEE ARTHROPLASTY WITH ADDUCTOR CANAL BLOCK    Surgical Approach: Knee Mid-Vastus         Surgeon(s):  Brooks Junior MD    SURGEON: Brooks Junior MD    Assistant: Laureen Marina CSA was responsible for performing the following activities: Retraction, Suction, Irrigation, Suturing, Closing and Placing Dressing and their skilled assistance was necessary for the success of this case.     Anesthesia: Regional, General, Spinal    Staff:   Circulator: Lori Huff RN  Scrub Person: Felicita Bey  Assistant: Laureen Marina CSA    Estimated Blood Loss: 100 mL    Specimens:                ID Type Source Tests Collected by Time   A (Not marked as sent) : bone and soft tissue right knee Tissue Knee, Right TISSUE EXAM, [facility] ([facility], [facility]) Brooks Junior MD 10/5/2022 1101         Drains: * No LDAs found *    Findings:  End-stage osteoarthritis Right knee    Complications: None    IMPLANTS:   Implant Name Type Inv. Item Serial No.  Lot No. LRB No. Used Action   CMT BONE SMARTSET HV 40GM - [id number] Implant CMT BONE SMARTSET HV 40GM  DEPUY [id number] Right 1 Implanted   PIN ATTUNE INTUITION NOHD THRD PK/6 - [id number] Implant PIN ATTUNE INTUITION NOHD THRD PK/6  DEPUY [id number] Right 1 Implanted   COMP FEM ATTUNE CMT PS NRW SZ4 RT -  [id number] Implant COMP FEM ATTUNE CMT PS NRW SZ4 RT  DEPUY [id number] Right 1 Implanted   COMP PAT ATTUNE CMT MEDL/DOME 32MM - [id number] Implant COMP PAT ATTUNE CMT MEDL/DOME 32MM  DEPUY [id number] Right 1 Implanted   BASE TIB ATTUNE CMT RP S PLS SZ3 - [id number] Implant BASE TIB ATTUNE CMT RP S PLS SZ3  DEPUY [id number] Right 1 Implanted   INSRT TIB ATTUNE PS RP SZ4 7MM - [id number] Implant INSRT TIB ATTUNE PS RP SZ4 7MM  DEPUY [id number] Right 1 Implanted         PROCEDURE:    The patient was taken to the operating room and placed in the supine position. Preoperative antibiotics were administered. Surgical time out was performed. After adequate induction of anesthesia, the leg was prepped and draped in the usual sterile fashion, exsanguinated with an Ace bandage and the tourniquet inflated to 300 mmHg. A midline incision was performed followed by a medial parapatellar arthrotomy.    Attention was then placed to the patella. The patella was noted to track centrally through range of motion. The patella was then sized with the trials. The thickness of the patella was then measured and the cut was made in a free-hand technique. The patella protector was then placed and the surrounding osteophytes were removed.   The patella was subluxed laterally in a non-everted position.  A portion of the fat pad, ACL, and anterior horns of the meniscus were excised.     The drill hole was placed in the distal femur and the canal was then irrigated and suctioned. The IM shima was placed and a 5 degree distal valgus cut was performed on the femur. The femur was then sized with a sizing guide. The femoral cutting block was placed and all femoral cuts were performed. The proximal tibia was exposed. We used the extramedullary tibial cutting guide set for removal of an appropriate amount of proximal tibia. The tibial cut was performed. The posterior horns of the menisci were excised. The posterior osteophytes were removed. Flexion and extension  blocks were then used to balance the knee. The tibial cut surface was then sized with the sizing templates and the tibial and femoral trial were then placed. The knee was placed in full extension and then the patella was re-addressed. The patella was resurfaced and the preoperative thickness was reproduced. The patella tracked centrally through range of motion.     At this point all trial components were removed, the knee was copiously irrigated with pulsed lavage. The cut surfaces were then dried with clean lap sponges, and the components were cemented; tibia, followed by femur, then patella. The knee was held in full extension and all excess cement was removed. The knee was held still until the cement had completely hardened. We then placed the trial polyethylene spacer which resulted in full extension and excellent flexion-extension balance. We placed the final polyethylene spacer.    Exparel liposomal bupivacaine was utilized during the procedure.  The mixture consisted of 20 mL of Exparel, 50 mL of 0.25% bupivacaine, 50 mL of normal saline.  At various times during the procedure 20 mL of this mixture was utilized in the posterior capsule in multiple sticks.  20 mL was utilized along the medial lateral epicondyles as well as the periosteum around the distal femur.  20 mL was utilized along the fat pad, the pes anserine, and the medial gutter.  20 mL was utilized along the lateral gutter and the medial and lateral proximal tibial periosteum.  20 mL was utilized along the medial collateral ligament, the medial retinaculum, and the medial subcutaneous tissues.  Finally, 20 mL was utilized in the lateral retinaculum and the lateral subcutaneous tissues as well as the suprapatellar synovial tissue.  Each of the different areas were injected with 15-20 sticks using 0.5 to 1 mL/inj.      The knee was then copiously irrigated. The tourniquet was then released. There was excellent hemostasis.  We closed the knee in  multiple layers in standard fashion. Sterile dressings were applied. At the end of the case, the sponge and needle counts were reported as being correct. There were no known complications. The patient was then transported to the recovery room in satisfactory condition.      Brooks Junior MD     Date: 10/5/2022  Time: 12:18 CDT

## 2022-10-05 NOTE — ANESTHESIA PROCEDURE NOTES
Spinal Block      Patient reassessed immediately prior to procedure    Patient location during procedure: OR  Start Time: 10/5/2022 10:04 AM  Stop Time: 10/5/2022 10:11 AM  Performed By  CRNA/CAA: Elizabeth Liang CRNA  Preanesthetic Checklist  Completed: patient identified, IV checked, site marked, risks and benefits discussed, surgical consent, monitors and equipment checked, pre-op evaluation and timeout performed  Spinal Block Prep:  Patient Position:sitting  Sterile Tech:cap, gloves, mask and sterile barriers  Prep:Betadine  Patient Monitoring:blood pressure monitoring and continuous pulse oximetry  Spinal Block Procedure  Approach:midline  Guidance:landmark technique and palpation technique  Location:L3-L4  Needle Type:Pencan  Needle Gauge:24 G  Placement of Spinal needle event:cerebrospinal fluid aspirated  Paresthesia: no  Fluid Appearance:clear  Medications: bupivacaine PF (MARCAINE) 0.75 % injection, 1.8 mL   Post Assessment  Patient Tolerance:patient tolerated the procedure well with no apparent complications  Complications no

## 2022-10-05 NOTE — ANESTHESIA PROCEDURE NOTES
Peripheral Block      Patient reassessed immediately prior to procedure    Patient location during procedure: holding area  Stop time: 10/5/2022 9:03 AM  Reason for block: at surgeon's request and post-op pain management  Performed by  Assisted by: Orestes Woody MDSRNA: Suzanne Staples SRNA  Preanesthetic Checklist  Completed: patient identified, IV checked, site marked, risks and benefits discussed, surgical consent, monitors and equipment checked, pre-op evaluation and timeout performed  Prep:  Pt Position: supine  Sterile barriers:cap, gloves, mask and washed/disinfected hands  Prep: ChloraPrep  Patient monitoring: blood pressure monitoring and continuous pulse oximetry  Procedure    Sedation: no  Performed under: local infiltration  Guidance:ultrasound guided    ULTRASOUND INTERPRETATION.  Using ultrasound guidance a 21 G gauge needle was placed in close proximity to the femoral nerve, at which point, under ultrasound guidance anesthetic was injected in the area of the nerve and spread of the anesthesia was seen on ultrasound in close proximity thereto.  There were no abnormalities seen on ultrasound; a digital image was taken; and the patient tolerated the procedure with no complications. Images:still images obtained, printed/placed on chart    Laterality:right  Block Type:adductor canal block  Injection Technique:single-shot  Needle Type:echogenic  Needle Gauge:21 G      Medications Used: ropivacaine (NAROPIN) 0.5 % injection, 30 mL  Med administered at 10/5/2022 9:02 AM      Medications  Comment:Pt identified  Ultrasound guided  Needle seen throughout  Local infiltration appropriate    Post Assessment  Injection Assessment: negative aspiration for heme, no paresthesia on injection and incremental injection  Patient Tolerance:comfortable throughout block  Complications:no

## 2022-10-05 NOTE — THERAPY EVALUATION
Patient Name: Priti Orr  : 1965    MRN: [id number]                              Today's Date: 10/5/2022       Admit Date: 10/5/2022    Visit Dx:     ICD-10-CM ICD-9-CM   1. Essential hypertension  I10 401.9   2. Primary osteoarthritis of right knee  M17.11 715.16   3. Morbid obesity with BMI of 40.0-44.9, adult ([facility])  E66.01 278.01    Z68.41 V85.41   4. Chronic pain of right knee  M25.561 719.46    G89.29 338.29   5. Impaired mobility and ADLs  Z74.09 V49.89    Z78.9    6. Impaired functional mobility, balance, gait, and endurance  Z74.09 V49.89     Patient Active Problem List   Diagnosis   • Osteoporosis   • Chronic left shoulder pain   • Presence of left artificial shoulder joint   • Chronic pain of right knee   • Anxiety   • Arthritis   • Asthma   • Depression   • Enchondroma of humerus   • Headache, chronic migraine without aura, intractable   • Status post arthroscopic surgery of right knee   • Humeral fracture   • Hypercholesteremia   • Hyperlipidemia   • Essential hypertension   • Meningioma ([facility])   • Morbid obesity ([facility])   • Obstructive sleep apnea syndrome   • Vitamin D deficiency   • Seizure-like activity ([facility])   • S/p reverse total shoulder arthroplasty   • Primary osteoarthritis of left knee   • Morbid obesity with BMI of 40.0-44.9, adult ([facility])   • History of brain surgery   • Postmenopausal osteoporosis   • Effusion of right knee   • Primary osteoarthritis of right knee   • Right knee pain   • Tear of medial meniscus of right knee, current   • Sprain of medial collateral ligament of right knee   • Acute pain of left shoulder   • Loose orthopedic implant ([facility])   • Arthritis of left shoulder region   • Gastroesophageal reflux disease without esophagitis   • Abnormal bowel habits   • Diarrhea   • Generalized abdominal pain   • Gastroesophageal reflux disease with esophagitis without hemorrhage   • Acute suppurative otitis media of left ear without spontaneous rupture of tympanic membrane    • Acute upper respiratory infection   • Mixed incontinence   • Pure hypertriglyceridemia   • Early menopause occurring in patient age younger than 45 years   • Postmenopausal   • History of vitamin D deficiency     Past Medical History:   Diagnosis Date   • Allergic    • Allergic rhinitis    • Anesthesia     nausea and vomiting with anesthesia requires pre medicaiton   • Anxiety    • Arthritis    • Asthma    • Benign neoplasm of meninges (HCC)     Post op       • Cobalamin deficiency    • Corneal abrasion    • Depression    • Encounter for gynecological examination (general) (routine) without abnormal findings    • Essential hypertension    • Fatigue    • Female stress incontinence    • Head injury    • History of delayed wound healing     Delayed healing of surgical wound      • Hyperlipidemia    • Low back pain    • Macular degeneration disease    • Nosebleed, symptom     Nosebleed/epistaxis symptom      • Obesity    • Osteopenia    • Osteoporosis    • Osteoporosis    • PONV (postoperative nausea and vomiting)    • Seizures (HCC)    • Sleep apnea     not wearing c-pap   • Visual impairment    • Wears glasses      Past Surgical History:   Procedure Laterality Date   • ARM LESION/CYST EXCISION  2014    Remove arm bone lesion (1)   • ARM WOUND REPAIR / CLOSURE  2014    Repair Superficial Wound TR-EXT 2.5 < CM 03702 (1)      • BRAIN SURGERY     •  SECTION  1986     Section (1)     • COLONOSCOPY     • ENDOSCOPY  2008    Colon endoscopy 14081 (1)     • EXCISION BREAST LESION W/ PREOP NEEDLE LOC  1981    Excision, breast lesion (1)     • HEMORRHOIDECTOMY  2008    Hemorrhoidectomy (2)      • INJECTION OF MEDICATION  2013    Celestone (betamethasone) (1)      • INJECTION OF MEDICATION  2013    Depo Medrol (Methylprednisone) (1)      • INJECTION OF MEDICATION  2013    Dexamethasone (2)      • INJECTION OF MEDICATION  2014    Kenalog  (6)   • KNEE ARTHROSCOPY Right 03/05/2007    Knee arthroscopy, surgery (1)     • KNEE ARTHROSCOPY Right 07/13/2020    Procedure: KNEE ARTHROSCOPY with debridement medial meniscus;  Surgeon: Brooks Junior MD;  Location: [facility];  Service: Orthopedics;  Laterality: Right;   • LAPAROSCOPIC TUBAL LIGATION  04/16/1991    Tubal ligation (1)      • PAP SMEAR  01/18/2007    PAP SMEAR (1)      • SHOULDER SURGERY Left     x6, including total shoulder arthroplasty   • TOTAL ABDOMINAL HYSTERECTOMY WITH SALPINGO OOPHORECTOMY  1993    JOSE/BSO (1)      • TOTAL KNEE ARTHROPLASTY Left 11/19/2018    Procedure: TOTAL KNEE ARTHROPLASTY ATTUNE with adductor canal block - left;  Surgeon: Brooks Junior MD;  Location: [facility];  Service: Orthopedics      General Information     Row Name 10/05/22 [facility]6          Physical Therapy Time and Intention    Document Type evaluation  -     Mode of Treatment co-treatment;physical therapy;occupational therapy  -     Row Name 10/05/22 1406          General Information    Patient Profile Reviewed yes  -     Existing Precautions/Restrictions fall  -     Barriers to Rehab none identified  -GETACHEW     Row Name 10/05/22 1406          Living Environment    People in Home alone  -GETACHEW     Row Name 10/05/22 1406          Home Main Entrance    Number of Stairs, Main Entrance one  -     Stair Railings, Main Entrance railing on left side (ascending)  -GETACHEW     Row Name 10/05/22 1406          Stairs Within Home, Primary    Number of Stairs, Within Home, Primary none  -GETACHEW     Row Name 10/05/22 1406          Cognition    Orientation Status (Cognition) oriented x 4  -GETACHEW     Row Name 10/05/22 1406          Safety Issues, Functional Mobility    Impairments Affecting Function (Mobility) balance;endurance/activity tolerance;range of motion (ROM);strength;shortness of breath  -           User Key  (r) = Recorded By, (t) = Taken By, (c) = Cosigned By    Initials Name Provider Type    GETACHEW Duffy  Juliana TRAMMELL, PT Physical Therapist               Mobility     Row Name 10/05/22 1406          Bed Mobility    Bed Mobility bed mobility (all) activities  -     All Activities, Rice (Bed Mobility) modified independence  -     Assistive Device (Bed Mobility) bed rails;head of bed elevated  -     Row Name 10/05/22 1406          Bed-Chair Transfer    Bed-Chair Rice (Transfers) minimum assist (75% patient effort);nonverbal cues (demo/gesture);verbal cues  bed to BSC to bed  -     Assistive Device (Bed-Chair Transfers) walker, front-wheeled  -GETACHEW     Row Name 10/05/22 1406          Sit-Stand Transfer    Sit-Stand Rice (Transfers) minimum assist (75% patient effort)  x4  -     Assistive Device (Sit-Stand Transfers) walker, front-wheeled  -     Row Name 10/05/22 1406          Gait/Stairs (Locomotion)    Rice Level (Gait) minimum assist (75% patient effort);nonverbal cues (demo/gesture);verbal cues  -     Assistive Device (Gait) walker, front-wheeled  -GETACHEW     Ambulated day of surgery or within 4 hours of PACU discharge yes  -     Distance in Feet (Gait) 10ft, 3ftx2  -     Row Name 10/05/22 1406          Mobility    Extremity Weight-bearing Status right lower extremity  -GETACHEW     Right Lower Extremity (Weight-bearing Status) weight-bearing as tolerated (WBAT)  -           User Key  (r) = Recorded By, (t) = Taken By, (c) = Cosigned By    Initials Name Provider Type    Juliana Ye PT Physical Therapist               Obj/Interventions     Row Name 10/05/22 1406          Range of Motion Comprehensive    Comment, General Range of Motion BLE: RLE: AROM WFL ankle/foot, knee 3 degrees to 78 degrees, AROM WFL hip; LLE: AROM WFL  -     Row Name 10/05/22 1406          Strength Comprehensive (MMT)    Comment, General Manual Muscle Testing (MMT) Assessment BLE: RLE: 4-/5 ankle/foot, 3-/5 knee, 3+/5 hip LLE: grossly 4/5  -     Row Name 10/05/22 1406          Sensory Assessment  (Somatosensory)    Sensory Assessment (Somatosensory) other (see comments)  still having numbness in LE's from block  -GETACHEW           User Key  (r) = Recorded By, (t) = Taken By, (c) = Cosigned By    Initials Name Provider Type    Juliana Ye, PT Physical Therapist               Goals/Plan     Row Name 10/05/22 1406          Bed Mobility Goal 1 (PT)    Activity/Assistive Device (Bed Mobility Goal 1, PT) sit to supine;supine to sit  -GETACHEW     Stanley Level/Cues Needed (Bed Mobility Goal 1, PT) independent  -GETACHEW     Time Frame (Bed Mobility Goal 1, PT) by discharge  -GETACHEW     Progress/Outcomes (Bed Mobility Goal 1, PT) goal not met  -GETACHEW     Row Name 10/05/22 1406          Transfer Goal 1 (PT)    Activity/Assistive Device (Transfer Goal 1, PT) sit-to-stand/stand-to-sit;bed-to-chair/chair-to-bed;walker, rolling  -GETACHEW     Stanley Level/Cues Needed (Transfer Goal 1, PT) modified independence  -GETACHEW     Time Frame (Transfer Goal 1, PT) by discharge  -GETACHEW     Progress/Outcome (Transfer Goal 1, PT) goal not met  -     Row Name 10/05/22 1406          Gait Training Goal 1 (PT)    Activity/Assistive Device (Gait Training Goal 1, PT) gait (walking locomotion);decrease fall risk;increase endurance/gait distance;walker, rolling  -GETACHEW     Stanley Level (Gait Training Goal 1, PT) standby assist;modified independence  -GETACHEW     Distance (Gait Training Goal 1, PT) 150ft x2  -GETACHEW     Time Frame (Gait Training Goal 1, PT) by discharge  -GETACHEW     Progress/Outcome (Gait Training Goal 1, PT) goal not met  -     Row Name 10/05/22 1406          ROM Goal 1 (PT)    ROM Goal 1 (PT) AROM R knee 0 degrees to 110 degrees  -GETACHEW     Time Frame (ROM Goal 1, PT) 3 days  -GETACHEW     Progress/Outcome (ROM Goal 1, PT) goal not met  -GETACHEW     Row Name 10/05/22 1406          Stairs Goal 1 (PT)    Activity/Assistive Device (Stairs Goal 1, PT) ascending stairs;descending stairs;using handrail, left  -GETACHEW     Stan[place] Level/Cues Needed (Stairs Goal 1, PT)  standby assist;modified independence  -     Number of Stairs (Stairs Goal 1, PT) 1  -     Time Frame (Stairs Goal 1, PT) by discharge  -     Progress/Outcome (Stairs Goal 1, PT) goal not met  -     Row Name 10/05/22 1406          Therapy Assessment/Plan (PT)    Planned Therapy Interventions (PT) balance training;bed mobility training;gait training;home exercise program;patient/family education;ROM (range of motion);stair training;strengthening;transfer training  -           User Key  (r) = Recorded By, (t) = Taken By, (c) = Cosigned By    Initials Name Provider Type    GETACHEW Juliana Duffy, PT Physical Therapist               Clinical Impression     Row Name 10/05/22 1406          Pain    Pretreatment Pain Rating 2/10  -     Posttreatment Pain Rating 4/10  -     Pain Location - Side/Orientation Right  -     Pain Location - knee  -     Pain Intervention(s) Repositioned;Rest  -     Additional Documentation Pain Scale: Numbers Pre/Post-Treatment (Group)  -     Row Name 10/05/22 1406          Plan of Care Review    Plan of Care Reviewed With patient;daughter  -     Outcome Evaluation PT evaluation completed as co-eval with OT. Pt awake, alert, and agreeable to therapy. AROM R knee 3 degrees to 78 degrees. Pt transferred supine to sit to supine with modified independence and sit to stand to sit with FWW with min assistance x4 for mobility. Pt ambulated 10ft at bedside with FWW and 3ftx2 to/from [facility] with min assistance. PT deferred ambulation to bathroom as pt still reporting significant numbness in LE's. Function limited by decreased strength, range, balance and tolerance for functional mobility and activities s/p R TKA. Pt is a good candidate for PT to regain lost function and decrease fall risk. Anticipate home with assist and outpatient PT.  -     Row Name 10/05/22 1406          Therapy Assessment/Plan (PT)    Patient/Family Therapy Goals Statement (PT) return to [facility]  -     Rehab Potential  (PT) good, to achieve stated therapy goals  -     Criteria for Skilled Interventions Met (PT) yes;meets criteria;skilled treatment is necessary  -     Therapy Frequency (PT) daily  x2  -GETACHEW     Predicted Duration of Therapy Intervention (PT) until discharge or goals met  -     Row Name 10/05/22 1406          Vital Signs    Pre Systolic BP Rehab 112  -GETACHEW     Pre Treatment Diastolic BP 70  -GETACHEW     Post Systolic BP Rehab 123  -GETACHEW     Post Treatment Diastolic BP 76  -GETACHEW     Pretreatment Heart Rate (beats/min) 74  -GETACHEW     Posttreatment Heart Rate (beats/min) 86  -GETACHEW     Pre SpO2 (%) 97  -GETACHEW     O2 Delivery Pre Treatment supplemental O2  3pm  -GETACHEW     Post SpO2 (%) 96  -GETACHEW     O2 Delivery Post Treatment nasal cannula  -GTEACHEW     Pre Patient Position Supine  -GETACHEW     Post Patient Position Supine  -     Row Name 10/05/22 1406          Positioning and Restraints    Pre-Treatment Position in bed  -GETACHEW     Post Treatment Position bed  -GETACHEW     In Bed notified nsg;call light within reach;encouraged to call for assist;exit alarm on;with family/caregiver  -           User Key  (r) = Recorded By, (t) = Taken By, (c) = Cosigned By    Initials Name Provider Type    Juliana Ye, PT Physical Therapist               Outcome Measures     Row Name 10/05/22 1406          How much help from another person do you currently need...    Turning from your back to your side while in flat bed without using bedrails? 4  -GETACHEW     Moving from lying on back to sitting on the side of a flat bed without bedrails? 4  -GETACHEW     Moving to and from a bed to a chair (including a wheelchair)? 3  -GETACHEW     Standing up from a chair using your arms (e.g., wheelchair, bedside chair)? 3  -GETACHEW     Climbing 3-5 steps with a railing? 3  -GETACHEW     To walk in hospital room? 3  -GETACHEW     AM-PAC 6 Clicks Score (PT) 20  -     Highest level of mobility 6 --> Walked 10 steps or more  -     Row Name 10/05/22 1408 10/05/22 1406       Functional Assessment    Outcome  Measure Options AM-PAC 6 Clicks Daily Activity (OT)  - AM-PAC 6 Clicks Basic Mobility (PT)  -          User Key  (r) = Recorded By, (t) = Taken By, (c) = Cosigned By    Initials Name Provider Type    GETACHEW Juliana Duffy, PT Physical Therapist    Ashly Ozuna OT Occupational Therapist                             Physical Therapy Education                 Title: PT OT SLP Therapies (In Progress)     Topic: Physical Therapy (In Progress)     Point: Mobility training (In Progress)     Learning Progress Summary           Patient Acceptance, E, NR by  at 10/5/2022 1630                   Point: Home exercise program (Not Started)     Learner Progress:  Not documented in this visit.          Point: Body mechanics (In Progress)     Learning Progress Summary           Patient Acceptance, E, NR by  at 10/5/2022 1630                   Point: Precautions (In Progress)     Learning Progress Summary           Patient Acceptance, E, NR by  at 10/5/2022 1630                               User Key     Initials Effective Dates Name Provider Type Discipline     06/16/21 -  Juliana Duffy, PT Physical Therapist PT              PT Recommendation and Plan  Planned Therapy Interventions (PT): balance training, bed mobility training, gait training, home exercise program, patient/family education, ROM (range of motion), stair training, strengthening, transfer training  Plan of Care Reviewed With: patient, daughter  Outcome Evaluation: PT evaluation completed as co-eval with OT. Pt awake, alert, and agreeable to therapy. AROM R knee 3 degrees to 78 degrees. Pt transferred supine to sit to supine with modified independence and sit to stand to sit with FWW with min assistance x4 for mobility. Pt ambulated 10ft at bedside with FWW and 3ftx2 to/from [facility] with min assistance. PT deferred ambulation to bathroom as pt still reporting significant numbness in LE's. Function limited by decreased strength, range, balance and tolerance for  functional mobility and activities s/p R TKA. Pt is a good candidate for PT to regain lost function and decrease fall risk. Anticipate home with assist and outpatient PT.     Time Calculation:    PT Charges     Row Name 10/05/22 1631             Time Calculation    Start Time 1406  -GETACHEW      Stop Time 1445  -GETACHEW      Time Calculation (min) 39 min  -GETACHEW      PT Received On 10/05/22  -      PT Goal Re-Cert Due Date 10/18/22  -              Time Calculation- PT    Total Timed Code Minutes- PT 0 minute(s)  -GETACHEW              Untimed Charges    PT Eval/Re-eval Minutes 39  -GETACHEW              Total Minutes    Untimed Charges Total Minutes 39  -GETACHEW       Total Minutes 39  -GETACHEW            User Key  (r) = Recorded By, (t) = Taken By, (c) = Cosigned By    Initials Name Provider Type    Juliana Ye PT Physical Therapist              Therapy Charges for Today     Code Description Service Date Service Provider Modifiers Qty    45181359936 HC PT EVAL MOD COMPLEXITY 3 10/5/2022 Juliana Duffy PT GP 1          PT G-Codes  Outcome Measure Options: AM-PAC 6 Clicks Daily Activity (OT)  AM-PAC 6 Clicks Score (PT): 20  AM-PAC 6 Clicks Score (OT): 21    Juliana Duffy PT  10/5/2022

## 2022-10-05 NOTE — INTERVAL H&P NOTE
H&P reviewed. The patient was examined and there are no changes to the H&P.      Vitals:    10/05/22 0750   BP: 91/52   Pulse: 80   Resp: 18   Temp: 97.7 °F (36.5 °C)   SpO2: 94%       Allergies   Allergen Reactions    Morphine Other (See Comments)     States felt like itching from within, hallucinations, agitation      Pseudoephedrine Shortness Of Breath and Swelling     LIPS SWELL AND TONGUE BECOMES THICK       Prior to Admission medications    Medication Sig Start Date End Date Taking? Authorizing Provider   buPROPion XL (WELLBUTRIN XL) 150 MG 24 hr tablet Take 1 tablet by mouth Every Morning. 6/17/22  Yes Lee Arias APRN   DULoxetine (CYMBALTA) 30 MG capsule Take 1 capsule by mouth Daily. 6/17/22  Yes Lee Arias APRN   acetaminophen (TYLENOL) 650 MG 8 hr tablet Take 650 mg by mouth Every 8 (Eight) Hours As Needed.    ProviderYoseph MD   albuterol sulfate HFA (Ventolin HFA) 108 (90 Base) MCG/ACT inhaler Inhale 2 puffs Every 4 (Four) Hours As Needed for Shortness of Air. 9/27/21   Elizabeth Combs APRN   amoxicillin (AMOXIL) 500 MG capsule Take 500 mg by mouth As Needed (For dental visits only).    ProviderYoseph MD   denosumab (Prolia) 60 MG/ML solution prefilled syringe syringe Inject 60 mg under the skin into the appropriate area as directed Every 6 (Six) Months.    ProviderYoseph MD   fluticasone-salmeterol (ADVAIR HFA) 115-21 MCG/ACT inhaler Inhale 1 puff As Needed.    ProvideroYseph MD   lisinopril-hydrochlorothiazide (PRINZIDE,ZESTORETIC) 20-12.5 MG per tablet Take 1 tablet by mouth Daily. 5/17/21   Lee Arias APRN   meloxicam (MOBIC) 15 MG tablet Take 1 tablet by mouth Daily. 4/18/22   Lee Arias APRN   montelukast (Singulair) 10 MG tablet Take 1 tablet by mouth Every Night. 5/17/21   Lee Arias APRN   rosuvastatin (CRESTOR) 5 MG tablet Take 1 tablet by mouth Every Evening. 5/17/21   Lee Arias APRN       Past Medical History:    Diagnosis Date    Allergic     Allergic rhinitis     Anesthesia     nausea and vomiting with anesthesia requires pre medicaiton    Anxiety     Arthritis     Asthma     Benign neoplasm of meninges (HCC)     Post op        Cobalamin deficiency     Corneal abrasion     Depression     Encounter for gynecological examination (general) (routine) without abnormal findings     Essential hypertension     Fatigue     Female stress incontinence     Head injury     History of delayed wound healing     Delayed healing of surgical wound       Hyperlipidemia     Low back pain     Macular degeneration disease     Nosebleed, symptom     Nosebleed/epistaxis symptom       Obesity     Osteopenia     Osteoporosis     Osteoporosis     PONV (postoperative nausea and vomiting)     Seizures (HCC)     Sleep apnea     not wearing c-pap    Visual impairment     Wears glasses        Past Surgical History:   Procedure Laterality Date    ARM LESION/CYST EXCISION  2014    Remove arm bone lesion (1)    ARM WOUND REPAIR / CLOSURE  2014    Repair Superficial Wound TR-EXT 2.5 < CM 35883 (1)       BRAIN SURGERY       SECTION  1986     Section (1)      COLONOSCOPY      ENDOSCOPY  2008    Colon endoscopy 25387 (1)      EXCISION BREAST LESION W/ PREOP NEEDLE LOC  1981    Excision, breast lesion (1)      HEMORRHOIDECTOMY  2008    Hemorrhoidectomy (2)       INJECTION OF MEDICATION  2013    Celestone (betamethasone) (1)       INJECTION OF MEDICATION  2013    Depo Medrol (Methylprednisone) (1)       INJECTION OF MEDICATION  2013    Dexamethasone (2)       INJECTION OF MEDICATION  2014    Kenalog (6)    KNEE ARTHROSCOPY Right 2007    Knee arthroscopy, surgery (1)      KNEE ARTHROSCOPY Right 2020    Procedure: KNEE ARTHROSCOPY with debridement medial meniscus;  Surgeon: Brooks Junior MD;  Location: [facility];  Service: Orthopedics;  Laterality: Right;     LAPAROSCOPIC TUBAL LIGATION  04/16/1991    Tubal ligation (1)       PAP SMEAR  01/18/2007    PAP SMEAR (1)       SHOULDER SURGERY Left     x6, including total shoulder arthroplasty    TOTAL ABDOMINAL HYSTERECTOMY WITH SALPINGO OOPHORECTOMY  1993    JOSE/BSO (1)       TOTAL KNEE ARTHROPLASTY Left 11/19/2018    Procedure: TOTAL KNEE ARTHROPLASTY ATTUNE with adductor canal block - left;  Surgeon: Brooks Junior MD;  Location: [facility];  Service: Orthopedics       Social History     Socioeconomic History    Marital status:    Tobacco Use    Smoking status: Never Smoker    Smokeless tobacco: Never Used   Vaping Use    Vaping Use: Never used   Substance and Sexual Activity    Alcohol use: Yes     Comment: social    Drug use: No    Sexual activity: Defer     Partners: Male       Family History   Problem Relation Age of Onset    Alzheimer's disease Other     Breast cancer Other         other    Cancer Other         other - GYN    Colon cancer Other         Colorectal Cancer    Depression Other     Diabetes Other     Hyperlipidemia Other     Hypertension Other     Stroke Other     Deep vein thrombosis Other     Heart disease Other     Ovarian cancer Other     Colon cancer Other     Hypertension Mother     Hypertension Father     Heart disease Father     Heart attack Father     No Known Problems Brother     No Known Problems Daughter     Cancer Maternal Grandmother     Breast cancer Maternal Grandmother     ALS Maternal Grandmother     Heart attack Paternal Grandmother     Heart attack Paternal Grandfather     Lung cancer Paternal Grandfather     Colon cancer Paternal Grandfather              This document has been electronically signed by Brooks Junior MD on October 5, 2022 09:36 CDT

## 2022-10-05 NOTE — THERAPY EVALUATION
Patient Name: Priti Orr  : 1965    MRN: [id number]                              Today's Date: 10/5/2022       Admit Date: 10/5/2022    Visit Dx:     ICD-10-CM ICD-9-CM   1. Essential hypertension  I10 401.9   2. Primary osteoarthritis of right knee  M17.11 715.16   3. Morbid obesity with BMI of 40.0-44.9, adult (Formerly [facility])  E66.01 278.01    Z68.41 V85.41   4. Chronic pain of right knee  M25.561 719.46    G89.29 338.29   5. Impaired mobility and ADLs  Z74.09 V49.89    Z78.9      Patient Active Problem List   Diagnosis   • Osteoporosis   • Chronic left shoulder pain   • Presence of left artificial shoulder joint   • Chronic pain of right knee   • Anxiety   • Arthritis   • Asthma   • Depression   • Enchondroma of humerus   • Headache, chronic migraine without aura, intractable   • Status post arthroscopic surgery of right knee   • Humeral fracture   • Hypercholesteremia   • Hyperlipidemia   • Essential hypertension   • Meningioma (Formerly [facility] [place])   • Morbid obesity (Formerly [facility])   • Obstructive sleep apnea syndrome   • Vitamin D deficiency   • Seizure-like activity (Formerly [facility])   • S/p reverse total shoulder arthroplasty   • Primary osteoarthritis of left knee   • Morbid obesity with BMI of 40.0-44.9, adult (Formerly [facility] [place])   • History of brain surgery   • Postmenopausal osteoporosis   • Effusion of right knee   • Primary osteoarthritis of right knee   • Right knee pain   • Tear of medial meniscus of right knee, current   • Sprain of medial collateral ligament of right knee   • Acute pain of left shoulder   • Loose orthopedic implant (Formerly [facility])   • Arthritis of left shoulder region   • Gastroesophageal reflux disease without esophagitis   • Abnormal bowel habits   • Diarrhea   • Generalized abdominal pain   • Gastroesophageal reflux disease with esophagitis without hemorrhage   • Acute suppurative otitis media of left ear without spontaneous rupture of tympanic membrane   • Acute upper respiratory infection   • Mixed incontinence   • Pure  hypertriglyceridemia   • Early menopause occurring in patient age younger than 45 years   • Postmenopausal   • History of vitamin D deficiency     Past Medical History:   Diagnosis Date   • Allergic    • Allergic rhinitis    • Anesthesia     nausea and vomiting with anesthesia requires pre medicaiton   • Anxiety    • Arthritis    • Asthma    • Benign neoplasm of meninges (HCC)     Post op       • Cobalamin deficiency    • Corneal abrasion    • Depression    • Encounter for gynecological examination (general) (routine) without abnormal findings    • Essential hypertension    • Fatigue    • Female stress incontinence    • Head injury    • History of delayed wound healing     Delayed healing of surgical wound      • Hyperlipidemia    • Low back pain    • Macular degeneration disease    • Nosebleed, symptom     Nosebleed/epistaxis symptom      • Obesity    • Osteopenia    • Osteoporosis    • Osteoporosis    • PONV (postoperative nausea and vomiting)    • Seizures (HCC)    • Sleep apnea     not wearing c-pap   • Visual impairment    • Wears glasses      Past Surgical History:   Procedure Laterality Date   • ARM LESION/CYST EXCISION  2014    Remove arm bone lesion (1)   • ARM WOUND REPAIR / CLOSURE  2014    Repair Superficial Wound TR-EXT 2.5 < CM 09239 (1)      • BRAIN SURGERY     •  SECTION  1986     Section (1)     • COLONOSCOPY     • ENDOSCOPY  2008    Colon endoscopy 46922 (1)     • EXCISION BREAST LESION W/ PREOP NEEDLE LOC  1981    Excision, breast lesion (1)     • HEMORRHOIDECTOMY  2008    Hemorrhoidectomy (2)      • INJECTION OF MEDICATION  2013    Celestone (betamethasone) (1)      • INJECTION OF MEDICATION  2013    Depo Medrol (Methylprednisone) (1)      • INJECTION OF MEDICATION  2013    Dexamethasone (2)      • INJECTION OF MEDICATION  2014    Kenalog (6)   • KNEE ARTHROSCOPY Right 2007    Knee arthroscopy, surgery  (1)     • KNEE ARTHROSCOPY Right 07/13/2020    Procedure: KNEE ARTHROSCOPY with debridement medial meniscus;  Surgeon: Brooks Junior MD;  Location: [facility];  Service: Orthopedics;  Laterality: Right;   • LAPAROSCOPIC TUBAL LIGATION  04/16/1991    Tubal ligation (1)      • PAP SMEAR  01/18/2007    PAP SMEAR (1)      • SHOULDER SURGERY Left     x6, including total shoulder arthroplasty   • TOTAL ABDOMINAL HYSTERECTOMY WITH SALPINGO OOPHORECTOMY  1993    JOSE/BSO (1)      • TOTAL KNEE ARTHROPLASTY Left 11/19/2018    Procedure: TOTAL KNEE ARTHROPLASTY ATTUNE with adductor canal block - left;  Surgeon: Brooks Junior MD;  Location: [facility];  Service: Orthopedics      General Information     Row Name 10/05/22 1408          OT Time and Intention    Document Type evaluation  -     Mode of Treatment co-treatment;physical therapy;occupational therapy  -     Row Name 10/05/22 1408          General Information    Patient Profile Reviewed yes  -     Prior Level of Function independent:;ADL's;all household mobility;home management;driving  -     Existing Precautions/Restrictions fall  R knee WBAT.  -     Barriers to Rehab none identified  -     Row Name 10/05/22 1408          Living Environment    People in Home alone  -     Row Name 10/05/22 1408          Home Main Entrance    Number of Stairs, Main Entrance one  -SA     Stair Railings, Main Entrance railing on left side (ascending)  -     Row Name 10/05/22 1408          Stairs Within Home, Primary    Number of Stairs, Within Home, Primary none  -SA     Stairs Comment, Within Home, Primary Walk in with little step. with grab bars and shower seat. Hand held shower head. Has FWW availble. Regular toilet.  -     Row Name 10/05/22 1408          Cognition    Orientation Status (Cognition) oriented x 4  -     Row Name 10/05/22 1408          Safety Issues, Functional Mobility    Safety Issues Affecting Function (Mobility) awareness of need for  assistance;insight into deficits/self-awareness;positioning of assistive device;problem-solving;safety precaution awareness;safety precautions follow-through/compliance  -     Impairments Affecting Function (Mobility) balance;endurance/activity tolerance;range of motion (ROM);strength;shortness of breath  -           User Key  (r) = Recorded By, (t) = Taken By, (c) = Cosigned By    Initials Name Provider Type     Ashly Mukherjee OT Occupational Therapist                 Mobility/ADL's     Row Name 10/05/22 1408          Bed Mobility    Bed Mobility bed mobility (all) activities  -     All Activities, [place] (Bed Mobility) modified independence  -     Assistive Device (Bed Mobility) bed rails;head of bed elevated  -     Row Name 10/05/22 1408          Transfers    Transfers sit-stand transfer;stand-sit transfer;toilet transfer  -     Sit-Stand [place] (Transfers) minimum assist (75% patient effort)  -     Stand-Sit [place] (Transfers) minimum assist (75% patient effort)  -     [place] Level (Toilet Transfer) minimum assist (75% patient effort)  -     Assistive Device (Toilet Transfer) commode, bedside with drop arms;walker, front-wheeled  -     Row Name 10/05/22 1408          Sit-Stand Transfer    Assistive Device (Sit-Stand Transfers) walker, front-wheeled  -     Row Name 10/05/22 1408          Stand-Sit Transfer    Assistive Device (Stand-Sit Transfers) walker, front-wheeled  -     Row Name 10/05/22 1408          Toilet Transfer    Type (Toilet Transfer) sit-stand;stand-sit  -     Row Name 10/05/22 1408          Activities of Daily Living    BADL Assessment/Intervention lower body dressing;toileting  -     Row Name 10/05/22 1408          Mobility    Extremity Weight-bearing Status right lower extremity  -     Right Lower Extremity (Weight-bearing Status) weight-bearing as tolerated (WBAT)  -     Row Name 10/05/22 1408          Lower Body Dressing  Assessment/Training    [place] Level (Lower Body Dressing) doff;don;socks;standby assist  -SA     Position (Lower Body Dressing) edge of bed sitting  -SA     Row Name 10/05/22 1408          Toileting Assessment/Training    [place] Level (Toileting) adjust/manage clothing;other (see comments)  Pt declined need to void  -SA     Assistive Devices (Toileting) commode, bedside with drop arms  -SA     Position (Toileting) unsupported sitting  -SA           User Key  (r) = Recorded By, (t) = Taken By, (c) = Cosigned By    Initials Name Provider Type     Ashly Mukherjee OT Occupational Therapist               Obj/Interventions     Row Name 10/05/22 1408          Sensory Assessment (Somatosensory)    Sensory Assessment (Somatosensory) UE sensation intact  -     Row Name 10/05/22 1408          Range of Motion Comprehensive    General Range of Motion upper extremity range of motion deficits identified  -SA     Comment, General Range of Motion L shoulder, Elbow, wrist grossly WFL. R shoulder flexion ~70 degres. Pt reports this is due to previous surgeries. R elbow, wrist, and digits WFL.  -     Row Name 10/05/22 1408          Strength Comprehensive (MMT)    Comment, General Manual Muscle Testing (MMT) Assessment L shoulder, elbow,  4/5. R shoulder 3-/5 grossly. R elbow,  4-/5 grossly  -SA           User Key  (r) = Recorded By, (t) = Taken By, (c) = Cosigned By    Initials Name Provider Type     Ashly Mukherjee OT Occupational Therapist               Goals/Plan     Row Name 10/05/22 1408          Transfer Goal 1 (OT)    Activity/Assistive Device (Transfer Goal 1, OT) transfers, all  -SA     [place] Level/Cues Needed (Transfer Goal 1, OT) modified independence  -SA     Time Frame (Transfer Goal 1, OT) long term goal (LTG);by discharge  -     Progress/Outcome (Transfer Goal 1, OT) goal not met  -     Row Name 10/05/22 1408          Bathing Goal 1 (OT)    Activity/Device (Bathing Goal 1, OT) lower  body bathing  -SA     [place] Level/Cues Needed (Bathing Goal 1, OT) independent  -SA     Time Frame (Bathing Goal 1, OT) long term goal (LTG);by discharge  -SA     Progress/Outcomes (Bathing Goal 1, OT) goal not met  -     Row Name 10/05/22 1408          Dressing Goal 1 (OT)    Activity/Device (Dressing Goal 1, OT) lower body dressing  -SA     [place]/Cues Needed (Dressing Goal 1, OT) independent  -SA     Time Frame (Dressing Goal 1, OT) long term goal (LTG);by discharge  -SA     Progress/Outcome (Dressing Goal 1, OT) goal not met  -     Row Name 10/05/22 1408          Toileting Goal 1 (OT)    Activity/Device (Toileting Goal 1, OT) toileting skills, all  -SA     [place] Level/Cues Needed (Toileting Goal 1, OT) independent  -SA     Time Frame (Toileting Goal 1, OT) long term goal (LTG);by discharge  -     Progress/Outcome (Toileting Goal 1, OT) goal not met  -[facility] Name 10/05/22 1408          Therapy Assessment/Plan (OT)    Planned Therapy Interventions (OT) activity tolerance training;functional balance retraining;occupation/activity based interventions;ROM/therapeutic exercise;IADL retraining;adaptive equipment training;BADL retraining;manual therapy/joint mobilization;strengthening exercise;transfer/mobility retraining;passive ROM/stretching;neuromuscular control/coordination retraining;cognitive/visual perception retraining;edema control/reduction;patient/caregiver education/training  -           User Key  (r) = Recorded By, (t) = Taken By, (c) = Cosigned By    Initials Name Provider Type    Ashly Ozuna OT Occupational Therapist               Clinical Impression     Row Name 10/05/22 1408          Pain Assessment    Pretreatment Pain Rating 2/10  -SA     Posttreatment Pain Rating 4/10  -SA     Pain Location - Side/Orientation Right  -SA     Pain Location - knee  -SA     Pain Intervention(s) Ambulation/increased activity;Emotional support;Repositioned  -     Row Name 10/05/22  1408          Plan of Care Review    Plan of Care Reviewed With patient  -     Outcome Evaluation OT Hernando completed this date as co-eval with PT. Patient completed bed mobility with Mod I. Required Min A with FWW to completed sit<>stand x 2 times and pivot to BSC. Pt declined need to void. Required SBA to doff<>don socks seated EOB. ROM: L shoulder, Elbow, wrist grossly WFL. R shoulder flexion ~70 degres. Pt reports this is due to previous surgeries. R elbow, wrist, and digits WFL. MMT: L shoulder, elbow,  4/5. R shoulder 3-/5 grossly. R elbow,  4-/5 grossly. Cont skilled IP/OT to address decreased strength, ROM, and independence with ADLs, IALDs, and transfers. Recommended d/c home with assist.  -     Row Name 10/05/22 1408          Therapy Assessment/Plan (OT)    Patient/Family Therapy Goal Statement (OT) to return home  -     Rehab Potential (OT) good, to achieve stated therapy goals  -     Criteria for Skilled Therapeutic Interventions Met (OT) yes;meets criteria;skilled treatment is necessary  -     Therapy Frequency (OT) other (see comments)  Daily  -     Predicted Duration of Therapy Intervention (OT) Until all goals met or d/c from hospital  -     Row Name 10/05/22 1408          Therapy Plan Review/Discharge Plan (OT)    Anticipated Discharge Disposition (OT) home with assist  -     Row Name 10/05/22 1408          Vital Signs    Pre Systolic BP Rehab 112  -SA     Pre Treatment Diastolic BP 70  -SA     Post Systolic BP Rehab 123  -SA     Post Treatment Diastolic BP 76  -SA     Pretreatment Heart Rate (beats/min) 74  -SA     Posttreatment Heart Rate (beats/min) 86  -SA     Pre SpO2 (%) 97  -SA     O2 Delivery Pre Treatment supplemental O2  3 LPM  -SA     Post SpO2 (%) 96  -SA     O2 Delivery Post Treatment supplemental O2  -SA     Pre Patient Position Supine  -SA     Post Patient Position Supine  -     Row Name 10/05/22 1408          Positioning and Restraints    Pre-Treatment  Position in bed  -SA     Post Treatment Position bed  -SA     In Bed notified nsg;call light within reach;supine;encouraged to call for assist;exit alarm on;with family/caregiver  -           User Key  (r) = Recorded By, (t) = Taken By, (c) = Cosigned By    Initials Name Provider Type    Ashly Ozuna OT Occupational Therapist               Outcome Measures     Row Name 10/05/22 1408          How much help from another is currently needed...    Putting on and taking off regular lower body clothing? 3  -SA     Bathing (including washing, rinsing, and drying) 3  -SA     Toileting (which includes using toilet bed pan or urinal) 3  -SA     Putting on and taking off regular upper body clothing 4  -SA     Taking care of personal grooming (such as brushing teeth) 4  -SA     Eating meals 4  -SA     AM-PAC 6 Clicks Score (OT) 21  -SA     Row Name 10/05/22 1408          Functional Assessment    Outcome Measure Options AM-PAC 6 Clicks Daily Activity (OT)  -           User Key  (r) = Recorded By, (t) = Taken By, (c) = Cosigned By    Initials Name Provider Type    Ashly Ozuna OT Occupational Therapist                Occupational Therapy Education                 Title: PT OT SLP Therapies (In Progress)     Topic: Occupational Therapy (Done)     Point: ADL training (Done)     Description:   Instruct learner(s) on proper safety adaptation and remediation techniques during self care or transfers.   Instruct in proper use of assistive devices.              Learning Progress Summary           Patient Acceptance, E,TB, VU by  at 10/5/2022 1455    Comment: OT POC, Role of OT, transfer training   Family Acceptance, E,TB, VU by  at 10/5/2022 1455    Comment: OT POC, Role of OT, transfer training                   Point: Home exercise program (Done)     Description:   Instruct learner(s) on appropriate technique for monitoring, assisting and/or progressing therapeutic exercises/activities.              Learning  Progress Summary           Patient Acceptance, E,TB, VU by  at 10/5/2022 1455    Comment: OT POC, Role of OT, transfer training   Family Acceptance, E,TB, VU by  at 10/5/2022 1455    Comment: OT POC, Role of OT, transfer training                   Point: Precautions (Done)     Description:   Instruct learner(s) on prescribed precautions during self-care and functional transfers.              Learning Progress Summary           Patient Acceptance, E,TB, VU by  at 10/5/2022 1455    Comment: OT POC, Role of OT, transfer training   Family Acceptance, E,TB, VU by  at 10/5/2022 1455    Comment: OT POC, Role of OT, transfer training                   Point: Body mechanics (Done)     Description:   Instruct learner(s) on proper positioning and spine alignment during self-care, functional mobility activities and/or exercises.              Learning Progress Summary           Patient Acceptance, E,TB, VU by  at 10/5/2022 1455    Comment: OT POC, Role of OT, transfer training   Family Acceptance, E,TB, VU by  at 10/5/2022 1455    Comment: OT POC, Role of OT, transfer training                               User Key     Initials Effective Dates Name Provider Type Discipline     08/11/22 -  Ashly Mukherjee OT Occupational Therapist OT              OT Recommendation and Plan  Planned Therapy Interventions (OT): activity tolerance training, functional balance retraining, occupation/activity based interventions, ROM/therapeutic exercise, IADL retraining, adaptive equipment training, BADL retraining, manual therapy/joint mobilization, strengthening exercise, transfer/mobility retraining, passive ROM/stretching, neuromuscular control/coordination retraining, cognitive/visual perception retraining, edema control/reduction, patient/caregiver education/training  Therapy Frequency (OT): other (see comments) (Daily)  Plan of Care Review  Plan of Care Reviewed With: patient  Outcome Evaluation: OT Hernando completed this date as  co-eval with PT. Patient completed bed mobility with Mod I. Required Min A with FWW to completed sit<>stand x 2 times and pivot to BSC. Pt declined need to void. Required SBA to doff<>don socks seated EOB. ROM: L shoulder, Elbow, wrist grossly WFL. R shoulder flexion ~70 degres. Pt reports this is due to previous surgeries. R elbow, wrist, and digits WFL. MMT: L shoulder, elbow,  4/5. R shoulder 3-/5 grossly. R elbow,  4-/5 grossly. Cont skilled IP/OT to address decreased strength, ROM, and independence with ADLs, IALDs, and transfers. Recommended d/c home with assist.     Time Calculation:    Time Calculation- OT     Row Name 10/05/22 1408             Time Calculation- OT    OT Start Time 1406  -SA      OT Stop Time 1448  -SA      OT Time Calculation (min) 42 min  -SA      OT Received On 10/05/22  -SA      OT Goal Re-Cert Due Date 10/18/22  -SA              Untimed Charges    OT Eval/Re-eval Minutes 42  -SA              Total Minutes    Untimed Charges Total Minutes 42  -SA       Total Minutes 42  -SA            User Key  (r) = Recorded By, (t) = Taken By, (c) = Cosigned By    Initials Name Provider Type     Ashly Mukherjee OT Occupational Therapist              Therapy Charges for Today     Code Description Service Date Service Provider Modifiers Qty    12871250705 HC OT EVAL MOD COMPLEXITY 3 10/5/2022 Ashly Mukherjee OT GO 1               Ashly Mukherjee OT  10/5/2022

## 2022-10-05 NOTE — PLAN OF CARE
Goal Outcome Evaluation:  Plan of Care Reviewed With: patient           Outcome Evaluation: OT Eval completed this date as co-eval with PT. Patient completed bed mobility with Mod I. Required Min A with FWW to completed sit<>stand x 2 times and pivot to BSC. Pt declined need to void. Required SBA to doff<>don socks seated EOB. ROM: L shoulder, Elbow, wrist grossly WFL. R shoulder flexion ~70 degres. Pt reports this is due to previous surgeries. R elbow, wrist, and digits WFL. MMT: L shoulder, elbow,  4/5. R shoulder 3-/5 grossly. R elbow,  4-/5 grossly. Cont skilled IP/OT to address decreased strength, ROM, and independence with ADLs, IALDs, and transfers. Recommended d/c home with assist.

## 2022-10-06 VITALS
HEIGHT: 60 IN | HEART RATE: 84 BPM | RESPIRATION RATE: 18 BRPM | TEMPERATURE: 98.4 F | DIASTOLIC BLOOD PRESSURE: 55 MMHG | OXYGEN SATURATION: 97 % | SYSTOLIC BLOOD PRESSURE: 95 MMHG | WEIGHT: 185 LBS | BODY MASS INDEX: 36.32 KG/M2

## 2022-10-06 PROCEDURE — 25010000002 CEFAZOLIN PER 500 MG: Performed by: ORTHOPAEDIC SURGERY

## 2022-10-06 PROCEDURE — 97530 THERAPEUTIC ACTIVITIES: CPT

## 2022-10-06 PROCEDURE — 99024 POSTOP FOLLOW-UP VISIT: CPT | Performed by: ORTHOPAEDIC SURGERY

## 2022-10-06 PROCEDURE — 97535 SELF CARE MNGMENT TRAINING: CPT

## 2022-10-06 PROCEDURE — 97116 GAIT TRAINING THERAPY: CPT

## 2022-10-06 RX ORDER — FERROUS SULFATE TAB EC 324 MG (65 MG FE EQUIVALENT) 324 (65 FE) MG
324 TABLET DELAYED RESPONSE ORAL
Qty: 30 TABLET | Refills: 0 | Status: SHIPPED | OUTPATIENT
Start: 2022-10-06 | End: 2022-11-05

## 2022-10-06 RX ORDER — ASPIRIN 81 MG/1
81 TABLET ORAL 2 TIMES DAILY
Qty: 60 TABLET | Refills: 0 | Status: SHIPPED | OUTPATIENT
Start: 2022-10-21 | End: 2022-11-30

## 2022-10-06 RX ORDER — OXYCODONE AND ACETAMINOPHEN 7.5; 325 MG/1; MG/1
1 TABLET ORAL EVERY 6 HOURS PRN
Qty: 30 TABLET | Refills: 0 | OUTPATIENT
Start: 2022-10-06 | End: 2022-10-27

## 2022-10-06 RX ORDER — AMOXICILLIN 250 MG
2 CAPSULE ORAL NIGHTLY
Qty: 38 TABLET | Refills: 0 | Status: SHIPPED | OUTPATIENT
Start: 2022-10-06 | End: 2022-10-25

## 2022-10-06 RX ADMIN — OXYCODONE 5 MG: 5 TABLET ORAL at 09:31

## 2022-10-06 RX ADMIN — BUPROPION HYDROCHLORIDE 150 MG: 150 TABLET, FILM COATED, EXTENDED RELEASE ORAL at 05:01

## 2022-10-06 RX ADMIN — DULOXETINE HYDROCHLORIDE 30 MG: 30 CAPSULE, DELAYED RELEASE ORAL at 08:14

## 2022-10-06 RX ADMIN — LISINOPRIL 20 MG: 20 TABLET ORAL at 08:15

## 2022-10-06 RX ADMIN — FAMOTIDINE 40 MG: 40 TABLET, FILM COATED ORAL at 08:14

## 2022-10-06 RX ADMIN — FERROUS SULFATE TAB EC 324 MG (65 MG FE EQUIVALENT) 324 MG: 324 (65 FE) TABLET DELAYED RESPONSE at 08:14

## 2022-10-06 RX ADMIN — OXYCODONE 5 MG: 5 TABLET ORAL at 05:01

## 2022-10-06 RX ADMIN — APIXABAN 2.5 MG: 2.5 TABLET, FILM COATED ORAL at 08:14

## 2022-10-06 RX ADMIN — Medication 3 ML: at 08:14

## 2022-10-06 RX ADMIN — ACETAMINOPHEN 1000 MG: 500 TABLET ORAL at 08:14

## 2022-10-06 RX ADMIN — SODIUM CHLORIDE 100 ML/HR: 9 INJECTION, SOLUTION INTRAVENOUS at 03:01

## 2022-10-06 RX ADMIN — HYDROCHLOROTHIAZIDE 12.5 MG: 12.5 TABLET ORAL at 08:14

## 2022-10-06 RX ADMIN — CEFAZOLIN 2 G: 10 INJECTION, POWDER, FOR SOLUTION INTRAVENOUS; PARENTERAL at 03:00

## 2022-10-06 RX ADMIN — OXYCODONE 5 MG: 5 TABLET ORAL at 00:10

## 2022-10-06 NOTE — PLAN OF CARE
Goal Outcome Evaluation:  Plan of Care Reviewed With: patient, friend        Progress: improving  Outcome Evaluation: Pt was sup in bed with friend present and agreeable to therapy. Pt t/f sup to sit with mod ind with HOB elevated. Pt stood with SBA and amb 128ft with RW and SBA. Pt performed step training with CGA for safety only for 5 steps x 4 trials. Pt returned to room and t/f to sup with mod ind and only small increase in R knee pain. Pt verb good understanding of home care instructions and voiced no concerns with being DC home. Pt would cont to benefit from tehrapy upon DC.

## 2022-10-06 NOTE — THERAPY TREATMENT NOTE
Patient Name: Priti Orr  : 1965    MRN: [id number]                              Today's Date: 10/6/2022       Admit Date: 10/5/2022    Visit Dx:     ICD-10-CM ICD-9-CM   1. Primary osteoarthritis of right knee  M17.11 715.16   2. Essential hypertension  I10 401.9   3. Morbid obesity with BMI of 40.0-44.9, adult ([facility])  E66.01 278.01    Z68.41 V85.41   4. Chronic pain of right knee  M25.561 719.46    G89.29 338.29   5. Impaired mobility and ADLs  Z74.09 V49.89    Z78.9    6. Impaired functional mobility, balance, gait, and endurance  Z74.09 V49.89     Patient Active Problem List   Diagnosis   • Osteoporosis   • Chronic left shoulder pain   • Presence of left artificial shoulder joint   • Chronic pain of right knee   • Anxiety   • Arthritis   • Asthma   • Depression   • Enchondroma of humerus   • Headache, chronic migraine without aura, intractable   • Status post arthroscopic surgery of right knee   • Humeral fracture   • Hypercholesteremia   • Hyperlipidemia   • Essential hypertension   • Meningioma ([facility])   • Morbid obesity ([facility])   • Obstructive sleep apnea syndrome   • Vitamin D deficiency   • Seizure-like activity ([facility])   • S/p reverse total shoulder arthroplasty   • Primary osteoarthritis of left knee   • Morbid obesity with BMI of 40.0-44.9, adult ([facility])   • History of brain surgery   • Postmenopausal osteoporosis   • Effusion of right knee   • Primary osteoarthritis of right knee   • Right knee pain   • Tear of medial meniscus of right knee, current   • Sprain of medial collateral ligament of right knee   • Acute pain of left shoulder   • Loose orthopedic implant ([facility])   • Arthritis of left shoulder region   • Gastroesophageal reflux disease without esophagitis   • Abnormal bowel habits   • Diarrhea   • Generalized abdominal pain   • Gastroesophageal reflux disease with esophagitis without hemorrhage   • Acute suppurative otitis media of left ear without spontaneous rupture of tympanic membrane    • Acute upper respiratory infection   • Mixed incontinence   • Pure hypertriglyceridemia   • Early menopause occurring in patient age younger than 45 years   • Postmenopausal   • History of vitamin D deficiency     Past Medical History:   Diagnosis Date   • Allergic    • Allergic rhinitis    • Anesthesia     nausea and vomiting with anesthesia requires pre medicaiton   • Anxiety    • Arthritis    • Asthma    • Benign neoplasm of meninges (HCC)     Post op       • Cobalamin deficiency    • Corneal abrasion    • Depression    • Encounter for gynecological examination (general) (routine) without abnormal findings    • Essential hypertension    • Fatigue    • Female stress incontinence    • Head injury    • History of delayed wound healing     Delayed healing of surgical wound      • Hyperlipidemia    • Low back pain    • Macular degeneration disease    • Nosebleed, symptom     Nosebleed/epistaxis symptom      • Obesity    • Osteopenia    • Osteoporosis    • Osteoporosis    • PONV (postoperative nausea and vomiting)    • Seizures (HCC)    • Sleep apnea     not wearing c-pap   • Visual impairment    • Wears glasses      Past Surgical History:   Procedure Laterality Date   • ARM LESION/CYST EXCISION  2014    Remove arm bone lesion (1)   • ARM WOUND REPAIR / CLOSURE  2014    Repair Superficial Wound TR-EXT 2.5 < CM 36610 (1)      • BRAIN SURGERY     •  SECTION  1986     Section (1)     • COLONOSCOPY     • ENDOSCOPY  2008    Colon endoscopy 61710 (1)     • EXCISION BREAST LESION W/ PREOP NEEDLE LOC  1981    Excision, breast lesion (1)     • HEMORRHOIDECTOMY  2008    Hemorrhoidectomy (2)      • INJECTION OF MEDICATION  2013    Celestone (betamethasone) (1)      • INJECTION OF MEDICATION  2013    Depo Medrol (Methylprednisone) (1)      • INJECTION OF MEDICATION  2013    Dexamethasone (2)      • INJECTION OF MEDICATION  2014    Kenalog  (6)   • KNEE ARTHROSCOPY Right 03/05/2007    Knee arthroscopy, surgery (1)     • KNEE ARTHROSCOPY Right 07/13/2020    Procedure: KNEE ARTHROSCOPY with debridement medial meniscus;  Surgeon: Brooks Junior MD;  Location: [facility];  Service: Orthopedics;  Laterality: Right;   • LAPAROSCOPIC TUBAL LIGATION  04/16/1991    Tubal ligation (1)      • PAP SMEAR  01/18/2007    PAP SMEAR (1)      • SHOULDER SURGERY Left     x6, including total shoulder arthroplasty   • TOTAL ABDOMINAL HYSTERECTOMY WITH SALPINGO OOPHORECTOMY  1993    JOSE/BSO (1)      • TOTAL KNEE ARTHROPLASTY Left 11/19/2018    Procedure: TOTAL KNEE ARTHROPLASTY ATTUNE with adductor canal block - left;  Surgeon: Brooks Junior MD;  Location: [facility];  Service: Orthopedics      General Information     Row Name 10/06/22 1115          OT Time and Intention    Document Type therapy note (daily note)  -KD     Mode of Treatment individual therapy;occupational therapy  -KD     Row Name 10/06/22 1115          General Information    Patient Profile Reviewed yes  -KD     Existing Precautions/Restrictions fall  -KD     Row Name 10/06/22 1115          Cognition    Orientation Status (Cognition) oriented x 4  -KD     Row Name 10/06/22 1115          Safety Issues, Functional Mobility    Impairments Affecting Function (Mobility) balance;endurance/activity tolerance;range of motion (ROM);strength;shortness of breath  -KD           User Key  (r) = Recorded By, (t) = Taken By, (c) = Cosigned By    Initials Name Provider Type    Sharona Lo COTA Occupational Therapist Assistant                 Mobility/ADL's     Row Name 10/06/22 1115          Mobility    Extremity Weight-bearing Status right lower extremity  -KD     Right Lower Extremity (Weight-bearing Status) weight-bearing as tolerated (WBAT)  -KD           User Key  (r) = Recorded By, (t) = Taken By, (c) = Cosigned By    Initials Name Provider Type    Sharona Lo COTA Occupational  Therapist Assistant               Obj/Interventions    No documentation.                Goals/Plan     Row Name 10/06/22 1115          Transfer Goal 1 (OT)    Activity/Assistive Device (Transfer Goal 1, OT) transfers, all  -KD     [place] Level/Cues Needed (Transfer Goal 1, OT) modified independence  -KD     Time Frame (Transfer Goal 1, OT) long term goal (LTG);by discharge  -KD     Progress/Outcome (Transfer Goal 1, OT) goal not met  -KD     Row Name 10/06/22 1115          Bathing Goal 1 (OT)    Activity/Device (Bathing Goal 1, OT) lower body bathing  -KD     [place] Level/Cues Needed (Bathing Goal 1, OT) independent  -KD     Time Frame (Bathing Goal 1, OT) long term goal (LTG);by discharge  -KD     Progress/Outcomes (Bathing Goal 1, OT) goal not met  -KD     Row Name 10/06/22 1115          Dressing Goal 1 (OT)    Activity/Device (Dressing Goal 1, OT) lower body dressing  -KD     [place]/Cues Needed (Dressing Goal 1, OT) independent  -KD     Time Frame (Dressing Goal 1, OT) long term goal (LTG);by discharge  -KD     Progress/Outcome (Dressing Goal 1, OT) goal not met  -KD     Row Name 10/06/22 1115          Toileting Goal 1 (OT)    Activity/Device (Toileting Goal 1, OT) toileting skills, all  -KD     [place] Level/Cues Needed (Toileting Goal 1, OT) independent  -KD     Time Frame (Toileting Goal 1, OT) long term goal (LTG);by discharge  -KD     Progress/Outcome (Toileting Goal 1, OT) goal not met  -KD           User Key  (r) = Recorded By, (t) = Taken By, (c) = Cosigned By    Initials Name Provider Type    KD Sharona Moctezuma COTA Occupational Therapist Assistant               Clinical Impression     Row Name 10/06/22 1115          Pain Assessment    Pretreatment Pain Rating 6/10  -KD     Posttreatment Pain Rating 6/10  -KD     Pain Location - Side/Orientation Right  -KD     Pain Location - knee  -KD     Pain Intervention(s) Repositioned  -KD     Row Name 10/06/22 1115          Therapy  Assessment/Plan (OT)    Therapy Frequency (OT) daily  -KD     Row Name 10/06/22 1115          Therapy Plan Review/Discharge Plan (OT)    Anticipated Discharge Disposition (OT) home with assist  -KD     Row Name 10/06/22 1115          Vital Signs    Pre Systolic BP Rehab 111  -KD     Pre Treatment Diastolic BP 70  -KD     Pretreatment Heart Rate (beats/min) 69  -KD     Pre SpO2 (%) 96  -KD     O2 Delivery Pre Treatment room air  -KD     Pre Patient Position Sitting  -KD     Intra Patient Position Sitting  -KD     Post Patient Position Sitting  -KD     Row Name 10/06/22 1115          Positioning and Restraints    Pre-Treatment Position in bed  -KD     Post Treatment Position bed  -KD     In Bed sitting EOB;call light within reach;encouraged to call for assist  -KD           User Key  (r) = Recorded By, (t) = Taken By, (c) = Cosigned By    Initials Name Provider Type    Sharona Lo COTA Occupational Therapist Assistant               Outcome Measures     Row Name 10/06/22 1115          How much help from another is currently needed...    Putting on and taking off regular lower body clothing? 3  -KD     Bathing (including washing, rinsing, and drying) 3  -KD     Toileting (which includes using toilet bed pan or urinal) 3  -KD     Putting on and taking off regular upper body clothing 4  -KD     Taking care of personal grooming (such as brushing teeth) 4  -KD     Eating meals 4  -KD     AM-PAC 6 Clicks Score (OT) 21  -KD           User Key  (r) = Recorded By, (t) = Taken By, (c) = Cosigned By    Initials Name Provider Type    Sharona Lo COTA Occupational Therapist Assistant                Occupational Therapy Education                 Title: PT OT SLP Therapies (In Progress)     Topic: Occupational Therapy (Done)     Point: ADL training (Done)     Description:   Instruct learner(s) on proper safety adaptation and remediation techniques during self care or transfers.   Instruct in proper use of assistive  devices.              Learning Progress Summary           Patient Acceptance, E,TB, VU by  at 10/5/2022 1455    Comment: OT POC, Role of OT, transfer training   Family Acceptance, E,TB, VU by  at 10/5/2022 1455    Comment: OT POC, Role of OT, transfer training                   Point: Home exercise program (Done)     Description:   Instruct learner(s) on appropriate technique for monitoring, assisting and/or progressing therapeutic exercises/activities.              Learning Progress Summary           Patient Acceptance, E,TB, VU by  at 10/5/2022 1455    Comment: OT POC, Role of OT, transfer training   Family Acceptance, E,TB, VU by  at 10/5/2022 1455    Comment: OT POC, Role of OT, transfer training                   Point: Precautions (Done)     Description:   Instruct learner(s) on prescribed precautions during self-care and functional transfers.              Learning Progress Summary           Patient Acceptance, E,TB, VU by  at 10/5/2022 1455    Comment: OT POC, Role of OT, transfer training   Family Acceptance, E,TB, VU by  at 10/5/2022 1455    Comment: OT POC, Role of OT, transfer training                   Point: Body mechanics (Done)     Description:   Instruct learner(s) on proper positioning and spine alignment during self-care, functional mobility activities and/or exercises.              Learning Progress Summary           Patient Acceptance, E,TB, VU by  at 10/5/2022 1455    Comment: OT POC, Role of OT, transfer training   Family Acceptance, E,TB, VU by  at 10/5/2022 1455    Comment: OT POC, Role of OT, transfer training                               User Key     Initials Effective Dates Name Provider Type Discipline     08/11/22 -  Ashly Mukherjee OT Occupational Therapist OT              OT Recommendation and Plan  Therapy Frequency (OT): daily        Time Calculation:    Time Calculation- OT     Row Name 10/06/22 1115             Time Calculation- OT    OT Start Time 1115  -KD       OT Stop Time 1130  -KD      OT Time Calculation (min) 15 min  -KD      Total Timed Code Minutes- OT 15 minute(s)  -KD      OT Received On 10/06/22  -KD              Timed Charges    29262 - OT Self Care/Mgmt Minutes 15  -KD              Total Minutes    Timed Charges Total Minutes 15  -KD       Total Minutes 15  -KD            User Key  (r) = Recorded By, (t) = Taken By, (c) = Cosigned By    Initials Name Provider Type     Sharona Moctezuma COTA Occupational Therapist Assistant              Therapy Charges for Today     Code Description Service Date Service Provider Modifiers Qty    20696071922 HC OT SELF CARE/MGMT/TRAIN EA 15 MIN 10/6/2022 Sharona Moctezuma COTA GO 1               FELIPE Sweet  10/6/2022

## 2022-10-06 NOTE — THERAPY TREATMENT NOTE
Acute Care - Physical Therapy Treatment Note  [facility]     Patient Name: Priti Orr  : 1965  MRN: [id number]  Today's Date: 10/6/2022      Visit Dx:     ICD-10-CM ICD-9-CM   1. Primary osteoarthritis of right knee  M17.11 715.16   2. Essential hypertension  I10 401.9   3. Morbid obesity with BMI of 40.0-44.9, adult ([facility])  E66.01 278.01    Z68.41 V85.41   4. Chronic pain of right knee  M25.561 719.46    G89.29 338.29   5. Impaired mobility and ADLs  Z74.09 V49.89    Z78.9    6. Impaired functional mobility, balance, gait, and endurance  Z74.09 V49.89     Patient Active Problem List   Diagnosis   • Osteoporosis   • Chronic left shoulder pain   • Presence of left artificial shoulder joint   • Chronic pain of right knee   • Anxiety   • Arthritis   • Asthma   • Depression   • Enchondroma of humerus   • Headache, chronic migraine without aura, intractable   • Status post arthroscopic surgery of right knee   • Humeral fracture   • Hypercholesteremia   • Hyperlipidemia   • Essential hypertension   • Meningioma ([facility])   • Morbid obesity ([facility])   • Obstructive sleep apnea syndrome   • Vitamin D deficiency   • Seizure-like activity ([facility])   • S/p reverse total shoulder arthroplasty   • Primary osteoarthritis of left knee   • Morbid obesity with BMI of 40.0-44.9, adult ([facility])   • History of brain surgery   • Postmenopausal osteoporosis   • Effusion of right knee   • Primary osteoarthritis of right knee   • Right knee pain   • Tear of medial meniscus of right knee, current   • Sprain of medial collateral ligament of right knee   • Acute pain of left shoulder   • Loose orthopedic implant ([facility])   • Arthritis of left shoulder region   • Gastroesophageal reflux disease without esophagitis   • Abnormal bowel habits   • Diarrhea   • Generalized abdominal pain   • Gastroesophageal reflux disease with esophagitis without hemorrhage   • Acute suppurative otitis media of left ear without spontaneous rupture of  tympanic membrane   • Acute upper respiratory infection   • Mixed incontinence   • Pure hypertriglyceridemia   • Early menopause occurring in patient age younger than 45 years   • Postmenopausal   • History of vitamin D deficiency     Past Medical History:   Diagnosis Date   • Allergic    • Allergic rhinitis    • Anesthesia     nausea and vomiting with anesthesia requires pre medicaiton   • Anxiety    • Arthritis    • Asthma    • Benign neoplasm of meninges (HCC)     Post op       • Cobalamin deficiency    • Corneal abrasion    • Depression    • Encounter for gynecological examination (general) (routine) without abnormal findings    • Essential hypertension    • Fatigue    • Female stress incontinence    • Head injury    • History of delayed wound healing     Delayed healing of surgical wound      • Hyperlipidemia    • Low back pain    • Macular degeneration disease    • Nosebleed, symptom     Nosebleed/epistaxis symptom      • Obesity    • Osteopenia    • Osteoporosis    • Osteoporosis    • PONV (postoperative nausea and vomiting)    • Seizures (HCC)    • Sleep apnea     not wearing c-pap   • Visual impairment    • Wears glasses      Past Surgical History:   Procedure Laterality Date   • ARM LESION/CYST EXCISION  2014    Remove arm bone lesion (1)   • ARM WOUND REPAIR / CLOSURE  2014    Repair Superficial Wound TR-EXT 2.5 < CM 38041 (1)      • BRAIN SURGERY     •  SECTION  1986     Section (1)     • COLONOSCOPY     • ENDOSCOPY  2008    Colon endoscopy 62535 (1)     • EXCISION BREAST LESION W/ PREOP NEEDLE LOC  1981    Excision, breast lesion (1)     • HEMORRHOIDECTOMY  2008    Hemorrhoidectomy (2)      • INJECTION OF MEDICATION  2013    Celestone (betamethasone) (1)      • INJECTION OF MEDICATION  2013    Depo Medrol (Methylprednisone) (1)      • INJECTION OF MEDICATION  2013    Dexamethasone (2)      • INJECTION OF MEDICATION   01/29/2014    Kenalog (6)   • KNEE ARTHROSCOPY Right 03/05/2007    Knee arthroscopy, surgery (1)     • KNEE ARTHROSCOPY Right 07/13/2020    Procedure: KNEE ARTHROSCOPY with debridement medial meniscus;  Surgeon: Brooks Junior MD;  Location: [facility];  Service: Orthopedics;  Laterality: Right;   • LAPAROSCOPIC TUBAL LIGATION  04/16/1991    Tubal ligation (1)      • PAP SMEAR  01/18/2007    PAP SMEAR (1)      • SHOULDER SURGERY Left     x6, including total shoulder arthroplasty   • TOTAL ABDOMINAL HYSTERECTOMY WITH SALPINGO OOPHORECTOMY  1993    JOSE/BSO (1)      • TOTAL KNEE ARTHROPLASTY Left 11/19/2018    Procedure: TOTAL KNEE ARTHROPLASTY ATTUNE with adductor canal block - left;  Surgeon: Brooks Junior MD;  Location: [facility];  Service: Orthopedics     PT Assessment (last 12 hours)     PT Evaluation and Treatment     Row Name 10/06/22 1009          Physical Therapy Time and Intention    Subjective Information complains of;pain  -TW     Document Type therapy note (daily note)  -TW     Mode of Treatment physical therapy;individual therapy  -TW     Patient Effort good  -TW     Row Name 10/06/22 1009          General Information    Patient Profile Reviewed yes  -TW     Patient Observations alert;cooperative;agree to therapy  -TW     Patient/Family/Caregiver Comments/Observations Friend present.  -TW     General Observations of Patient Pt sup in bed.  -TW     Row Name 10/06/22 1009          Pain    Pretreatment Pain Rating 6/10  -TW     Posttreatment Pain Rating 7/10  -TW     Pain Location - Side/Orientation Right  -TW     Pain Location - knee  -TW     Row Name 10/06/22 1009          Cognition    Affect/Mental Status (Cognition) WNL  -TW     Orientation Status (Cognition) oriented x 4  -TW     Follows Commands (Cognition) WNL  -TW     Cognitive Function WNL  -TW     Personal Safety Interventions fall prevention program maintained;gait belt;nonskid shoes/slippers when out of bed;muscle strengthening  facilitated  -TW     Row Name 10/06/22 1009          Mobility    Right Lower Extremity (Weight-bearing Status) weight-bearing as tolerated (WBAT)  -TW     Row Name 10/06/22 1009          Bed Mobility    All Activities, [place] (Bed Mobility) modified independence  -TW     Assistive Device (Bed Mobility) head of bed elevated  -TW     Row Name 10/06/22 1009          Transfers    Sit-Stand [place] (Transfers) standby assist  -TW     Stand-Sit [place] (Transfers) supervision;verbal cues  -TW     Row Name 10/06/22 1009          Sit-Stand Transfer    Assistive Device (Sit-Stand Transfers) walker, front-wheeled  -TW     Row Name 10/06/22 1009          Stand-Sit Transfer    Assistive Device (Stand-Sit Transfers) walker, front-wheeled  -TW     Row Name 10/06/22 1009          Gait/Stairs (Locomotion)    [place] Level (Gait) standby assist  -TW     Assistive Device (Gait) walker, front-wheeled  -TW     Distance in Feet (Gait) 128ft  -TW     [place] Level (Stairs) stand by assist;verbal cues  -TW     Handrail Location (Stairs) both sides  -TW     Number of Steps (Stairs) 5 x4  -TW     Ascending Technique (Stairs) step-to-step  -TW     Descending Technique (Stairs) step-to-step  -TW     Row Name             Wound 10/05/22 1103 Right knee Incision    Wound - Properties Group Placement Date: 10/05/22  -KW Placement Time: 1103  -KW Present on Hospital Admission: N  -KW Side: Right  -KW Location: knee  -KW Primary Wound Type: Incision  -KW     Retired Wound - Properties Group Placement Date: 10/05/22  -KW Placement Time: 1103  -KW Present on Hospital Admission: N  -KW Side: Right  -KW Location: knee  -KW Primary Wound Type: Incision  -KW     Retired Wound - Properties Group Date first assessed: 10/05/22  -KW Time first assessed: 1103  -KW Present on Hospital Admission: N  -KW Side: Right  -KW Location: knee  -KW Primary Wound Type: Incision  -KW     Row Name 10/06/22 1009          Plan of Care Review    Plan  of Care Reviewed With patient;friend  -TW     Progress improving  -TW     Outcome Evaluation Pt was sup in bed with friend present and agreeable to therapy. Pt t/f sup to sit with mod ind with HOB elevated. Pt stood with SBA and amb 128ft with RW and SBA. Pt performed step training with CGA for safety only for 5 steps x 4 trials. Pt returned to room and t/f to sup with mod ind and only small increase in R knee pain. Pt verb good understanding of home care instructions and voiced no concerns with being DC home. Pt would cont to benefit from tehrapy upon DC.  -TW     Row Name 10/06/22 1009          Vital Signs    Pretreatment Heart Rate (beats/min) 72  -TW     Intratreatment Heart Rate (beats/min) 85  -TW     Posttreatment Heart Rate (beats/min) 78  -TW     Pre SpO2 (%) 96  -TW     O2 Delivery Pre Treatment room air  -TW     Intra SpO2 (%) 97  -TW     Post SpO2 (%) 94  -TW     Pre Patient Position Supine  -TW     Intra Patient Position Standing  -TW     Post Patient Position Supine  -TW     Row Name 10/06/22 1009          Positioning and Restraints    Pre-Treatment Position in bed  -TW     Post Treatment Position bed  -TW     In Bed supine;call light within reach;encouraged to call for assist;with family/caregiver  -TW     Row Name 10/06/22 1009          Therapy Assessment/Plan (PT)    Rehab Potential (PT) good, to achieve stated therapy goals  -TW     Row Name 10/06/22 1009          Bed Mobility Goal 1 (PT)    Activity/Assistive Device (Bed Mobility Goal 1, PT) sit to supine;supine to sit  -TW     [place] Level/Cues Needed (Bed Mobility Goal 1, PT) independent  -TW     Time Frame (Bed Mobility Goal 1, PT) by discharge  -TW     Progress/Outcomes (Bed Mobility Goal 1, PT) goal not met  -TW     Row Name 10/06/22 1009          Transfer Goal 1 (PT)    Activity/Assistive Device (Transfer Goal 1, PT) sit-to-stand/stand-to-sit;bed-to-chair/chair-to-bed;walker, rolling  -TW     [place] Level/Cues Needed (Transfer  Goal 1, PT) modified independence  -TW     Time Frame (Transfer Goal 1, PT) by discharge  -TW     Progress/Outcome (Transfer Goal 1, PT) goal not met  -TW     Row Name 10/06/22 1009          Gait Training Goal 1 (PT)    Activity/Assistive Device (Gait Training Goal 1, PT) gait (walking locomotion);decrease fall risk;increase endurance/gait distance;walker, rolling  -TW     [place] Level (Gait Training Goal 1, PT) standby assist;modified independence  -TW     Distance (Gait Training Goal 1, PT) 150ft x2  -TW     Time Frame (Gait Training Goal 1, PT) by discharge  -TW     Progress/Outcome (Gait Training Goal 1, PT) goal not met  -TW     Row Name 10/06/22 1009          ROM Goal 1 (PT)    ROM Goal 1 (PT) AROM R knee 0 degrees to 110 degrees  -TW     Time Frame (ROM Goal 1, PT) 3 days  -TW     Progress/Outcome (ROM Goal 1, PT) goal not met  -TW     Row Name 10/06/22 1009          Stairs Goal 1 (PT)    Activity/Assistive Device (Stairs Goal 1, PT) ascending stairs;descending stairs;using handrail, left  -TW     [place] Level/Cues Needed (Stairs Goal 1, PT) standby assist;modified independence  -TW     Number of Stairs (Stairs Goal 1, PT) 1  -TW     Time Frame (Stairs Goal 1, PT) by discharge  -TW     Progress/Outcome (Stairs Goal 1, PT) goal not met  -TW           User Key  (r) = Recorded By, (t) = Taken By, (c) = Cosigned By    Initials Name Provider Type    TW Zaid Bowers PTA Physical Therapist Assistant    Lori Bejarano RN Registered Nurse                Physical Therapy Education                 Title: PT OT SLP Therapies (In Progress)     Topic: Physical Therapy (In Progress)     Point: Mobility training (In Progress)     Learning Progress Summary           Patient Acceptance, E, NR by GETACHEW at 10/5/2022 1630                   Point: Home exercise program (Not Started)     Learner Progress:  Not documented in this visit.          Point: Body mechanics (In Progress)     Learning Progress Summary            Patient Acceptance, E, NR by  at 10/5/2022 1630                   Point: Precautions (In Progress)     Learning Progress Summary           Patient Acceptance, E, NR by  at 10/5/2022 1630                               User Key     Initials Effective Dates Name Provider Type Discipline     06/16/21 -  Juliana Duffy, PT Physical Therapist PT              PT Recommendation and Plan  Anticipated Discharge Disposition (PT): home with assist, home with outpatient therapy services  Plan of Care Reviewed With: patient, friend  Progress: improving  Outcome Evaluation: Pt was sup in bed with friend present and agreeable to therapy. Pt t/f sup to sit with mod ind with HOB elevated. Pt stood with SBA and amb 128ft with RW and SBA. Pt performed step training with CGA for safety only for 5 steps x 4 trials. Pt returned to room and t/f to sup with mod ind and only small increase in R knee pain. Pt verb good understanding of home care instructions and voiced no concerns with being DC home. Pt would cont to benefit from tehrapy upon DC.       Time Calculation:    PT Charges     Row Name 10/06/22 1156             Time Calculation    Start Time 1009  -TW      Stop Time 1035  -TW      Time Calculation (min) 26 min  -TW              Time Calculation- PT    Total Timed Code Minutes- PT 26 minute(s)  -TW            User Key  (r) = Recorded By, (t) = Taken By, (c) = Cosigned By    Initials Name Provider Type     Zaid Bowers PTA Physical Therapist Assistant              Therapy Charges for Today     Code Description Service Date Service Provider Modifiers Qty    58074902978 HC GAIT TRAINING EA 15 MIN 10/6/2022 Zaid Bowers PTA GP 1    78632155220 HC PT THERAPEUTIC ACT EA 15 MIN 10/6/2022 Zaid Bowers PTA GP 1          PT G-Codes  Outcome Measure Options: AM-PAC 6 Clicks Daily Activity (OT)  AM-PAC 6 Clicks Score (PT): 20  AM-PAC 6 Clicks Score (OT): 21    Zaid Bowers PTA  10/6/2022

## 2022-10-06 NOTE — PLAN OF CARE
Goal Outcome Evaluation:  Plan of Care Reviewed With: patient           Outcome Evaluation: patient resting in bed.  assessment. care and medication administration complete and noted.  b/p medications held per b/p parameter.  patient with Standby assist to bathroom this shift.  patient request that pain meds continue at q4 option as per her experience from last knee sugery and pain tolerence.  patient was offered pain medication as per request of time frequency.  IV fluids continue.  RN monitoring to continue

## 2022-10-06 NOTE — PROGRESS NOTES
Orthopedic Total Knee Progress Note      Patient: Priti Orr  Date of Admission: 10/5/2022  YOB: 1965  Medical Record Number: [id number]    LOS: 0    Status Post: Procedure(s) (LRB):  RIGHT TOTAL KNEE ARTHROPLASTY WITH ADDUCTOR CANAL BLOCK (Right)        Patient has no complaints this morning. Denies any fevers, chills, chest pain, shortness of breath. States she started to feel a little more pain in the knee as the block is wearing off, but overall pain has been well controlled.  She is able to ambulate to the bathroom with a walker.       Physical Exam:  56 y.o. female   Temp:  [96.9 °F (36.1 °C)-98.5 °F (36.9 °C)] 97.6 °F (36.4 °C)  Heart Rate:  [67-90] 69  Resp:  [17-20] 18  BP: ()/(52-76) 111/70  FiO2 (%):  [50 %] 50 %  Temp (24hrs), Av.5 °F (36.4 °C), Min:96.9 °F (36.1 °C), Max:98.5 °F (36.9 °C)    Physical Exam  Vitals reviewed.   Constitutional:       General: She is not in acute distress.     Appearance: Normal appearance. She is not ill-appearing.   Cardiovascular:      Rate and Rhythm: Normal rate and regular rhythm.   Pulmonary:      Effort: Pulmonary effort is normal. No respiratory distress.      Breath sounds: Normal breath sounds.   Musculoskeletal:         General: No swelling.      Comments: Homans sign negative    Skin:     Comments: Incision is covered with dressing. Dry. No erythema or warm   Neurological:      Mental Status: She is alert and oriented to person, place, and time.         Activity: Mobilizing Per P.T.   Weight Bearing: As Tolerated    Data Review  Admission on 10/05/2022   Component Date Value Ref Range Status   • ABO Type 10/05/2022 A   Final   • RH type 10/05/2022 Positive   Final   • Antibody Screen 10/05/2022 Negative   Final   • T&S Expiration Date 10/05/2022 10/8/2022 11:59:59 PM   Final   • Previous History 10/05/2022 Previous Record on File   Final   • Hemoglobin 10/05/2022 11.9 (A) 12.0 - 15.9 g/dL Final   • Hematocrit 10/05/2022 36.9   34.0 - 46.6 % Final       No results found.    Medications  acetaminophen, 1,000 mg, Oral, 4x Daily  apixaban, 2.5 mg, Oral, Q12H  buPROPion XL, 150 mg, Oral, QAM  DULoxetine, 30 mg, Oral, Daily  famotidine, 40 mg, Oral, Daily  ferrous sulfate, 324 mg, Oral, Daily With Breakfast  hydroCHLOROthiazide, 12.5 mg, Oral, Daily  lisinopril, 20 mg, Oral, Q24H  montelukast, 10 mg, Oral, Nightly  rosuvastatin, 5 mg, Oral, Q PM  senna-docusate sodium, 2 tablet, Oral, Nightly  sodium chloride, 3 mL, Intravenous, Q12H      albuterol  •  electrolyte-A  •  HYDROmorphone **AND** naloxone  •  ondansetron **OR** ondansetron  •  oxyCODONE  •  sodium chloride    Assessment:  Doing well following total knee replacement  Patient Active Problem List   Diagnosis   • Osteoporosis   • Chronic left shoulder pain   • Presence of left artificial shoulder joint   • Chronic pain of right knee   • Anxiety   • Arthritis   • Asthma   • Depression   • Enchondroma of humerus   • Headache, chronic migraine without aura, intractable   • Status post arthroscopic surgery of right knee   • Humeral fracture   • Hypercholesteremia   • Hyperlipidemia   • Essential hypertension   • Meningioma ([facility])   • Morbid obesity ([facility])   • Obstructive sleep apnea syndrome   • Vitamin D deficiency   • Seizure-like activity ([facility])   • S/p reverse total shoulder arthroplasty   • Primary osteoarthritis of left knee   • Morbid obesity with BMI of 40.0-44.9, adult ([facility])   • History of brain surgery   • Postmenopausal osteoporosis   • Effusion of right knee   • Primary osteoarthritis of right knee   • Right knee pain   • Tear of medial meniscus of right knee, current   • Sprain of medial collateral ligament of right knee   • Acute pain of left shoulder   • Loose orthopedic implant ([facility])   • Arthritis of left shoulder region   • Gastroesophageal reflux disease without esophagitis   • Abnormal bowel habits   • Diarrhea   • Generalized abdominal pain   • Gastroesophageal reflux  disease with esophagitis without hemorrhage   • Acute suppurative otitis media of left ear without spontaneous rupture of tympanic membrane   • Acute upper respiratory infection   • Mixed incontinence   • Pure hypertriglyceridemia   • Early menopause occurring in patient age younger than 45 years   • Postmenopausal   • History of vitamin D deficiency         Assessment and Plan:   #S/p right total knee arthroplasty  - Pain controlled with current prescriptions  - Continue ambulation and mobilization  - Continue working with PT  - Incision care discussed with patient   - Patient advised on incentive spirometry use  - DVT prophylaxis ordered    Discharge Plan: Home today    Signature        Maria Elena Mobley MD PGY2  [facility]  [address] [place] [address]  Office: [phone]    This document has been electronically signed by Maria Elena Ahmadi MD on October 6, 2022 07:42 CDT

## 2022-10-06 NOTE — NURSING NOTE
Patient discharged per order. IV removed. AVS reviewed with patient. Discharge instructions and follow up appointment discussed. Medications delivered to bedside.All questions and concerns addressed with patient. Awaiting wheelchair from transport.

## 2022-10-07 ENCOUNTER — HOSPITAL ENCOUNTER (OUTPATIENT)
Dept: PHYSICAL THERAPY | Facility: HOSPITAL | Age: 57
Setting detail: THERAPIES SERIES
Discharge: HOME | End: 2022-10-07

## 2022-10-07 DIAGNOSIS — M25.561 CHRONIC PAIN OF RIGHT KNEE: ICD-10-CM

## 2022-10-07 DIAGNOSIS — M17.11 PRIMARY OSTEOARTHRITIS OF RIGHT KNEE: Primary | ICD-10-CM

## 2022-10-07 DIAGNOSIS — Z78.9 IMPAIRED MOBILITY AND ADLS: ICD-10-CM

## 2022-10-07 DIAGNOSIS — Z74.09 IMPAIRED FUNCTIONAL MOBILITY, BALANCE, GAIT, AND ENDURANCE: ICD-10-CM

## 2022-10-07 DIAGNOSIS — G89.29 CHRONIC PAIN OF RIGHT KNEE: ICD-10-CM

## 2022-10-07 DIAGNOSIS — Z74.09 IMPAIRED MOBILITY AND ADLS: ICD-10-CM

## 2022-10-07 LAB — REF LAB TEST METHOD: NORMAL

## 2022-10-07 PROCEDURE — 97163 PT EVAL HIGH COMPLEX 45 MIN: CPT | Performed by: PHYSICAL THERAPIST

## 2022-10-08 NOTE — THERAPY EVALUATION
Outpatient Physical Therapy Ortho Initial Evaluation  [facility]     Patient Name: Priti Orr  : 1965  MRN: [id number]  Today's Date: 10/7/2022      Visit Date: 10/07/2022    Attendance:  (authorization required)  Subjective Improvement: n/a  Next MD Appt: 10/20/22  Recert Date: 10/28/22    Therapy Diagnosis: R total knee arthoplasty 10/5/22       Past Medical History:   Diagnosis Date   • Allergic    • Allergic rhinitis    • Anesthesia     nausea and vomiting with anesthesia requires pre medicaiton   • Anxiety    • Arthritis    • Asthma    • Benign neoplasm of meninges (HCC)     Post op       • Cobalamin deficiency    • Corneal abrasion    • Depression    • Encounter for gynecological examination (general) (routine) without abnormal findings    • Essential hypertension    • Fatigue    • Female stress incontinence    • Head injury    • History of delayed wound healing     Delayed healing of surgical wound      • Hyperlipidemia    • Low back pain    • Macular degeneration disease    • Nosebleed, symptom     Nosebleed/epistaxis symptom      • Obesity    • Osteopenia    • Osteoporosis    • Osteoporosis    • PONV (postoperative nausea and vomiting)    • Seizures (HCC)    • Sleep apnea     not wearing c-pap   • Visual impairment    • Wears glasses         Past Surgical History:   Procedure Laterality Date   • ARM LESION/CYST EXCISION  2014    Remove arm bone lesion (1)   • ARM WOUND REPAIR / CLOSURE  2014    Repair Superficial Wound TR-EXT 2.5 < CM 59639 (1)      • BRAIN SURGERY     •  SECTION  1986     Section (1)     • COLONOSCOPY     • ENDOSCOPY  2008    Colon endoscopy 39050 (1)     • EXCISION BREAST LESION W/ PREOP NEEDLE LOC  1981    Excision, breast lesion (1)     • HEMORRHOIDECTOMY  2008    Hemorrhoidectomy (2)      • INJECTION OF MEDICATION  2013    Celestone (betamethasone) (1)      • INJECTION OF MEDICATION   Bed: 10  Expected date: 12/15/19  Expected time: 6:03 PM  Means of arrival: [facility]  Comments:  68 y/o male with c/o weakness. A&Ox4. RO=042/66, 100, 16, 97%. DJ=676. Hx of colon and liver cancer. ECG faxed. Report received by Hueyn Willard RN.   Harpreet Jimenez Jr. informed of need for urine specimen. Urinal provided. Pt unable to void at this time. Continue to await specimen. Pt verbalized understanding.      Observation FYWB given to patient.    [place] ED to IP Report (EDIP)    Mental Status     Sleepy but easily arousable    Safety     Fall Risk    Tele  Yes    Oxygen Needs  room air    Mobility    Unsteady Gait and Up with 2 Assist    Protocols  None    ISAR     3 (12/15/19 1815)    Isolation  None    Additional Information: Patient has not ambulated in the ED. Pt reports he is unsteady on his feet and has been using a wheelchair.    12/04/2013    Depo Medrol (Methylprednisone) (1)      • INJECTION OF MEDICATION  05/21/2013    Dexamethasone (2)      • INJECTION OF MEDICATION  01/29/2014    Kenalog (6)   • KNEE ARTHROSCOPY Right 03/05/2007    Knee arthroscopy, surgery (1)     • KNEE ARTHROSCOPY Right 07/13/2020    Procedure: KNEE ARTHROSCOPY with debridement medial meniscus;  Surgeon: Brooks Junior MD;  Location: [facility];  Service: Orthopedics;  Laterality: Right;   • LAPAROSCOPIC TUBAL LIGATION  04/16/1991    Tubal ligation (1)      • PAP SMEAR  01/18/2007    PAP SMEAR (1)      • SHOULDER SURGERY Left     x6, including total shoulder arthroplasty   • TOTAL ABDOMINAL HYSTERECTOMY WITH SALPINGO OOPHORECTOMY  1993    JOSE/BSO (1)      • TOTAL KNEE ARTHROPLASTY Left 11/19/2018    Procedure: TOTAL KNEE ARTHROPLASTY ATTUNE with adductor canal block - left;  Surgeon: Brooks Junior MD;  Location: [facility];  Service: Orthopedics     Allergies   Allergen Reactions   • Morphine Other (See Comments)     States felt like itching from within, hallucinations, agitation     • Pseudoephedrine Shortness Of Breath and Swelling     LIPS SWELL AND TONGUE BECOMES THICK       Visit Dx:     ICD-10-CM ICD-9-CM   1. Primary osteoarthritis of right knee  M17.11 715.16   2. Chronic pain of right knee  M25.561 719.46    G89.29 338.29   3. Impaired mobility and ADLs  Z74.09 V49.89    Z78.9    4. Impaired functional mobility, balance, gait, and endurance  Z74.09 V49.89          Patient History     Row Name 10/07/22 1300             History    Chief Complaint Difficulty Walking;Difficulty with daily activities;Pain  -SS      Type of Pain Knee pain  right  -SS      Date Current Problem(s) Began 10/05/22  -      Brief Description of Current Complaint Patient underwent R total knee arthroplasty on 10/5/22. Discharged on 10/6/22. R knee seems different than the left total knee in the past. Knee feels very tight in the posterior knee. Knee is painful. Pain  "with weightbearing and ROM. Lives in a single story house with 1 step to enter and none inside.  -SS      Patient/Caregiver Goals Comment \"I just want to be able to walk.\"  -SS      Current Tobacco Use no  -SS      Smoking Status never  -SS      Patient's Rating of General Health Good  -SS      Occupation/sports/leisure activities [employer]  [employer]. Hobbies: grandchild, power walk, read  -SS      Surgery/Hospitalization 10/5/22 - R TKA  -SS              Pain     Pain Location Knee  right  -SS      Pain at Present 5  -SS      Pain at Best 2  -SS      Pain at Worst 6  -SS      Pain Frequency Constant/continuous  -SS      Pain Description Sharp  -SS      What Performance Factors Make the Current Problem(s) WORSE? weightbearing, motion  -SS      What Performance Factors Make the Current Problem(s) BETTER? position knee  -SS      Is your sleep disturbed? Yes  -SS      Is medication used to assist with sleep? Yes  -SS      Difficulties at work? currently off work  -SS      Difficulties with ADL's? decreased  -SS      Difficulties with recreational activities? decreased  -SS              Fall Risk Assessment    Any falls in the past year: No  -SS      Does patient have a fear of falling No  -SS              Daily Activities    Primary Language English  -              Safety    Are you being hurt, hit, or frightened by anyone at home or in your life? No  -SS      Have you had any of the following issues with Anxiety;Depression  -SS            User Key  (r) = Recorded By, (t) = Taken By, (c) = Cosigned By    Initials Name Provider Type     Nathaniel Sharp, PT DPT Physical Therapist                 PT Ortho     Row Name 10/07/22 1300       Subjective Comments    Subjective Comments see Therapy Patient History  -       Precautions and Contraindications    Precautions R TKA 10/5/22; history of L TKA and L total shoulder with revision  -SS    Contraindications caution with forces through L UE  " -       Subjective Pain    Able to rate subjective pain? yes  -    Pre-Treatment Pain Level 5  -    Post-Treatment Pain Level 5  -       Posture/Observations    Posture/Observations Comments Presents ambulating with rolling walker. Antalgic gait. Stapled incision anterior knee.,  -SS       Right Lower Ext    Rt Knee Extension/Flexion AROM 0-15-83 deg, pain each  -       MMT Right Lower Ext    Rt Lower Extremity Comments  7 deg additional extension lag with flexion SLR.  -          User Key  (r) = Recorded By, (t) = Taken By, (c) = Cosigned By    Initials Name Provider Type    Nathaniel Zhang, PT DPT Physical Therapist                            Therapy Education  Education Details: therapy plan; heel prop for extension stretching, knee AROM  Given: HEP, Other (comment)  How Provided: Verbal, Demonstration  Provided to: Patient  Level of Understanding: Verbalized, Demonstrated      PT OP Goals     Row Name 10/07/22 1300          PT Short Term Goals    STG Date to Achieve 10/28/22  -     STG 1 Note a >/= 50% subjective improvement.  -     STG 2 Complete LEFS.  -     STG 3 R knee active extension to be -5 deg or better.  -     STG 4 R knee active flexion to be >/= 105 deg.  -     STG 5 Demonstrate ability to ambulate household distances with cane.  -            Long Term Goals    LTG Date to Achieve 12/02/22  -     LTG 1 Independent with HEP/self-management.  -     LTG 2 LEFS score to be >/= 60/80.  -     LTG 3 L knee active extension to 0 deg.  -     LTG 4 L knee active flexion to be >/= 120 deg.  -     LTG 5 Demonstrate ability to ambulate community distances on firm, level surfaces with minimal deviation.  -     LTG 6 Resume walking for exercise.  -            Time Calculation    PT Goal Re-Cert Due Date 10/28/22  -           User Key  (r) = Recorded By, (t) = Taken By, (c) = Cosigned By    Initials Name Provider Type    Nathaniel Zhang, PT DPT Physical  Therapist                 PT Assessment/Plan     Row Name 10/07/22 1300          PT Assessment    Functional Limitations Impaired gait;Limitation in home management;Limitations in community activities;Limitations in functional capacity and performance;Performance in leisure activities;Performance in self-care ADL;Performance in work activities  -     Impairments Balance;Edema;Endurance;Gait;Integumentary integrity;Joint mobility;Pain;Muscle strength;Range of motion  -     Assessment Comments Patient is 2 days s/p  total knee arthroplasty on 10/5/22. Presence of revision L total shoulder arthroplasty that is tenuous will impact her gait progression.  -     Rehab Potential Good  -     Patient/caregiver participated in establishment of treatment plan and goals Yes  -SS     Patient would benefit from skilled therapy intervention Yes  -SS            PT Plan    PT Frequency 2x/week;3x/week  -     Predicted Duration of Therapy Intervention (PT) 6-8 weeks  -     Planned CPT's? PT EVAL HIGH COMPLEXITY: 82102;PT THER PROC EA 15 MIN: 96268;PT THER ACT EA 15 MIN: 89065;PT MANUAL THERAPY EA 15 MIN: 20299;PT GAIT TRAINING EA 15 MIN: 99362;PT SELF CARE/HOME MGMT/TRAIN EA 15: 20941;PT HOT OR COLD PACK TREAT MCARE;PT ELECTRICAL STIM UNATTEND:   -     PT Plan Comments Knee ROM, LE stretching and strengthening, gait training, balance training, ice with or without IFC estim as needed for pain. When progressing patient to cane, cane will need to be used in R hand due to tenuous revision arthroplasty of the L shoulder.  -           User Key  (r) = Recorded By, (t) = Taken By, (c) = Cosigned By    Initials Name Provider Type     Nathaniel Sharp, PT DPT Physical Therapist                                              Time Calculation:     Start Time: 1301  Stop Time: 1340  Time Calculation (min): 39 min     Therapy Charges for Today     Code Description Service Date Service Provider Modifiers Qty    [id number]  HC PT EVAL HIGH COMPLEXITY 3 10/7/2022 Nathaniel Sharp, PT DPT GP 1                    Nathaniel Sharp, PT, DPT, CHT  10/7/2022       within 1-3 days

## 2022-10-12 DIAGNOSIS — Z96.651 S/P TKR (TOTAL KNEE REPLACEMENT), RIGHT: Primary | ICD-10-CM

## 2022-10-12 NOTE — DISCHARGE SUMMARY
Patient Name: Priti Orr  Patient YOB: 1965    Date of Admission:  10/5/2022  Date of Discharge:  10/6/2022  Discharge Diagnosis:   Primary osteoarthritis of right knee    Essential hypertension    Morbid obesity with BMI of 40.0-44.9, adult (HCC)    Chronic pain of right knee    Impaired mobility and ADLs    Impaired functional mobility, balance, gait, and endurance    Presenting Problem/History of Present Illness: Primary osteoarthritis of right knee [M17.11]  Chronic pain of right knee [M25.561, G89.29]  Essential hypertension [I10]  Morbid obesity with BMI of 40.0-44.9, adult (HCC) [E66.01, Z68.41]    Procedure:  Procedure(s) (LRB):  RIGHT TOTAL KNEE ARTHROPLASTY WITH ADDUCTOR CANAL BLOCK (Right)    Admitting Physician:  Brooks Junior MD    Consults:   Consults     No orders found from 9/6/2022 to 10/6/2022.          DETAILS OF HOSPITAL STAY:  Patient is a 56 y.o. female was admitted to the floor following the above procedure and underwent an uncomplicated hospital stay.  Patient did well with physical therapy and was ambulating well without problems.  On the day of discharge the wound was clean, dry and intact and calf was soft and non tender and Homans sign was negative.  Patient was tolerating diet without problems.  Patient was discharged home.    Condition on Discharge:  Stable      LABS:      Admission on 10/05/2022, Discharged on 10/06/2022   Component Date Value Ref Range Status   • ABO Type 10/05/2022 A   Final   • RH type 10/05/2022 Positive   Final   • Antibody Screen 10/05/2022 Negative   Final   • T&S Expiration Date 10/05/2022 10/8/2022 11:59:59 PM   Final   • Previous History 10/05/2022 Previous Record on File   Final   • Reference Lab Report 10/05/2022    Final                    Value:[facility]  [address] [place] [address]  Phone: [phone] or [phone]  Fax: [phone]  Gregg Pham M.D., Medical  "Director    PATIENT NAME                           LABORATORY NO.  [id number]                  [id number]  [id number]                         AGE              SEX  SSN           CLIENT REF #  [facility]           56      1965  F    [id number]   [id number]    [place]                       REQUESTING M.D.     ATTENDING M.D.     COPY TO.  [address] [place] [address]                 [place] [address]             DATE COLLECTED      DATE RECEIVED      DATE REPORTED  10/05/2022          10/05/2022         10/07/2022    DIAGNOSIS:  BONE FRAGMENTS, GROSS ONLY, RIGHT KNEE:  GROSS DIAGNOSIS:  Changes consistent with osteoarthritis    JYOTSNA/sm    CLINICAL HISTORY:  Primary osteoarthritis of right knee, chronic pain of right knee, essential  hypertension,                           morbid obesity with BMI of 40.0-44.9, adult    SPECIMENS RECEIVED:  BONE FRAGMENTS, GROSS ONLY, RIGHT KNEE    Professional interpretation rendered by Sebas Lara M.D., F.C.A.P. at [employer], [address] [place] [address].    GROSS DESCRIPTION:  Specimen received in formalin labeled \"bone and soft tissue right knee\" and  consists of multiple fragmented portions of white-tan semifirm to firm  osteocartilaginous tissue including portions of tibial plateau and femoral  condyles measuring 7.0 x 6.5 x 6.0 cm in aggregate.  The articular surfaces are  tan and glistening with markedly roughened areas granularity and multiple areas  of eburnation.  No sections are submitted.  JTM/RLL    REVIEWED, DIAGNOSED AND ELECTRONICALLY  SIGNED BY:    Sebas Lara M.D., F.C.A.P.  CPT CODES:  69603     • Hemoglobin 10/05/2022 11.9 (L)  12.0 - 15.9 g/dL Final   • Hematocrit 10/05/2022 36.9  34.0 - 46.6 % Final       XR Knee 1 or 2 View Right    Result Date: 10/5/2022  Narrative: Two view right leg HISTORY: Arthroplasty AP and lateral views obtained portably at 12:34 PM. " COMPARISON: May 10, 2022 FINDINGS: Total knee prosthesis now in place. Anatomic relationship of the components of the prosthesis. Immediate post operative intra-articular and periarticular air. Surgical skin staples. No fracture or dislocation. No other osseous or articular abnormality.     Impression: CONCLUSION: Immediate postop right total knee arthroplasty. [id number] Electronically signed by:  Nathaniel Rizo MD  10/5/2022 1:00 PM CDT Workstation: [id number]    Peripheral Block    Result Date: 10/5/2022  Narrative: Orestes Woody MD     10/5/2022  9:11 AM Peripheral Block Patient reassessed immediately prior to procedure Patient location during procedure: holding area Stop time: 10/5/2022 9:03 AM Reason for block: at surgeon's request and post-op pain management Performed by Assisted by: Orestes Woody MDSRNA: Suzanne Staples SRNA Preanesthetic Checklist Completed: patient identified, IV checked, site marked, risks and benefits discussed, surgical consent, monitors and equipment checked, pre-op evaluation and timeout performed Prep: Pt Position: supine Sterile barriers:cap, gloves, mask and washed/disinfected hands Prep: ChloraPrep Patient monitoring: blood pressure monitoring and continuous pulse oximetry Procedure Sedation: no Performed under: local infiltration Guidance:ultrasound guided ULTRASOUND INTERPRETATION.  Using ultrasound guidance a 21 G gauge needle was placed in close proximity to the femoral nerve, at which point, under ultrasound guidance anesthetic was injected in the area of the nerve and spread of the anesthesia was seen on ultrasound in close proximity thereto.  There were no abnormalities seen on ultrasound; a digital image was taken; and the patient tolerated the procedure with no complications. Images:still images obtained, printed/placed on chart Laterality:right Block Type:adductor canal block Injection Technique:single-shot Needle Type:echogenic Needle Gauge:21 G Medications Used:  ropivacaine (NAROPIN) 0.5 % injection, 30 mL Med administered at 10/5/2022 9:02 AM Medications Comment:Pt identified Ultrasound guided Needle seen throughout Local infiltration appropriate Post Assessment Injection Assessment: negative aspiration for heme, no paresthesia on injection and incremental injection Patient Tolerance:comfortable throughout block Complications:no       Discharge Medications     Discharge Medications      New Medications      Instructions Start Date   aspirin 81 MG EC tablet   81 mg, Oral, 2 Times Daily   Start Date: October 21, 2022     Eliquis 2.5 MG tablet tablet  Generic drug: apixaban   Take 1 tablet by mouth Every 12 (Twelve) Hours for 28 doses.      ferrous sulfate 324 (65 Fe) MG tablet delayed-release EC tablet   324 mg, Oral, Daily With Breakfast      oxyCODONE-acetaminophen 7.5-325 MG per tablet  Commonly known as: PERCOCET   1 tablet, Oral, Every 6 Hours PRN      Stool Softener Plus Laxative 8.6-50 MG per tablet  Generic drug: sennosides-docusate   2 tablets, Oral, Nightly         Continue These Medications      Instructions Start Date   acetaminophen 650 MG 8 hr tablet  Commonly known as: TYLENOL   650 mg, Oral, Every 8 Hours PRN      albuterol sulfate  (90 Base) MCG/ACT inhaler  Commonly known as: Ventolin HFA   2 puffs, Inhalation, Every 4 Hours PRN      amoxicillin 500 MG capsule  Commonly known as: AMOXIL   500 mg, Oral, As Needed      buPROPion  MG 24 hr tablet  Commonly known as: WELLBUTRIN XL   150 mg, Oral, Every Morning      DULoxetine 30 MG capsule  Commonly known as: CYMBALTA   30 mg, Oral, Daily      fluticasone-salmeterol 115-21 MCG/ACT inhaler  Commonly known as: ADVAIR HFA   1 puff, Inhalation, As Needed      lisinopril-hydrochlorothiazide 20-12.5 MG per tablet  Commonly known as: PRINZIDE,ZESTORETIC   1 tablet, Oral, Daily      montelukast 10 MG tablet  Commonly known as: Singulair   10 mg, Oral, Nightly      Prolia 60 MG/ML solution prefilled syringe  syringe  Generic drug: denosumab   60 mg, Subcutaneous, Every 6 Months      rosuvastatin 5 MG tablet  Commonly known as: CRESTOR   5 mg, Oral, Every Evening         Stop These Medications    meloxicam 15 MG tablet  Commonly known as: MOBIC            Discharge Diet:   Diet Instructions     Diet: Regular      Discharge Diet: Regular          Activity at Discharge:   Activity Instructions     Activity as Tolerated            Discharge Instructions: Patient is to continue with physical therapy exercises daily and continue working with the physical therapist as ordered. Patient may weight bear as tolerated. Continue ice regularly and use ace bandages from the foot toward the knee as needed for swelling. Patient instructed on frequent calf pumping exercises.  Patient also instructed on incentive spirometer during hospitalization and encouraged to continue to use at home regularly. Patient may shower on POD#2. The wound should be gently cleaned with antibacterial soap then allowed to dry.  If there is any drainage then it can be covered with a dry sterile dressing.  If there is drainage, redness or swelling, then the physician should be notified. Follow up appointment in 2 weeks - patient to call the office at [phone] to schedule.  Eliquis for 2 weeks followed by 81mg aspirin BID for 30 days.  All other meds per the discharge med/rec    Discharge Diagnosis:    Patient Active Problem List   Diagnosis   • Osteoporosis   • Chronic left shoulder pain   • Presence of left artificial shoulder joint   • Chronic pain of right knee   • Anxiety   • Arthritis   • Asthma   • Depression   • Enchondroma of humerus   • Headache, chronic migraine without aura, intractable   • Status post arthroscopic surgery of right knee   • Humeral fracture   • Hypercholesteremia   • Hyperlipidemia   • Essential hypertension   • Meningioma (HCC)   • Morbid obesity (HCC)   • Obstructive sleep apnea syndrome   • Vitamin D deficiency   • Seizure-like  activity ([facility])   • S/p reverse total shoulder arthroplasty   • Primary osteoarthritis of left knee   • Morbid obesity with BMI of 40.0-44.9, adult ([facility])   • History of brain surgery   • Postmenopausal osteoporosis   • Effusion of right knee   • Primary osteoarthritis of right knee   • Right knee pain   • Tear of medial meniscus of right knee, current   • Sprain of medial collateral ligament of right knee   • Acute pain of left shoulder   • Loose orthopedic implant ([facility])   • Arthritis of left shoulder region   • Gastroesophageal reflux disease without esophagitis   • Abnormal bowel habits   • Diarrhea   • Generalized abdominal pain   • Gastroesophageal reflux disease with esophagitis without hemorrhage   • Acute suppurative otitis media of left ear without spontaneous rupture of tympanic membrane   • Acute upper respiratory infection   • Mixed incontinence   • Pure hypertriglyceridemia   • Early menopause occurring in patient age younger than 45 years   • Postmenopausal   • History of vitamin D deficiency       Follow-up Appointments  Future Appointments   Date Time Provider Department Center   10/20/2022 10:50 AM Fay Reyes APRN [facility]   12/19/2022  9:15 AM Lee Arias APRN [facility]     Additional Instructions for the Follow-ups that You Need to Schedule     Ambulatory Referral to Physical Therapy Evaluate and treat   As directed      S/p TKA  Slowly progress as tolerated  No restrictions  Swelling control  Full extension primary goal, progress flexion slowly as tolerated  Slowly add weight as pain allows.  Call with any questions.    Order Comments: S/p TKA Slowly progress as tolerated No restrictions Swelling control Full extension primary goal, progress flexion slowly as tolerated Slowly add weight as pain allows. Call with any questions.     Specialty needed: Evaluate and treat    Follow-up needed: Yes         Call MD With Problems / Concerns   As directed      Instructions:   Call  the office with questions, problems, or concerns.  Be sure to call before taking last pain pill if a refill will be needed.    Order Comments: Instructions: Call the office with questions, problems, or concerns. Be sure to call before taking last pain pill if a refill will be needed.          Discharge Follow-up with Specified Provider: DOMENICA Watkins T Young/Burns, k shelton; 2 Weeks   As directed      To: DOMENICA Watkins T Young/Burns, k shelton    Follow Up: 2 Weeks                  Brooks Junior MD  10/12/22  09:16 CDT

## 2022-10-14 ENCOUNTER — HOSPITAL ENCOUNTER (OUTPATIENT)
Dept: PHYSICAL THERAPY | Facility: HOSPITAL | Age: 57
Setting detail: THERAPIES SERIES
Discharge: HOME | End: 2022-10-14

## 2022-10-14 DIAGNOSIS — Z78.9 IMPAIRED MOBILITY AND ADLS: ICD-10-CM

## 2022-10-14 DIAGNOSIS — Z74.09 IMPAIRED MOBILITY AND ADLS: ICD-10-CM

## 2022-10-14 DIAGNOSIS — M17.11 PRIMARY OSTEOARTHRITIS OF RIGHT KNEE: Primary | ICD-10-CM

## 2022-10-14 DIAGNOSIS — Z74.09 IMPAIRED FUNCTIONAL MOBILITY, BALANCE, GAIT, AND ENDURANCE: ICD-10-CM

## 2022-10-14 DIAGNOSIS — M25.561 CHRONIC PAIN OF RIGHT KNEE: ICD-10-CM

## 2022-10-14 DIAGNOSIS — G89.29 CHRONIC PAIN OF RIGHT KNEE: ICD-10-CM

## 2022-10-14 PROCEDURE — 97110 THERAPEUTIC EXERCISES: CPT

## 2022-10-14 NOTE — THERAPY TREATMENT NOTE
Outpatient Physical Therapy Ortho Treatment Note  [facility]     Patient Name: Priti Orr  : 1965  MRN: [id number]  Today's Date: 10/14/2022      Visit Date: 10/14/2022  Subjective Improvement: n/a  MD visit: 10/20/2022  Visit Number: 2  Total Approved:11 visits  Recert Date: 10/28/2022  Visit Dx:    ICD-10-CM ICD-9-CM   1. Primary osteoarthritis of right knee  M17.11 715.16   2. Chronic pain of right knee  M25.561 719.46    G89.29 338.29   3. Impaired mobility and ADLs  Z74.09 V49.89    Z78.9    4. Impaired functional mobility, balance, gait, and endurance  Z74.09 V49.89       Patient Active Problem List   Diagnosis   • Osteoporosis   • Chronic left shoulder pain   • Presence of left artificial shoulder joint   • Chronic pain of right knee   • Anxiety   • Arthritis   • Asthma   • Depression   • Enchondroma of humerus   • Headache, chronic migraine without aura, intractable   • Status post arthroscopic surgery of right knee   • Humeral fracture   • Hypercholesteremia   • Hyperlipidemia   • Essential hypertension   • Meningioma ([facility])   • Morbid obesity ([facility])   • Obstructive sleep apnea syndrome   • Vitamin D deficiency   • Seizure-like activity ([facility])   • S/p reverse total shoulder arthroplasty   • Primary osteoarthritis of left knee   • Morbid obesity with BMI of 40.0-44.9, adult ([facility])   • History of brain surgery   • Postmenopausal osteoporosis   • Effusion of right knee   • Primary osteoarthritis of right knee   • Right knee pain   • Tear of medial meniscus of right knee, current   • Sprain of medial collateral ligament of right knee   • Acute pain of left shoulder   • Loose orthopedic implant ([facility])   • Arthritis of left shoulder region   • Gastroesophageal reflux disease without esophagitis   • Abnormal bowel habits   • Diarrhea   • Generalized abdominal pain   • Gastroesophageal reflux disease with esophagitis without hemorrhage   • Acute suppurative otitis media of left ear without  spontaneous rupture of tympanic membrane   • Acute upper respiratory infection   • Mixed incontinence   • Pure hypertriglyceridemia   • Early menopause occurring in patient age younger than 45 years   • Postmenopausal   • History of vitamin D deficiency        Past Medical History:   Diagnosis Date   • Allergic    • Allergic rhinitis    • Anesthesia     nausea and vomiting with anesthesia requires pre medicaiton   • Anxiety    • Arthritis    • Asthma    • Benign neoplasm of meninges (HCC)     Post op       • Cobalamin deficiency    • Corneal abrasion    • Depression    • Encounter for gynecological examination (general) (routine) without abnormal findings    • Essential hypertension    • Fatigue    • Female stress incontinence    • Head injury    • History of delayed wound healing     Delayed healing of surgical wound      • Hyperlipidemia    • Low back pain    • Macular degeneration disease    • Nosebleed, symptom     Nosebleed/epistaxis symptom      • Obesity    • Osteopenia    • Osteoporosis    • Osteoporosis    • PONV (postoperative nausea and vomiting)    • Seizures (HCC)    • Sleep apnea     not wearing c-pap   • Visual impairment    • Wears glasses         Past Surgical History:   Procedure Laterality Date   • ARM LESION/CYST EXCISION  2014    Remove arm bone lesion (1)   • ARM WOUND REPAIR / CLOSURE  2014    Repair Superficial Wound TR-EXT 2.5 < CM 35083 (1)      • BRAIN SURGERY     •  SECTION  1986     Section (1)     • COLONOSCOPY     • ENDOSCOPY  2008    Colon endoscopy 79321 (1)     • EXCISION BREAST LESION W/ PREOP NEEDLE LOC  1981    Excision, breast lesion (1)     • HEMORRHOIDECTOMY  2008    Hemorrhoidectomy (2)      • INJECTION OF MEDICATION  2013    Celestone (betamethasone) (1)      • INJECTION OF MEDICATION  2013    Depo Medrol (Methylprednisone) (1)      • INJECTION OF MEDICATION  2013    Dexamethasone (2)      •  INJECTION OF MEDICATION  01/29/2014    Kenalog (6)   • KNEE ARTHROSCOPY Right 03/05/2007    Knee arthroscopy, surgery (1)     • KNEE ARTHROSCOPY Right 07/13/2020    Procedure: KNEE ARTHROSCOPY with debridement medial meniscus;  Surgeon: Brooks Junior MD;  Location: [facility];  Service: Orthopedics;  Laterality: Right;   • LAPAROSCOPIC TUBAL LIGATION  04/16/1991    Tubal ligation (1)      • PAP SMEAR  01/18/2007    PAP SMEAR (1)      • SHOULDER SURGERY Left     x6, including total shoulder arthroplasty   • TOTAL ABDOMINAL HYSTERECTOMY WITH SALPINGO OOPHORECTOMY  1993    JOSE/BSO (1)      • TOTAL KNEE ARTHROPLASTY Left 11/19/2018    Procedure: TOTAL KNEE ARTHROPLASTY ATTUNE with adductor canal block - left;  Surgeon: Brooks Junior MD;  Location: [facility];  Service: Orthopedics   • TOTAL KNEE ARTHROPLASTY Right 10/5/2022    Procedure: RIGHT TOTAL KNEE ARTHROPLASTY WITH ADDUCTOR CANAL BLOCK;  Surgeon: Brooks Junior MD;  Location: [facility];  Service: Orthopedics;  Laterality: Right;        PT Ortho     Row Name 10/14/22 1000       Subjective Comments    Subjective Comments Pt reported she is supposed to be on a walker for 2 weeks but pt walking with quad cane into clinic. pt reports walker has made her shld hurt.  -TL       Precautions and Contraindications    Precautions R TKA 10/5/22; history of L TKA and L total shoulder with revision  -TL    Contraindications caution with forces through L UE  -TL       Subjective Pain    Able to rate subjective pain? yes  -TL    Pre-Treatment Pain Level 4  -TL       Posture/Observations    Posture/Observations Comments walking with quad cane in the wrong hand into clinic this date.  -TL          User Key  (r) = Recorded By, (t) = Taken By, (c) = Cosigned By    Initials Name Provider Type    TL Cynthia Rosario PTA Physical Therapist Assistant                             PT Assessment/Plan     Row Name 10/14/22 1100          PT Assessment    Assessment  Comments pt requested ice to go secondary to daughter outside waiting on pt. pt gave good effort today. Pt able to make full revolution fwd with pro ll . PTA given pt a HEP. See educational tab. Pt increase ROM with right knee 8-90.   No new goals met. Pt came into clinic walking with quad cane. Pt with slight limp not walking without anything. Pt right knee with staples intact. Pt with moderate swelling . No drainage or signs of infections.  -TL        PT Plan    PT Frequency 2x/week;3x/week  -TL     Predicted Duration of Therapy Intervention (PT) 6-8 weejs  -TL     PT Plan Comments Continue work on extension.  -TL           User Key  (r) = Recorded By, (t) = Taken By, (c) = Cosigned By    Initials Name Provider Type    Cynthia Poe PTA Physical Therapist Assistant                   OP Exercises     Row Name 10/14/22 1000             Subjective Comments    Subjective Comments Pt reported she is supposed to be on a walker for 2 weeks but pt walking with quad cane into clinic. pt reports walker has made her shld hurt.  -TL         Subjective Pain    Able to rate subjective pain? yes  -TL      Pre-Treatment Pain Level 4  -TL      Post-Treatment Pain Level --  Pt left without pain rating this date.  -TL         Exercise 1    Exercise Name 1 pro ll legs for rom  -TL      Time 1 10 mins  -TL         Exercise 2    Exercise Name 2 long sitting ham S  -TL      Sets 2 3  -TL      Time 2 30 sec hold  -TL         Exercise 3    Exercise Name 3 lunge S  -TL      Sets 3 3  -TL      Time 3 30  -TL         Exercise 4    Exercise Name 4 incline S  -TL      Sets 4 3  -TL      Time 4 30 sec hold  -TL         Exercise 5    Exercise Name 5 step up fwd 4'  -TL      Sets 5 2  -TL      Reps 5 10  -TL         Exercise 6    Exercise Name 6 quad sets with  -TL      Sets 6 20  -TL            User Key  (r) = Recorded By, (t) = Taken By, (c) = Cosigned By    Initials Name Provider Type    Cynthia Poe PTA Physical Therapist  Assistant                              PT OP Goals     Row Name 10/14/22 1000          PT Short Term Goals    STG Date to Achieve 10/28/22  -TL     STG 1 Note a >/= 50% subjective improvement.  -TL     STG 2 Complete LEFS.  -TL     STG 3 R knee active extension to be -5 deg or better.  -TL     STG 4 R knee active flexion to be >/= 105 deg.  -TL     STG 5 Demonstrate ability to ambulate household distances with cane.  -TL        Long Term Goals    LTG Date to Achieve 12/02/22  -TL     LTG 1 Independent with HEP/self-management.  -TL     LTG 2 LEFS score to be >/= 60/80.  -TL     LTG 3 L knee active extension to 0 deg.  -TL     LTG 4 L knee active flexion to be >/= 120 deg.  -TL     LTG 5 Demonstrate ability to ambulate community distances on firm, level surfaces with minimal deviation.  -TL     LTG 6 Resume walking for exercise.  -TL        Time Calculation    PT Goal Re-Cert Due Date 10/28/22  -TL           User Key  (r) = Recorded By, (t) = Taken By, (c) = Cosigned By    Initials Name Provider Type    TL Cynthia Rosario PTA Physical Therapist Assistant                Therapy Education  Education Details: SLR, quad sets, hs with strap, SLR, encourage Heel propping, educated pt on quad cane with gait. Make sure all 4 legs are down on ground before stepping.  Given: HEP  Program: New, Reinforced  How Provided: Verbal, Demonstration, Written  Provided to: Patient  Level of Understanding: Teach back education performed, Verbalized, Demonstrated              Time Calculation:   Start Time: 1013  Stop Time: 1100  Time Calculation (min): 47 min  Therapy Charges for Today     Code Description Service Date Service Provider Modifiers Qty    39758522609 HC PT THER PROC EA 15 MIN 10/14/2022 Cynthia Rosario PTA GP, CQ 3                    Cynthia Rosario PTA  10/14/2022

## 2022-10-18 ENCOUNTER — APPOINTMENT (OUTPATIENT)
Dept: PHYSICAL THERAPY | Facility: HOSPITAL | Age: 57
End: 2022-10-18

## 2022-10-20 ENCOUNTER — OFFICE VISIT (OUTPATIENT)
Dept: ORTHOPEDIC SURGERY | Facility: CLINIC | Age: 57
End: 2022-10-20

## 2022-10-20 VITALS — BODY MASS INDEX: 35.73 KG/M2 | HEIGHT: 60 IN | WEIGHT: 182 LBS

## 2022-10-20 DIAGNOSIS — Z96.651 STATUS POST TOTAL KNEE REPLACEMENT, RIGHT: Primary | ICD-10-CM

## 2022-10-20 PROCEDURE — 99024 POSTOP FOLLOW-UP VISIT: CPT | Performed by: NURSE PRACTITIONER

## 2022-10-20 NOTE — PROGRESS NOTES
Priti Orr is a 56 y.o. female is s/p  Right Total Knee Arthroplasty      Chief Complaint   Patient presents with   • Post-op     Right TKA       HISTORY OF PRESENT ILLNESS:    Mrs. Orr is a 56-year-old female who presents today status post right TKA.  Surgery was performed by Dr. Junior on 10/5/2022.  She is now 15 weeks post surgery.  She denies fever, nausea, vomiting.  She admits to taking Eliquis as prescribed.  She is working with physical therapy.  No unusual complaints.       Allergies   Allergen Reactions   • Morphine Other (See Comments)     States felt like itching from within, hallucinations, agitation     • Pseudoephedrine Shortness Of Breath and Swelling     LIPS SWELL AND TONGUE BECOMES THICK         Current Outpatient Medications:   •  acetaminophen (TYLENOL) 650 MG 8 hr tablet, Take 650 mg by mouth Every 8 (Eight) Hours As Needed., Disp: , Rfl:   •  albuterol sulfate HFA (Ventolin HFA) 108 (90 Base) MCG/ACT inhaler, Inhale 2 puffs Every 4 (Four) Hours As Needed for Shortness of Air., Disp: 18 g, Rfl: 0  •  amoxicillin (AMOXIL) 500 MG capsule, Take 500 mg by mouth As Needed (For dental visits only)., Disp: , Rfl:   •  apixaban (ELIQUIS) 2.5 MG tablet tablet, Take 1 tablet by mouth Every 12 (Twelve) Hours for 28 doses., Disp: 28 tablet, Rfl: 0  •  [START ON 10/21/2022] aspirin (aspirin) 81 MG EC tablet, Take 1 tablet by mouth 2 (Two) Times a Day., Disp: 60 tablet, Rfl: 0  •  buPROPion XL (WELLBUTRIN XL) 150 MG 24 hr tablet, Take 1 tablet by mouth Every Morning., Disp: 90 tablet, Rfl: 3  •  denosumab (Prolia) 60 MG/ML solution prefilled syringe syringe, Inject 60 mg under the skin into the appropriate area as directed Every 6 (Six) Months., Disp: , Rfl:   •  DULoxetine (CYMBALTA) 30 MG capsule, Take 1 capsule by mouth Daily., Disp: 90 capsule, Rfl: 3  •  ferrous sulfate 324 (65 Fe) MG tablet delayed-release EC tablet, Take 1 tablet by mouth Daily With Breakfast for 30 doses., Disp: 30  tablet, Rfl: 0  •  fluticasone-salmeterol (ADVAIR HFA) 115-21 MCG/ACT inhaler, Inhale 1 puff As Needed., Disp: , Rfl:   •  lisinopril-hydrochlorothiazide (PRINZIDE,ZESTORETIC) 20-12.5 MG per tablet, Take 1 tablet by mouth Daily., Disp: 90 tablet, Rfl: 3  •  montelukast (Singulair) 10 MG tablet, Take 1 tablet by mouth Every Night., Disp: 90 tablet, Rfl: 3  •  oxyCODONE-acetaminophen (PERCOCET) 7.5-325 MG per tablet, Take 1 tablet by mouth Every 6 (Six) Hours As Needed for Severe Pain., Disp: 30 tablet, Rfl: 0  •  rosuvastatin (CRESTOR) 5 MG tablet, Take 1 tablet by mouth Every Evening., Disp: 90 tablet, Rfl: 3  •  sennosides-docusate (PERICOLACE) 8.6-50 MG per tablet, Take 2 tablets by mouth Every Night for 19 doses., Disp: 38 tablet, Rfl: 0    No fevers or chills.  No nausea or vomiting.      PHYSICAL EXAMINATION:       Priti Orr is a 56 y.o. female    Patient is awake and alert, answers questions appropriately and is in no apparent distress.    GAIT:     []  Normal  []  Antalgic    Assistive device: []  None  []  Walker     []  Crutches  []  Cane     []  Wheelchair  []  Stretcher    Ortho Exam     Flexion: -3.  Extension: 100.  Incision site is clean, dry, intact and well approximated.  No evidence of infection.  Homans' sign: Negative.  Distal pulses are intact.      XR Knee 1 or 2 View Right    Result Date: 10/5/2022  Narrative: Two view right leg HISTORY: Arthroplasty AP and lateral views obtained portably at 12:34 PM. COMPARISON: May 10, 2022 FINDINGS: Total knee prosthesis now in place. Anatomic relationship of the components of the prosthesis. Immediate post operative intra-articular and periarticular air. Surgical skin staples. No fracture or dislocation. No other osseous or articular abnormality.     Impression: CONCLUSION: Immediate postop right total knee arthroplasty. [id number] Electronically signed by:  Nathaniel Rizo MD  10/5/2022 1:00 PM CDT Workstation: [id number]    Peripheral Block    Result  Date: 10/5/2022  Narrative: Orestes Woody MD     10/5/2022  9:11 AM Peripheral Block Patient reassessed immediately prior to procedure Patient location during procedure: holding area Stop time: 10/5/2022 9:03 AM Reason for block: at surgeon's request and post-op pain management Performed by Assisted by: Orestes Woody MDSRNA: Suzanne Staples SRNA Preanesthetic Checklist Completed: patient identified, IV checked, site marked, risks and benefits discussed, surgical consent, monitors and equipment checked, pre-op evaluation and timeout performed Prep: Pt Position: supine Sterile barriers:cap, gloves, mask and washed/disinfected hands Prep: ChloraPrep Patient monitoring: blood pressure monitoring and continuous pulse oximetry Procedure Sedation: no Performed under: local infiltration Guidance:ultrasound guided ULTRASOUND INTERPRETATION.  Using ultrasound guidance a 21 G gauge needle was placed in close proximity to the femoral nerve, at which point, under ultrasound guidance anesthetic was injected in the area of the nerve and spread of the anesthesia was seen on ultrasound in close proximity thereto.  There were no abnormalities seen on ultrasound; a digital image was taken; and the patient tolerated the procedure with no complications. Images:still images obtained, printed/placed on chart Laterality:right Block Type:adductor canal block Injection Technique:single-shot Needle Type:echogenic Needle Gauge:21 G Medications Used: ropivacaine (NAROPIN) 0.5 % injection, 30 mL Med administered at 10/5/2022 9:02 AM Medications Comment:Pt identified Ultrasound guided Needle seen throughout Local infiltration appropriate Post Assessment Injection Assessment: negative aspiration for heme, no paresthesia on injection and incremental injection Patient Tolerance:comfortable throughout block Complications:no           ASSESSMENT:    Diagnoses and all orders for this visit:    Status post total knee replacement,  right          PLAN    X-rays reviewed and satisfactory.  Patient seems to be progressing appropriately.  Staples removed and Steri-Strips placed.  Incision site care explained to patient.  Patient is to finish Eliquis as prescribed and then is to start baby aspirin twice daily for an additional 4 weeks, this will give patient total of 6 weeks DVT prophylaxis.  Signs and symptoms to report and when to seek care explained to patient, patient verbalized understanding and is to return in 4 weeks for postop recheck with Dr. Junior.    Return in about 4 weeks (around 11/17/2022).    EMR Dragon/Transciption Disclaimer: Some of this note may be an electronic transcription/translation of spoken language to printed text.  The electronic translation of spoken language may permit erroneous, or at times, nonsensical words or phrases to be inadvertently transcribed. Although I have reviewed the note for such errors, some may still exist.       This document has been electronically signed by Fay TORRES on October 20, 2022 12:36 CDT

## 2022-10-21 ENCOUNTER — HOSPITAL ENCOUNTER (OUTPATIENT)
Dept: PHYSICAL THERAPY | Facility: HOSPITAL | Age: 57
Setting detail: THERAPIES SERIES
Discharge: HOME | End: 2022-10-21

## 2022-10-21 DIAGNOSIS — Z74.09 IMPAIRED FUNCTIONAL MOBILITY, BALANCE, GAIT, AND ENDURANCE: ICD-10-CM

## 2022-10-21 DIAGNOSIS — G89.29 CHRONIC PAIN OF RIGHT KNEE: ICD-10-CM

## 2022-10-21 DIAGNOSIS — M25.561 CHRONIC PAIN OF RIGHT KNEE: ICD-10-CM

## 2022-10-21 DIAGNOSIS — Z78.9 IMPAIRED MOBILITY AND ADLS: ICD-10-CM

## 2022-10-21 DIAGNOSIS — Z74.09 IMPAIRED MOBILITY AND ADLS: ICD-10-CM

## 2022-10-21 DIAGNOSIS — M17.11 PRIMARY OSTEOARTHRITIS OF RIGHT KNEE: Primary | ICD-10-CM

## 2022-10-21 PROCEDURE — 97110 THERAPEUTIC EXERCISES: CPT | Performed by: PHYSICAL THERAPIST

## 2022-10-22 NOTE — THERAPY TREATMENT NOTE
Outpatient Physical Therapy Ortho Treatment Note  [facility]     Patient Name: Priti Orr  : 1965  MRN: [id number]  Today's Date: 10/21/2022      Visit Date: 10/21/2022    Attendance: 3/4 (11 visits)  Subjective Improvement: 60%  Next MD Appt: 22  Recert Date: 10/28/22     Therapy Diagnosis: R total knee arthoplasty 10/5/22     Visit Dx:    ICD-10-CM ICD-9-CM   1. Primary osteoarthritis of right knee  M17.11 715.16   2. Chronic pain of right knee  M25.561 719.46    G89.29 338.29   3. Impaired mobility and ADLs  Z74.09 V49.89    Z78.9    4. Impaired functional mobility, balance, gait, and endurance  Z74.09 V49.89            Past Medical History:   Diagnosis Date   • Allergic    • Allergic rhinitis    • Anesthesia     nausea and vomiting with anesthesia requires pre medicaiton   • Anxiety    • Arthritis    • Asthma    • Benign neoplasm of meninges (HCC)     Post op       • Cobalamin deficiency    • Corneal abrasion    • Depression    • Encounter for gynecological examination (general) (routine) without abnormal findings    • Essential hypertension    • Fatigue    • Female stress incontinence    • Head injury    • History of delayed wound healing     Delayed healing of surgical wound      • Hyperlipidemia    • Low back pain    • Macular degeneration disease    • Nosebleed, symptom     Nosebleed/epistaxis symptom      • Obesity    • Osteopenia    • Osteoporosis    • Osteoporosis    • PONV (postoperative nausea and vomiting)    • Seizures (HCC)    • Sleep apnea     not wearing c-pap   • Visual impairment    • Wears glasses         Past Surgical History:   Procedure Laterality Date   • ARM LESION/CYST EXCISION  2014    Remove arm bone lesion (1)   • ARM WOUND REPAIR / CLOSURE  2014    Repair Superficial Wound TR-EXT 2.5 < CM 29812 (1)      • BRAIN SURGERY     •  SECTION  1986     Section (1)     • COLONOSCOPY     • ENDOSCOPY  2008     "Colon endoscopy 01017 (1)     • EXCISION BREAST LESION W/ PREOP NEEDLE LOC  07/01/1981    Excision, breast lesion (1)     • HEMORRHOIDECTOMY  02/19/2008    Hemorrhoidectomy (2)      • INJECTION OF MEDICATION  06/06/2013    Celestone (betamethasone) (1)      • INJECTION OF MEDICATION  12/04/2013    Depo Medrol (Methylprednisone) (1)      • INJECTION OF MEDICATION  05/21/2013    Dexamethasone (2)      • INJECTION OF MEDICATION  01/29/2014    Kenalog (6)   • KNEE ARTHROSCOPY Right 03/05/2007    Knee arthroscopy, surgery (1)     • KNEE ARTHROSCOPY Right 07/13/2020    Procedure: KNEE ARTHROSCOPY with debridement medial meniscus;  Surgeon: Brooks Junior MD;  Location: [facility];  Service: Orthopedics;  Laterality: Right;   • LAPAROSCOPIC TUBAL LIGATION  04/16/1991    Tubal ligation (1)      • PAP SMEAR  01/18/2007    PAP SMEAR (1)      • SHOULDER SURGERY Left     x6, including total shoulder arthroplasty   • TOTAL ABDOMINAL HYSTERECTOMY WITH SALPINGO OOPHORECTOMY  1993    JOSE/BSO (1)      • TOTAL KNEE ARTHROPLASTY Left 11/19/2018    Procedure: TOTAL KNEE ARTHROPLASTY ATTUNE with adductor canal block - left;  Surgeon: Brooks Junior MD;  Location: [facility];  Service: Orthopedics   • TOTAL KNEE ARTHROPLASTY Right 10/5/2022    Procedure: RIGHT TOTAL KNEE ARTHROPLASTY WITH ADDUCTOR CANAL BLOCK;  Surgeon: Brooks Junior MD;  Location: [facility];  Service: Orthopedics;  Laterality: Right;        PT Ortho     Row Name 10/21/22 0900       Subjective Comments    Subjective Comments Presents this date ambulating without assistive device. Knee feels better since staples removed. Reports knee still feels stiff. R LE feels \"heavier than the other one.\" 60% subjective improvement.  -SS       Precautions and Contraindications    Precautions R TKA 10/5/22; history of L TKA and L total shoulder with revision  -SS    Contraindications caution with forces through L UE  -SS       Subjective Pain    Able to rate " subjective pain? yes  -    Pre-Treatment Pain Level 0  -       Posture/Observations    Posture/Observations Comments Presents ambulating without assistive device. R Trendelenburg gait noted.  -SS       Right Lower Ext    Rt Knee Extension/Flexion AROM 0-6-106 deg  -          User Key  (r) = Recorded By, (t) = Taken By, (c) = Cosigned By    Initials Name Provider Type     Nathaniel Sharp, PT DPT Physical Therapist                             PT Assessment/Plan     Row Name 10/21/22 0900          PT Assessment    Functional Limitations Impaired gait;Limitation in home management;Limitations in community activities;Limitations in functional capacity and performance;Performance in leisure activities;Performance in self-care ADL;Performance in work activities  -     Impairments Balance;Edema;Endurance;Gait;Integumentary integrity;Joint mobility;Pain;Muscle strength;Range of motion  -     Assessment Comments Gait without assistive device due to cane hurting her left shoulder. I instructed her to use cane in R hand if she needs to use it. ROM improved. Good effort with therex. Patient noted pain with manual extension stretching.  -     Rehab Potential Good  -     Patient/caregiver participated in establishment of treatment plan and goals Yes  -     Patient would benefit from skilled therapy intervention Yes  -SS        PT Plan    PT Frequency 2x/week;3x/week  -     Predicted Duration of Therapy Intervention (PT) 6-8 weeks  -     PT Plan Comments Continue POC. Emphasis on motion and gentle strength.  -           User Key  (r) = Recorded By, (t) = Taken By, (c) = Cosigned By    Initials Name Provider Type     Nathaniel Sharp, PT DPT Physical Therapist                   OP Exercises     Row Name 10/21/22 0900             Precautions    Existing Precautions/Restrictions --  R TKA 10/5/22; history of L TKA and L total shoulder with revision; caution with forces through L UE  -          "Subjective Comments    Subjective Comments Presents this date ambulating without assistive device. Knee feels better since staples removed. Reports knee still feels stiff. R LE feels \"heavier than the other one.\" 60% subjective improvement.  -SS         Subjective Pain    Able to rate subjective pain? yes  -SS      Pre-Treatment Pain Level 0  -SS      Post-Treatment Pain Level 0  -SS         Exercise 1    Exercise Name 1 Pro2, Seat 6, LE, ROM/strength  -SS      Cueing 1 Verbal  -SS      Time 1 10 mins  -SS      Additional Comments Level 3  -SS         Exercise 2    Exercise Name 2 Incline calf stretch  -SS      Cueing 2 Verbal;Demo  -SS      Sets 2 3  -SS      Time 2 30 sec hold  -SS         Exercise 3    Exercise Name 3 Standing HS stretch  -SS      Cueing 3 Verbal;Demo  -SS      Sets 3 3  -SS      Time 3 30 sec hold  -SS         Exercise 4    Exercise Name 4 Lunge stretch for knee flexion  -SS      Cueing 4 Verbal;Demo  -SS      Sets 4 3  -SS      Time 4 30 sec hold  -SS         Exercise 5    Exercise Name 5 LAQ  -SS      Cueing 5 Verbal  -SS      Sets 5 3  -SS      Reps 5 10  -SS      Additional Comments 2# AW  -SS         Exercise 6    Exercise Name 6 check AROM  -SS         Exercise 7    Exercise Name 7 Passive knee extension stretch  -SS      Cueing 7 Tactile  -SS      Sets 7 5  -SS      Time 7 10 sec hold  -SS            User Key  (r) = Recorded By, (t) = Taken By, (c) = Cosigned By    Initials Name Provider Type    Nathaniel Zhang, PT DPT Physical Therapist                              PT OP Goals     Row Name 10/21/22 0900          PT Short Term Goals    STG Date to Achieve 10/28/22  -     STG 1 Note a >/= 50% subjective improvement.  -SS     STG 1 Progress Met  -SS     STG 2 Complete LEFS.  -     STG 2 Progress Ongoing  -     STG 3 R knee active extension to be -5 deg or better.  -     STG 3 Progress Ongoing  -SS     STG 4 R knee active flexion to be >/= 105 deg.  -     STG 4 Progress " Met  -     STG 5 Demonstrate ability to ambulate household distances with cane.  -     STG 5 Progress Met  -        Long Term Goals    LTG Date to Achieve 12/02/22  -     LTG 1 Independent with HEP/self-management.  -SS     LTG 1 Progress Ongoing  -     LTG 2 LEFS score to be >/= 60/80.  -     LTG 2 Progress Ongoing  -     LTG 3 L knee active extension to 0 deg.  -     LTG 3 Progress Ongoing  -     LTG 4 L knee active flexion to be >/= 120 deg.  -     LTG 4 Progress Ongoing  -     LTG 5 Demonstrate ability to ambulate community distances on firm, level surfaces with minimal deviation.  -     LTG 5 Progress Ongoing  -     LTG 6 Resume walking for exercise.  -     LTG 6 Progress Ongoing  -        Time Calculation    PT Goal Re-Cert Due Date 10/28/22  -           User Key  (r) = Recorded By, (t) = Taken By, (c) = Cosigned By    Initials Name Provider Type     Nathaniel Sharp, PT DPT Physical Therapist                Therapy Education  Education Details: emphasized heel prop for ROM, cane use as needed  How Provided: Verbal  Provided to: Patient  Level of Understanding: Verbalized              Time Calculation:   Start Time: 0925  Stop Time: 1014  Time Calculation (min): 49 min  Therapy Charges for Today     Code Description Service Date Service Provider Modifiers Qty    71018392097 HC PT THER PROC EA 15 MIN 10/21/2022 Nathaniel Sharp, PT DPT GP 3                    Nathaniel Sharp PT, DPT, CHT  10/21/2022

## 2022-10-25 ENCOUNTER — HOSPITAL ENCOUNTER (OUTPATIENT)
Dept: PHYSICAL THERAPY | Facility: HOSPITAL | Age: 57
Setting detail: THERAPIES SERIES
Discharge: HOME | End: 2022-10-25

## 2022-10-25 DIAGNOSIS — Z74.09 IMPAIRED MOBILITY AND ADLS: ICD-10-CM

## 2022-10-25 DIAGNOSIS — M25.561 CHRONIC PAIN OF RIGHT KNEE: ICD-10-CM

## 2022-10-25 DIAGNOSIS — Z78.9 IMPAIRED MOBILITY AND ADLS: ICD-10-CM

## 2022-10-25 DIAGNOSIS — Z74.09 IMPAIRED FUNCTIONAL MOBILITY, BALANCE, GAIT, AND ENDURANCE: ICD-10-CM

## 2022-10-25 DIAGNOSIS — G89.29 CHRONIC PAIN OF RIGHT KNEE: ICD-10-CM

## 2022-10-25 DIAGNOSIS — M17.11 PRIMARY OSTEOARTHRITIS OF RIGHT KNEE: Primary | ICD-10-CM

## 2022-10-25 PROCEDURE — 97110 THERAPEUTIC EXERCISES: CPT | Performed by: PHYSICAL THERAPIST

## 2022-10-25 NOTE — THERAPY TREATMENT NOTE
Outpatient Physical Therapy Ortho Treatment Note  [facility]     Patient Name: Priti Orr  : 1965  MRN: [id number]  Today's Date: 10/25/2022      Visit Date: 10/25/2022    Attendance: 45 (11 visits)  Subjective Improvement: 70%  Next MD Appt: 22  Recert Date: 10/28/22     Therapy Diagnosis: R total knee arthoplasty 10/5/22     Visit Dx:    ICD-10-CM ICD-9-CM   1. Primary osteoarthritis of right knee  M17.11 715.16   2. Chronic pain of right knee  M25.561 719.46    G89.29 338.29   3. Impaired mobility and ADLs  Z74.09 V49.89    Z78.9    4. Impaired functional mobility, balance, gait, and endurance  Z74.09 V49.89            Past Medical History:   Diagnosis Date   • Allergic    • Allergic rhinitis    • Anesthesia     nausea and vomiting with anesthesia requires pre medicaiton   • Anxiety    • Arthritis    • Asthma    • Benign neoplasm of meninges (HCC)     Post op       • Cobalamin deficiency    • Corneal abrasion    • Depression    • Encounter for gynecological examination (general) (routine) without abnormal findings    • Essential hypertension    • Fatigue    • Female stress incontinence    • Head injury    • History of delayed wound healing     Delayed healing of surgical wound      • Hyperlipidemia    • Low back pain    • Macular degeneration disease    • Nosebleed, symptom     Nosebleed/epistaxis symptom      • Obesity    • Osteopenia    • Osteoporosis    • Osteoporosis    • PONV (postoperative nausea and vomiting)    • Seizures (HCC)    • Sleep apnea     not wearing c-pap   • Visual impairment    • Wears glasses         Past Surgical History:   Procedure Laterality Date   • ARM LESION/CYST EXCISION  2014    Remove arm bone lesion (1)   • ARM WOUND REPAIR / CLOSURE  2014    Repair Superficial Wound TR-EXT 2.5 < CM 68305 (1)      • BRAIN SURGERY     •  SECTION  1986     Section (1)     • COLONOSCOPY     • ENDOSCOPY  2008     Colon endoscopy 36957 (1)     • EXCISION BREAST LESION W/ PREOP NEEDLE LOC  07/01/1981    Excision, breast lesion (1)     • HEMORRHOIDECTOMY  02/19/2008    Hemorrhoidectomy (2)      • INJECTION OF MEDICATION  06/06/2013    Celestone (betamethasone) (1)      • INJECTION OF MEDICATION  12/04/2013    Depo Medrol (Methylprednisone) (1)      • INJECTION OF MEDICATION  05/21/2013    Dexamethasone (2)      • INJECTION OF MEDICATION  01/29/2014    Kenalog (6)   • KNEE ARTHROSCOPY Right 03/05/2007    Knee arthroscopy, surgery (1)     • KNEE ARTHROSCOPY Right 07/13/2020    Procedure: KNEE ARTHROSCOPY with debridement medial meniscus;  Surgeon: Brooks Junior MD;  Location: [facility];  Service: Orthopedics;  Laterality: Right;   • LAPAROSCOPIC TUBAL LIGATION  04/16/1991    Tubal ligation (1)      • PAP SMEAR  01/18/2007    PAP SMEAR (1)      • SHOULDER SURGERY Left     x6, including total shoulder arthroplasty   • TOTAL ABDOMINAL HYSTERECTOMY WITH SALPINGO OOPHORECTOMY  1993    JOSE/BSO (1)      • TOTAL KNEE ARTHROPLASTY Left 11/19/2018    Procedure: TOTAL KNEE ARTHROPLASTY ATTUNE with adductor canal block - left;  Surgeon: Brooks Junior MD;  Location: [facility];  Service: Orthopedics   • TOTAL KNEE ARTHROPLASTY Right 10/5/2022    Procedure: RIGHT TOTAL KNEE ARTHROPLASTY WITH ADDUCTOR CANAL BLOCK;  Surgeon: Brooks Junior MD;  Location: [facility];  Service: Orthopedics;  Laterality: Right;        PT Ortho     Row Name 10/25/22 1400       Subjective Comments    Subjective Comments Doing pretty well. Walked up and down her street a couple of times yesterday. 70% subjective improvement.  -SS       Precautions and Contraindications    Precautions R TKA 10/5/22; history of L TKA and L total shoulder with revision  -SS    Contraindications caution with forces through L UE  -SS       Subjective Pain    Able to rate subjective pain? yes  -SS    Pre-Treatment Pain Level 2  -SS    Post-Treatment Pain Level 3   -       Posture/Observations    Posture/Observations Comments Ambulating without assistive device. Slight limp noted.  -SS       Right Lower Ext    Rt Knee Extension/Flexion AROM 0-108 deg  -          User Key  (r) = Recorded By, (t) = Taken By, (c) = Cosigned By    Initials Name Provider Type     Nathaniel Sharp, PT DPT Physical Therapist                             PT Assessment/Plan     Row Name 10/25/22 1400          PT Assessment    Functional Limitations Impaired gait;Limitation in home management;Limitations in community activities;Limitations in functional capacity and performance;Performance in leisure activities;Performance in self-care ADL;Performance in work activities  -     Impairments Balance;Edema;Endurance;Gait;Integumentary integrity;Joint mobility;Pain;Muscle strength;Range of motion  -     Assessment Comments Good effort. Improving ROM  and gait. Tolerated treatment well.  -     Rehab Potential Good  -     Patient/caregiver participated in establishment of treatment plan and goals Yes  -SS     Patient would benefit from skilled therapy intervention Yes  -SS        PT Plan    PT Frequency 2x/week  -     Predicted Duration of Therapy Intervention (PT) 6-8 weeks  -     PT Plan Comments Continue POC. Recheck next.  -           User Key  (r) = Recorded By, (t) = Taken By, (c) = Cosigned By    Initials Name Provider Type     Nathaniel Sharp, PT DPT Physical Therapist                   OP Exercises     Row Name 10/25/22 1400             Subjective Comments    Subjective Comments Doing pretty well. Walked up and down her street a couple of times yesterday. 70% subjective improvement.  -         Subjective Pain    Able to rate subjective pain? yes  -      Pre-Treatment Pain Level 2  -      Post-Treatment Pain Level 3  -         Exercise 1    Exercise Name 1 Pro2, Seat 6, LE  -SS      Cueing 1 Verbal  -      Time 1 10 mins  -      Additional Comments Level 4   -SS         Exercise 2    Exercise Name 2 Incline calf stretch  -SS      Cueing 2 Verbal  -SS      Sets 2 3  -SS      Time 2 30 sec hold  -SS         Exercise 3    Exercise Name 3 Standing HS stretch  -SS      Cueing 3 Demo;Verbal  -SS      Sets 3 3  -SS      Time 3 30 sec hold  -SS         Exercise 4    Exercise Name 4 Lunge stretch for knee flexion  -SS      Cueing 4 Verbal;Demo  -SS      Sets 4 3  -SS      Time 4 30 sec hold  -SS         Exercise 5    Exercise Name 5 SAQ  -SS      Cueing 5 Verbal  -SS      Sets 5 2  -SS      Reps 5 10  -SS      Time 5 5 sec hold  -SS      Additional Comments 2# AQ  -SS         Exercise 6    Exercise Name 6 check AROM  -SS         Exercise 7    Exercise Name 7 Flexion SLR  -SS      Cueing 7 Verbal  -SS      Sets 7 1  -SS      Reps 7 15  -SS      Time 7 3 sec hold  -SS         Exercise 8    Exercise Name 8 SLS with UE support  -SS      Cueing 8 Verbal;Demo  -SS      Sets 8 1  -SS      Reps 8 3  -SS      Time 8 15 sec hold  -SS            User Key  (r) = Recorded By, (t) = Taken By, (c) = Cosigned By    Initials Name Provider Type    Nathaniel Zhang, PT DPT Physical Therapist                              PT OP Goals     Row Name 10/25/22 1400          PT Short Term Goals    STG Date to Achieve 10/28/22  -     STG 1 Note a >/= 50% subjective improvement.  -     STG 1 Progress Met  -     STG 2 Complete LEFS.  -     STG 2 Progress Ongoing  -     STG 3 R knee active extension to be -5 deg or better.  -     STG 3 Progress Met  -     STG 4 R knee active flexion to be >/= 105 deg.  -     STG 4 Progress Met  -     STG 5 Demonstrate ability to ambulate household distances with cane.  -     STG 5 Progress Met  -        Long Term Goals    LTG Date to Achieve 12/02/22  -     LTG 1 Independent with HEP/self-management.  -     LTG 1 Progress Ongoing  -     LTG 2 LEFS score to be >/= 60/80.  -     LTG 2 Progress Ongoing  -     LTG 3 L knee active extension  to 0 deg.  -     LTG 3 Progress Ongoing  -     LTG 4 L knee active flexion to be >/= 120 deg.  -     LTG 4 Progress Ongoing  -     LTG 5 Demonstrate ability to ambulate community distances on firm, level surfaces with minimal deviation.  -     LTG 5 Progress Ongoing  -     LTG 6 Resume walking for exercise.  -     LTG 6 Progress Ongoing  -        Time Calculation    PT Goal Re-Cert Due Date 10/28/22  -           User Key  (r) = Recorded By, (t) = Taken By, (c) = Cosigned By    Initials Name Provider Type     Nathaniel Sharp, PT DPT Physical Therapist                Therapy Education  Given: HEP  Program: Reinforced  How Provided: Verbal  Provided to: Patient  Level of Understanding: Verbalized              Time Calculation:   Start Time: 1432  Stop Time: 1517  Time Calculation (min): 45 min  Therapy Charges for Today     Code Description Service Date Service Provider Modifiers Qty    76564043581 HC PT THER PROC EA 15 MIN 10/25/2022 Nathaniel Sharp, PT DPT GP 3                    Nathaniel Sharp PT, DPT, CHT  10/25/2022

## 2022-10-27 PROBLEM — J30.9 ALLERGIC SINUSITIS: Status: ACTIVE | Noted: 2022-10-27

## 2022-10-28 ENCOUNTER — APPOINTMENT (OUTPATIENT)
Dept: PHYSICAL THERAPY | Facility: HOSPITAL | Age: 57
End: 2022-10-28

## 2022-11-01 ENCOUNTER — APPOINTMENT (OUTPATIENT)
Dept: PHYSICAL THERAPY | Facility: HOSPITAL | Age: 57
End: 2022-11-01

## 2022-11-04 ENCOUNTER — HOSPITAL ENCOUNTER (OUTPATIENT)
Dept: PHYSICAL THERAPY | Facility: HOSPITAL | Age: 57
Setting detail: THERAPIES SERIES
Discharge: HOME | End: 2022-11-04

## 2022-11-04 DIAGNOSIS — Z74.09 IMPAIRED MOBILITY AND ADLS: ICD-10-CM

## 2022-11-04 DIAGNOSIS — M25.561 CHRONIC PAIN OF RIGHT KNEE: ICD-10-CM

## 2022-11-04 DIAGNOSIS — G89.29 CHRONIC PAIN OF RIGHT KNEE: ICD-10-CM

## 2022-11-04 DIAGNOSIS — M17.11 PRIMARY OSTEOARTHRITIS OF RIGHT KNEE: Primary | ICD-10-CM

## 2022-11-04 DIAGNOSIS — Z78.9 IMPAIRED MOBILITY AND ADLS: ICD-10-CM

## 2022-11-04 DIAGNOSIS — Z74.09 IMPAIRED FUNCTIONAL MOBILITY, BALANCE, GAIT, AND ENDURANCE: ICD-10-CM

## 2022-11-04 PROCEDURE — 97110 THERAPEUTIC EXERCISES: CPT | Performed by: PHYSICAL THERAPIST

## 2022-11-04 NOTE — THERAPY PROGRESS REPORT/RE-CERT
Outpatient Physical Therapy Ortho Progress Note  [facility]     Patient Name: Priti Orr  : 1965  MRN: [id number]  Today's Date: 2022      Visit Date: 2022    Attendance:  (11 visits)  Subjective Improvement: 95%  Next MD Appt: 22  Recert Date: 22     Therapy Diagnosis: R total knee arthoplasty 10/5/22    Changes in Medications: none  Changes in MD Orders: none  Number of Work Days Lost: < 1 week, working from home          Past Medical History:   Diagnosis Date   • Allergic    • Allergic rhinitis    • Anesthesia     nausea and vomiting with anesthesia requires pre medicaiton   • Anxiety    • Arthritis    • Asthma    • Benign neoplasm of meninges (HCC)     Post op       • Cobalamin deficiency    • Corneal abrasion    • Depression    • Encounter for gynecological examination (general) (routine) without abnormal findings    • Essential hypertension    • Fatigue    • Female stress incontinence    • Head injury    • History of delayed wound healing     Delayed healing of surgical wound      • Hyperlipidemia    • Low back pain    • Macular degeneration disease    • Nosebleed, symptom     Nosebleed/epistaxis symptom      • Obesity    • Osteopenia    • Osteoporosis    • Osteoporosis    • PONV (postoperative nausea and vomiting)    • Seizures (HCC)    • Sleep apnea     not wearing c-pap   • Visual impairment    • Wears glasses         Past Surgical History:   Procedure Laterality Date   • ARM LESION/CYST EXCISION  2014    Remove arm bone lesion (1)   • ARM WOUND REPAIR / CLOSURE  2014    Repair Superficial Wound TR-EXT 2.5 < CM 44323 (1)      • BRAIN SURGERY     •  SECTION  1986     Section (1)     • COLONOSCOPY     • ENDOSCOPY  2008    Colon endoscopy 42879 (1)     • EXCISION BREAST LESION W/ PREOP NEEDLE LOC  1981    Excision, breast lesion (1)     • HEMORRHOIDECTOMY  2008    Hemorrhoidectomy (2)      •  INJECTION OF MEDICATION  06/06/2013    Celestone (betamethasone) (1)      • INJECTION OF MEDICATION  12/04/2013    Depo Medrol (Methylprednisone) (1)      • INJECTION OF MEDICATION  05/21/2013    Dexamethasone (2)      • INJECTION OF MEDICATION  01/29/2014    Kenalog (6)   • KNEE ARTHROSCOPY Right 03/05/2007    Knee arthroscopy, surgery (1)     • KNEE ARTHROSCOPY Right 07/13/2020    Procedure: KNEE ARTHROSCOPY with debridement medial meniscus;  Surgeon: Brooks Junior MD;  Location: [facility];  Service: Orthopedics;  Laterality: Right;   • LAPAROSCOPIC TUBAL LIGATION  04/16/1991    Tubal ligation (1)      • PAP SMEAR  01/18/2007    PAP SMEAR (1)      • SHOULDER SURGERY Left     x6, including total shoulder arthroplasty   • TOTAL ABDOMINAL HYSTERECTOMY WITH SALPINGO OOPHORECTOMY  1993    JOSE/BSO (1)      • TOTAL KNEE ARTHROPLASTY Left 11/19/2018    Procedure: TOTAL KNEE ARTHROPLASTY ATTUNE with adductor canal block - left;  Surgeon: Brooks Junior MD;  Location: [facility];  Service: Orthopedics   • TOTAL KNEE ARTHROPLASTY Right 10/5/2022    Procedure: RIGHT TOTAL KNEE ARTHROPLASTY WITH ADDUCTOR CANAL BLOCK;  Surgeon: Brooks Junior MD;  Location: [facility];  Service: Orthopedics;  Laterality: Right;       Visit Dx:     ICD-10-CM ICD-9-CM   1. Primary osteoarthritis of right knee  M17.11 715.16   2. Chronic pain of right knee  M25.561 719.46    G89.29 338.29   3. Impaired mobility and ADLs  Z74.09 V49.89    Z78.9    4. Impaired functional mobility, balance, gait, and endurance  Z74.09 V49.89              PT Ortho     Row Name 11/04/22 1000       Precautions and Contraindications    Precautions R TKA 10/5/22; history of L TKA and L total shoulder with revision  -SS    Contraindications caution with forces through L UE  -SS       Subjective Pain    Post-Treatment Pain Level 0  -SS       Posture/Observations    Posture/Observations Comments Independent gait. Slight antalgia noted.  -SS        Right Lower Ext    Rt Knee Extension/Flexion AROM 0-2-122 deg  -SS       MMT (Manual Muscle Testing)    Rt Lower Ext Rt Hip Flexion;Rt Hip Extension;Rt Hip ABduction;Rt Hip ADduction;Rt Knee Extension;Rt Knee Flexion  -       MMT Right Lower Ext    Rt Hip Flexion MMT, Gross Movement (5/5) normal  -SS    Rt Hip Extension MMT, Gross Movement (5/5) normal  -SS    Rt Hip ABduction MMT, Gross Movement (5/5) normal  -SS    Rt Hip ADduction MMT, Gross Movement (5/5) normal  -SS    Rt Knee Extension MMT, Gross Movement (5/5) normal  -SS    Rt Knee Flexion MMT, Gross Movement (5/5) normal  -SS    Rt Lower Extremity Comments  3 deg additional lag with flexion SLR  -          User Key  (r) = Recorded By, (t) = Taken By, (c) = Cosigned By    Initials Name Provider Type    SS Nathaniel Sharp, PT DPT Physical Therapist                            Therapy Education  Education Details: progress, plan; reinforced HEP  How Provided: Verbal  Provided to: Patient  Level of Understanding: Verbalized      PT OP Goals     Row Name 11/04/22 1000          PT Short Term Goals    STG Date to Achieve --  further STGs deferred  -     STG 1 Note a >/= 50% subjective improvement.  -     STG 1 Progress Met  -     STG 2 Complete LEFS.  -     STG 2 Progress Met  -     STG 3 R knee active extension to be -5 deg or better.  -     STG 3 Progress Met  -     STG 4 R knee active flexion to be >/= 105 deg.  -     STG 4 Progress Met  -     STG 5 Demonstrate ability to ambulate household distances with cane.  -     STG 5 Progress Met  -        Long Term Goals    LTG Date to Achieve 12/02/22  -     LTG 1 Independent with HEP/self-management.  -     LTG 1 Progress Ongoing  -     LTG 2 LEFS score to be >/= 60/80.  -     LTG 2 Progress Ongoing  -     LTG 3 L knee active extension to 0 deg.  -     LTG 3 Progress Ongoing  -     LTG 4 L knee active flexion to be >/= 120 deg.  -     LTG 4 Progress Met  -     LTG 5  "Demonstrate ability to ambulate community distances on firm, level surfaces with minimal deviation.  -     LTG 5 Progress Met  -     LTG 6 Resume walking for exercise.  -     LTG 6 Progress Met  -        Time Calculation    PT Goal Re-Cert Due Date 11/25/22  -           User Key  (r) = Recorded By, (t) = Taken By, (c) = Cosigned By    Initials Name Provider Type     Nathaniel Sharp, PT DPT Physical Therapist                 PT Assessment/Plan     Row Name 11/04/22 1000          PT Assessment    Functional Limitations Impaired gait;Limitation in home management;Limitations in community activities;Limitations in functional capacity and performance;Performance in leisure activities;Performance in self-care ADL;Performance in work activities  -     Impairments Balance;Endurance;Gait;Muscle strength;Range of motion  -     Assessment Comments Significantly improved knee flexion. Slight decrease extension. Doing very well at this time.  -     Rehab Potential Good  -     Patient/caregiver participated in establishment of treatment plan and goals Yes  -     Patient would benefit from skilled therapy intervention Yes  -SS        PT Plan    PT Plan Comments Will see in approximately 2 weeks or sooner if needed. Stretching, strengthening, balance training, functional activity progression, ice as needed for pain. If doing well in 2 weeks, anticipate PRN.  -           User Key  (r) = Recorded By, (t) = Taken By, (c) = Cosigned By    Initials Name Provider Type     Nathaniel Sharp, PT DPT Physical Therapist                   OP Exercises     Row Name 11/04/22 1000             Subjective Comments    Subjective Comments Missed last appointments due to illness. Feels like her knee is doing well. Walking around her neighborhood. Knee is \"a tiny bit stiff in the morning.\" Planning on walking a 5K Thanksgiving. 95% subjective improvement.  -         Subjective Pain    Able to rate subjective " pain? yes  -SS      Pre-Treatment Pain Level 0  -SS      Post-Treatment Pain Level 0  -SS         Exercise 1    Exercise Name 1 Pro2, Seat 6, Pedal 2, LE  -SS      Cueing 1 Verbal  -SS      Time 1 10 mins  -SS      Additional Comments Level 4, [place]  -SS         Exercise 2    Exercise Name 2 Lunge stretch for knee flexion  -SS      Cueing 2 Verbal  -SS      Sets 2 3  -SS      Time 2 30 sec hold  -SS         Exercise 3    Exercise Name 3 Standing HS stretch  -SS      Cueing 3 Verbal  -SS      Sets 3 3  -SS      Time 3 30 sec hold  -SS         Exercise 4    Exercise Name 4 recheck  -SS            User Key  (r) = Recorded By, (t) = Taken By, (c) = Cosigned By    Initials Name Provider Type    Nathaniel Zhang, MARICRUZ DPT Physical Therapist                              Outcome Measure Options: Lower Extremity Functional Scale (LEFS)  Lower Extremity Functional Index  Any of your usual work, housework or school activities: No difficulty  Your usual hobbies, recreational or sporting activities: Moderate difficulty  Getting into or out of the bath: No difficulty  Walking between rooms: No difficulty  Putting on your shoes or socks: No difficulty  Squatting: Moderate difficulty  Lifting an object, like a bag of groceries from the floor: No difficulty  Performing light activities around your home: No difficulty  Performing heavy activities around your home: Moderate difficulty  Getting into or out of a car: No difficulty  Walking 2 blocks: No difficulty  Walking a mile: Moderate difficulty  Going up or down 10 stairs (about 1 flight of stairs): No difficulty  Standing for 1 hour: Moderate difficulty  Sitting for 1 hour: No difficulty  Running on even ground: Extreme difficulty or unable to perform activity  Running on uneven ground: Extreme difficulty or unable to perform activity  Making sharp turns while running fast: Extreme difficulty or unable to perform activity  Hopping: Extreme difficulty or unable  to perform activity  Rolling over in bed: No difficulty  Total: 54      Time Calculation:     Start Time: 1017  Stop Time: 1048  Time Calculation (min): 31 min     Therapy Charges for Today     Code Description Service Date Service Provider Modifiers Qty    [id number] HC PT THER PROC EA 15 MIN 11/4/2022 Nathaniel Sharp, PT DPT GP 2                   Nathaniel Sharp, PT, DPT, CHT  11/4/2022

## 2022-11-11 ENCOUNTER — APPOINTMENT (OUTPATIENT)
Dept: PHYSICAL THERAPY | Facility: HOSPITAL | Age: 57
End: 2022-11-11

## 2022-11-16 ENCOUNTER — APPOINTMENT (OUTPATIENT)
Dept: PHYSICAL THERAPY | Facility: HOSPITAL | Age: 57
End: 2022-11-16

## 2022-11-23 NOTE — PROGRESS NOTES
, prolia approved 9/16/2022-9/15/2023 per pharmacy patient can only have the buy and bill. Patient notified

## 2022-11-27 DIAGNOSIS — M81.0 AGE-RELATED OSTEOPOROSIS WITHOUT CURRENT PATHOLOGICAL FRACTURE: Primary | ICD-10-CM

## 2022-11-30 ENCOUNTER — LAB (OUTPATIENT)
Dept: LAB | Facility: HOSPITAL | Age: 57
End: 2022-11-30

## 2022-11-30 ENCOUNTER — OFFICE VISIT (OUTPATIENT)
Dept: FAMILY MEDICINE CLINIC | Facility: CLINIC | Age: 57
End: 2022-11-30

## 2022-11-30 VITALS
WEIGHT: 170.5 LBS | BODY MASS INDEX: 33.47 KG/M2 | SYSTOLIC BLOOD PRESSURE: 128 MMHG | OXYGEN SATURATION: 98 % | DIASTOLIC BLOOD PRESSURE: 82 MMHG | HEART RATE: 68 BPM | HEIGHT: 60 IN | TEMPERATURE: 97.1 F | RESPIRATION RATE: 18 BRPM

## 2022-11-30 DIAGNOSIS — E78.2 MIXED HYPERLIPIDEMIA: ICD-10-CM

## 2022-11-30 DIAGNOSIS — G25.81 RLS (RESTLESS LEGS SYNDROME): Primary | ICD-10-CM

## 2022-11-30 LAB
ANION GAP SERPL CALCULATED.3IONS-SCNC: 10.3 MMOL/L (ref 5–15)
BASOPHILS # BLD AUTO: 0.03 10*3/MM3 (ref 0–0.2)
BASOPHILS NFR BLD AUTO: 0.6 % (ref 0–1.5)
BUN SERPL-MCNC: 23 MG/DL (ref 6–20)
BUN/CREAT SERPL: 23.2 (ref 7–25)
CALCIUM SPEC-SCNC: 9.7 MG/DL (ref 8.6–10.5)
CHLORIDE SERPL-SCNC: 99 MMOL/L (ref 98–107)
CHOLEST SERPL-MCNC: 233 MG/DL (ref 0–200)
CO2 SERPL-SCNC: 27.7 MMOL/L (ref 22–29)
CREAT SERPL-MCNC: 0.99 MG/DL (ref 0.57–1)
DEPRECATED RDW RBC AUTO: 47.8 FL (ref 37–54)
EGFRCR SERPLBLD CKD-EPI 2021: 67.5 ML/MIN/1.73
EOSINOPHIL # BLD AUTO: 0.09 10*3/MM3 (ref 0–0.4)
EOSINOPHIL NFR BLD AUTO: 1.8 % (ref 0.3–6.2)
ERYTHROCYTE [DISTWIDTH] IN BLOOD BY AUTOMATED COUNT: 14.4 % (ref 12.3–15.4)
FERRITIN SERPL-MCNC: 61.1 NG/ML (ref 13–150)
GLUCOSE SERPL-MCNC: 91 MG/DL (ref 65–99)
HCT VFR BLD AUTO: 40.6 % (ref 34–46.6)
HDLC SERPL-MCNC: 73 MG/DL (ref 40–60)
HGB BLD-MCNC: 13.6 G/DL (ref 12–15.9)
IMM GRANULOCYTES # BLD AUTO: 0.01 10*3/MM3 (ref 0–0.05)
IMM GRANULOCYTES NFR BLD AUTO: 0.2 % (ref 0–0.5)
IRON 24H UR-MRATE: 76 MCG/DL (ref 37–145)
IRON SATN MFR SERPL: 19 % (ref 20–50)
LDLC SERPL CALC-MCNC: 137 MG/DL (ref 0–100)
LDLC/HDLC SERPL: 1.84 {RATIO}
LYMPHOCYTES # BLD AUTO: 1.35 10*3/MM3 (ref 0.7–3.1)
LYMPHOCYTES NFR BLD AUTO: 26.7 % (ref 19.6–45.3)
MAGNESIUM SERPL-MCNC: 2.2 MG/DL (ref 1.6–2.6)
MCH RBC QN AUTO: 30.8 PG (ref 26.6–33)
MCHC RBC AUTO-ENTMCNC: 33.5 G/DL (ref 31.5–35.7)
MCV RBC AUTO: 91.9 FL (ref 79–97)
MONOCYTES # BLD AUTO: 0.42 10*3/MM3 (ref 0.1–0.9)
MONOCYTES NFR BLD AUTO: 8.3 % (ref 5–12)
NEUTROPHILS NFR BLD AUTO: 3.16 10*3/MM3 (ref 1.7–7)
NEUTROPHILS NFR BLD AUTO: 62.4 % (ref 42.7–76)
NRBC BLD AUTO-RTO: 0 /100 WBC (ref 0–0.2)
PLATELET # BLD AUTO: 316 10*3/MM3 (ref 140–450)
PMV BLD AUTO: 10.4 FL (ref 6–12)
POTASSIUM SERPL-SCNC: 3.5 MMOL/L (ref 3.5–5.2)
RBC # BLD AUTO: 4.42 10*6/MM3 (ref 3.77–5.28)
SODIUM SERPL-SCNC: 137 MMOL/L (ref 136–145)
TIBC SERPL-MCNC: 398 MCG/DL (ref 298–536)
TRANSFERRIN SERPL-MCNC: 267 MG/DL (ref 200–360)
TRIGL SERPL-MCNC: 130 MG/DL (ref 0–150)
VIT B12 BLD-MCNC: 256 PG/ML (ref 211–946)
VLDLC SERPL-MCNC: 23 MG/DL (ref 5–40)
WBC NRBC COR # BLD: 5.06 10*3/MM3 (ref 3.4–10.8)

## 2022-11-30 PROCEDURE — 80048 BASIC METABOLIC PNL TOTAL CA: CPT | Performed by: NURSE PRACTITIONER

## 2022-11-30 PROCEDURE — 99214 OFFICE O/P EST MOD 30 MIN: CPT | Performed by: NURSE PRACTITIONER

## 2022-11-30 PROCEDURE — 80061 LIPID PANEL: CPT | Performed by: NURSE PRACTITIONER

## 2022-11-30 PROCEDURE — 85025 COMPLETE CBC W/AUTO DIFF WBC: CPT | Performed by: NURSE PRACTITIONER

## 2022-11-30 PROCEDURE — 83735 ASSAY OF MAGNESIUM: CPT | Performed by: NURSE PRACTITIONER

## 2022-11-30 PROCEDURE — 84466 ASSAY OF TRANSFERRIN: CPT | Performed by: NURSE PRACTITIONER

## 2022-11-30 PROCEDURE — 82607 VITAMIN B-12: CPT | Performed by: NURSE PRACTITIONER

## 2022-11-30 PROCEDURE — 36415 COLL VENOUS BLD VENIPUNCTURE: CPT | Performed by: NURSE PRACTITIONER

## 2022-11-30 PROCEDURE — 82728 ASSAY OF FERRITIN: CPT | Performed by: NURSE PRACTITIONER

## 2022-11-30 PROCEDURE — 83540 ASSAY OF IRON: CPT | Performed by: NURSE PRACTITIONER

## 2022-11-30 RX ORDER — ROPINIROLE 0.25 MG/1
0.25 TABLET, FILM COATED ORAL NIGHTLY
Qty: 30 TABLET | Refills: 3 | Status: SHIPPED | OUTPATIENT
Start: 2022-11-30 | End: 2022-12-19

## 2022-11-30 NOTE — PROGRESS NOTES
Subjective   Priti Orr is a 57 y.o. female.     History of Present Illness  CC: Nightly bilateral leg pain  Leg Pain   The incident occurred more than 1 week ago. There was no injury mechanism. The pain is present in the left leg and right leg. The quality of the pain is described as cramping and aching. The pain is moderate. Pain course: nightly. Pertinent negatives include no inability to bear weight, loss of motion, loss of sensation, numbness or tingling. Nothing aggravates the symptoms. Treatments tried: water, magnesium. The treatment provided no relief.        The following portions of the patient's history were reviewed and updated as appropriate: allergies, current medications, past family history, past medical history, past social history, past surgical history and problem list.    Review of Systems   Constitutional: Negative for activity change, appetite change, chills, fatigue, fever, unexpected weight gain and unexpected weight loss.   HENT: Negative for congestion, sore throat, trouble swallowing and voice change.    Eyes: Negative.    Respiratory: Negative for cough, chest tightness, shortness of breath and wheezing.    Cardiovascular: Negative for chest pain, palpitations and leg swelling.   Gastrointestinal: Negative for abdominal pain, constipation, diarrhea, nausea and vomiting.   Endocrine: Negative.  Negative for cold intolerance, heat intolerance, polydipsia, polyphagia and polyuria.   Genitourinary: Negative for dysuria.   Musculoskeletal: Positive for myalgias (ble at night). Negative for arthralgias.   Skin: Negative for rash.   Allergic/Immunologic: Negative.    Neurological: Negative.  Negative for tingling and numbness.   Hematological: Negative.    Psychiatric/Behavioral: Negative.        Objective   Physical Exam  Vitals and nursing note reviewed.   Constitutional:       General: She is not in acute distress.     Appearance: Normal appearance. She is well-developed. She is  obese. She is not ill-appearing, toxic-appearing or diaphoretic.   HENT:      Head: Normocephalic and atraumatic.   Eyes:      Conjunctiva/sclera: Conjunctivae normal.   Cardiovascular:      Rate and Rhythm: Normal rate and regular rhythm.      Heart sounds: Normal heart sounds.   Pulmonary:      Effort: Pulmonary effort is normal. No respiratory distress.      Breath sounds: Normal breath sounds. No stridor. No wheezing, rhonchi or rales.   Musculoskeletal:         General: No tenderness. Normal range of motion.      Cervical back: Normal range of motion.   Skin:     General: Skin is warm and dry.      Coloration: Skin is not pale.      Findings: No erythema or rash.   Neurological:      Mental Status: She is alert and oriented to person, place, and time.   Psychiatric:         Mood and Affect: Mood normal.         Behavior: Behavior normal.         Thought Content: Thought content normal.         Judgment: Judgment normal.           Assessment & Plan   Diagnoses and all orders for this visit:    1. RLS (restless legs syndrome) (Primary)  -     CBC & Differential  -     Ferritin  -     Vitamin B12  -     Iron Profile  -     Magnesium  -     Basic Metabolic Panel  -     rOPINIRole (Requip) 0.25 MG tablet; Take 1 tablet by mouth Every Night. Take 1 hour before bedtime.  Dispense: 30 tablet; Refill: 3   - Patient's leg discomfort primarily happens in the evening which is consistent with restless leg syndrome.  We will obtain lab work to assess for secondary causes of leg pain.  We will start a trial of Requip 0.25 mg 1 tablet nightly.  Instructed patient not to take with other sedating medications or alcohol as this medication will cause drowsiness.  Take 8 to 10 hours before planned awakening.  We will reevaluate at patient's follow-up in 3 weeks.  Patient verbalized understanding of instruction.  We will call with lab results.    2. Mixed hyperlipidemia   -     Lipid Panel, will call with results.  Previous lipid  panel reviewed and was within normal limits.  Patient has changed her diet and has also been losing weight.  May consider discontinuing her statin at follow-up and pursue diet control for her lipidemia to see if this helps with her leg issues.  Patient verbalized understanding of instruction.    3.  Follow-up in 3 weeks as scheduled or sooner for any acute needs.            This document has been electronically signed by MELISSA Carrillo on November 30, 2022 10:07 CST

## 2022-12-12 ENCOUNTER — LAB (OUTPATIENT)
Dept: LAB | Facility: HOSPITAL | Age: 57
End: 2022-12-12

## 2022-12-12 DIAGNOSIS — M81.0 AGE-RELATED OSTEOPOROSIS WITHOUT CURRENT PATHOLOGICAL FRACTURE: ICD-10-CM

## 2022-12-12 LAB — CALCIUM SPEC-SCNC: 9.4 MG/DL (ref 8.6–10.5)

## 2022-12-12 PROCEDURE — 82310 ASSAY OF CALCIUM: CPT

## 2022-12-13 ENCOUNTER — TELEPHONE (OUTPATIENT)
Dept: ORTHOPEDIC SURGERY | Facility: CLINIC | Age: 57
End: 2022-12-13

## 2022-12-13 NOTE — TELEPHONE ENCOUNTER
PREETI,    Pre-auth was approved. [facility] need to inform us about something. I asked several times what it was and I could not hear her so I told her someone who handles the prior authorizations will call her back.

## 2022-12-19 ENCOUNTER — OFFICE VISIT (OUTPATIENT)
Dept: FAMILY MEDICINE CLINIC | Facility: CLINIC | Age: 57
End: 2022-12-19

## 2022-12-19 ENCOUNTER — INFUSION (OUTPATIENT)
Dept: ONCOLOGY | Facility: HOSPITAL | Age: 57
End: 2022-12-19

## 2022-12-19 VITALS
RESPIRATION RATE: 18 BRPM | SYSTOLIC BLOOD PRESSURE: 104 MMHG | WEIGHT: 167.1 LBS | DIASTOLIC BLOOD PRESSURE: 80 MMHG | TEMPERATURE: 97.4 F | OXYGEN SATURATION: 98 % | HEART RATE: 91 BPM | BODY MASS INDEX: 32.81 KG/M2 | HEIGHT: 60 IN

## 2022-12-19 VITALS
DIASTOLIC BLOOD PRESSURE: 78 MMHG | HEART RATE: 75 BPM | RESPIRATION RATE: 18 BRPM | TEMPERATURE: 97 F | OXYGEN SATURATION: 99 % | SYSTOLIC BLOOD PRESSURE: 121 MMHG

## 2022-12-19 DIAGNOSIS — D32.9 MENINGIOMA: ICD-10-CM

## 2022-12-19 DIAGNOSIS — Z98.890 HISTORY OF BRAIN SURGERY: ICD-10-CM

## 2022-12-19 DIAGNOSIS — G25.81 RLS (RESTLESS LEGS SYNDROME): ICD-10-CM

## 2022-12-19 DIAGNOSIS — Z23 NEED FOR INFLUENZA VACCINATION: ICD-10-CM

## 2022-12-19 DIAGNOSIS — I10 ESSENTIAL HYPERTENSION: Primary | ICD-10-CM

## 2022-12-19 DIAGNOSIS — R47.1 DYSARTHRIA: ICD-10-CM

## 2022-12-19 DIAGNOSIS — M81.0 AGE-RELATED OSTEOPOROSIS WITHOUT CURRENT PATHOLOGICAL FRACTURE: Primary | ICD-10-CM

## 2022-12-19 PROCEDURE — 25010000002 DENOSUMAB 60 MG/ML SOLUTION PREFILLED SYRINGE: Performed by: NURSE PRACTITIONER

## 2022-12-19 PROCEDURE — 99214 OFFICE O/P EST MOD 30 MIN: CPT | Performed by: NURSE PRACTITIONER

## 2022-12-19 PROCEDURE — 90686 IIV4 VACC NO PRSV 0.5 ML IM: CPT | Performed by: NURSE PRACTITIONER

## 2022-12-19 PROCEDURE — 96372 THER/PROPH/DIAG INJ SC/IM: CPT | Performed by: NURSE PRACTITIONER

## 2022-12-19 PROCEDURE — 90471 IMMUNIZATION ADMIN: CPT | Performed by: NURSE PRACTITIONER

## 2022-12-19 RX ORDER — ROPINIROLE 0.5 MG/1
0.5 TABLET, FILM COATED ORAL NIGHTLY
Qty: 90 TABLET | Refills: 3 | Status: SHIPPED | OUTPATIENT
Start: 2022-12-19

## 2022-12-19 RX ADMIN — DENOSUMAB 60 MG: 60 INJECTION SUBCUTANEOUS at 10:11

## 2022-12-19 NOTE — PROGRESS NOTES
Subjective   Priti Orr is a 57 y.o. female.     History of Present Illness  CC: Hypertension, restless leg syndrome, single episode of dysarthria  Hypertension  This is a chronic problem. The current episode started more than 1 year ago. The problem is controlled. Pertinent negatives include no chest pain, headaches, palpitations or shortness of breath. Risk factors for coronary artery disease include family history, dyslipidemia, obesity, post-menopausal state and sedentary lifestyle. Current antihypertension treatment includes ACE inhibitors. The current treatment provides significant improvement. There are no compliance problems.  There is no history of angina, kidney disease or CAD/MI.   Leg Pain   The incident occurred more than 1 week ago. There was no injury mechanism. The pain is present in the left leg and right leg. The quality of the pain is described as aching. The pain is moderate. The pain has been intermittent (nightly) since onset. Pertinent negatives include no inability to bear weight, loss of motion, loss of sensation, muscle weakness, numbness or tingling. Nothing aggravates the symptoms. Treatments tried: requip. The treatment provided moderate relief.   Other  This is a new (Dysarthria) problem. Episode frequency: once. Pertinent negatives include no abdominal pain, arthralgias, chest pain, chills, congestion, coughing, fatigue, fever, headaches, myalgias, nausea, numbness, rash, sore throat or vomiting. Nothing aggravates the symptoms. She has tried nothing for the symptoms. The treatment provided significant (resovled with no new episodes reported. ) relief.        The following portions of the patient's history were reviewed and updated as appropriate: allergies, current medications, past family history, past medical history, past social history, past surgical history and problem list.    Review of Systems   Constitutional: Negative for activity change, appetite change, chills,  fatigue, fever, unexpected weight gain and unexpected weight loss.   HENT: Negative for congestion, sore throat, trouble swallowing and voice change.    Eyes: Negative.    Respiratory: Negative for cough, chest tightness, shortness of breath and wheezing.    Cardiovascular: Negative for chest pain, palpitations and leg swelling.   Gastrointestinal: Negative for abdominal pain, constipation, diarrhea, nausea and vomiting.   Endocrine: Negative.    Genitourinary: Negative for dysuria.   Musculoskeletal: Negative for arthralgias and myalgias.   Skin: Negative for rash.   Allergic/Immunologic: Negative.    Neurological: Positive for speech difficulty (x1 episode lasting 5-10 minutes). Negative for tingling and numbness.   Hematological: Negative.    Psychiatric/Behavioral: Negative.        Objective   Physical Exam  Vitals and nursing note reviewed.   Constitutional:       General: She is not in acute distress.     Appearance: Normal appearance. She is well-developed. She is obese. She is not ill-appearing, toxic-appearing or diaphoretic.   HENT:      Head: Normocephalic and atraumatic.   Eyes:      Extraocular Movements: Extraocular movements intact.      Conjunctiva/sclera: Conjunctivae normal.      Pupils: Pupils are equal, round, and reactive to light.   Neck:      Vascular: No carotid bruit.   Cardiovascular:      Rate and Rhythm: Normal rate and regular rhythm.      Heart sounds: Normal heart sounds.   Pulmonary:      Effort: Pulmonary effort is normal. No respiratory distress.      Breath sounds: Normal breath sounds. No stridor. No wheezing, rhonchi or rales.   Musculoskeletal:         General: No tenderness. Normal range of motion.      Cervical back: Normal range of motion.   Skin:     General: Skin is warm and dry.      Coloration: Skin is not pale.      Findings: No erythema or rash.   Neurological:      General: No focal deficit present.      Mental Status: She is alert and oriented to person, place, and  time. Mental status is at baseline.      Cranial Nerves: No cranial nerve deficit.      Motor: No weakness.      Gait: Gait normal.   Psychiatric:         Mood and Affect: Mood normal.         Behavior: Behavior normal.         Thought Content: Thought content normal.         Judgment: Judgment normal.           Assessment & Plan   Diagnoses and all orders for this visit:    1. Essential hypertension (Primary)   -Controlled.  Continue Zestoretic as prescribed.  We will continue to monitor.    2. RLS (restless legs syndrome)  -     rOPINIRole (Requip) 0.5 MG tablet; Take 1 tablet by mouth Every Night. Take 1 hour before bedtime.  Dispense: 90 tablet; Refill: 3   -Tolerating well with no next-day drowsiness.  Reports mild to moderate decrease in symptoms.  Will increase dosage to 0.5 mg daily.  We will continue to monitor.    3. Need for influenza vaccination  -     FluLaval/Fluzone >6 mos (7731-8652), tolerated well with no adverse reaction.    4. History of brain surgery  -     MRI Brain With & Without Contrast; Future, will call with results.  Plan of care stated below #6    5. Meningioma (HCC)  -     MRI Brain With & Without Contrast; Future, will call with results.  Plan of care stated below #6.    6. Dysarthria  -     MRI Brain With & Without Contrast; Future   - Patient had one-time episode of dysarthria that resolved within 5 to 10 minutes.  Patient was asymptomatic otherwise.  Patient does have an history of meningioma with subsequent surgical removal.  Last imaging of head (MRI brain with and without contrast) over 3 years ago.  Patient does not see neurosurgery again for another year.  We will repeat MRI brain with and without contrast at this time instead of waiting for neuro surgery follow-up.  Instructed patient present to the ER should symptoms return or new symptoms develop.  Patient verbalized understanding of instruction agrees plan of care.    7.  Follow-up in 6 months or sooner for any acute  needs.                  This document has been electronically signed by MELISSA Carrillo on December 19, 2022 10:31 CST

## 2022-12-29 ENCOUNTER — HOSPITAL ENCOUNTER (OUTPATIENT)
Dept: MRI IMAGING | Facility: HOSPITAL | Age: 57
Discharge: HOME | End: 2022-12-29
Admitting: NURSE PRACTITIONER

## 2022-12-29 DIAGNOSIS — Z98.890 HISTORY OF BRAIN SURGERY: ICD-10-CM

## 2022-12-29 DIAGNOSIS — D32.9 MENINGIOMA: ICD-10-CM

## 2022-12-29 DIAGNOSIS — R47.1 DYSARTHRIA: ICD-10-CM

## 2022-12-29 PROCEDURE — 70553 MRI BRAIN STEM W/O & W/DYE: CPT

## 2022-12-29 PROCEDURE — A9579 GAD-BASE MR CONTRAST NOS,1ML: HCPCS | Performed by: NURSE PRACTITIONER

## 2022-12-29 PROCEDURE — 25010000002 GADOTERIDOL PER 1 ML: Performed by: NURSE PRACTITIONER

## 2022-12-29 RX ADMIN — GADOTERIDOL 17 ML: 279.3 INJECTION, SOLUTION INTRAVENOUS at 09:44

## 2022-12-29 NOTE — PROGRESS NOTES
Per radiology report, no acute ischemia or suspicious enhancement noted.  If symptoms return/become recurrent she needs to present to the ER.  Otherwise follow-up with neurosurgery as scheduled.

## 2025-04-15 NOTE — MR AVS SNAPSHOT
[facility]   [phone]                    Priti Orr   1/23/2017  4:15 PM   REEVALUATION    Dept Phone:  [phone]   Encounter #:  [id number]    Provider:  Nathaniel Sharp PT   Department:  [facility]                 Your Full Care Plan              Your Updated Medication List      ASK your doctor about these medications     ALBUTEROL IN        MG capsule       lisinopril-hydrochlorothiazide 20-12.5 MG per tablet   Commonly known as:  PRINZIDE,ZESTORETIC       PROLIA 60 MG/ML solution syringe   Generic drug:  denosumab       Vitamin D 1000 UNITS tablet               You Were Diagnosed With        Codes Comments    Left shoulder pain, unspecified chronicity    -  Primary ICD-10-CM: M25.512  ICD-9-CM: 719.41       Instructions     None    Patient Instructions History      Upcoming Appointments     Visit Type Date Time Department    RE-EVALUATION 1/23/2017  4:15 PM [facility]     TREATMENT 1/30/2017  4:15 PM [facility]     FOLLOW UP 2/2/2017  8:00 AM [facility]      Seeot Signup     Our records indicate that your [facility] [employer] account has been deactivated. If you would like to reactivate your account, please email [email or web address] or call [phone] to talk to our [employer] staff.             Other Info from Your Visit           Your Appointments     Jan 30, 2017  4:15 PM CST   Therapy Treatment with Nathaniel Sharp, PT   [facility]  (--)    [address] [address] [phone]            Feb 02, 2017  8:00 AM CST   Follow Up with Brooks Junior MD   [facility] (--)    [address]  [facility] [address] [phone]           Arrive 15 minutes prior to appointment.              Allergies     Pseudoephedrine  Shortness Of Breath, Swelling    LIPS SWELL AND TONGUE BECOMES THICK    Is she calling for an apt?   Morphine  Other (See Comments)    States felt like itching from within, hallucinations, agitation    Allegra [Fexofenadine]  Swelling    Zyrtec [Cetirizine]  Swelling      Reason for Visit     Left Shoulder - Shoulder Pain     PT Re-Evaluation           Vital Signs     Smoking Status                   Never Smoker           Problems and Diagnoses Noted     Pain in left shoulder    -  Primary

## (undated) DEVICE — GLV SURG NEOLON 2G PF LF 6.5 STRL

## (undated) DEVICE — GOWN,AURORA,NOREINF,RAGLAN,XL,STERILE: Brand: MEDLINE

## (undated) DEVICE — GLV SURG SIGNATURE ESSENTIAL PF LTX SZ7

## (undated) DEVICE — STERILE POLYISOPRENE POWDER-FREE SURGICAL GLOVES WITH EMOLLIENT COATING: Brand: PROTEXIS

## (undated) DEVICE — DRSNG GZ CURAD XEROFORM NONADHR OVERWRAP 5X9IN

## (undated) DEVICE — GLV SURG SENSICARE POLYISPRN W/ALOE PF LF 6.5 GRN STRL

## (undated) DEVICE — DISPOSABLE TOURNIQUET CUFF SINGLE BLADDER, SINGLE PORT AND QUICK CONNECT CONNECTOR: Brand: COLOR CUFF

## (undated) DEVICE — SUT VIC 0 CT1 36IN J946H

## (undated) DEVICE — SUT ETHIB 0 CT1 CR8 18IN CX21D

## (undated) DEVICE — DISPOSABLE TOURNIQUET CUFF SINGLE BLADDER, DUAL PORT AND QUICK CONNECT CONNECTOR: Brand: COLOR CUFF

## (undated) DEVICE — GLV SURG TRIUMPH LT PF LTX 7 STRL

## (undated) DEVICE — BNDG ELAS ELITE V/CLOSE 6IN 5YD LF STRL

## (undated) DEVICE — PATIENT RETURN ELECTRODE, SINGLE-USE, CONTACT QUALITY MONITORING, ADULT, WITH 9FT CORD, FOR PATIENTS WEIGING OVER 33LBS. (15KG): Brand: MEGADYNE

## (undated) DEVICE — GLV SURG SIGNATURE ESSENTIAL PF LTX SZ8

## (undated) DEVICE — STPLR SKIN VISISTAT WD 35CT

## (undated) DEVICE — SUT ETHIB 0/0 CT1 30IN X424H

## (undated) DEVICE — SOL IRR NACL 0.9PCT BT 1000ML

## (undated) DEVICE — NDL HYPO PRECISIONGLIDE/REG 18G 1IN PNK

## (undated) DEVICE — SOL IRR NACL 0.9PCT 3000ML

## (undated) DEVICE — RECIPROCATING BLADE, DOUBLE SIDED, OFFSET  (70.0 X 1.0 X 12.5MM)

## (undated) DEVICE — DRSNG WND BORDR/ADHS NONADHR/GZ LF 4X10IN STRL

## (undated) DEVICE — ANTIBACTERIAL UNDYED BRAIDED (POLYGLACTIN 910), SYNTHETIC ABSORBABLE SUTURE: Brand: COATED VICRYL

## (undated) DEVICE — BNDG ELAS CO-FLEX SLF ADHR 4IN5YD LF STRL

## (undated) DEVICE — GLV SURG SENSICARE PI LF PF 8 GRN STRL

## (undated) DEVICE — GLV SURG TRIUMPH LT PF LTX 8 STRL

## (undated) DEVICE — TBG PENCL TELESCP MEGADYNE SMOKE EVAC 10FT

## (undated) DEVICE — SPNG GZ WOVN 4X4IN 12PLY 10/BX STRL

## (undated) DEVICE — GLV SURG SENSICARE PI ORTHO SZ6.5 LF STRL

## (undated) DEVICE — APPL CHLORAPREP W/TINT 26ML ORNG

## (undated) DEVICE — PAD,ABDOMINAL,8"X10",ST,LF: Brand: MEDLINE

## (undated) DEVICE — GLV SURG SENSICARE PI ORTHO PF SZ7 LF STRL

## (undated) DEVICE — STRYKER PERFORMANCE SERIES SAGITTAL BLADE: Brand: STRYKER PERFORMANCE SERIES

## (undated) DEVICE — PK KN TOTL LF 60

## (undated) DEVICE — CATHETER,URETHRAL,REDRUBBER,STRL,16FR: Brand: MEDLINE

## (undated) DEVICE — 3 BONE CEMENT MIXER: Brand: MIXEVAC

## (undated) DEVICE — BLD SHAVER RESEC SABRE COOLCUT 5MM 13CM

## (undated) DEVICE — STERILE SYNTHETIC NEOPRENE POWDER-FREE SURGICAL GLOVES WITH NITRILE COATING, SMOOTH FINISH, STRAIGHT FINGER: Brand: PROTEXIS

## (undated) DEVICE — UNDRPD BREATH 23X36 BG/10

## (undated) DEVICE — GLV SURG SENSICARE PI PF LF 7 GRN STRL

## (undated) DEVICE — PROXIMATE SKIN STAPLERS (35 WIDE) CONTAINS 35 STAINLESS STEEL STAPLES (FIXED HEAD): Brand: PROXIMATE

## (undated) DEVICE — NDL HYPO SFTY GLD 22G 1 1/2IN

## (undated) DEVICE — SOL IRR H2O BG 1000ML STRL

## (undated) DEVICE — DRSNG GZ CURAD XEROFORM NONADHS 5X9IN STRL

## (undated) DEVICE — HOOD, PEEL-AWAY: Brand: FLYTE

## (undated) DEVICE — NO-SCRATCH ™ SMALL WHITNEY CURETTE ™ IS A SINGLE-USE, PLASTIC CURETTE FOR QUICKLY APPLYING, MANIPULATING AND REMOVING BONE CEMENT DURING HIP AND KNEE REPLACEMENT SURGERY. THE PLASTIC IS SOFTER THAN STEEL INSTRUMENTS, REDUCING THE RISK OF DAMAGING THE PROSTHESIS WITH METAL INSTRUMENTS.  THE CURETTE’S 6MM TIP REMOVES EXCESS CEMENT FROM REPLACEMENT HIPS AND KNEES. EASY-TO-MANEUVER, THE SMALL BLUE CURETTE LETS YOU REMOVE CEMENT FROM ALL EDGES OF THE PROSTHESIS.NO-SCRATCH WHITNEY SMALL CURETTE FEATURES:SAFER THAN STEEL- MADE OF PLASTIC - STURDY YET SOFTER THAN SURGICAL STEEL.HANDIER- EACH TOOL HAS A MOLDED-IN THUMB INDENTATION INSTANTLY ORIENTING THE TOOL.- EASIER TO MANEUVER IN HARD TO SEE PLACES.- COLOR-CODED FOR EASY IDENTIFICATION.FASTER- COMES INDIVIDUALLY PACKAGED IN STERILE, PEEL OPEN POUCH, READY TO GO.- APPLIES, MANIPULATES, OR REMOVES CEMENT WITH FINGERTIP PRECISION.ECONOMICAL- THE COST OF A SINGLE REVISION DWARFS THE COST OF A SINGLE-USE CURETTE. - DISPOSABLE – THERE’S NO NEED TO WASTE TIME REMOVING HARDENED CEMENT OR RE-STERILIZING TOOLS.- LESS EXPENSIVE TO BUY AND INVENTORY - ORDER ONLY THE TOOL YOU USE.- PACKAGED 25 INDIVIDUALLY WRAPPED TOOLS TO A CARTON FOR CONVENIENT SHELF STORAGE.: Brand: WHITNEY NO-SCRATCH CURETTE (SMALL)

## (undated) DEVICE — SYR LUERLOK 20CC BX/50

## (undated) DEVICE — TBG PUMP ARTHSCP MAIN AR6400 16FT

## (undated) DEVICE — SUT ETHLN 3/0 FS1 663G

## (undated) DEVICE — HANDPIECE SET WITH COAXIAL HIGH FLOW TIP AND SUCTION TUBE: Brand: INTERPULSE

## (undated) DEVICE — TRAY,FOLEY INSERTION,PVP,10ML SYRINGE: Brand: MEDLINE

## (undated) DEVICE — HOOD WITH PEEL AWAY FACE SHIELD: Brand: T7PLUS